# Patient Record
Sex: FEMALE | Race: WHITE | NOT HISPANIC OR LATINO | URBAN - METROPOLITAN AREA
[De-identification: names, ages, dates, MRNs, and addresses within clinical notes are randomized per-mention and may not be internally consistent; named-entity substitution may affect disease eponyms.]

---

## 2017-11-02 ENCOUNTER — INPATIENT (INPATIENT)
Facility: HOSPITAL | Age: 72
LOS: 10 days | Discharge: HOME | DRG: 233 | End: 2017-11-13
Attending: THORACIC SURGERY (CARDIOTHORACIC VASCULAR SURGERY) | Admitting: INTERNAL MEDICINE
Payer: MEDICARE

## 2017-11-02 ENCOUNTER — EMERGENCY (EMERGENCY)
Facility: HOSPITAL | Age: 72
LOS: 1 days | Discharge: ANOTHER HOSPITAL | End: 2017-11-02
Attending: EMERGENCY MEDICINE | Admitting: EMERGENCY MEDICINE
Payer: MEDICARE

## 2017-11-02 VITALS
SYSTOLIC BLOOD PRESSURE: 137 MMHG | DIASTOLIC BLOOD PRESSURE: 54 MMHG | TEMPERATURE: 98 F | OXYGEN SATURATION: 96 % | RESPIRATION RATE: 19 BRPM | HEART RATE: 86 BPM

## 2017-11-02 VITALS
WEIGHT: 190.92 LBS | HEART RATE: 87 BPM | OXYGEN SATURATION: 100 % | RESPIRATION RATE: 16 BRPM | SYSTOLIC BLOOD PRESSURE: 175 MMHG | DIASTOLIC BLOOD PRESSURE: 96 MMHG | HEIGHT: 67 IN

## 2017-11-02 VITALS
WEIGHT: 190.92 LBS | RESPIRATION RATE: 18 BRPM | DIASTOLIC BLOOD PRESSURE: 89 MMHG | HEIGHT: 67 IN | TEMPERATURE: 99 F | HEART RATE: 93 BPM | OXYGEN SATURATION: 96 % | SYSTOLIC BLOOD PRESSURE: 161 MMHG

## 2017-11-02 DIAGNOSIS — I21.4 NON-ST ELEVATION (NSTEMI) MYOCARDIAL INFARCTION: ICD-10-CM

## 2017-11-02 DIAGNOSIS — Z98.890 OTHER SPECIFIED POSTPROCEDURAL STATES: Chronic | ICD-10-CM

## 2017-11-02 DIAGNOSIS — Z90.710 ACQUIRED ABSENCE OF BOTH CERVIX AND UTERUS: Chronic | ICD-10-CM

## 2017-11-02 DIAGNOSIS — Z90.89 ACQUIRED ABSENCE OF OTHER ORGANS: Chronic | ICD-10-CM

## 2017-11-02 LAB
ALBUMIN SERPL ELPH-MCNC: 3.5 G/DL — SIGNIFICANT CHANGE UP (ref 3.3–5)
ALP SERPL-CCNC: 74 U/L — SIGNIFICANT CHANGE UP (ref 40–120)
ALT FLD-CCNC: 25 U/L — SIGNIFICANT CHANGE UP (ref 12–78)
ANION GAP SERPL CALC-SCNC: 13 MMOL/L — SIGNIFICANT CHANGE UP (ref 5–17)
ANION GAP SERPL CALC-SCNC: 18 MMOL/L — HIGH (ref 5–17)
APTT BLD: 30.9 SEC — SIGNIFICANT CHANGE UP (ref 27.5–37.4)
APTT BLD: 32.6 SEC — SIGNIFICANT CHANGE UP (ref 27.5–37.4)
AST SERPL-CCNC: 24 U/L — SIGNIFICANT CHANGE UP (ref 15–37)
BASOPHILS # BLD AUTO: 0.1 K/UL — SIGNIFICANT CHANGE UP (ref 0–0.2)
BASOPHILS NFR BLD AUTO: 1 % — SIGNIFICANT CHANGE UP (ref 0–2)
BILIRUB SERPL-MCNC: 0.7 MG/DL — SIGNIFICANT CHANGE UP (ref 0.2–1.2)
BUN SERPL-MCNC: 15 MG/DL — SIGNIFICANT CHANGE UP (ref 7–23)
BUN SERPL-MCNC: 16 MG/DL — SIGNIFICANT CHANGE UP (ref 7–23)
CALCIUM SERPL-MCNC: 8.3 MG/DL — LOW (ref 8.5–10.1)
CALCIUM SERPL-MCNC: 9.1 MG/DL — SIGNIFICANT CHANGE UP (ref 8.4–10.5)
CHLORIDE SERPL-SCNC: 108 MMOL/L — SIGNIFICANT CHANGE UP (ref 96–108)
CHLORIDE SERPL-SCNC: 98 MMOL/L — SIGNIFICANT CHANGE UP (ref 96–108)
CK MB BLD-MCNC: 2.8 % — SIGNIFICANT CHANGE UP (ref 0–3.5)
CK MB BLD-MCNC: 6.3 % — HIGH (ref 0–3.5)
CK MB CFR SERPL CALC: 1.3 NG/ML — SIGNIFICANT CHANGE UP (ref 0–3.6)
CK MB CFR SERPL CALC: 1.7 NG/ML — SIGNIFICANT CHANGE UP (ref 0–3.8)
CK SERPL-CCNC: 27 U/L — SIGNIFICANT CHANGE UP (ref 25–170)
CK SERPL-CCNC: 47 U/L — SIGNIFICANT CHANGE UP (ref 26–192)
CO2 SERPL-SCNC: 22 MMOL/L — SIGNIFICANT CHANGE UP (ref 22–31)
CO2 SERPL-SCNC: 25 MMOL/L — SIGNIFICANT CHANGE UP (ref 22–31)
CREAT SERPL-MCNC: 0.81 MG/DL — SIGNIFICANT CHANGE UP (ref 0.5–1.3)
CREAT SERPL-MCNC: 1.11 MG/DL — SIGNIFICANT CHANGE UP (ref 0.5–1.3)
D DIMER BLD IA.RAPID-MCNC: 659 NG/ML DDU — HIGH
EOSINOPHIL # BLD AUTO: 0.1 K/UL — SIGNIFICANT CHANGE UP (ref 0–0.5)
EOSINOPHIL NFR BLD AUTO: 1.3 % — SIGNIFICANT CHANGE UP (ref 0–6)
GLUCOSE SERPL-MCNC: 158 MG/DL — HIGH (ref 70–99)
GLUCOSE SERPL-MCNC: 85 MG/DL — SIGNIFICANT CHANGE UP (ref 70–99)
HCT VFR BLD CALC: 32.7 % — LOW (ref 34.5–45)
HCT VFR BLD CALC: 34.4 % — LOW (ref 34.5–45)
HGB BLD-MCNC: 10.9 G/DL — LOW (ref 11.5–15.5)
HGB BLD-MCNC: 11.2 G/DL — LOW (ref 11.5–15.5)
INR BLD: 1.15 RATIO — SIGNIFICANT CHANGE UP (ref 0.88–1.16)
INR BLD: 1.19 RATIO — HIGH (ref 0.88–1.16)
LYMPHOCYTES # BLD AUTO: 2 K/UL — SIGNIFICANT CHANGE UP (ref 1–3.3)
LYMPHOCYTES # BLD AUTO: 24 % — SIGNIFICANT CHANGE UP (ref 13–44)
MAGNESIUM SERPL-MCNC: 1.8 MG/DL — SIGNIFICANT CHANGE UP (ref 1.6–2.6)
MCHC RBC-ENTMCNC: 28.1 PG — SIGNIFICANT CHANGE UP (ref 27–34)
MCHC RBC-ENTMCNC: 29.6 PG — SIGNIFICANT CHANGE UP (ref 27–34)
MCHC RBC-ENTMCNC: 31.6 GM/DL — LOW (ref 32–36)
MCHC RBC-ENTMCNC: 34.1 GM/DL — SIGNIFICANT CHANGE UP (ref 32–36)
MCV RBC AUTO: 87 FL — SIGNIFICANT CHANGE UP (ref 80–100)
MCV RBC AUTO: 88.9 FL — SIGNIFICANT CHANGE UP (ref 80–100)
MONOCYTES # BLD AUTO: 0.6 K/UL — SIGNIFICANT CHANGE UP (ref 0–0.9)
MONOCYTES NFR BLD AUTO: 6.7 % — SIGNIFICANT CHANGE UP (ref 1–9)
NEUTROPHILS # BLD AUTO: 5.6 K/UL — SIGNIFICANT CHANGE UP (ref 1.8–7.4)
NEUTROPHILS NFR BLD AUTO: 67 % — SIGNIFICANT CHANGE UP (ref 43–77)
PHOSPHATE SERPL-MCNC: 4.6 MG/DL — HIGH (ref 2.5–4.5)
PLATELET # BLD AUTO: 346 K/UL — SIGNIFICANT CHANGE UP (ref 150–400)
PLATELET # BLD AUTO: 354 K/UL — SIGNIFICANT CHANGE UP (ref 150–400)
POTASSIUM SERPL-MCNC: 3.8 MMOL/L — SIGNIFICANT CHANGE UP (ref 3.5–5.3)
POTASSIUM SERPL-MCNC: 4.5 MMOL/L — SIGNIFICANT CHANGE UP (ref 3.5–5.3)
POTASSIUM SERPL-SCNC: 3.8 MMOL/L — SIGNIFICANT CHANGE UP (ref 3.5–5.3)
POTASSIUM SERPL-SCNC: 4.5 MMOL/L — SIGNIFICANT CHANGE UP (ref 3.5–5.3)
PROT SERPL-MCNC: 8.2 G/DL — SIGNIFICANT CHANGE UP (ref 6–8.3)
PROTHROM AB SERPL-ACNC: 12.6 SEC — SIGNIFICANT CHANGE UP (ref 9.8–12.7)
PROTHROM AB SERPL-ACNC: 13 SEC — HIGH (ref 9.8–12.7)
RBC # BLD: 3.76 M/UL — LOW (ref 3.8–5.2)
RBC # BLD: 3.87 M/UL — SIGNIFICANT CHANGE UP (ref 3.8–5.2)
RBC # FLD: 14.6 % — HIGH (ref 10.3–14.5)
RBC # FLD: 15.2 % — HIGH (ref 10.3–14.5)
SODIUM SERPL-SCNC: 141 MMOL/L — SIGNIFICANT CHANGE UP (ref 135–145)
SODIUM SERPL-SCNC: 143 MMOL/L — SIGNIFICANT CHANGE UP (ref 135–145)
TROPONIN I SERPL-MCNC: 0.09 NG/ML — HIGH (ref 0.01–0.04)
TROPONIN T SERPL-MCNC: <0.01 NG/ML — SIGNIFICANT CHANGE UP (ref 0–0.06)
WBC # BLD: 8.3 K/UL — SIGNIFICANT CHANGE UP (ref 3.8–10.5)
WBC # BLD: 8.3 K/UL — SIGNIFICANT CHANGE UP (ref 3.8–10.5)
WBC # FLD AUTO: 8.3 K/UL — SIGNIFICANT CHANGE UP (ref 3.8–10.5)
WBC # FLD AUTO: 8.3 K/UL — SIGNIFICANT CHANGE UP (ref 3.8–10.5)

## 2017-11-02 PROCEDURE — 93010 ELECTROCARDIOGRAM REPORT: CPT

## 2017-11-02 PROCEDURE — 82550 ASSAY OF CK (CPK): CPT

## 2017-11-02 PROCEDURE — 85027 COMPLETE CBC AUTOMATED: CPT

## 2017-11-02 PROCEDURE — 85610 PROTHROMBIN TIME: CPT

## 2017-11-02 PROCEDURE — 93458 L HRT ARTERY/VENTRICLE ANGIO: CPT | Mod: 26,GC

## 2017-11-02 PROCEDURE — 85730 THROMBOPLASTIN TIME PARTIAL: CPT

## 2017-11-02 PROCEDURE — 71275 CT ANGIOGRAPHY CHEST: CPT

## 2017-11-02 PROCEDURE — 94640 AIRWAY INHALATION TREATMENT: CPT

## 2017-11-02 PROCEDURE — 71045 X-RAY EXAM CHEST 1 VIEW: CPT

## 2017-11-02 PROCEDURE — 93005 ELECTROCARDIOGRAM TRACING: CPT

## 2017-11-02 PROCEDURE — 71275 CT ANGIOGRAPHY CHEST: CPT | Mod: 26

## 2017-11-02 PROCEDURE — 99223 1ST HOSP IP/OBS HIGH 75: CPT | Mod: GC

## 2017-11-02 PROCEDURE — 99285 EMERGENCY DEPT VISIT HI MDM: CPT

## 2017-11-02 PROCEDURE — 84484 ASSAY OF TROPONIN QUANT: CPT

## 2017-11-02 PROCEDURE — 99152 MOD SED SAME PHYS/QHP 5/>YRS: CPT | Mod: GC

## 2017-11-02 PROCEDURE — 99285 EMERGENCY DEPT VISIT HI MDM: CPT | Mod: 25

## 2017-11-02 PROCEDURE — 80053 COMPREHEN METABOLIC PANEL: CPT

## 2017-11-02 PROCEDURE — 96374 THER/PROPH/DIAG INJ IV PUSH: CPT | Mod: XU

## 2017-11-02 PROCEDURE — 82553 CREATINE MB FRACTION: CPT

## 2017-11-02 PROCEDURE — 71010: CPT | Mod: 26

## 2017-11-02 PROCEDURE — 85379 FIBRIN DEGRADATION QUANT: CPT

## 2017-11-02 PROCEDURE — 71010: CPT | Mod: 26,77

## 2017-11-02 RX ORDER — FUROSEMIDE 40 MG
40 TABLET ORAL ONCE
Qty: 0 | Refills: 0 | Status: COMPLETED | OUTPATIENT
Start: 2017-11-02 | End: 2017-11-02

## 2017-11-02 RX ORDER — ONDANSETRON 8 MG/1
4 TABLET, FILM COATED ORAL ONCE
Qty: 0 | Refills: 0 | Status: COMPLETED | OUTPATIENT
Start: 2017-11-02 | End: 2017-11-02

## 2017-11-02 RX ORDER — DEXTROSE 50 % IN WATER 50 %
12.5 SYRINGE (ML) INTRAVENOUS ONCE
Qty: 0 | Refills: 0 | Status: DISCONTINUED | OUTPATIENT
Start: 2017-11-02 | End: 2017-11-04

## 2017-11-02 RX ORDER — DEXTROSE 50 % IN WATER 50 %
25 SYRINGE (ML) INTRAVENOUS ONCE
Qty: 0 | Refills: 0 | Status: DISCONTINUED | OUTPATIENT
Start: 2017-11-02 | End: 2017-11-04

## 2017-11-02 RX ORDER — ASPIRIN/CALCIUM CARB/MAGNESIUM 324 MG
325 TABLET ORAL ONCE
Qty: 0 | Refills: 0 | Status: COMPLETED | OUTPATIENT
Start: 2017-11-02 | End: 2017-11-02

## 2017-11-02 RX ORDER — DEXTROSE 50 % IN WATER 50 %
1 SYRINGE (ML) INTRAVENOUS ONCE
Qty: 0 | Refills: 0 | Status: DISCONTINUED | OUTPATIENT
Start: 2017-11-02 | End: 2017-11-04

## 2017-11-02 RX ORDER — INSULIN LISPRO 100/ML
VIAL (ML) SUBCUTANEOUS AT BEDTIME
Qty: 0 | Refills: 0 | Status: DISCONTINUED | OUTPATIENT
Start: 2017-11-02 | End: 2017-11-04

## 2017-11-02 RX ORDER — IPRATROPIUM/ALBUTEROL SULFATE 18-103MCG
3 AEROSOL WITH ADAPTER (GRAM) INHALATION ONCE
Qty: 0 | Refills: 0 | Status: COMPLETED | OUTPATIENT
Start: 2017-11-02 | End: 2017-11-02

## 2017-11-02 RX ORDER — INFLUENZA VIRUS VACCINE 15; 15; 15; 15 UG/.5ML; UG/.5ML; UG/.5ML; UG/.5ML
0.5 SUSPENSION INTRAMUSCULAR ONCE
Qty: 0 | Refills: 0 | Status: DISCONTINUED | OUTPATIENT
Start: 2017-11-02 | End: 2017-11-09

## 2017-11-02 RX ORDER — INSULIN LISPRO 100/ML
VIAL (ML) SUBCUTANEOUS
Qty: 0 | Refills: 0 | Status: DISCONTINUED | OUTPATIENT
Start: 2017-11-02 | End: 2017-11-04

## 2017-11-02 RX ORDER — LANOLIN ALCOHOL/MO/W.PET/CERES
3 CREAM (GRAM) TOPICAL AT BEDTIME
Qty: 0 | Refills: 0 | Status: DISCONTINUED | OUTPATIENT
Start: 2017-11-02 | End: 2017-11-04

## 2017-11-02 RX ORDER — SODIUM CHLORIDE 9 MG/ML
3 INJECTION INTRAMUSCULAR; INTRAVENOUS; SUBCUTANEOUS ONCE
Qty: 0 | Refills: 0 | Status: COMPLETED | OUTPATIENT
Start: 2017-11-02 | End: 2017-11-02

## 2017-11-02 RX ORDER — SODIUM CHLORIDE 9 MG/ML
1000 INJECTION, SOLUTION INTRAVENOUS
Qty: 0 | Refills: 0 | Status: DISCONTINUED | OUTPATIENT
Start: 2017-11-02 | End: 2017-11-04

## 2017-11-02 RX ORDER — ATORVASTATIN CALCIUM 80 MG/1
80 TABLET, FILM COATED ORAL AT BEDTIME
Qty: 0 | Refills: 0 | Status: DISCONTINUED | OUTPATIENT
Start: 2017-11-02 | End: 2017-11-08

## 2017-11-02 RX ORDER — POTASSIUM CHLORIDE 20 MEQ
20 PACKET (EA) ORAL ONCE
Qty: 0 | Refills: 0 | Status: COMPLETED | OUTPATIENT
Start: 2017-11-02 | End: 2017-11-03

## 2017-11-02 RX ORDER — MAGNESIUM SULFATE 500 MG/ML
1 VIAL (ML) INJECTION ONCE
Qty: 0 | Refills: 0 | Status: COMPLETED | OUTPATIENT
Start: 2017-11-02 | End: 2017-11-03

## 2017-11-02 RX ORDER — ASPIRIN/CALCIUM CARB/MAGNESIUM 324 MG
81 TABLET ORAL DAILY
Qty: 0 | Refills: 0 | Status: DISCONTINUED | OUTPATIENT
Start: 2017-11-02 | End: 2017-11-04

## 2017-11-02 RX ORDER — GLUCAGON INJECTION, SOLUTION 0.5 MG/.1ML
1 INJECTION, SOLUTION SUBCUTANEOUS ONCE
Qty: 0 | Refills: 0 | Status: DISCONTINUED | OUTPATIENT
Start: 2017-11-02 | End: 2017-11-04

## 2017-11-02 RX ORDER — CLOPIDOGREL BISULFATE 75 MG/1
300 TABLET, FILM COATED ORAL ONCE
Qty: 0 | Refills: 0 | Status: COMPLETED | OUTPATIENT
Start: 2017-11-02 | End: 2017-11-02

## 2017-11-02 RX ADMIN — ONDANSETRON 4 MILLIGRAM(S): 8 TABLET, FILM COATED ORAL at 20:55

## 2017-11-02 RX ADMIN — Medication 3 MILLILITER(S): at 10:46

## 2017-11-02 RX ADMIN — CLOPIDOGREL BISULFATE 300 MILLIGRAM(S): 75 TABLET, FILM COATED ORAL at 16:31

## 2017-11-02 RX ADMIN — ATORVASTATIN CALCIUM 80 MILLIGRAM(S): 80 TABLET, FILM COATED ORAL at 23:47

## 2017-11-02 RX ADMIN — SODIUM CHLORIDE 3 MILLILITER(S): 9 INJECTION INTRAMUSCULAR; INTRAVENOUS; SUBCUTANEOUS at 10:46

## 2017-11-02 RX ADMIN — Medication 40 MILLIGRAM(S): at 15:34

## 2017-11-02 RX ADMIN — Medication 325 MILLIGRAM(S): at 10:46

## 2017-11-02 RX ADMIN — Medication 3 MILLIGRAM(S): at 23:47

## 2017-11-02 NOTE — CONSULT NOTE ADULT - ASSESSMENT
72 year old woman with a history of tobacco use, DM, HTN, poor historian presents for dyspnea.  - She is complaining of significant dyspnea and is volume overloaded I would give Lasix 40mg IV x 1  - She has anginal symptoms, with mildly elevated trops and ischemic EKG changes. She will need a cardiac cath.   - ASA 325mg x1  - Plavix 300mg x1  - transfer to [place] for cardiac cath. Pt understands and agrees with procedure.

## 2017-11-02 NOTE — ED PROVIDER NOTE - OBJECTIVE STATEMENT
pt c/o sob x 2 weeks with epigastric pain this am, now resolved. no fevers, chills, ha, d/n/v, cp, cough, edema, calf pain, travel.  pmd - katof  cards - none  pulm - none

## 2017-11-02 NOTE — CHART NOTE - NSCHARTNOTEFT_GEN_A_CORE
====================  HPI:  ====================  72 year old woman with a history of tobacco use, T2 DM unknown HgA1C, ?HTN on no medications  poor historian presented to [facility] with c/o  for dyspnea. She has been complaining of progressively worsening dyspnea on exertion for the last few months. Her exercise tolerance is limited to about half a block. LAst night the patient  felt dyspnea worsened  when laying down with chest pressure. + orthopnea, and went to [facility]. She was found to have an elevated D-  Dimer 659 but CT angio was negative for PE ( results below) Her Troponin I 0.093 was elevated  ws also elevated. Patient was given  mg, Plavix 300mg and Lasix 40 mg and transferred to [facility] for further evaluation. The patient currently denies any CP, palpitations, syncope or near syncope     PCI: 99% mLAD, 99% pCx. CTS consulted (Jackie)    ====================  VITALS:  ====================    ICU Vital Signs Last 24 Hrs  T(C): 36.6 (02 Nov 2017 17:29), Max: 37 (02 Nov 2017 09:34)  T(F): 97.8 (02 Nov 2017 17:29), Max: 98.6 (02 Nov 2017 09:34)  HR: 87 (02 Nov 2017 18:49) (82 - 93)  BP: 175/96 (02 Nov 2017 18:49) (132/57 - 175/96)  BP(mean): 122 (02 Nov 2017 18:49) (122 - 122)  ABP: --  ABP(mean): --  RR: 16 (02 Nov 2017 18:49) (16 - 19)  SpO2: 100% (02 Nov 2017 18:49) (96% - 100%)      I&O's Summary    ====================  LABS:  ====================                          10.9   8.3   )-----------( 354      ( 02 Nov 2017 10:44 )             34.4     11-02    143  |  108  |  15  ----------------------------<  85  4.5   |  22  |  0.81    Ca    8.3<L>      02 Nov 2017 10:44    TPro  8.2  /  Alb  3.5  /  TBili  0.7  /  DBili  x   /  AST  24  /  ALT  25  /  AlkPhos  74  11-02    PT/INR - ( 02 Nov 2017 10:44 )   PT: 12.6 sec;   INR: 1.15 ratio         PTT - ( 02 Nov 2017 10:44 )  PTT:32.6 sec  Creatine Kinase, Serum: 47 U/L (11-02-17 @ 10:44)        ====================  PLAN:  ====================  - c/w ASA. Holding plavix 2/2 CTS consult  - Remove Radial Band @ 1030PM  - Trend Little, EKGs  - eventual PCI vs. CTS

## 2017-11-02 NOTE — CHART NOTE - NSCHARTNOTEFT_GEN_A_CORE
====================  CCU MIDNIGHT ROUNDS  ====================    LUIGI MELARA  [id number]    ====================  SUMMARY:  ====================    72 year old woman with a history of tobacco use, T2 DM unknown HgA1C, ?HTN on no medications  poor historian presented to [facility] with c/o  for dyspnea. She has been complaining of progressively worsening dyspnea on exertion for the last few months. Her exercise tolerance is limited to about half a block. LAst night the patient  felt dyspnea worsened  when laying down with chest pressure. + orthopnea, and went to [facility]. She was found to have an elevated D-  Dimer 659 but CT angio was negative for PE ( results below) Her Troponin I 0.093 was elevated  ws also elevated. Patient was given  mg, Plavix 300mg and Lasix 40 mg and transferred to [facility] for further evaluation. The patient currently denies any CP, palpitations, syncope or near syncope     PCI: 99% mLAD, 99% pCx. CTS consulted (Jackie)    ====================  NEW EVENTS:  ====================    no events    ====================  VITALS (Last 12 hrs):  ====================    T(C): 36.6 (11-02-17 @ 20:30), Max: 36.6 (11-02-17 @ 17:29)  HR: 76 (11-02-17 @ 22:45) (76 - 87)  BP: 148/70 (11-02-17 @ 22:45) (132/57 - 175/96)  BP(mean): 96 (11-02-17 @ 22:45) (96 - 138)  ABP: --  ABP(mean): --  RR: 22 (11-02-17 @ 22:45) (16 - 34)  SpO2: 94% (11-02-17 @ 22:45) (91% - 100%)  Wt(kg): --  CVP(mm Hg): --  CO: --  CI: --  PA: --  PA(mean): --  PA(direct): --  PCWP: --  SVR: --    TELEMETRY:        *BLOOD GAS/ARTERIAL/MIXED/VENOUS  *LACTATE    I&O's Summary      ====================  PLAN:  # Neuro: AAOx3, no issues  # CAD s/p cath showing multivessel disease. CT Surgery consulted. Will continue medical treatement with aspirin and atorvastatin. After radial sheath is removed, heparin drip will be restarted in 6 hours. Plavix on hold for CT surgery evaluation. Will trend cardiac enzymes   # Endocrine: Ha1c pending, ISS. Holding home oral hypoglycemics     ====================    HEALTH ISSUES - PROBLEM Dx:        HEALTH ISSUES - R/O PROBLEM Dx:      Bernadette Balderas PGY2  Pager [phone]

## 2017-11-02 NOTE — ED ADULT NURSE NOTE - ED STAT RN HANDOFF DETAILS 2
received hand off report from Marichuy GALLARDO. pt awaiting transfer to [facility]. on cardiac monitor, NSR noted. denies pain at this time.

## 2017-11-02 NOTE — ED ADULT NURSE NOTE - OBJECTIVE STATEMENT
Patient is a and o x3. No acute distress noted. Placed on Cardiac monitor MSpencer Patient is a and o x3. No acute distress noted. Placed on Cardiac monitor Rufina  [id number] Report  given to True    Regarding transfer to [facility]. Patient advised of NPO status and accepting MD Haas. Kikoencer

## 2017-11-02 NOTE — H&P CARDIOLOGY - HISTORY OF PRESENT ILLNESS
72 year old woman with a history of tobacco use, T2 DM unknown HgA1C, ?HTN on no medications  poor historian presented to [facility] with c/o  for dyspnea. She has been complaining of progressively worsening dyspnea on exertion for the last few months. Her exercise tolerance is limited to about half a block. LAst night the patient  felt dyspnea worsened  when laying down with chest pressure. + orthopnea, and went to [facility]. She was found to have an elevated D-  Dimer 659 but CT angio was negative for PE ( results below) Her Troponin ws also elevated. Patient was given  mg, Plavix 300mg and LAsix 40 mg and transferred to [facility] for further evaluation. The patient currently denies any CP, palpitations, syncope or near syncope     < from: CT Angio Chest w/ IV Cont (11.02.17 @ 14:02) >  mpression:    No CT evidence for central pulmonary embolism as discussed.    CT findings compatible with interstitial pulmonary edema including small   bilateral pleural effusions, larger on the right.      < end of copied text > 72 year old woman with a history of tobacco use, T2 DM unknown HgA1C, ?HTN on no medications  poor historian presented to [facility] with c/o  for dyspnea. She has been complaining of progressively worsening dyspnea on exertion for the last few months. Her exercise tolerance is limited to about half a block. LAst night the patient  felt dyspnea worsened  when laying down with chest pressure. + orthopnea, and went to [facility]. She was found to have an elevated D-  Dimer 659 but CT angio was negative for PE ( results below) Her Troponin I was elevated  ws also elevated. Patient was given  mg, Plavix 300mg and Lasix 40 mg and transferred to [facility] for further evaluation. The patient currently denies any CP, palpitations, syncope or near syncope   Troponin I, Serum (11.02.17 @ 10:44)    Troponin I, Serum: .093: The new reference range for Troponin-I performed on the Siemens Vista  system is 0.015-0.045 ng/mL, which includes the 99th percentile of a  healthy reference population. Studies have shown that elevated troponin  levels above the 99th percentile cutoff are associated with an increased  risk for adverse cardiac events, with the risk increasing as troponin  levels increase. As per a joint committee of the American College of  Cardiology and European Society of Cardiology, diagnosis of classic MI is  based upon the detection of a rise or fall of cardiac troponin values,  with at least one value above the 99th percentile upper reference limit,  in the appropriate clinical context.  Troponin-I (ng/mL) Interpretation  0.00-0.045 Normal range (includes the 99th percentile of a healthy  reference population)  >0.045 Elevated troponin level indicating increased risk  Note: Troponin-I and Troponin-T cannot be used interchangeably in serial  measurements. Minimally elevated Troponin results should be interpreted  in the context of clinical findings and risk factors. ng/mL        < from: CT Angio Chest w/ IV Cont (11.02.17 @ 14:02) >  mpression:    No CT evidence for central pulmonary embolism as discussed.    CT findings compatible with interstitial pulmonary edema including small   bilateral pleural effusions, larger on the right.      < end of copied text > 72 year old woman with a history of tobacco use, T2 DM unknown HgA1C, ?HTN on no medications  poor historian presented to [facility] with c/o  for dyspnea. She has been complaining of progressively worsening dyspnea on exertion for the last few months. Her exercise tolerance is limited to about half a block. LAst night the patient  felt dyspnea worsened  when laying down with chest pressure. + orthopnea, and went to [facility]. She was found to have an elevated D-  Dimer 659 but CT angio was negative for PE ( results below) Her Troponin I 0.093 was elevated  ws also elevated. Patient was given  mg, Plavix 300mg and Lasix 40 mg and transferred to [facility] for further evaluation. The patient currently denies any CP, palpitations, syncope or near syncope             < from: CT Angio Chest w/ IV Cont (11.02.17 @ 14:02) >  mpression:    No CT evidence for central pulmonary embolism as discussed.    CT findings compatible with interstitial pulmonary edema including small   bilateral pleural effusions, larger on the right.      < end of copied text >    < from: Xray Chest 1 View AP/PA (11.02.17 @ 11:29) >  Mild vascular congestion present. Loss of definition left hemidiaphragm   and left costophrenic angle, effusion. Heart size appears enlarged   although there is magnification secondary to portable technique. Aortic   knob contains calcifications. No acute osseous abnormalities.    IMPRESSION:    < end of copied text >

## 2017-11-02 NOTE — CONSULT NOTE ADULT - SUBJECTIVE AND OBJECTIVE BOX
CHIEF COMPLAINT: Patient is a 72y old  Female who presents with a chief complaint of dyspnea    HPI:  72 year old woman with a history of tobacco use, DM, HTN, poor historian presents for dyspnea. She has been complaining of progressively worsening dyspnea on exertion for the last few weeks. Her exercise tolerance is limited to about half a block. She today felt dyspnea when laying down with chest pressure. + orthopnea, Denies any  PND, orthopnea, near syncope, syncope, lower extremity edema, stroke like symptoms.     EKG: SR with ST/T wave changes c/w lateral ischemia    REVIEW OF SYSTEMS:   All other review of systems are negative unless indicated above    PAST MEDICAL & SURGICAL HISTORY:  see listed    SOCIAL HISTORY:  + tobacco, denies ethanol, or drug abuse.    FAMILY HISTORY:    No family history of acute MI or sudden cardiac death.           Allergies    No Known Drug Allergies  Peroxide (Blisters)    Intolerances        Home meds:  Home Medications:        VITAL SIGNS:   Vital Signs Last 24 Hrs  T(C): 37 (02 Nov 2017 09:34), Max: 37 (02 Nov 2017 09:34)  T(F): 98.6 (02 Nov 2017 09:34), Max: 98.6 (02 Nov 2017 09:34)  HR: 82 (02 Nov 2017 12:02) (82 - 93)  BP: 132/57 (02 Nov 2017 12:02) (132/57 - 161/89)  BP(mean): --  RR: 18 (02 Nov 2017 12:02) (18 - 18)  SpO2: 96% (02 Nov 2017 12:02) (96% - 96%)    I&O's Summary      On Exam:    Constitutional: NAD, awake and alert, well-developed  HEENT: Moist Mucous Membranes, Anicteric  Pulmonary: Decreased breath sounds b/l. bibisilar crackles.   Cardiovascular: Regular, S1 and S2, No murmurs, rubs, gallops or clicks  Gastrointestinal: Bowel Sounds present, soft, nontender.   Lymph: trace peripheral edema. No lymphadenopathy.  Skin: No visible rashes or ulcers.  Psych:  Mood & affect appropriate    LABS: All Labs Reviewed:                        10.9   8.3   )-----------( 354      ( 02 Nov 2017 10:44 )             34.4     02 Nov 2017 10:44    143    |  108    |  15     ----------------------------<  85     4.5     |  22     |  0.81     Ca    8.3        02 Nov 2017 10:44    TPro  8.2    /  Alb  3.5    /  TBili  0.7    /  DBili  x      /  AST  24     /  ALT  25     /  AlkPhos  74     02 Nov 2017 10:44    PT/INR - ( 02 Nov 2017 10:44 )   PT: 12.6 sec;   INR: 1.15 ratio         PTT - ( 02 Nov 2017 10:44 )  PTT:32.6 sec  CARDIAC MARKERS ( 02 Nov 2017 10:44 )  .093 ng/mL / x     / 47 U/L / x     / 1.3 ng/mL      Blood Culture:         RADIOLOGY:  < from: CT Angio Chest w/ IV Cont (11.02.17 @ 14:02) >    EXAM:  CT ANGIO CHEST (W)AW IC                            PROCEDURE DATE:  11/02/2017          INTERPRETATION:  CLINICAL INFORMATION:  Respiratory difficulty.    PROCEDURE:  Using multislice helical technique,  CT angiography, 1.5 mm   sections were obtained  following the intravenous administration of 78   ccs of Omnipaque 350.  Multiplanar MIP reconstructed images were obtained.    FINDINGS:      There is no evidence of intraluminal filling defects to suggest central   or interlobar pulmonaryemboli.  Evaluation of the lower lobe segmental pulmonary artery branches is   limited secondary to artifact.    The heart is enlarged. There is arteriosclerotic calcification of the   thoracic aorta with coronary artery calcification. There is a small   anterior pericardial effusion.    There is a pretracheal mediastinal lymph nodes, measuring up to 10 mm in   short axis.  No enlarged retroperitoneal lymphadenopathy is noted.    There is a small to moderate right-sided pleural effusion and a small   left-sided pleural fusion.  There is bilateral interlobular septal thickening. Findings are   compatible with pulmonary interstitial edema.  No lobar lung consolidation is noted.    There is narrowing of the tracheal bronchial airways, which remain patent.    Limited images that include the upper abdomen suggests an enlarged liver   and spleen.  Partially visualized is calcification at the upper pole of the right   kidney.    There is an age indeterminant compression fracture deformity of theT11   vertebral body. Degenerative disc disease with intervertebral vacuum disc   phenomenon is present at T11-12.    There is a small nonspecific sclerotic focus right posterior seventh rib.    Impression:    No CT evidence for central pulmonary embolism as discussed.    CT findings compatible with interstitial pulmonary edema including small   bilateral pleural effusions, larger on the right.    Other findings as discussed above.                          ALVINO SIEGMANN M.D., ATTENDING RADIOLOGIST  This document has been electronically signed. Nov 2 2017  2:59PM                < end of copied text >

## 2017-11-03 DIAGNOSIS — I25.10 ATHEROSCLEROTIC HEART DISEASE OF NATIVE CORONARY ARTERY WITHOUT ANGINA PECTORIS: ICD-10-CM

## 2017-11-03 DIAGNOSIS — I50.9 HEART FAILURE, UNSPECIFIED: ICD-10-CM

## 2017-11-03 LAB
ANION GAP SERPL CALC-SCNC: 19 MMOL/L — HIGH (ref 5–17)
APPEARANCE UR: ABNORMAL
APTT BLD: 32.2 SEC — SIGNIFICANT CHANGE UP (ref 27.5–37.4)
APTT BLD: 35.9 SEC — SIGNIFICANT CHANGE UP (ref 27.5–37.4)
APTT BLD: 37.6 SEC — HIGH (ref 27.5–37.4)
BILIRUB UR-MCNC: NEGATIVE — SIGNIFICANT CHANGE UP
BLD GP AB SCN SERPL QL: NEGATIVE — SIGNIFICANT CHANGE UP
BUN SERPL-MCNC: 15 MG/DL — SIGNIFICANT CHANGE UP (ref 7–23)
CALCIUM SERPL-MCNC: 9.2 MG/DL — SIGNIFICANT CHANGE UP (ref 8.4–10.5)
CHLORIDE SERPL-SCNC: 99 MMOL/L — SIGNIFICANT CHANGE UP (ref 96–108)
CHOLEST SERPL-MCNC: 212 MG/DL — HIGH (ref 10–199)
CK MB CFR SERPL CALC: 1.7 NG/ML — SIGNIFICANT CHANGE UP (ref 0–3.8)
CK SERPL-CCNC: 26 U/L — SIGNIFICANT CHANGE UP (ref 25–170)
CO2 SERPL-SCNC: 25 MMOL/L — SIGNIFICANT CHANGE UP (ref 22–31)
COLOR SPEC: YELLOW — SIGNIFICANT CHANGE UP
CREAT SERPL-MCNC: 1.13 MG/DL — SIGNIFICANT CHANGE UP (ref 0.5–1.3)
DIFF PNL FLD: ABNORMAL
GLUCOSE BLDC GLUCOMTR-MCNC: 114 MG/DL — HIGH (ref 70–99)
GLUCOSE BLDC GLUCOMTR-MCNC: 117 MG/DL — HIGH (ref 70–99)
GLUCOSE BLDC GLUCOMTR-MCNC: 144 MG/DL — HIGH (ref 70–99)
GLUCOSE BLDC GLUCOMTR-MCNC: 187 MG/DL — HIGH (ref 70–99)
GLUCOSE SERPL-MCNC: 111 MG/DL — HIGH (ref 70–99)
GLUCOSE UR QL: NEGATIVE — SIGNIFICANT CHANGE UP
HBA1C BLD-MCNC: 5.2 % — SIGNIFICANT CHANGE UP (ref 4–5.6)
HBA1C BLD-MCNC: 5.2 % — SIGNIFICANT CHANGE UP (ref 4–5.6)
HCT VFR BLD CALC: 32.6 % — LOW (ref 34.5–45)
HCT VFR BLD CALC: 33.4 % — LOW (ref 34.5–45)
HDLC SERPL-MCNC: 37 MG/DL — LOW (ref 40–125)
HGB BLD-MCNC: 10.7 G/DL — LOW (ref 11.5–15.5)
HGB BLD-MCNC: 11 G/DL — LOW (ref 11.5–15.5)
INR BLD: 1.19 RATIO — HIGH (ref 0.88–1.16)
KETONES UR-MCNC: NEGATIVE — SIGNIFICANT CHANGE UP
LEUKOCYTE ESTERASE UR-ACNC: ABNORMAL
LIPID PNL WITH DIRECT LDL SERPL: 142 MG/DL — HIGH
MAGNESIUM SERPL-MCNC: 2.2 MG/DL — SIGNIFICANT CHANGE UP (ref 1.6–2.6)
MCHC RBC-ENTMCNC: 28.4 PG — SIGNIFICANT CHANGE UP (ref 27–34)
MCHC RBC-ENTMCNC: 28.6 PG — SIGNIFICANT CHANGE UP (ref 27–34)
MCHC RBC-ENTMCNC: 32.8 GM/DL — SIGNIFICANT CHANGE UP (ref 32–36)
MCHC RBC-ENTMCNC: 32.9 GM/DL — SIGNIFICANT CHANGE UP (ref 32–36)
MCV RBC AUTO: 86.4 FL — SIGNIFICANT CHANGE UP (ref 80–100)
MCV RBC AUTO: 86.9 FL — SIGNIFICANT CHANGE UP (ref 80–100)
NITRITE UR-MCNC: NEGATIVE — SIGNIFICANT CHANGE UP
NT-PROBNP SERPL-SCNC: 6079 PG/ML — HIGH (ref 0–300)
PH UR: 6 — SIGNIFICANT CHANGE UP (ref 5–8)
PHOSPHATE SERPL-MCNC: 5.3 MG/DL — HIGH (ref 2.5–4.5)
PLATELET # BLD AUTO: 310 K/UL — SIGNIFICANT CHANGE UP (ref 150–400)
PLATELET # BLD AUTO: 360 K/UL — SIGNIFICANT CHANGE UP (ref 150–400)
POTASSIUM SERPL-MCNC: 4.1 MMOL/L — SIGNIFICANT CHANGE UP (ref 3.5–5.3)
POTASSIUM SERPL-SCNC: 4.1 MMOL/L — SIGNIFICANT CHANGE UP (ref 3.5–5.3)
PROT UR-MCNC: SIGNIFICANT CHANGE UP
PROTHROM AB SERPL-ACNC: 13 SEC — HIGH (ref 9.8–12.7)
RBC # BLD: 3.77 M/UL — LOW (ref 3.8–5.2)
RBC # BLD: 3.85 M/UL — SIGNIFICANT CHANGE UP (ref 3.8–5.2)
RBC # FLD: 14.4 % — SIGNIFICANT CHANGE UP (ref 10.3–14.5)
RBC # FLD: 14.5 % — SIGNIFICANT CHANGE UP (ref 10.3–14.5)
RH IG SCN BLD-IMP: POSITIVE — SIGNIFICANT CHANGE UP
SODIUM SERPL-SCNC: 143 MMOL/L — SIGNIFICANT CHANGE UP (ref 135–145)
SP GR SPEC: 1.03 — HIGH (ref 1.01–1.02)
TOTAL CHOLESTEROL/HDL RATIO MEASUREMENT: 5.7 RATIO — SIGNIFICANT CHANGE UP (ref 3.3–7.1)
TRIGL SERPL-MCNC: 165 MG/DL — HIGH (ref 10–149)
TROPONIN T SERPL-MCNC: <0.01 NG/ML — SIGNIFICANT CHANGE UP (ref 0–0.06)
TSH SERPL-MCNC: 2.41 UIU/ML — SIGNIFICANT CHANGE UP (ref 0.27–4.2)
UROBILINOGEN FLD QL: NEGATIVE — SIGNIFICANT CHANGE UP
WBC # BLD: 10.7 K/UL — HIGH (ref 3.8–10.5)
WBC # BLD: 7.9 K/UL — SIGNIFICANT CHANGE UP (ref 3.8–10.5)
WBC # FLD AUTO: 10.7 K/UL — HIGH (ref 3.8–10.5)
WBC # FLD AUTO: 7.9 K/UL — SIGNIFICANT CHANGE UP (ref 3.8–10.5)

## 2017-11-03 PROCEDURE — 99233 SBSQ HOSP IP/OBS HIGH 50: CPT | Mod: GC

## 2017-11-03 PROCEDURE — 99291 CRITICAL CARE FIRST HOUR: CPT

## 2017-11-03 PROCEDURE — 93010 ELECTROCARDIOGRAM REPORT: CPT

## 2017-11-03 RX ORDER — FUROSEMIDE 40 MG
20 TABLET ORAL ONCE
Qty: 0 | Refills: 0 | Status: COMPLETED | OUTPATIENT
Start: 2017-11-03 | End: 2017-11-03

## 2017-11-03 RX ORDER — HEPARIN SODIUM 5000 [USP'U]/ML
INJECTION INTRAVENOUS; SUBCUTANEOUS
Qty: 25000 | Refills: 0 | Status: DISCONTINUED | OUTPATIENT
Start: 2017-11-03 | End: 2017-11-05

## 2017-11-03 RX ORDER — LISINOPRIL 2.5 MG/1
2.5 TABLET ORAL DAILY
Qty: 0 | Refills: 0 | Status: DISCONTINUED | OUTPATIENT
Start: 2017-11-03 | End: 2017-11-04

## 2017-11-03 RX ORDER — HEPARIN SODIUM 5000 [USP'U]/ML
6000 INJECTION INTRAVENOUS; SUBCUTANEOUS EVERY 6 HOURS
Qty: 0 | Refills: 0 | Status: DISCONTINUED | OUTPATIENT
Start: 2017-11-03 | End: 2017-11-05

## 2017-11-03 RX ADMIN — HEPARIN SODIUM 6000 UNIT(S): 5000 INJECTION INTRAVENOUS; SUBCUTANEOUS at 23:38

## 2017-11-03 RX ADMIN — ATORVASTATIN CALCIUM 80 MILLIGRAM(S): 80 TABLET, FILM COATED ORAL at 21:17

## 2017-11-03 RX ADMIN — HEPARIN SODIUM 1000 UNIT(S)/HR: 5000 INJECTION INTRAVENOUS; SUBCUTANEOUS at 07:55

## 2017-11-03 RX ADMIN — LISINOPRIL 2.5 MILLIGRAM(S): 2.5 TABLET ORAL at 21:28

## 2017-11-03 RX ADMIN — Medication 3 MILLIGRAM(S): at 21:16

## 2017-11-03 RX ADMIN — Medication 20 MILLIGRAM(S): at 14:22

## 2017-11-03 RX ADMIN — HEPARIN SODIUM 1300 UNIT(S)/HR: 5000 INJECTION INTRAVENOUS; SUBCUTANEOUS at 15:28

## 2017-11-03 RX ADMIN — Medication 100 GRAM(S): at 00:24

## 2017-11-03 RX ADMIN — Medication 81 MILLIGRAM(S): at 11:30

## 2017-11-03 RX ADMIN — Medication 20 MILLIEQUIVALENT(S): at 00:25

## 2017-11-03 RX ADMIN — HEPARIN SODIUM 1600 UNIT(S)/HR: 5000 INJECTION INTRAVENOUS; SUBCUTANEOUS at 23:28

## 2017-11-03 NOTE — CONSULT NOTE ADULT - PROBLEM SELECTOR RECOMMENDATION 9
- Continue with current medication regimen.   - Continue with Heparin gtt for ACS   - Diuresis   - TTE in AM   - Check P2 Y12 on AM and continue to hold Plavix   - Pre op Cardiac surgery work up in progress   - PFT's   - Carotid Duplex study  - Discussed with patient option and recommendation for cardiac surgery for revascularization.  Patient verbalized uncertainty to undergo procedure.  Risk vs Benefits explained at length to patient. OR tentative scheduled for Wednesday with Dr. Mejia; pending on patient to make a final decision.   Plan of care discussed with attending. - Continue with current medication regimen.   - Continue with Heparin gtt for ACS   - Continue with ASA   - Continue with high dose Statin   - Start low dose BB if patient able to tolerate  - Diuresis   - TTE in AM   - Check P2 Y12 on AM and continue to hold Plavix   - Pre op Cardiac surgery work up in progress   - PFT's   - Carotid Duplex study  - Discussed with patient option and recommendation for cardiac surgery for revascularization.  Patient verbalized uncertainty to undergo procedure.  Risk vs Benefits explained at length to patient. OR tentative scheduled for Wednesday with Dr. Mejia; pending on patient to make a final decision.   Plan of care discussed with attending. - Continue with current medication regimen.   - Continue with Heparin gtt for ACS   - Continue with ASA   - Continue with high dose Statin   - Start low dose BB if patient able to tolerate  - Diuresis   - TTE in AM   - Check P2 Y12 on AM and continue to hold Plavix   - Pre op Cardiac surgery work up in progress   - PFT's   - Carotid Duplex study  - Discussed with patient option and recommendation for cardiac surgery for revascularization.  Patient verbalized uncertainty to undergo procedure.  Risk vs Benefits explained at length to patient. OR tentatively scheduled for Wednesday with Dr. Mejia; pending on patient to make a final decision.   Plan of care discussed with attending.

## 2017-11-03 NOTE — CONSULT NOTE ADULT - SUBJECTIVE AND OBJECTIVE BOX
[facility] - Madison Herrera, Landon, Yusra, Jonn Smith  Office Number: [phone]    Initial Consult Note    CHIEF COMPLAINT: Patient is a 72y old  Female who presents with a chief complaint of dyspnea    HPI:  72 year old woman with a history of tobacco use, T2 DM unknown HgA1C, ?HTN on no medications  poor historian presented to [facility] with c/o  for dyspnea. She has been complaining of progressively worsening dyspnea on exertion for the last few months. Her exercise tolerance is limited to about half a block. LAst night the patient  felt dyspnea worsened  when laying down with chest pressure. + orthopnea, and went to [facility]. She was found to have an elevated D-  Dimer 659 but CT angio was negative for PE ( results below) Her Troponin I 0.093 was elevated  ws also elevated. Patient was given  mg, Plavix 300mg and Lasix 40 mg and transferred to [facility] for further evaluation. The patient currently denies any CP, palpitations, syncope or near syncope  She was seen by our practice yesterday at , and transferred to [place] for further management.  She is s/p cath with LCX and LAD lesions. She does not want to think about surgery at current time.  She denies chest pain, difficulty breathing and is feeling better overall. She is very nervous during my exam this morning.      < from: CT Angio Chest w/ IV Cont (11.02.17 @ 14:02) >  mpression:    No CT evidence for central pulmonary embolism as discussed.    CT findings compatible with interstitial pulmonary edema including small   bilateral pleural effusions, larger on the right.      < end of copied text >    < from: Xray Chest 1 View AP/PA (11.02.17 @ 11:29) >  Mild vascular congestion present. Loss of definition left hemidiaphragm   and left costophrenic angle, effusion. Heart size appears enlarged   although there is magnification secondary to portable technique. Aortic   knob contains calcifications. No acute osseous abnormalities.    IMPRESSION:    < end of copied text > (02 Nov 2017 18:49)      PAST MEDICAL & SURGICAL HISTORY:  Diabetes  S/P breast lumpectomy: left  History of tonsillectomy  H/O abdominal hysterectomy: 1991      SOCIAL HISTORY:  + tobacco, no drug use    FAMILY HISTORY:    No family history of acute MI or sudden cardiac death.    MEDICATIONS  (STANDING):  aspirin  chewable 81 milliGRAM(s) Oral daily  atorvastatin 80 milliGRAM(s) Oral at bedtime  dextrose 5%. 1000 milliLiter(s) (50 mL/Hr) IV Continuous <Continuous>  dextrose 50% Injectable 12.5 Gram(s) IV Push once  dextrose 50% Injectable 25 Gram(s) IV Push once  dextrose 50% Injectable 25 Gram(s) IV Push once  furosemide    Tablet 20 milliGRAM(s) Oral once  heparin  Infusion.  Unit(s)/Hr (10 mL/Hr) IV Continuous <Continuous>  influenza   Vaccine 0.5 milliLiter(s) IntraMuscular once  insulin lispro (HumaLOG) corrective regimen sliding scale   SubCutaneous three times a day before meals  insulin lispro (HumaLOG) corrective regimen sliding scale   SubCutaneous at bedtime  melatonin 3 milliGRAM(s) Oral at bedtime    MEDICATIONS  (PRN):  dextrose Gel 1 Dose(s) Oral once PRN Blood Glucose LESS THAN 70 milliGRAM(s)/deciliter  glucagon  Injectable 1 milliGRAM(s) IntraMuscular once PRN Glucose LESS THAN 70 milligrams/deciliter  heparin  Injectable 6000 Unit(s) IV Push every 6 hours PRN For aPTT less than 40      Allergies    No Known Drug Allergies  Peroxide (Blisters)    Intolerances        REVIEW OF SYSTEMS:    CONSTITUTIONAL: + weakness, fevers or chills  EYES/ENT: No visual changes;  No vertigo or throat pain   NECK: No pain or stiffness  RESPIRATORY: No cough, wheezing, hemoptysis; +shortness of breath  CARDIOVASCULAR: +chest pain or palpitations  GASTROINTESTINAL: No abdominal pain. No nausea, vomiting, or hematemesis; No diarrhea or constipation. No melena or hematochezia.  GENITOURINARY: No dysuria, frequency or hematuria  NEUROLOGICAL: No numbness or weakness, + anxiety  SKIN: No itching or rash  All other review of systems is negative unless indicated above    VITAL SIGNS:   Vital Signs Last 24 Hrs  T(C): 36.6 (03 Nov 2017 08:30), Max: 37.1 (03 Nov 2017 03:00)  T(F): 97.9 (03 Nov 2017 08:30), Max: 98.8 (03 Nov 2017 03:00)  HR: 78 (03 Nov 2017 12:00) (66 - 87)  BP: 130/63 (03 Nov 2017 12:00) (104/58 - 175/96)  BP(mean): 89 (03 Nov 2017 12:00) (68 - 138)  RR: 28 (03 Nov 2017 12:00) (15 - 34)  SpO2: 99% (03 Nov 2017 12:00) (91% - 100%)    I&O's Summary    02 Nov 2017 07:01  -  03 Nov 2017 07:00  --------------------------------------------------------  IN: 550 mL / OUT: 1100 mL / NET: -550 mL    03 Nov 2017 07:01  -  03 Nov 2017 13:26  --------------------------------------------------------  IN: 400 mL / OUT: 0 mL / NET: 400 mL        On Exam:    Constitutional: NAD, alert and oriented x 3  Lungs:  Non-labored, breath sounds are clear bilaterally, No wheezing, rales or rhonchi  Cardiovascular: RRR.  S1 and S2 positive.  No murmurs, rubs, gallops or clicks  Gastrointestinal: Bowel Sounds present, soft, nontender.   Lymph: No peripheral edema. No cervical lymphadenopathy.  Neurological: Alert, no focal deficits  Skin: No rashes or ulcers   Psych:  Mood & affect appropriate.    LABS: All Labs Reviewed:                        10.7   7.9   )-----------( 360      ( 03 Nov 2017 06:24 )             32.6                         11.2   8.3   )-----------( 346      ( 02 Nov 2017 22:59 )             32.7                         10.9   8.3   )-----------( 354      ( 02 Nov 2017 10:44 )             34.4     03 Nov 2017 06:26    143    |  99     |  15     ----------------------------<  111    4.1     |  25     |  1.13   02 Nov 2017 22:59    141    |  98     |  16     ----------------------------<  158    3.8     |  25     |  1.11   02 Nov 2017 10:44    143    |  108    |  15     ----------------------------<  85     4.5     |  22     |  0.81     Ca    9.2        03 Nov 2017 06:26  Ca    9.1        02 Nov 2017 22:59  Ca    8.3        02 Nov 2017 10:44  Phos  5.3       03 Nov 2017 06:26  Phos  4.6       02 Nov 2017 22:59  Mg     2.2       03 Nov 2017 06:26  Mg     1.8       02 Nov 2017 22:59    TPro  8.2    /  Alb  3.5    /  TBili  0.7    /  DBili  x      /  AST  24     /  ALT  25     /  AlkPhos  74     02 Nov 2017 10:44    PT/INR - ( 03 Nov 2017 06:25 )   PT: 13.0 sec;   INR: 1.19 ratio         PTT - ( 03 Nov 2017 06:25 )  PTT:32.2 sec  CARDIAC MARKERS ( 03 Nov 2017 06:26 )  x     / <0.01 ng/mL / 26 U/L / x     / 1.7 ng/mL  CARDIAC MARKERS ( 02 Nov 2017 22:59 )  x     / <0.01 ng/mL / 27 U/L / x     / 1.7 ng/mL  CARDIAC MARKERS ( 02 Nov 2017 10:44 )  .093 ng/mL / x     / 47 U/L / x     / 1.3 ng/mL      Blood Culture:     11-03 @ 05:50  TSH: 2.41      RADIOLOGY:    EKG: SR, LAE, LAD Qtc 480  TELE: no events, SR

## 2017-11-03 NOTE — CHART NOTE - NSCHARTNOTEFT_GEN_A_CORE
====================  CCU MIDNIGHT ROUNDS  ====================    LUIGI MELARA  [id number]    ====================  SUMMARY:  ====================    71 yo F w/PMH of T2DM, HTN, tobacco use who presented to [place] w/complain of dyspnea ruled out for PE, found to have TWI on EKG and positive troponin, transferred to [facility] for cardiac cath which demonstrated multi-vessel disease, pending CT surgery consult for possible CABG    ====================  NEW EVENTS:  ====================    no events     ====================  VITALS (Last 12 hrs):  ====================    T(C): 36.9 (11-03-17 @ 19:00), Max: 36.9 (11-03-17 @ 19:00)  HR: 96 (11-03-17 @ 20:00) (76 - 96)  BP: 147/71 (11-03-17 @ 20:00) (104/58 - 153/70)  BP(mean): 105 (11-03-17 @ 20:00) (68 - 105)  ABP: --  ABP(mean): --  RR: 25 (11-03-17 @ 20:00) (21 - 32)  SpO2: 97% (11-03-17 @ 20:00) (91% - 100%)  Wt(kg): --  CVP(mm Hg): --  CO: --  CI: --  PA: --  PA(mean): --  PA(direct): --  PCWP: --  SVR: --    TELEMETRY:        *BLOOD GAS/ARTERIAL/MIXED/VENOUS  *LACTATE    I&O's Summary    02 Nov 2017 07:01  -  03 Nov 2017 07:00  --------------------------------------------------------  IN: 550 mL / OUT: 1100 mL / NET: -550 mL    03 Nov 2017 07:01  -  03 Nov 2017 21:42  --------------------------------------------------------  IN: 985 mL / OUT: 100 mL / NET: 885 mL        ====================  PLAN:  #Neuro - AAOx3, no issues  #CV: NSTEMI s/p cardiac cath demonstrating 99% occlusion mLAD and 99% to pCx. Plan for CABG Wednesday. Continue ASA, statin, heparin gtt, lisinopril   #GI: DASH/TLC diet  #Endo: SSI and FSBG checks for DM  #DVT ppx: heparin gtt    ====================    HEALTH ISSUES - PROBLEM Dx:  CHF (congestive heart failure): CHF (congestive heart failure)  CAD (coronary artery disease): CAD (coronary artery disease)        HEALTH ISSUES - R/O PROBLEM Dx:      Bernadette Balderas PGY2  Pager [phone]/ [phone]

## 2017-11-03 NOTE — CONSULT NOTE ADULT - SUBJECTIVE AND OBJECTIVE BOX
History of Present Illness:  72y Female    Past Medical History  Diabetes      Past Surgical History  S/P breast lumpectomy: left  History of tonsillectomy  H/O abdominal hysterectomy: 1991      MEDICATIONS  (STANDING):  aspirin  chewable 81 milliGRAM(s) Oral daily  atorvastatin 80 milliGRAM(s) Oral at bedtime  heparin  Infusion.  Unit(s)/Hr (10 mL/Hr) IV Continuous <Continuous>  influenza  Vaccine 0.5 milliLiter(s) IntraMuscular once  insulin lispro (HumaLOG) corrective regimen sliding scale   SubCutaneous three times a day before meals  insulin lispro (HumaLOG) corrective regimen sliding scale   SubCutaneous at bedtime  melatonin 3 milliGRAM(s) Oral at bedtime    MEDICATIONS  (PRN):  dextrose Gel 1 Dose(s) Oral once PRN Blood Glucose LESS THAN 70 milliGRAM(s)/deciliter  glucagon  Injectable 1 milliGRAM(s) IntraMuscular once PRN Glucose LESS THAN 70 milligrams/deciliter  heparin  Injectable 6000 Unit(s) IV Push every 6 hours PRN For aPTT less than 40    Antiplatelet therapy:                           Last dose/amt:    Allergies: No Known Drug Allergies  Peroxide (Blisters)      SOCIAL HISTORY:  Smoker: [X ] Yes  [ ] No        PACK YEARS:                         WHEN QUIT?  ETOH use: [ ] Yes  [X ] No              FREQUENCY / QUANTITY:  Ilicit Drug use:  [ ] Yes  [X ] No  Occupation: Retired    Live with: Self   Assist device use: Denies     Relevant Family History  FAMILY HISTORY:      Review of Systems  GENERAL:  Fevers[] chills[] sweats[] fatigue[] weight loss[] weight gain []                                        NEURO:  parathesias[] seizures []  syncope []  confusion []                                                                                  EYES: glasses[]  blurry vision[]  discharge[] pain[] glaucoma []                                                                            ENMT:  difficulty hearing []  vertigo[]  dysphagia[] epistaxis[] recent dental work []                                      CV:  chest pain[] palpitations[] RANGEL [X] diaphoresis [] edema[]                                                                                             RESPIRATORY:  wheezing[] SOB[X] cough [] sputum[] hemoptysis[]                                                                    GI:  nausea[]  vomiting []  diarrhea[] constipation [] melena []                                                                        : hematuria[ ]  dysuria[ ] urgency[] incontinence[]                                                                                              MUSCULOSKELETAL  arthritis[ ]  joint swelling [ ] muscle weakness [ ]                                                                  SKIN/BREAST:  rash[ ] itching [ ]  hair loss[ ] masses[ ]                                                                                                PSYCH:  dementia [ ] depression [ ] anxiety[X ]                                                                                                                  HEME/LYMPH:  bruises easily[ ] enlarged lymph nodes[ ] tender lymph nodes[ ]                                                 ENDOCRINE:  cold intolerance[ ] heat intolerance[ ] polydipsia[ ]                                                                              PHYSICAL EXAM  Vital Signs Last 24 Hrs  T(C): 36.8 (03 Nov 2017 14:00), Max: 37.1 (03 Nov 2017 03:00)  T(F): 98.2 (03 Nov 2017 14:00), Max: 98.8 (03 Nov 2017 03:00)  HR: 80 (03 Nov 2017 15:00) (66 - 87)  BP: 132/79 (03 Nov 2017 15:00) (104/58 - 175/96)  BP(mean): 87 (03 Nov 2017 15:00) (68 - 138)  RR: 22 (03 Nov 2017 15:00) (15 - 34)  SpO2: 98% (03 Nov 2017 15:00) (91% - 100%)    General: Well nourished, well developed, no acute distress.                                                         Neuro: Normal exam oriented to person/place & time with no focal motor or sensory  deficits.                    Eyes: Normal exam of conjunctiva & lids, pupils equally reactive.   ENT: Normal exam of nasal/oral mucosa with absence of cyanosis.   Neck: Normal exam of jugular veins, trachea & thyroid.   Chest: Normal lung exam with good air movement absence of wheezes, rales, or rhonchi:                                                                          CV:  Auscultation: normal [X] S3[ ] S4[ ] Irregular [ ] Rub[ ] Clicks[ ]  Murmurs none:[X]systolic [ ]  diastolic [ ] holosystolic [ ]  Carotids: No Bruits[X ] Other____________ Abdominal Aorta: normal [X ] nonpalpable[X]                                                                         GI: Normal exam of abdomen, liver & spleen with no noted masses or tenderness.  (+) BS X 4 Quadrants, Nontender / Non Distended.                                                                                             Extremities: Normal no evidence of cyanosis or deformity Edema: none[ ]trace[X ]1+[ ]2+[ ]3+[ ]4+[ ]  Lower Extremity Pulses: Right[+2 ] Left[+2 ]Varicosities[0 ]  SKIN : Normal exam to inspection & palpation.                                                           LABS:                        11.0   10.7  )-----------( 310      ( 03 Nov 2017 14:49 )             33.4     11-03    143  |  99  |  15  ----------------------------<  111<H>  4.1   |  25  |  1.13    Ca    9.2      03 Nov 2017 06:26  Phos  5.3     11-03  Mg     2.2     11-03    TPro  8.2  /  Alb  3.5  /  TBili  0.7  /  DBili  x   /  AST  24  /  ALT  25  /  AlkPhos  74  11-02    PT/INR - ( 03 Nov 2017 06:25 )   PT: 13.0 sec;   INR: 1.19 ratio         PTT - ( 03 Nov 2017 14:49 )  PTT:35.9 sec    CARDIAC MARKERS ( 03 Nov 2017 06:26 )  x     / <0.01 ng/mL / 26 U/L / x     / 1.7 ng/mL  CARDIAC MARKERS ( 02 Nov 2017 22:59 )  x     / <0.01 ng/mL / 27 U/L / x     / 1.7 ng/mL  CARDIAC MARKERS ( 02 Nov 2017 10:44 )  .093 ng/mL / x     / 47 U/L / x     / 1.3 ng/mL          Cardiac Cath: VENTRICLES: EF estimated was 20 %. CORONARY VESSELS: The coronary circulation is right dominant.  LM: Angiography showed minor luminal irregularities with no flow  limiting lesions. Mid LAD: There was a diffuse 90 % stenosis. Mid circumflex: There was a 100 % stenosis.  Distal circumflex: There was a 100 % stenosis. OM1: There was a 90 % stenosis.  RCA: Angiography showed minor luminal irregularities with no flow limiting lesions.      TTE / LUC: History of Present Illness:  72y Female presents with PMHx of current smoker, and DM2 who presented to [facility] with c/o worsening dyspnea. She was found to have an elevated D- Dimer 659 but CT angio was negative for PE.  Also was found to have (+) Troponin. Patient was given  mg, Plavix 300mg and Lasix 40 mg and on 11/2 transferred to [facility] for further evaluation for NSTEMI in the setting of dyspnea, S/P cardiac cath finding revealed multivessel CAD; EF 20%;  99% stenosis mLAD, 99% pCx. CT surgery consult called to evaluate patient for cardiac surgery.     Past Medical History  Diabetes      Past Surgical History  S/P breast lumpectomy: left  History of tonsillectomy  H/O abdominal hysterectomy: 1991      MEDICATIONS  (STANDING):  aspirin  chewable 81 milliGRAM(s) Oral daily  atorvastatin 80 milliGRAM(s) Oral at bedtime  heparin  Infusion.  Unit(s)/Hr (10 mL/Hr) IV Continuous <Continuous>  influenza  Vaccine 0.5 milliLiter(s) IntraMuscular once  insulin lispro (HumaLOG) corrective regimen sliding scale   SubCutaneous three times a day before meals  insulin lispro (HumaLOG) corrective regimen sliding scale   SubCutaneous at bedtime  melatonin 3 milliGRAM(s) Oral at bedtime    MEDICATIONS  (PRN):  dextrose Gel 1 Dose(s) Oral once PRN Blood Glucose LESS THAN 70 milliGRAM(s)/deciliter  glucagon  Injectable 1 milliGRAM(s) IntraMuscular once PRN Glucose LESS THAN 70 milligrams/deciliter  heparin  Injectable 6000 Unit(s) IV Push every 6 hours PRN For aPTT less than 40    Antiplatelet therapy: Plavix 300 mg                    Last dose/amt: 11/2/17    Allergies: No Known Drug Allergies  Peroxide (Blisters)      SOCIAL HISTORY:  Smoker: [X ] Yes  [ ] No        PACK YEARS:  28 pk/yrs                       WHEN QUIT?  ETOH use: [ ] Yes  [X ] No              FREQUENCY / QUANTITY:  Ilicit Drug use:  [ ] Yes  [X ] No  Occupation: Retired    Live with: Self   Assist device use: Denies     Relevant Family History  FAMILY HISTORY:      Review of Systems  GENERAL:  Fevers[] chills[] sweats[] fatigue[] weight loss[] weight gain []                                        NEURO:  parathesias[] seizures []  syncope []  confusion []                                                                                  EYES: glasses[]  blurry vision[]  discharge[] pain[] glaucoma []                                                                            ENMT:  difficulty hearing []  vertigo[]  dysphagia[] epistaxis[] recent dental work []                                      CV:  chest pain[] palpitations[] RANGEL [X] diaphoresis [] edema[]                                                                                             RESPIRATORY:  wheezing[] SOB[X] cough [] sputum[] hemoptysis[]                                                                    GI:  nausea[]  vomiting []  diarrhea[] constipation [] melena []                                                                        : hematuria[ ]  dysuria[ ] urgency[] incontinence[]                                                                                              MUSCULOSKELETAL  arthritis[ ]  joint swelling [ ] muscle weakness [ ]                                                                  SKIN/BREAST:  rash[ ] itching [ ]  hair loss[ ] masses[ ]                                                                                                PSYCH:  dementia [ ] depression [ ] anxiety[X ]                                                                                                                  HEME/LYMPH:  bruises easily[ ] enlarged lymph nodes[ ] tender lymph nodes[ ]                                                 ENDOCRINE:  cold intolerance[ ] heat intolerance[ ] polydipsia[ ]                                                                              PHYSICAL EXAM  Vital Signs Last 24 Hrs  T(C): 36.8 (03 Nov 2017 14:00), Max: 37.1 (03 Nov 2017 03:00)  T(F): 98.2 (03 Nov 2017 14:00), Max: 98.8 (03 Nov 2017 03:00)  HR: 80 (03 Nov 2017 15:00) (66 - 87)  BP: 132/79 (03 Nov 2017 15:00) (104/58 - 175/96)  BP(mean): 87 (03 Nov 2017 15:00) (68 - 138)  RR: 22 (03 Nov 2017 15:00) (15 - 34)  SpO2: 98% (03 Nov 2017 15:00) (91% - 100%)    General: Well nourished, well developed, no acute distress.                                                         Neuro: Normal exam oriented to person/place & time with no focal motor or sensory  deficits.                    Eyes: Normal exam of conjunctiva & lids, pupils equally reactive.   ENT: Normal exam of nasal/oral mucosa with absence of cyanosis.   Neck: Normal exam of jugular veins, trachea & thyroid.   Chest: Normal lung exam with good air movement absence of wheezes, rales, or rhonchi:                                                                          CV:  Auscultation: normal [X] S3[ ] S4[ ] Irregular [ ] Rub[ ] Clicks[ ]  Murmurs none:[X]systolic [ ]  diastolic [ ] holosystolic [ ]  Carotids: No Bruits[X ] Other____________ Abdominal Aorta: normal [X ] nonpalpable[X]                                                                         GI: Normal exam of abdomen, liver & spleen with no noted masses or tenderness.  (+) BS X 4 Quadrants, Nontender / Non Distended.                                                                                             Extremities: Normal no evidence of cyanosis or deformity Edema: none[ ]trace[X ]1+[ ]2+[ ]3+[ ]4+[ ]  Lower Extremity Pulses: Right[+2 ] Left[+2 ]Varicosities[0 ]  SKIN : Normal exam to inspection & palpation.                                                           LABS:                        11.0   10.7  )-----------( 310      ( 03 Nov 2017 14:49 )             33.4     11-03    143  |  99  |  15  ----------------------------<  111<H>  4.1   |  25  |  1.13    Ca    9.2      03 Nov 2017 06:26  Phos  5.3     11-03  Mg     2.2     11-03    TPro  8.2  /  Alb  3.5  /  TBili  0.7  /  DBili  x   /  AST  24  /  ALT  25  /  AlkPhos  74  11-02    PT/INR - ( 03 Nov 2017 06:25 )   PT: 13.0 sec;   INR: 1.19 ratio         PTT - ( 03 Nov 2017 14:49 )  PTT:35.9 sec    CARDIAC MARKERS ( 03 Nov 2017 06:26 )  x     / <0.01 ng/mL / 26 U/L / x     / 1.7 ng/mL  CARDIAC MARKERS ( 02 Nov 2017 22:59 )  x     / <0.01 ng/mL / 27 U/L / x     / 1.7 ng/mL  CARDIAC MARKERS ( 02 Nov 2017 10:44 )  .093 ng/mL / x     / 47 U/L / x     / 1.3 ng/mL      Cardiac Cath: VENTRICLES: EF estimated was 20 %. CORONARY VESSELS: The coronary circulation is right dominant.  LM: Angiography showed minor luminal irregularities with no flow  limiting lesions. Mid LAD: There was a diffuse 90 % stenosis. Mid circumflex: There was a 100 % stenosis.  Distal circumflex: There was a 100 % stenosis. OM1: There was a 90 % stenosis.  RCA: Angiography showed minor luminal irregularities with no flow limiting lesions.

## 2017-11-03 NOTE — PROGRESS NOTE ADULT - ASSESSMENT
71 yo F w/PMH of T2DM, HTN, tobacco use who presented to [place] w/complain of dyspnea ruled out for PE, found to have TWI on EKG and positive troponin, transferred to [facility] for cardiac cath which demonstrated multi-vessel disease, pending CT surgery consult for possible CABG      #Neuro:   -     #CV:   - patient presented with NSTEMI s/p cardiac cath demonstrating 99% occlusion mLAD and 99% to pCx  - CT surgery consulted regarding possible CABG, recommendations pending  - c/w ASA, statin, heparin gtt    #Resp:   -     #GI:   -     #ID:  - no issues    #Metabolic/Renal  - patient with normal renal function, no issues    #Heme  - patient with mild baseline anemia  - H/H stable    #Endo:   - c/w SSI and FSBG checks for DM  - f/u A1c, TSH    #DVT ppx: heparin gtt 71 yo F w/PMH of T2DM, HTN, tobacco use who presented to [place] w/complain of dyspnea ruled out for PE, found to have TWI on EKG and positive troponin, transferred to [facility] for cardiac cath which demonstrated multi-vessel disease, pending CT surgery consult for possible CABG      #Neuro:   - AAOx3, no issues    #CV:   - patient presented with NSTEMI s/p cardiac cath demonstrating 99% occlusion mLAD and 99% to pCx  - CT surgery consulted regarding possible CABG, recommendations pending  - c/w ASA, statin, heparin gtt    #Resp:   - patient denies dysnpea at rest this morning, but reports feeling short of breath with movement  - O2 sat stable on O2 by NC  - CTA at OSH negative for PE    #GI:   - c/w DASH/TLC diet    #ID:  - no issues    #Metabolic/Renal  - patient with normal renal function, no issues    #Heme  - patient with mild baseline anemia  - H/H stable    #Endo:   - c/w SSI and FSBG checks for DM  - f/u A1c, TSH    #DVT ppx: heparin gtt

## 2017-11-03 NOTE — CONSULT NOTE ADULT - ASSESSMENT
72 year old woman with a history of tobacco use, DM, HTN, poor historian presents for dyspnea and NSTEMI. Initially she was found to be in heart failure, and is s/p IV Lasix.   She is more euvolemic on exam today, though I would give her a single dose of lasix 20 IV and watch output  She is s/p Cath with significant two vessel disease.   CTS to evaluate  TTE to evaluate LV function and MV disease  Continue with ASA and Heparin gtt. It is ok to hold Plavix in preparation for possible surgery.  Continue with Statin  Will further discuss options with patient, after surgical evaluation.

## 2017-11-03 NOTE — PROGRESS NOTE ADULT - SUBJECTIVE AND OBJECTIVE BOX
CONTACT INFO:  KAREN Gross  Pager:  [phone]      HPI / INTERVAL HISTORY:    Patient transferred from [facility] yesterday for NSTEMI in the setting of dyspnea. She underwent cardiac cath which showed 99% stenosis mLAD, 99% pCx. CT surgery was consulted. Awaiting recommendations, Plavix on hold pending decision.    OBJECTIVE:  VITAL SIGNS:  ICU Vital Signs Last 24 Hrs  T(C): 37.1 (03 Nov 2017 03:00), Max: 37.1 (03 Nov 2017 03:00)  T(F): 98.8 (03 Nov 2017 03:00), Max: 98.8 (03 Nov 2017 03:00)  HR: 76 (03 Nov 2017 07:00) (66 - 93)  BP: 144/86 (03 Nov 2017 07:00) (119/62 - 175/96)  BP(mean): 95 (03 Nov 2017 07:00) (82 - 138)  ABP: --  ABP(mean): --  RR: 15 (03 Nov 2017 05:00) (15 - 34)  SpO2: 96% (03 Nov 2017 07:00) (91% - 100%)    11-02 @ 07:01  -  11-03 @ 07:00  --------------------------------------------------------  IN: 540 mL / OUT: 1100 mL / NET: -560 mL    PHYSICAL EXAM:  Gen:   HEENT: NC/AT; PERRL, anicteric sclera  Neck: supple, no JVD  Resp: clear to ausculation B/L; no wheezes, rales or rhonchi  Cardiovasc: S1S2 normal; RRR; no murmurs, rubs or gallops  GI: soft, nondistended, nontender; +BS  Extr: warm, well-perfused, PT/DP pulses 2+ B/L; no LE edema  Skin: normal color and turgor  Neuro:     LABS:                        10.7   7.9   )-----------( 360      ( 03 Nov 2017 06:24 )             32.6     11-03    143  |  99  |  15  ----------------------------<  111<H>  4.1   |  25  |  1.13    Ca    9.2      03 Nov 2017 06:26  Phos  5.3     11-03  Mg     2.2     11-03    TPro  8.2  /  Alb  3.5  /  TBili  0.7  /  DBili  x   /  AST  24  /  ALT  25  /  AlkPhos  74  11-02    LIVER FUNCTIONS - ( 02 Nov 2017 10:44 )  Alb: 3.5 g/dL / Pro: 8.2 g/dL / ALK PHOS: 74 U/L / ALT: 25 U/L / AST: 24 U/L / GGT: x           PT/INR - ( 03 Nov 2017 06:25 )   PT: 13.0 sec;   INR: 1.19 ratio    PTT - ( 03 Nov 2017 06:25 )  PTT:32.2 sec    CARDIAC MARKERS ( 03 Nov 2017 06:26 )  x     / <0.01 ng/mL / 26 U/L / x     / 1.7 ng/mL  CARDIAC MARKERS ( 02 Nov 2017 22:59 )  x     / <0.01 ng/mL / 27 U/L / x     / 1.7 ng/mL  CARDIAC MARKERS ( 02 Nov 2017 10:44 )  .093 ng/mL / x     / 47 U/L / x     / 1.3 ng/mL    RADIOLOGY & ADDITIONAL TESTS:     MEDICATIONS:  aspirin  chewable 81 milliGRAM(s) Oral daily  atorvastatin 80 milliGRAM(s) Oral at bedtime  dextrose 5%. 1000 milliLiter(s) IV Continuous <Continuous>  dextrose 50% Injectable 12.5 Gram(s) IV Push once  dextrose 50% Injectable 25 Gram(s) IV Push once  dextrose 50% Injectable 25 Gram(s) IV Push once  dextrose Gel 1 Dose(s) Oral once PRN  glucagon  Injectable 1 milliGRAM(s) IntraMuscular once PRN  heparin  Infusion.  Unit(s)/Hr IV Continuous <Continuous>  heparin  Injectable 6000 Unit(s) IV Push every 6 hours PRN  influenza   Vaccine 0.5 milliLiter(s) IntraMuscular once  insulin lispro (HumaLOG) corrective regimen sliding scale   SubCutaneous three times a day before meals  insulin lispro (HumaLOG) corrective regimen sliding scale   SubCutaneous at bedtime  melatonin 3 milliGRAM(s) Oral at bedtime      ALLERGIES:  No Known Drug Allergies  Peroxide (Blisters) CONTACT INFO:  KAREN Gross  Pager:  [phone]      HPI / INTERVAL HISTORY:    Patient transferred from [facility] yesterday for NSTEMI in the setting of dyspnea. She underwent cardiac cath which showed 99% stenosis mLAD, 99% pCx. CT surgery was consulted. Awaiting recommendations, Plavix on hold pending decision. Patient states this morning she hates doctors and hospitals and says she is a difficult patient. States she does not want to undergo surgical intervention.    OBJECTIVE:  VITAL SIGNS:  ICU Vital Signs Last 24 Hrs  T(C): 37.1 (03 Nov 2017 03:00), Max: 37.1 (03 Nov 2017 03:00)  T(F): 98.8 (03 Nov 2017 03:00), Max: 98.8 (03 Nov 2017 03:00)  HR: 76 (03 Nov 2017 07:00) (66 - 93)  BP: 144/86 (03 Nov 2017 07:00) (119/62 - 175/96)  BP(mean): 95 (03 Nov 2017 07:00) (82 - 138)  ABP: --  ABP(mean): --  RR: 15 (03 Nov 2017 05:00) (15 - 34)  SpO2: 96% (03 Nov 2017 07:00) (91% - 100%)    11-02 @ 07:01  -  11-03 @ 07:00  --------------------------------------------------------  IN: 540 mL / OUT: 1100 mL / NET: -560 mL    PHYSICAL EXAM:  Gen: NAD  HEENT: NC/AT; PERRL, anicteric sclera  Neck: supple, no JVD  Resp: clear to ausculation B/L; no wheezes, rales or rhonchi  Cardiovasc: S1S2 normal; RRR; no murmurs, rubs or gallops  GI: soft, nondistended, nontender; +BS  Extr: warm, well-perfused, PT/DP pulses 2+ B/L; no LE edema  Skin: normal color and turgor  Neuro: AAOx3    LABS:                        10.7   7.9   )-----------( 360      ( 03 Nov 2017 06:24 )             32.6     11-03    143  |  99  |  15  ----------------------------<  111<H>  4.1   |  25  |  1.13    Ca    9.2      03 Nov 2017 06:26  Phos  5.3     11-03  Mg     2.2     11-03    TPro  8.2  /  Alb  3.5  /  TBili  0.7  /  DBili  x   /  AST  24  /  ALT  25  /  AlkPhos  74  11-02    LIVER FUNCTIONS - ( 02 Nov 2017 10:44 )  Alb: 3.5 g/dL / Pro: 8.2 g/dL / ALK PHOS: 74 U/L / ALT: 25 U/L / AST: 24 U/L / GGT: x           PT/INR - ( 03 Nov 2017 06:25 )   PT: 13.0 sec;   INR: 1.19 ratio    PTT - ( 03 Nov 2017 06:25 )  PTT:32.2 sec    CARDIAC MARKERS ( 03 Nov 2017 06:26 )  x     / <0.01 ng/mL / 26 U/L / x     / 1.7 ng/mL  CARDIAC MARKERS ( 02 Nov 2017 22:59 )  x     / <0.01 ng/mL / 27 U/L / x     / 1.7 ng/mL  CARDIAC MARKERS ( 02 Nov 2017 10:44 )  .093 ng/mL / x     / 47 U/L / x     / 1.3 ng/mL    RADIOLOGY & ADDITIONAL TESTS:     MEDICATIONS:  aspirin  chewable 81 milliGRAM(s) Oral daily  atorvastatin 80 milliGRAM(s) Oral at bedtime  dextrose 5%. 1000 milliLiter(s) IV Continuous <Continuous>  dextrose 50% Injectable 12.5 Gram(s) IV Push once  dextrose 50% Injectable 25 Gram(s) IV Push once  dextrose 50% Injectable 25 Gram(s) IV Push once  dextrose Gel 1 Dose(s) Oral once PRN  glucagon  Injectable 1 milliGRAM(s) IntraMuscular once PRN  heparin  Infusion.  Unit(s)/Hr IV Continuous <Continuous>  heparin  Injectable 6000 Unit(s) IV Push every 6 hours PRN  influenza   Vaccine 0.5 milliLiter(s) IntraMuscular once  insulin lispro (HumaLOG) corrective regimen sliding scale   SubCutaneous three times a day before meals  insulin lispro (HumaLOG) corrective regimen sliding scale   SubCutaneous at bedtime  melatonin 3 milliGRAM(s) Oral at bedtime      ALLERGIES:  No Known Drug Allergies  Peroxide (Blisters)

## 2017-11-04 LAB
ANION GAP SERPL CALC-SCNC: 18 MMOL/L — HIGH (ref 5–17)
APTT BLD: 26.5 SEC — LOW (ref 27.5–37.4)
APTT BLD: 58 SEC — HIGH (ref 27.5–37.4)
BUN SERPL-MCNC: 17 MG/DL — SIGNIFICANT CHANGE UP (ref 7–23)
CALCIUM SERPL-MCNC: 8.8 MG/DL — SIGNIFICANT CHANGE UP (ref 8.4–10.5)
CHLORIDE SERPL-SCNC: 97 MMOL/L — SIGNIFICANT CHANGE UP (ref 96–108)
CK MB BLD-MCNC: 3.5 % — SIGNIFICANT CHANGE UP (ref 0–3.5)
CK MB CFR SERPL CALC: 1.2 NG/ML — SIGNIFICANT CHANGE UP (ref 0–3.8)
CK SERPL-CCNC: 34 U/L — SIGNIFICANT CHANGE UP (ref 25–170)
CO2 SERPL-SCNC: 24 MMOL/L — SIGNIFICANT CHANGE UP (ref 22–31)
CREAT SERPL-MCNC: 1.21 MG/DL — SIGNIFICANT CHANGE UP (ref 0.5–1.3)
GAS PNL BLDA: SIGNIFICANT CHANGE UP
GLUCOSE BLDC GLUCOMTR-MCNC: 133 MG/DL — HIGH (ref 70–99)
GLUCOSE BLDC GLUCOMTR-MCNC: 161 MG/DL — HIGH (ref 70–99)
GLUCOSE BLDC GLUCOMTR-MCNC: 212 MG/DL — HIGH (ref 70–99)
GLUCOSE SERPL-MCNC: 174 MG/DL — HIGH (ref 70–99)
HCT VFR BLD CALC: 31.7 % — LOW (ref 34.5–45)
HGB BLD-MCNC: 10.8 G/DL — LOW (ref 11.5–15.5)
MAGNESIUM SERPL-MCNC: 2 MG/DL — SIGNIFICANT CHANGE UP (ref 1.6–2.6)
MCHC RBC-ENTMCNC: 29.2 PG — SIGNIFICANT CHANGE UP (ref 27–34)
MCHC RBC-ENTMCNC: 33.9 GM/DL — SIGNIFICANT CHANGE UP (ref 32–36)
MCV RBC AUTO: 86.2 FL — SIGNIFICANT CHANGE UP (ref 80–100)
MRSA PCR RESULT.: SIGNIFICANT CHANGE UP
PA ADP PRP-ACNC: 278 PRU — SIGNIFICANT CHANGE UP (ref 194–417)
PHOSPHATE SERPL-MCNC: 4.1 MG/DL — SIGNIFICANT CHANGE UP (ref 2.5–4.5)
PLATELET # BLD AUTO: 323 K/UL — SIGNIFICANT CHANGE UP (ref 150–400)
POTASSIUM SERPL-MCNC: 3.5 MMOL/L — SIGNIFICANT CHANGE UP (ref 3.5–5.3)
POTASSIUM SERPL-SCNC: 3.5 MMOL/L — SIGNIFICANT CHANGE UP (ref 3.5–5.3)
RBC # BLD: 3.68 M/UL — LOW (ref 3.8–5.2)
RBC # FLD: 14.3 % — SIGNIFICANT CHANGE UP (ref 10.3–14.5)
S AUREUS DNA NOSE QL NAA+PROBE: SIGNIFICANT CHANGE UP
SODIUM SERPL-SCNC: 139 MMOL/L — SIGNIFICANT CHANGE UP (ref 135–145)
TROPONIN T SERPL-MCNC: <0.01 NG/ML — SIGNIFICANT CHANGE UP (ref 0–0.06)
WBC # BLD: 13 K/UL — HIGH (ref 3.8–10.5)
WBC # FLD AUTO: 13 K/UL — HIGH (ref 3.8–10.5)

## 2017-11-04 PROCEDURE — 93880 EXTRACRANIAL BILAT STUDY: CPT | Mod: 26

## 2017-11-04 PROCEDURE — 93010 ELECTROCARDIOGRAM REPORT: CPT

## 2017-11-04 PROCEDURE — 71010: CPT | Mod: 26

## 2017-11-04 PROCEDURE — 99233 SBSQ HOSP IP/OBS HIGH 50: CPT

## 2017-11-04 PROCEDURE — 99291 CRITICAL CARE FIRST HOUR: CPT

## 2017-11-04 PROCEDURE — 93306 TTE W/DOPPLER COMPLETE: CPT | Mod: 26

## 2017-11-04 RX ORDER — IPRATROPIUM/ALBUTEROL SULFATE 18-103MCG
3 AEROSOL WITH ADAPTER (GRAM) INHALATION EVERY 6 HOURS
Qty: 0 | Refills: 0 | Status: DISCONTINUED | OUTPATIENT
Start: 2017-11-04 | End: 2017-11-08

## 2017-11-04 RX ORDER — ZOLPIDEM TARTRATE 10 MG/1
5 TABLET ORAL ONCE
Qty: 0 | Refills: 0 | Status: DISCONTINUED | OUTPATIENT
Start: 2017-11-04 | End: 2017-11-04

## 2017-11-04 RX ORDER — SIMETHICONE 80 MG/1
80 TABLET, CHEWABLE ORAL ONCE
Qty: 0 | Refills: 0 | Status: COMPLETED | OUTPATIENT
Start: 2017-11-04 | End: 2017-11-04

## 2017-11-04 RX ORDER — ASPIRIN/CALCIUM CARB/MAGNESIUM 324 MG
81 TABLET ORAL DAILY
Qty: 0 | Refills: 0 | Status: DISCONTINUED | OUTPATIENT
Start: 2017-11-04 | End: 2017-11-08

## 2017-11-04 RX ORDER — POTASSIUM CHLORIDE 20 MEQ
20 PACKET (EA) ORAL
Qty: 0 | Refills: 0 | Status: DISCONTINUED | OUTPATIENT
Start: 2017-11-04 | End: 2017-11-04

## 2017-11-04 RX ORDER — FUROSEMIDE 40 MG
20 TABLET ORAL ONCE
Qty: 0 | Refills: 0 | Status: COMPLETED | OUTPATIENT
Start: 2017-11-04 | End: 2017-11-04

## 2017-11-04 RX ORDER — POTASSIUM CHLORIDE 20 MEQ
40 PACKET (EA) ORAL ONCE
Qty: 0 | Refills: 0 | Status: COMPLETED | OUTPATIENT
Start: 2017-11-04 | End: 2017-11-04

## 2017-11-04 RX ORDER — ONDANSETRON 8 MG/1
4 TABLET, FILM COATED ORAL ONCE
Qty: 0 | Refills: 0 | Status: COMPLETED | OUTPATIENT
Start: 2017-11-04 | End: 2017-11-04

## 2017-11-04 RX ADMIN — Medication 1: at 08:52

## 2017-11-04 RX ADMIN — HEPARIN SODIUM 1900 UNIT(S)/HR: 5000 INJECTION INTRAVENOUS; SUBCUTANEOUS at 15:52

## 2017-11-04 RX ADMIN — Medication 3 MILLILITER(S): at 16:51

## 2017-11-04 RX ADMIN — Medication 40 MILLIEQUIVALENT(S): at 07:15

## 2017-11-04 RX ADMIN — Medication 3 MILLILITER(S): at 12:01

## 2017-11-04 RX ADMIN — SIMETHICONE 80 MILLIGRAM(S): 80 TABLET, CHEWABLE ORAL at 21:36

## 2017-11-04 RX ADMIN — ATORVASTATIN CALCIUM 80 MILLIGRAM(S): 80 TABLET, FILM COATED ORAL at 21:35

## 2017-11-04 RX ADMIN — ONDANSETRON 4 MILLIGRAM(S): 8 TABLET, FILM COATED ORAL at 05:37

## 2017-11-04 RX ADMIN — Medication 3 MILLILITER(S): at 05:02

## 2017-11-04 RX ADMIN — Medication 20 MILLIGRAM(S): at 11:50

## 2017-11-04 RX ADMIN — ZOLPIDEM TARTRATE 5 MILLIGRAM(S): 10 TABLET ORAL at 21:35

## 2017-11-04 RX ADMIN — Medication 81 MILLIGRAM(S): at 11:54

## 2017-11-04 NOTE — PROGRESS NOTE ADULT - ASSESSMENT
73 yo F w/PMH of T2DM, HTN, tobacco use who presented to [place] w/complain of dyspnea ruled out for PE, found to have TWI on EKG and positive troponin, transferred to [facility] for cardiac cath which demonstrated multi-vessel disease, pending CT surgery consult for possible CABG      #Neuro:   - AAOx3, no issues    #CV:   - patient presented with NSTEMI s/p cardiac cath demonstrating 99% occlusion mLAD and 99% to pCx  - CT surgery consulted regarding possible CABG, recommendations pending  - c/w ASA, statin, heparin gtt    #Resp:   - patient denies dysnpea at rest this morning, but reports feeling short of breath with movement  - O2 sat stable on O2 by NC  - CTA at OSH negative for PE    #GI:   - c/w DASH/TLC diet    #ID:  - no issues    #Metabolic/Renal  - patient with normal renal function, no issues    #Heme  - patient with mild baseline anemia  - H/H stable    #Endo:   - c/w SSI and FSBG checks for DM  - f/u A1c, TSH    #DVT ppx: heparin gtt 71 yo F w/PMH of T2DM, HTN, tobacco use who presented to [place] w/complain of dyspnea ruled out for PE, found to have TWI on EKG and positive troponin, transferred to [facility] for cardiac cath which demonstrated multi-vessel disease, pending CT surgery consult for possible CABG      #Neuro:   - AAOx3, no issues    #CV:   - patient presented with NSTEMI s/p cardiac cath demonstrating 99% occlusion mLAD and 99% to pCx  - CT surgery following, potential for 11/15 CABG    #Resp:   - O2 sat stable on O2 by NC 4L  - CTA at OSH negative for PE    #GI:   - c/w DASH/TLC diet    #ID:  - no issues    #Metabolic/Renal  - patient with normal renal function, no issues    #Heme  - patient with mild baseline anemia  - H/H stable    #Endo:   - c/w SSI and FSBG checks for DM  - f/u A1c, TSH    #DVT ppx: heparin gtt 73 yo F w/PMH of T2DM, HTN, tobacco use who presented to [place] w/complain of dyspnea ruled out for PE, found to have TWI on EKG and positive troponin, transferred to [facility] for cardiac cath which demonstrated multi-vessel disease, pending CT surgery consult for possible CABG      #Neuro:   - AAOx3, no issues    #CV:   - patient presented with NSTEMI s/p cardiac cath demonstrating 90% occlusion mLAD and 100% to pCx, OM1 100%  - CT surgery following, potential for 11/15 CABG  - c/w heparin gtt, ASA 81, lipitor 80  - f/u TTE, consider low dose Beta blocker if BP tolerates    #Resp:   - O2 sat stable on O2 by NC 4L  - CTA at OSH negative for PE    #GI:   - c/w DASH/TLC diet    #ID:  - no issues    #Metabolic/Renal  - patient with normal renal function, no issues    #Heme  - patient with mild baseline anemia  - H/H stable    #Endo:   - c/w SSI and FSBG checks for DM  -  A1c 5.2, TSH 5.7     #DVT ppx: heparin gtt 73 yo F w/PMH of T2DM, HTN, tobacco use who presented to [place] w/complain of dyspnea ruled out for PE, found to have TWI on EKG and positive troponin, transferred to [facility] for cardiac cath which demonstrated multi-vessel disease. Currently, being medically optimized for for possible CABG      #Neuro:   - AAOx3, no issues    #CV:   - patient presented with NSTEMI s/p cardiac cath demonstrating 90% occlusion mLAD and 100% to pCx, OM1 100%  - CT surgery following, potential for CABG on 11/15   - c/w heparin gtt, ASA 81, lipitor 80  - f/u TTE, consider low dose Beta blocker if BP tolerates  - goal for net negative, diuresis with 20mg IV lasix    #Resp:   - O2 sat stable on O2 by NC 4L  - CTA at OSH negative for PE    #GI:   - c/w DASH/TLC diet    #ID:  - no issues    #Metabolic/Renal  - patient with normal renal function, no issues    #Heme  - patient with mild baseline anemia  - H/H stable    #Endo:   -  A1c 5.2, TSH 5.7   - d/c SSI    #DVT ppx: heparin gtt 71 yo F w/PMH of T2DM, HTN, tobacco use who presented to [place] w/complain of dyspnea ruled out for PE, found to have TWI on EKG and positive troponin, transferred to [facility] for cardiac cath which demonstrated multi-vessel disease. Currently, being medically optimized for for possible CABG      #Neuro:   - AAOx3, no issues    #CV:   - patient presented with NSTEMI s/p cardiac cath demonstrating 90% occlusion mLAD and 100% to pCx, OM1 100%  - CT surgery following, potential for CABG on 11/15   - c/w heparin gtt, ASA 81, lipitor 80  - f/u TTE, consider low dose Beta blocker if BP tolerates      #Resp:   - O2 sat stable on O2 by NC 4L  - CTA at OSH negative for PE  -CXR with worsening edema, will give 20mg IV lasix     #GI:   - c/w DASH/TLC diet    #ID:  - no issues    #Metabolic/Renal  - patient with normal renal function, no issues    #Heme  - patient with mild baseline anemia  - H/H stable    #Endo:   -  A1c 5.2, TSH 5.7   - d/c SSI    #DVT ppx: heparin gtt 71 yo F w/PMH of T2DM, HTN, tobacco use who presented to [place] w/complain of dyspnea ruled out for PE, found to have TWI on EKG and positive troponin, transferred to [facility] for cardiac cath which demonstrated multi-vessel disease. Currently, being medically optimized for for possible CABG      #Neuro:   - AAOx3, no issues    #CV:   - patient presented with NSTEMI s/p cardiac cath demonstrating 90% occlusion mLAD and 100% to pCx, OM1 100%  - CT surgery following, potential for CABG on 11/8 wednesday  - c/w heparin gtt, ASA 81, lipitor 80  - f/u TTE, consider low dose Beta blocker if BP tolerates      #Resp:   - O2 sat stable on O2 by NC 4L  - CTA at OSH negative for PE  -CXR with worsening edema, will give 20mg IV lasix     #GI:   - c/w DASH/TLC diet    #ID:  - no issues    #Metabolic/Renal  - patient with normal renal function, no issues    #Heme  - patient with mild baseline anemia  - H/H stable    #Endo:   -  A1c 5.2, TSH 5.7   - d/c SSI    #DVT ppx: heparin gtt

## 2017-11-04 NOTE — PROGRESS NOTE ADULT - SUBJECTIVE AND OBJECTIVE BOX
[facility]    Madison Herrera, Landon, Yusra, Luis, Jonn, Liseth      [phone]    CHIEF COMPLAINT: Patient is a 72y old  Female who presents with a chief complaint of     Follow Up: cad, severe mr, considering cabg    Interim history: The patient reports no new symptoms.  Denies chest discomfort.  Improved shortness of breath.  No abdominal pain.  No new neurologic symptoms. Reports that she simply wants to sleep.        MEDICATIONS  (STANDING):  ALBUTerol/ipratropium for Nebulization 3 milliLiter(s) Nebulizer every 6 hours  aspirin  chewable 81 milliGRAM(s) Oral daily  atorvastatin 80 milliGRAM(s) Oral at bedtime  dextrose 5%. 1000 milliLiter(s) (50 mL/Hr) IV Continuous <Continuous>  dextrose 50% Injectable 12.5 Gram(s) IV Push once  dextrose 50% Injectable 25 Gram(s) IV Push once  dextrose 50% Injectable 25 Gram(s) IV Push once  heparin  Infusion.  Unit(s)/Hr (10 mL/Hr) IV Continuous <Continuous>  influenza   Vaccine 0.5 milliLiter(s) IntraMuscular once  insulin lispro (HumaLOG) corrective regimen sliding scale   SubCutaneous three times a day before meals  insulin lispro (HumaLOG) corrective regimen sliding scale   SubCutaneous at bedtime  lisinopril 2.5 milliGRAM(s) Oral daily  melatonin 3 milliGRAM(s) Oral at bedtime    MEDICATIONS  (PRN):  dextrose Gel 1 Dose(s) Oral once PRN Blood Glucose LESS THAN 70 milliGRAM(s)/deciliter  glucagon  Injectable 1 milliGRAM(s) IntraMuscular once PRN Glucose LESS THAN 70 milligrams/deciliter  heparin  Injectable 6000 Unit(s) IV Push every 6 hours PRN For aPTT less than 40      REVIEW OF SYSTEMS:  eye, ent, GI, , allergic, dermatologic, musculoskeletal and neurologic are negative except as described above    Vital Signs Last 24 Hrs  T(C): 36.6 (04 Nov 2017 08:00), Max: 36.9 (03 Nov 2017 19:00)  T(F): 97.9 (04 Nov 2017 08:00), Max: 98.5 (03 Nov 2017 19:00)  HR: 76 (04 Nov 2017 08:00) (76 - 108)  BP: 100/62 (04 Nov 2017 08:00) (94/52 - 161/76)  BP(mean): 76 (04 Nov 2017 08:00) (68 - 106)  RR: 18 (04 Nov 2017 08:00) (18 - 33)  SpO2: 99% (04 Nov 2017 08:00) (90% - 100%)    I&O's Summary    03 Nov 2017 07:01  -  04 Nov 2017 07:00  --------------------------------------------------------  IN: 1275 mL / OUT: 200 mL / NET: 1075 mL        Telemetry past 24h: sr    PHYSICAL EXAM:    Constitutional: well-nourished, well-developed, NAD   HEENT:  MMM, sclerae anicteric, conjunctivae clear, no oral cyanosis.  Pulmonary: Non-labored, crackles fine at bases  Cardiovascular: Regular, S1 and S2.  soft apical murmur.  No rubs, gallops or clicks  Gastrointestinal: Bowel Sounds present, soft, nontender.   Lymph: No peripheral edema.   Neurological: Alert, no focal deficits  Skin: No rashes.  Psych:  Mood & affect appropriate    LABS: All Labs Reviewed:                        10.8   13.0  )-----------( 323      ( 04 Nov 2017 05:40 )             31.7                         11.0   10.7  )-----------( 310      ( 03 Nov 2017 14:49 )             33.4                         10.7   7.9   )-----------( 360      ( 03 Nov 2017 06:24 )             32.6     04 Nov 2017 05:40    139    |  97     |  17     ----------------------------<  174    3.5     |  24     |  1.21   03 Nov 2017 06:26    143    |  99     |  15     ----------------------------<  111    4.1     |  25     |  1.13   02 Nov 2017 22:59    141    |  98     |  16     ----------------------------<  158    3.8     |  25     |  1.11     Ca    8.8        04 Nov 2017 05:40  Ca    9.2        03 Nov 2017 06:26  Ca    9.1        02 Nov 2017 22:59  Phos  4.1       04 Nov 2017 05:40  Phos  5.3       03 Nov 2017 06:26  Phos  4.6       02 Nov 2017 22:59  Mg     2.0       04 Nov 2017 05:40  Mg     2.2       03 Nov 2017 06:26  Mg     1.8       02 Nov 2017 22:59    TPro  8.2    /  Alb  3.5    /  TBili  0.7    /  DBili  x      /  AST  24     /  ALT  25     /  AlkPhos  74     02 Nov 2017 10:44    PT/INR - ( 03 Nov 2017 06:25 )   PT: 13.0 sec;   INR: 1.19 ratio         PTT - ( 04 Nov 2017 05:40 )  PTT:58.0 sec  CARDIAC MARKERS ( 04 Nov 2017 05:40 )  x     / <0.01 ng/mL / 34 U/L / x     / 1.2 ng/mL  CARDIAC MARKERS ( 03 Nov 2017 06:26 )  x     / <0.01 ng/mL / 26 U/L / x     / 1.7 ng/mL  CARDIAC MARKERS ( 02 Nov 2017 22:59 )  x     / <0.01 ng/mL / 27 U/L / x     / 1.7 ng/mL  CARDIAC MARKERS ( 02 Nov 2017 10:44 )  .093 ng/mL / x     / 47 U/L / x     / 1.3 ng/mL      Blood Culture:   11-03 @ 22:05  Pro Bnp [id number]    11-03 @ 05:50  TSH: 2.41      RADIOLOGY:    EKG:    Echo:

## 2017-11-04 NOTE — PROGRESS NOTE ADULT - SUBJECTIVE AND OBJECTIVE BOX
CONTACT INFO:  Marques Padilla MD  Internal Medicine PGY1  Pager:  [phone]       HPI / INTERVAL HISTORY:  Pt nauseous o/n, responded to zofran. Denies dyspnea, chest pain, palpitations, abd pain, ext pain.       OBJECTIVE:  VITAL SIGNS:  ICU Vital Signs Last 24 Hrs  T(C): 36.6 (2017 08:00), Max: 36.9 (2017 19:00)  T(F): 97.9 (2017 08:00), Max: 98.5 (2017 19:00)  HR: 76 (2017 08:00) (76 - 108)  BP: 100/62 (2017 08:00) (94/52 - 161/76)  BP(mean): 76 (2017 08:00) (68 - 106)  ABP: --  ABP(mean): --  RR: 18 (2017 08:00) (18 - 33)  SpO2: 99% (2017 08:00) (90% - 100%)         @ 07:01  -   @ 07:00  --------------------------------------------------------  IN: 1275 mL / OUT: 200 mL / NET: 1075 mL      CAPILLARY BLOOD GLUCOSE  212 (2017 06:00)      POCT Blood Glucose.: 161 mg/dL (2017 07:51)      PHYSICAL EXAM:  Gen: NAD, obese  HEENT: NC/AT; PERRL, anicteric sclera  Neck: supple, no JVD  Resp: clear to ausculation B/L; wheezing b/l  Cardiovasc: S1, S2 normal; RRR; no murmurs, rubs or gallops  GI: soft, nondistended, nontender; +BS  Extr: warm, well-perfused, PT/DP pulses 2+ B/L; no LE edema  Skin: normal color and turgor  Neuro: AOx3    LABS:                        10.8   13.0  )-----------( 323      ( 2017 05:40 )             31.7     11-    139  |  97  |  17  ----------------------------<  174<H>  3.5   |  24  |  1.21    Ca    8.8      2017 05:40  Phos  4.1     11-04  Mg     2.0     11-04    TPro  8.2  /  Alb  3.5  /  TBili  0.7  /  DBili  x   /  AST  24  /  ALT  25  /  AlkPhos  74  11-02    LIVER FUNCTIONS - ( 2017 10:44 )  Alb: 3.5 g/dL / Pro: 8.2 g/dL / ALK PHOS: 74 U/L / ALT: 25 U/L / AST: 24 U/L / GGT: x           PT/INR - ( 2017 06:25 )   PT: 13.0 sec;   INR: 1.19 ratio         PTT - ( 2017 05:40 )  PTT:58.0 sec  Urinalysis Basic - ( 2017 18:54 )    Color: Yellow / Appearance: SL Turbid / S.030 / pH: x  Gluc: x / Ketone: Negative  / Bili: Negative / Urobili: Negative   Blood: x / Protein: Trace / Nitrite: Negative   Leuk Esterase: Small / RBC: 5-10 /HPF / WBC 10-25 /HPF   Sq Epi: x / Non Sq Epi: Few /HPF / Bacteria: x      CARDIAC MARKERS ( 2017 05:40 )  x     / <0.01 ng/mL / 34 U/L / x     / 1.2 ng/mL  CARDIAC MARKERS ( 2017 06:26 )  x     / <0.01 ng/mL / 26 U/L / x     / 1.7 ng/mL  CARDIAC MARKERS ( 2017 22:59 )  x     / <0.01 ng/mL / 27 U/L / x     / 1.7 ng/mL  CARDIAC MARKERS ( 2017 10:44 )  .093 ng/mL / x     / 47 U/L / x     / 1.3 ng/mL          RADIOLOGY & ADDITIONAL TESTS:       MEDICATIONS:  ALBUTerol/ipratropium for Nebulization 3 milliLiter(s) Nebulizer every 6 hours  aspirin  chewable 81 milliGRAM(s) Oral daily  atorvastatin 80 milliGRAM(s) Oral at bedtime  dextrose 5%. 1000 milliLiter(s) IV Continuous <Continuous>  dextrose 50% Injectable 12.5 Gram(s) IV Push once  dextrose 50% Injectable 25 Gram(s) IV Push once  dextrose 50% Injectable 25 Gram(s) IV Push once  dextrose Gel 1 Dose(s) Oral once PRN  glucagon  Injectable 1 milliGRAM(s) IntraMuscular once PRN  heparin  Infusion.  Unit(s)/Hr IV Continuous <Continuous>  heparin  Injectable 6000 Unit(s) IV Push every 6 hours PRN  influenza   Vaccine 0.5 milliLiter(s) IntraMuscular once  insulin lispro (HumaLOG) corrective regimen sliding scale   SubCutaneous three times a day before meals  insulin lispro (HumaLOG) corrective regimen sliding scale   SubCutaneous at bedtime  lisinopril 2.5 milliGRAM(s) Oral daily  melatonin 3 milliGRAM(s) Oral at bedtime      ALLERGIES:  No Known Drug Allergies  Peroxide (Blisters)

## 2017-11-04 NOTE — PROVIDER CONTACT NOTE (OTHER) - SITUATION
Pt explained risks and benefits of heparin gtt. Pt verbalizes understanding, however adamantly refusing blood draw for ptt at this time.

## 2017-11-04 NOTE — CHART NOTE - NSCHARTNOTEFT_GEN_A_CORE
====================  CCU MIDNIGHT ROUNDS  ====================    LUIGI MELARA  [id number]    ====================  SUMMARY:  ====================    72 year old woman with a history of tobacco use, DM, HTN, poor historian presents for dyspnea and NSTEMI. Initially she was found to be in heart failure, and is s/p IV Lasix. Found to have significant multi-vessel disease.  Being evaluated for CABG    ====================  NEW EVENTS:  ====================    no events     ====================  VITALS (Last 12 hrs):  ====================    T(C): 36.7 (11-04-17 @ 19:55), Max: 36.7 (11-04-17 @ 19:55)  HR: 88 (11-04-17 @ 21:55) (74 - 88)  BP: 121/82 (11-04-17 @ 21:55) (91/49 - 121/82)  BP(mean): 103 (11-04-17 @ 21:55) (55 - 103)  ABP: --  ABP(mean): --  RR: 25 (11-04-17 @ 21:55) (16 - 25)  SpO2: 95% (11-04-17 @ 21:55) (95% - 100%)  Wt(kg): --  CVP(mm Hg): --  CO: --  CI: --  PA: --  PA(mean): --  PA(direct): --  PCWP: --  SVR: --    TELEMETRY:        *BLOOD GAS/ARTERIAL/MIXED/VENOUS  *LACTATE    I&O's Summary    03 Nov 2017 07:01  -  04 Nov 2017 07:00  --------------------------------------------------------  IN: 1275 mL / OUT: 200 mL / NET: 1075 mL    04 Nov 2017 08:01  -  04 Nov 2017 22:51  --------------------------------------------------------  IN: 646 mL / OUT: 100 mL / NET: 546 mL        ====================  PLAN:  #Neuro - AAOx3, no issues  #CV: NSTEMI s/p cardiac cath demonstrating 99% occlusion mLAD and 99% to pCx. Plan for CABG Wednesday. Continue ASA, statin, heparin gtt.   #GI: DASH/TLC diet  #Endo: SSI and FSBG checks for DM  #DVT ppx: heparin gtt  ====================    HEALTH ISSUES - PROBLEM Dx:  CHF (congestive heart failure): CHF (congestive heart failure)  CAD (coronary artery disease): CAD (coronary artery disease)        HEALTH ISSUES - R/O PROBLEM Dx:      Bernadette Balderas PGY2  Pager [phone]

## 2017-11-05 LAB
ANION GAP SERPL CALC-SCNC: 13 MMOL/L — SIGNIFICANT CHANGE UP (ref 5–17)
APTT BLD: 29.9 SEC — SIGNIFICANT CHANGE UP (ref 27.5–37.4)
BUN SERPL-MCNC: 27 MG/DL — HIGH (ref 7–23)
CALCIUM SERPL-MCNC: 8.8 MG/DL — SIGNIFICANT CHANGE UP (ref 8.4–10.5)
CHLORIDE SERPL-SCNC: 98 MMOL/L — SIGNIFICANT CHANGE UP (ref 96–108)
CO2 SERPL-SCNC: 28 MMOL/L — SIGNIFICANT CHANGE UP (ref 22–31)
CREAT SERPL-MCNC: 1.34 MG/DL — HIGH (ref 0.5–1.3)
GLUCOSE SERPL-MCNC: 128 MG/DL — HIGH (ref 70–99)
HCT VFR BLD CALC: 29.4 % — LOW (ref 34.5–45)
HGB BLD-MCNC: 9.6 G/DL — LOW (ref 11.5–15.5)
MCHC RBC-ENTMCNC: 28.7 PG — SIGNIFICANT CHANGE UP (ref 27–34)
MCHC RBC-ENTMCNC: 32.8 GM/DL — SIGNIFICANT CHANGE UP (ref 32–36)
MCV RBC AUTO: 87.6 FL — SIGNIFICANT CHANGE UP (ref 80–100)
PLATELET # BLD AUTO: 295 K/UL — SIGNIFICANT CHANGE UP (ref 150–400)
POTASSIUM SERPL-MCNC: 3.7 MMOL/L — SIGNIFICANT CHANGE UP (ref 3.5–5.3)
POTASSIUM SERPL-SCNC: 3.7 MMOL/L — SIGNIFICANT CHANGE UP (ref 3.5–5.3)
RBC # BLD: 3.35 M/UL — LOW (ref 3.8–5.2)
RBC # FLD: 14 % — SIGNIFICANT CHANGE UP (ref 10.3–14.5)
SODIUM SERPL-SCNC: 139 MMOL/L — SIGNIFICANT CHANGE UP (ref 135–145)
WBC # BLD: 6.6 K/UL — SIGNIFICANT CHANGE UP (ref 3.8–10.5)
WBC # FLD AUTO: 6.6 K/UL — SIGNIFICANT CHANGE UP (ref 3.8–10.5)

## 2017-11-05 PROCEDURE — 99291 CRITICAL CARE FIRST HOUR: CPT

## 2017-11-05 PROCEDURE — 93010 ELECTROCARDIOGRAM REPORT: CPT

## 2017-11-05 RX ORDER — ZOLPIDEM TARTRATE 10 MG/1
5 TABLET ORAL AT BEDTIME
Qty: 0 | Refills: 0 | Status: DISCONTINUED | OUTPATIENT
Start: 2017-11-05 | End: 2017-11-07

## 2017-11-05 RX ORDER — METOPROLOL TARTRATE 50 MG
12.5 TABLET ORAL EVERY 12 HOURS
Qty: 0 | Refills: 0 | Status: DISCONTINUED | OUTPATIENT
Start: 2017-11-05 | End: 2017-11-07

## 2017-11-05 RX ORDER — HEPARIN SODIUM 5000 [USP'U]/ML
1600 INJECTION INTRAVENOUS; SUBCUTANEOUS
Qty: 25000 | Refills: 0 | Status: DISCONTINUED | OUTPATIENT
Start: 2017-11-05 | End: 2017-11-05

## 2017-11-05 RX ORDER — POTASSIUM CHLORIDE 20 MEQ
40 PACKET (EA) ORAL ONCE
Qty: 0 | Refills: 0 | Status: COMPLETED | OUTPATIENT
Start: 2017-11-05 | End: 2017-11-05

## 2017-11-05 RX ORDER — ACETAMINOPHEN 500 MG
650 TABLET ORAL EVERY 6 HOURS
Qty: 0 | Refills: 0 | Status: DISCONTINUED | OUTPATIENT
Start: 2017-11-05 | End: 2017-11-08

## 2017-11-05 RX ORDER — HEPARIN SODIUM 5000 [USP'U]/ML
4000 INJECTION INTRAVENOUS; SUBCUTANEOUS EVERY 6 HOURS
Qty: 0 | Refills: 0 | Status: DISCONTINUED | OUTPATIENT
Start: 2017-11-05 | End: 2017-11-05

## 2017-11-05 RX ADMIN — Medication 81 MILLIGRAM(S): at 13:39

## 2017-11-05 RX ADMIN — Medication 650 MILLIGRAM(S): at 12:38

## 2017-11-05 RX ADMIN — Medication 12.5 MILLIGRAM(S): at 21:22

## 2017-11-05 RX ADMIN — Medication 3 MILLILITER(S): at 11:59

## 2017-11-05 RX ADMIN — Medication 3 MILLILITER(S): at 17:19

## 2017-11-05 RX ADMIN — Medication 3 MILLILITER(S): at 05:13

## 2017-11-05 RX ADMIN — Medication 3 MILLILITER(S): at 00:12

## 2017-11-05 RX ADMIN — Medication 12.5 MILLIGRAM(S): at 12:21

## 2017-11-05 RX ADMIN — ATORVASTATIN CALCIUM 80 MILLIGRAM(S): 80 TABLET, FILM COATED ORAL at 21:22

## 2017-11-05 RX ADMIN — HEPARIN SODIUM 1600 UNIT(S)/HR: 5000 INJECTION INTRAVENOUS; SUBCUTANEOUS at 04:50

## 2017-11-05 RX ADMIN — ZOLPIDEM TARTRATE 5 MILLIGRAM(S): 10 TABLET ORAL at 21:22

## 2017-11-05 RX ADMIN — Medication 40 MILLIEQUIVALENT(S): at 21:22

## 2017-11-05 RX ADMIN — Medication 650 MILLIGRAM(S): at 12:20

## 2017-11-05 NOTE — PROGRESS NOTE ADULT - ASSESSMENT
71 yo F w/PMH of T2DM, HTN, tobacco use who presented to [place] w/complain of dyspnea ruled out for PE, found to have TWI on EKG and positive troponin, transferred to [facility] for cardiac cath which demonstrated multi-vessel disease, currently being medically optimized for possible CABG      #Neuro:   - AAOx3, no issues    #CV:   - patient presented with NSTEMI s/p cardiac cath demonstrating 90% occlusion mLAD and 100% to pCx, OM1 100%  - c/w heparin gtt, ASA 81, Lipitor 80  - TTE (11/3): EF 30-35%, mild-mod MR, mildly dilated LA, mod-severe LVSD, eccentric LVH, mild diastolic dysfunction (stage I), minimal RI, no LV thrombus  - per CT surgery, is tentatively scheduled for procedure Wednesday (11/8) with Dr. Mejia, however patient still unsure if she would want the procedure      #Resp:   - O2 sat stable on O2 by NC 4L  - CTA at OSH negative for PE  - patient with continued bibasilar crackles on exam, received Lasix yesterday morning, currently net positive approximately 900cc  - consider additional Lasix as needed    #GI:   - c/w DASH/TLC diet    #ID:  - no issues    #Metabolic/Renal  - patient with normal renal function, no issues    #Heme  - patient with mild baseline anemia  - H/H stable    #Endo:   - A1c 5.2, TSH 5.7    #DVT ppx: heparin gtt

## 2017-11-05 NOTE — CHART NOTE - NSCHARTNOTEFT_GEN_A_CORE
====================  NEW EVENTS:  ====================  No events o/n    ====================  SUMMARY:  ====================  71 yo F w/PMH of T2DM, HTN, tobacco use who presented to [place] w/complain of dyspnea ruled out for PE, found to have TWI on EKG and positive troponin, transferred to [facility] for cardiac cath which demonstrated multi-vessel disease, currently being medically optimized for possible CABG    ====================  VITALS:  ====================    ICU Vital Signs Last 24 Hrs  T(C): 36.6 (05 Nov 2017 19:30), Max: 36.6 (05 Nov 2017 10:00)  T(F): 97.8 (05 Nov 2017 19:30), Max: 97.9 (05 Nov 2017 10:00)  HR: 74 (05 Nov 2017 20:30) (64 - 100)  BP: 136/72 (05 Nov 2017 20:30) (93/49 - 153/92)  BP(mean): 96 (05 Nov 2017 20:30) (66 - 128)  ABP: --  ABP(mean): --  RR: 23 (05 Nov 2017 20:30) (16 - 32)  SpO2: 100% (05 Nov 2017 20:30) (84% - 100%)      I&O's Summary    04 Nov 2017 08:01  -  05 Nov 2017 07:00  --------------------------------------------------------  IN: 1067 mL / OUT: 200 mL / NET: 867 mL    05 Nov 2017 07:01  -  05 Nov 2017 21:29  --------------------------------------------------------  IN: 688 mL / OUT: 450 mL / NET: 238 mL    ====================  LABS:  ====================                          9.6    6.6   )-----------( 295      ( 05 Nov 2017 15:28 )             29.4     11-05    139  |  98  |  27<H>  ----------------------------<  128<H>  3.7   |  28  |  1.34<H>    Ca    8.8      05 Nov 2017 15:28  Phos  4.1     11-04  Mg     2.0     11-04      PTT - ( 05 Nov 2017 15:28 )  PTT:29.9 sec    ABG - ( 04 Nov 2017 06:15 )  pH: 7.48  /  pCO2: 34    /  pO2: 97    / HCO3: 25    / Base Excess: 2.3   /  SaO2: 99        ====================  PLAN:  ====================  - Pt refusing more than 1 blood draw a day.   - Pt on ASA, Lipitor, Metoprolol. Holding Plavix 2/2 possible CTS  - Pt tentatively planned for CTS on Wednesday 11/8  - Pt very anxious. Will attempt to get Behavorial Cardiology to see pt tomorrow

## 2017-11-05 NOTE — PROGRESS NOTE ADULT - SUBJECTIVE AND OBJECTIVE BOX
[facility]    Madison Herrera, Landon, Yusra, Luis, Jonn, Lindsaygillian      [phone]    CHIEF COMPLAINT: Patient is a 72y old  Female who presents with a chief complaint of sob    Follow Up: chf, mv disease, cad    Interim history: The patient reports no new symptoms.  Denies chest discomfort and shortness of breath.  No abdominal pain.  No new neurologic symptoms. She is refusing blood draws.  Echo results noted      MEDICATIONS  (STANDING):  ALBUTerol/ipratropium for Nebulization 3 milliLiter(s) Nebulizer every 6 hours  aspirin enteric coated 81 milliGRAM(s) Oral daily  atorvastatin 80 milliGRAM(s) Oral at bedtime  heparin  Infusion. 1600 Unit(s)/Hr (16 mL/Hr) IV Continuous <Continuous>  influenza   Vaccine 0.5 milliLiter(s) IntraMuscular once    MEDICATIONS  (PRN):  heparin  Injectable 4000 Unit(s) IV Push every 6 hours PRN For aPTT less than 40      REVIEW OF SYSTEMS:  eye, ent, GI, , allergic, dermatologic, musculoskeletal and neurologic are negative except as described above    Vital Signs Last 24 Hrs  T(C): 36.5 (05 Nov 2017 06:20), Max: 36.7 (04 Nov 2017 19:55)  T(F): 97.7 (05 Nov 2017 06:20), Max: 98 (04 Nov 2017 19:55)  HR: 76 (05 Nov 2017 06:20) (74 - 100)  BP: 119/62 (05 Nov 2017 06:20) (86/48 - 153/92)  BP(mean): 86 (05 Nov 2017 06:20) (55 - 128)  RR: 32 (05 Nov 2017 06:20) (16 - 32)  SpO2: 97% (05 Nov 2017 06:20) (85% - 100%)    I&O's Summary    03 Nov 2017 07:01  -  04 Nov 2017 07:00  --------------------------------------------------------  IN: 1275 mL / OUT: 200 mL / NET: 1075 mL    04 Nov 2017 08:01  -  05 Nov 2017 06:58  --------------------------------------------------------  IN: 1067 mL / OUT: 200 mL / NET: 867 mL        Telemetry past 24h: sr    PHYSICAL EXAM:    Constitutional: well-nourished, well-developed, NAD   HEENT:  MMM, sclerae anicteric, conjunctivae clear, no oral cyanosis.  Pulmonary: Non-labored, breath sounds are clear bilaterally, No wheezing, rales or rhonchi  Cardiovascular: Regular, S1 and S2.  No murmur.  No rubs, gallops or clicks  Gastrointestinal: Bowel Sounds present, soft, nontender.   Lymph: No peripheral edema.   Neurological: Alert, no focal deficits  Skin: No rashes.  Psych:  Mood & affect appropriate    LABS: All Labs Reviewed:                        10.8   13.0  )-----------( 323      ( 04 Nov 2017 05:40 )             31.7                         11.0   10.7  )-----------( 310      ( 03 Nov 2017 14:49 )             33.4                         10.7   7.9   )-----------( 360      ( 03 Nov 2017 06:24 )             32.6     04 Nov 2017 05:40    139    |  97     |  17     ----------------------------<  174    3.5     |  24     |  1.21   03 Nov 2017 06:26    143    |  99     |  15     ----------------------------<  111    4.1     |  25     |  1.13   02 Nov 2017 22:59    141    |  98     |  16     ----------------------------<  158    3.8     |  25     |  1.11     Ca    8.8        04 Nov 2017 05:40  Ca    9.2        03 Nov 2017 06:26  Ca    9.1        02 Nov 2017 22:59  Phos  4.1       04 Nov 2017 05:40  Phos  5.3       03 Nov 2017 06:26  Phos  4.6       02 Nov 2017 22:59  Mg     2.0       04 Nov 2017 05:40  Mg     2.2       03 Nov 2017 06:26  Mg     1.8       02 Nov 2017 22:59    TPro  8.2    /  Alb  3.5    /  TBili  0.7    /  DBili  x      /  AST  24     /  ALT  25     /  AlkPhos  74     02 Nov 2017 10:44    PTT - ( 04 Nov 2017 15:04 )  PTT:26.5 sec  CARDIAC MARKERS ( 04 Nov 2017 05:40 )  x     / <0.01 ng/mL / 34 U/L / x     / 1.2 ng/mL      Blood Culture:   11-03 @ 22:05  Pro Bnp [id number]    11-03 @ 05:50  TSH: 2.41      RADIOLOGY:    EKG:    Echo:    < from: TTE with Doppler (w/Cont) (11.04.17 @ 10:30) >    Patient name: LUIGI MELARA  YOB: 1945   Age: 72 (F)   MR#: [id number]  Study Date: 11/4/2017  Location: [place] [id number]: Roula Rebolledo [facility]  Study quality: Technically difficult  Referring Physician: Pineda Haas MD  BloodPressure: 100/62 mmHg  Height: 170 cm  Weight: 86 kg  BSA: 2 m2  ------------------------------------------------------------------------  PROCEDURE: Transthoracic echocardiogram with 2-D, M-Mode  and complete spectral and color flow Doppler. Verbal  consent was obtained for injection of echo contrast  following a discussion of risks and benefits. Following  intravenous injection of contrast, harmonic imaging was  performed.  INDICATION: Atherosclerotic heart disease of native  coronary artery without angina pectoris (I25.10)  ------------------------------------------------------------------------  Dimensions:    Normal Values:  LA:     3.2    2.0 - 4.0 cm  Ao:     3.0    2.0 - 3.8 cm  SEPTUM: 1.4    0.6 - 1.2 cm  PWT:    1.3    0.6 - 1.1 cm  LVIDd:  7.0    3.0 - 5.6 cm  LVIDs:  6.0    1.8 - 4.0 cm  Derived variables:  LVMI: 240 g/m2  RWT: 0.37  Fractional short: 14 %  EF (Visual Estimate): 30-35 %  ------------------------------------------------------------------------  Observations:  Mitral Valve: Tethered mitral valve leaflets with mitral  annular calcification. Mild-moderate mitral regurgitation.  Aortic Valve/Aorta: Calcified trileaflet aortic valve with  normal opening.  Normal aortic root size. (Ao: 3.0 cm at the sinuses of  Valsalva).  Left Atrium: Mildly dilated left atrium.  LA volume index =  35 cc/m2.  Left Ventricle: Moderate to severe left ventricular  systolic dysfunction. Left ventricular dysfunction is  global with regional variation. No left ventricular  thrombus seen. Eccentric left ventricular hypertrophy  (dilated left ventricle with normal relative wall  thickness). Mild diastolic dysfunction (Stage I).  Right Heart: Normal right atrium. The right ventricle is  not well visualized; grossly normal right ventricular  systolic function. Normal tricuspid valve. Pulmonic valve  not well visualized, probably normal. Minimal pulmonic  regurgitation.  Pericardium/Pleura: Trivial pericardial effusion.  Right pleural effusion.  Hemodynamic: Estimated right atrial pressure is 8 mm Hg.  ------------------------------------------------------------------------  Conclusions:  1. Moderate to severe left ventricular systolic  dysfunction. Left ventricular dysfunction is global with  regional variation. No left ventricular thrombus seen.  2. Mild diastolic dysfunction (Stage I).  3. The right ventricle is not well visualized; grossly  normal right ventricular systolic function.  4. Trivial pericardial effusion.  *** No previous Echo exam.  ------------------------------------------------------------------------  Confirmed on  11/4/2017 - 14:18:18 by Memo Leo M.D.  ------------------------------------------------------------------------    < end of copied text >

## 2017-11-05 NOTE — PROGRESS NOTE ADULT - SUBJECTIVE AND OBJECTIVE BOX
CONTACT INFO:  KAREN Gross  Pager:  [phone]      HPI / INTERVAL HISTORY:  Overnight, the patient refused blood draws for aPTT for heparin gtt. Gtt initially increased to 19 secondary to low aPTT, decreased back to 16 as patient refusing repeat blood draw and risk of bleeding. Patient aware of risk of bleeding, MI and death. She is still refusing blood draws. Continues to be unsure of whether she would want to pursue CABG.      OBJECTIVE:  VITAL SIGNS:  ICU Vital Signs Last 24 Hrs  T(C): 36.5 (2017 06:20), Max: 36.7 (2017 19:55)  T(F): 97.7 (2017 06:20), Max: 98 (2017 19:55)  HR: 76 (2017 06:20) (74 - 100)  BP: 119/62 (2017 06:20) (86/48 - 153/92)  BP(mean): 86 (2017 06:20) (55 - 128)  ABP: --  ABP(mean): --  RR: 32 (2017 06:20) (16 - 32)  SpO2: 97% (2017 06:20) (85% - 100%)         @ 08:01  -   @ 07:00  --------------------------------------------------------  IN: 1067 mL / OUT: 200 mL / NET: 867 mL      CAPILLARY BLOOD GLUCOSE  212 (2017 06:00)      PHYSICAL EXAM:  Gen: NAD  HEENT: NC/AT; PERRL, anicteric sclera  Neck: supple, no JVD  Resp: bilateral crackles at bases  Cardiovasc: S1S2 normal; RRR; no murmurs, rubs or gallops  GI: soft, nondistended, nontender; +BS  Extr: warm, well-perfused, PT/DP pulses 2+ B/L; no LE edema  Skin: normal color and turgor  Neuro: AAOx3    LABS:                        10.8   13.0  )-----------( 323      ( 2017 05:40 )             31.7     11-04    139  |  97  |  17  ----------------------------<  174<H>  3.5   |  24  |  1.21    Ca    8.8      2017 05:40  Phos  4.1     11-04  Mg     2.0     11-04      PTT - ( 2017 15:04 )  PTT:26.5 sec  Urinalysis Basic - ( 2017 18:54 )    Color: Yellow / Appearance: SL Turbid / S.030 / pH: x  Gluc: x / Ketone: Negative  / Bili: Negative / Urobili: Negative   Blood: x / Protein: Trace / Nitrite: Negative   Leuk Esterase: Small / RBC: 5-10 /HPF / WBC 10-25 /HPF   Sq Epi: x / Non Sq Epi: Few /HPF / Bacteria: x      CARDIAC MARKERS ( 2017 05:40 )  x     / <0.01 ng/mL / 34 U/L / x     / 1.2 ng/mL      RADIOLOGY & ADDITIONAL TESTS:       MEDICATIONS:  ALBUTerol/ipratropium for Nebulization 3 milliLiter(s) Nebulizer every 6 hours  aspirin enteric coated 81 milliGRAM(s) Oral daily  atorvastatin 80 milliGRAM(s) Oral at bedtime  heparin  Infusion. 1600 Unit(s)/Hr IV Continuous <Continuous>  heparin  Injectable 4000 Unit(s) IV Push every 6 hours PRN  influenza   Vaccine 0.5 milliLiter(s) IntraMuscular once      ALLERGIES:  No Known Drug Allergies  Peroxide (Blisters)

## 2017-11-05 NOTE — PROGRESS NOTE ADULT - ASSESSMENT
72 year old woman with a history of tobacco use, DM, HTN, poor historian presents for dyspnea and NSTEMI. Initially she was found to be in heart failure, and is s/p IV Lasix.     -she appears euvolemic on exam  -would keep net even today  -She is s/p Cath with significant two vessel disease.   -cabg has been recommended, but she is hesitant  -TTE reveals only mild-mod MR which certainly reduces the complexity and risk of the planned CTS intervention  - she seems to have poor insight into her illness and has still not agreed to pursue cabg  -Continue with ASA and Heparin gtt.   -Continue with Statin  -did not tolerate ACEi as she developed hypotension  -will follow    The patient is at risk of abrupt decompensation.  35 minutes of critical care time was spent with this patient.

## 2017-11-05 NOTE — CHART NOTE - NSCHARTNOTEFT_GEN_A_CORE
Was called to bedside because patient is refusing blood draws. It was explained to the patient that blood work is necessary to ensure therapeutic range of heparin drip. Last blood draw was at 3pm and because the PTT <50, rate was increased from 16ml/hr to 19ml/hr. Patient has been refusing blood work since. Will reduce the rate back to 16ml/hr. Patient understands risk of heart attack and death.       Fellow Addendum    Discussed risk / benefits of not checking labs on Heparin gtt, pt refusing blood draws, also refusing Lovenox. Will place Heparin gtt back to level where we knew the pt was not supratherapeutic. Pt understands risks.    Alexis

## 2017-11-06 LAB
ANION GAP SERPL CALC-SCNC: 13 MMOL/L — SIGNIFICANT CHANGE UP (ref 5–17)
APTT BLD: 32.7 SEC — SIGNIFICANT CHANGE UP (ref 27.5–37.4)
BUN SERPL-MCNC: 24 MG/DL — HIGH (ref 7–23)
CALCIUM SERPL-MCNC: 9.1 MG/DL — SIGNIFICANT CHANGE UP (ref 8.4–10.5)
CHLORIDE SERPL-SCNC: 101 MMOL/L — SIGNIFICANT CHANGE UP (ref 96–108)
CO2 SERPL-SCNC: 27 MMOL/L — SIGNIFICANT CHANGE UP (ref 22–31)
CREAT SERPL-MCNC: 1.16 MG/DL — SIGNIFICANT CHANGE UP (ref 0.5–1.3)
GLUCOSE SERPL-MCNC: 141 MG/DL — HIGH (ref 70–99)
HCT VFR BLD CALC: 31.3 % — LOW (ref 34.5–45)
HGB BLD-MCNC: 10.2 G/DL — LOW (ref 11.5–15.5)
INR BLD: 1.18 RATIO — HIGH (ref 0.88–1.16)
MAGNESIUM SERPL-MCNC: 2.3 MG/DL — SIGNIFICANT CHANGE UP (ref 1.6–2.6)
MCHC RBC-ENTMCNC: 28.8 PG — SIGNIFICANT CHANGE UP (ref 27–34)
MCHC RBC-ENTMCNC: 32.7 GM/DL — SIGNIFICANT CHANGE UP (ref 32–36)
MCV RBC AUTO: 88.1 FL — SIGNIFICANT CHANGE UP (ref 80–100)
PHOSPHATE SERPL-MCNC: 3.3 MG/DL — SIGNIFICANT CHANGE UP (ref 2.5–4.5)
PLATELET # BLD AUTO: 336 K/UL — SIGNIFICANT CHANGE UP (ref 150–400)
POTASSIUM SERPL-MCNC: 4.4 MMOL/L — SIGNIFICANT CHANGE UP (ref 3.5–5.3)
POTASSIUM SERPL-SCNC: 4.4 MMOL/L — SIGNIFICANT CHANGE UP (ref 3.5–5.3)
PROTHROM AB SERPL-ACNC: 12.9 SEC — HIGH (ref 9.8–12.7)
RBC # BLD: 3.56 M/UL — LOW (ref 3.8–5.2)
RBC # FLD: 14.1 % — SIGNIFICANT CHANGE UP (ref 10.3–14.5)
SODIUM SERPL-SCNC: 141 MMOL/L — SIGNIFICANT CHANGE UP (ref 135–145)
WBC # BLD: 6.4 K/UL — SIGNIFICANT CHANGE UP (ref 3.8–10.5)
WBC # FLD AUTO: 6.4 K/UL — SIGNIFICANT CHANGE UP (ref 3.8–10.5)

## 2017-11-06 PROCEDURE — 90791 PSYCH DIAGNOSTIC EVALUATION: CPT

## 2017-11-06 PROCEDURE — 99291 CRITICAL CARE FIRST HOUR: CPT

## 2017-11-06 PROCEDURE — 99233 SBSQ HOSP IP/OBS HIGH 50: CPT

## 2017-11-06 PROCEDURE — 94010 BREATHING CAPACITY TEST: CPT | Mod: 26

## 2017-11-06 PROCEDURE — 93010 ELECTROCARDIOGRAM REPORT: CPT

## 2017-11-06 RX ADMIN — Medication 12.5 MILLIGRAM(S): at 22:07

## 2017-11-06 RX ADMIN — Medication 81 MILLIGRAM(S): at 11:30

## 2017-11-06 RX ADMIN — Medication 0.5 MILLIGRAM(S): at 22:07

## 2017-11-06 RX ADMIN — Medication 3 MILLILITER(S): at 18:25

## 2017-11-06 RX ADMIN — Medication 3 MILLILITER(S): at 13:04

## 2017-11-06 RX ADMIN — Medication 12.5 MILLIGRAM(S): at 10:35

## 2017-11-06 RX ADMIN — Medication 3 MILLILITER(S): at 06:05

## 2017-11-06 RX ADMIN — Medication 3 MILLILITER(S): at 00:12

## 2017-11-06 RX ADMIN — ATORVASTATIN CALCIUM 80 MILLIGRAM(S): 80 TABLET, FILM COATED ORAL at 22:07

## 2017-11-06 NOTE — DIETITIAN INITIAL EVALUATION ADULT. - ENERGY NEEDS
Ht: 5'7" Wt: 187.1 BMI: 29.3 kg/m2 IBW: 135.5 (+/-10%) 138 %IBW  Pertinent information: Pt admitted with complain of dyspnea. PMH: T2DM, tobacco use, HTN, anxiety, stenosis mLAD, CHF. Per chart: NSTEMI, some ARYA.   Noted no edema. However, noted +2 bilateral edema in ankles (11/3) and +1 bilateral edema in ankles (11/4). Skin intact. Ht: 5'7" Wt: 187.1 BMI: 29.3 kg/m2 IBW: 135.5 (+/-10%) 138 %IBW  Pertinent information: Pt admitted with dyspnea. PMH: T2DM, tobacco use, HTN, anxiety. Per chart: NSTEMI s/p cardiac cath shows stenosis mLAD, some ARYA, 2 vessel disease possible CABG but Pt undecided, noted per RN Pt refusing blood draws and medications. Pending behavioral cardiology evaluation.   No noted edema today. However, noted +2 bilateral edema in ankles (11/3) and +1 bilateral edema in ankles (11/4). Skin intact.

## 2017-11-06 NOTE — DIETITIAN INITIAL EVALUATION ADULT. - NS FNS REASON FOR WEIGHT CHANG
other (specify)/Noted weight 217.5 (11/2) and 187.1 (11/6) pounds, weight loss probable due to edema. Pt reported  for the last 2 years. other (specify)/Noted weight 217.5 (11/2) and 187.1 (11/6) pounds, weight loss probable due to fluid shift as per chart s/p IV Lasix. Pt reported  for the last 2 years.

## 2017-11-06 NOTE — CONSULT NOTE ADULT - SUBJECTIVE AND OBJECTIVE BOX
Behavioral Cardiology     Psychological assessment:    Patient information:  Mrs. Malone is a 72 year-old woman, , one daughter, grandchildren and great-grandchildren.  Lives alone in [facility].   Retired .        HPI:  PMH of T2DM, HTN, tobacco use who presented to [facility] w/complain of dyspnea ruled out for PE, found to have TWI on EKG and positive troponin, transferred to [facility] for cardiac catherization which demonstrated multi-vessel disease, currently being medically optimized for CABG.       Understanding of illness and treatment recommendations:   Good understanding of current health issues and reason for hospitalization and CABG.  Reports poor adherence with MD follow up.  Expressing increased anxiety related to surgery and other aspects of hospitalization (blood draws, testing).          Emotional health/coping strategies:   Describes herself as always having fears surrounding medical issues (fearful of going to doctors, having blood drawn, surgery).   Since being hospitalized has refused some blood draws and was unsure of having surgery due to her anxiety.   After speaking with family members has decided to go through with surgery "I want to be around for my daughter and grandchildren."   Since hospitalization endorsing ongoing anxiety and periods of low mood.   Denies anhedonia.   Denies s/i.   Poor sleep in hospital.   Appetite fair (doesn’t like hospital food).   Current stressors: fearful of surgery and all aspects of hospitalization.  Coping strategies include: strong family support.  Denies PPH.  Many years ago was in psychotherapy for 2 years during divorce.   Several years ago was prescribed medication to "calm me down" was unsure of name; reports it made her foggy.       Substance use history:  Current smoker; ½ PPD x50 years.  Expressed motivation to quit "for my health."  Has never tried to quit in past.   Rare alcohol use 5-6x/year.   Denies nonprescription drug use.        MSE:  Pt seen resting in bed.   A&Ox3.   Anxious and guarded.  Fairly related with intermittent eye contact.  Fidgety and irritable at times.   Speech normal rate and volume.  Thought process goal directed.  No evidence of psychosis, nitish, delusions.  Denies s/i.   Mood "I’m scared." Affect constricted.            Impression:  73 y/o female with NSTEMI, found to have 3 vessel disease scheduled for CABG.  Experiencing increased anxiety related to surgery and all aspects of hospitalization most notably blood draws, fears about the surgery (sutures, dressings).  Good understanding of current health issues, poor adherence with MD follow up.  Endorsing ongoing anxiety with brief periods of low mood since hospitalization.  Describes herself as always being anxious but since hospitalization has become more intense.  Denies PPH.  Has strong family support (daughter, sister).  Not receptive to stress reduction/relaxation strategies or additional coping strategies for managing anxiety.      Dx:  Adjustment disorder with anxiety and depressed mood.        Recommendations:   (1) May benefit from medication evaluation for anxiety.   (2) Behavioral Cardiology will follow as needed.

## 2017-11-06 NOTE — DIETITIAN INITIAL EVALUATION ADULT. - NS AS NUTRI INTERV MEALS SNACK
Carbohydrate - modified diet/Other (specify)/Consistent carbohydrates, DASH Other (specify)/Carbohydrate - modified diet/Consistent carbohydrates, DASH. Reviewed alternate menu options and ordering procedures. Provided education on current diet restrictions and encouraged Pt to increase PO intake of meals.

## 2017-11-06 NOTE — DIETITIAN INITIAL EVALUATION ADULT. - NS AS NUTRI INTERV MEDICAL AND FOOD SUPPLEMENTS
Commercial beverage/Pt willing to try Glucerna to increase PO intake and reach nutritional needs. Commercial beverage/Will provide a trial of Glucerna for dinner

## 2017-11-06 NOTE — DIETITIAN INITIAL EVALUATION ADULT. - NS AS NUTRI INTERV ED CONTENT
Other (specify)/RD attempt to provide education of DM and HF nutrition therapy. Pt denied education at this time. Pt not interested on modifying lifestyle/eating behaviors. RD remains available. RD attempt to provide education for DM and heart healthy nutrition therapy. Pt declines education at this time. Pt not interested on modifying lifestyle/eating behaviors. RD remains available./Other (specify)

## 2017-11-06 NOTE — DIETITIAN INITIAL EVALUATION ADULT. - OTHER INFO
Pt seen for length of stay initial assessment. Pt reports poor appetite since admission because dislikes food. However, noted PO intake %. Pt states not eating vegetables at home and constantly going out for lunch and dinner. Pt reports no difficulty chewing or swallowing. No symptoms of nausea or vomit reported, however daughter reports Pt feeling heartburn after lunch probable to nebulizer. Pt reports history of constipation. Noted last BM on 11/6. Pt reports allergy to clamps. No other food allergies reported. Pt reports taking fish oil at home. Pt states taking metformin PTA and not monitoring blood glucose. Pt willing to try Glucerna to increase PO intake. Pt seen for length of stay initial assessment. Pt reports poor appetite since admission because does not likes institutional food. However, noted PO intake %. Pt states not eating vegetables at home and constantly going out for lunch and dinner. Pt reports no difficulty chewing or swallowing. No symptoms of nausea or vomit reported, however daughter reports Pt feeling heartburn after lunch probable to nebulizer. Pt reports history of constipation. Noted last BM on 11/6. Pt reports allergy to clams. No other food allergies reported. Pt reports taking fish oil at home. Pt states taking metformin PTA and not monitoring blood glucose. Offered diet education, however Pt not receptive stating she rather take medications than eat vegetables. Pt stated she will not be changing her diet at home despite encouragements.

## 2017-11-06 NOTE — STUDENT SIGN OFF DOCUMENT - DOCUMENTS STUDENTS ARE SIGNED OFF ON
Input and Output/Plan of Care/Assessment and Intervention Patient Profile/Provider Contact Note/Dietitian Initial Evaluation

## 2017-11-06 NOTE — DIETITIAN INITIAL EVALUATION ADULT. - SIGNS/SYMPTOMS
Pt diet recall and no blood glucose monitoring PTA, and Pt denied diet education at this time. Pt non compliant with therapeutic diet at home and Pt declined diet education at this time.

## 2017-11-06 NOTE — DIETITIAN INITIAL EVALUATION ADULT. - PERTINENT LABORATORY DATA
(11/5) BUN 27, Creatinine 1.34, , GFR 39, , . HbA1c 5.2% (11/3). (11/5) BUN 27, Creatinine 1.34, , GFR 39, Cholesterol 212, . HbA1c 5.2% (11/3). FS (11/2-4) 114 - 212.

## 2017-11-06 NOTE — CHART NOTE - NSCHARTNOTEFT_GEN_A_CORE
====================  CCU MIDNIGHT ROUNDS  ====================    LUIGI MELARA  [id number]    ====================  SUMMARY:  ====================    73 yo F tobacco use, DM2, HTN, transferred from [facility] w/ NSTEMI s/p LHC 11/2 w/ mLAD 90%, mCx 100%, dCx 100%, OM1 90%, s/p diuresis, being optimized for CABG tenatively Weds    ====================  NEW EVENTS:  ====================    No new complaints, PRN PO ativan started for anxiety. Pt refusing BP measurement and pulse ox     ====================  VITALS (Last 12 hrs):  ====================    T(C): 36.9 (11-06-17 @ 19:30), Max: 36.9 (11-06-17 @ 19:30)  HR: 76 (11-06-17 @ 23:00) (66 - 82)  BP: 155/72 (11-06-17 @ 22:00) (115/66 - 160/80)  BP(mean): 104 (11-06-17 @ 22:00) (76 - 118)  RR: 21 (11-06-17 @ 23:00) (20 - 27)  SpO2: 96% (11-06-17 @ 19:30) (96% - 100%)    TELEMETRY: sinus     I&O's Summary    05 Nov 2017 07:01  -  06 Nov 2017 07:00  --------------------------------------------------------  IN: 928 mL / OUT: 500 mL / NET: 428 mL    06 Nov 2017 07:01  -  06 Nov 2017 23:49  --------------------------------------------------------  IN: 470 mL / OUT: 100 mL / NET: 370 mL    ====================  PLAN:  ====================  c/w ASA, lipitor, lopressor  strict I&Os, daily weights, monitor Cr and taylor dupont as needed   PRN ativan for anxiety    Annemarie Traore [facility]/CCU  #[id number]

## 2017-11-06 NOTE — DIETITIAN INITIAL EVALUATION ADULT. - ORAL INTAKE PTA
good/Pt states having good appetite PTA, however appetite decreased in hospital because Pt dislikes food. good/Pt states having good appetite PTA, however appetite decreased in hospital because Pt dislikes institutional food.

## 2017-11-06 NOTE — PROGRESS NOTE ADULT - SUBJECTIVE AND OBJECTIVE BOX
CONTACT INFO:  KAREN Gross  Pager:  [phone]      HPI / INTERVAL HISTORY:  no acute events overnight. Patient refused blood draws again this morning. States the metoprolol makes her feel "loopy." When asked to clarify, she moved her head from side to side. Cannot clarify further. When asked if she would like someone to speak to her about her anxiety, she stated "it won't help." When asked if she has made a decision on the surgery, she states that she is having it.    OBJECTIVE:  VITAL SIGNS:  ICU Vital Signs Last 24 Hrs  T(C): 36.6 (06 Nov 2017 04:30), Max: 36.6 (05 Nov 2017 10:00)  T(F): 97.8 (06 Nov 2017 04:30), Max: 97.9 (05 Nov 2017 10:00)  HR: 64 (06 Nov 2017 06:45) (62 - 91)  BP: 111/52 (06 Nov 2017 06:45) (93/49 - 136/72)  BP(mean): 77 (06 Nov 2017 06:45) (66 - 122)  ABP: --  ABP(mean): --  RR: 20 (06 Nov 2017 06:45) (13 - 29)  SpO2: 96% (06 Nov 2017 06:45) (90% - 100%)        11-05 @ 07:01  -  11-06 @ 07:00  --------------------------------------------------------  IN: 928 mL / OUT: 500 mL / NET: 428 mL      CAPILLARY BLOOD GLUCOSE      POCT Blood Glucose.: 133 mg/dL (04 Nov 2017 11:27)      PHYSICAL EXAM:  Gen: NAD  HEENT: NC/AT; PERRL, anicteric sclera  Neck: supple, no JVD  Resp: bibasilar crackles  Cardiovasc: S1S2 normal; RRR; no murmurs, rubs or gallops  GI: soft, nondistended, nontender; +BS  Extr: warm, well-perfused, PT/DP pulses 2+ B/L; no LE edema  Skin: normal color and turgor  Neuro: AAOx3    LABS:                        9.6    6.6   )-----------( 295      ( 05 Nov 2017 15:28 )             29.4     11-05    139  |  98  |  27<H>  ----------------------------<  128<H>  3.7   |  28  |  1.34<H>    Ca    8.8      05 Nov 2017 15:28    PTT - ( 05 Nov 2017 15:28 )  PTT:29.9 sec      RADIOLOGY & ADDITIONAL TESTS:       MEDICATIONS:  acetaminophen   Tablet. 650 milliGRAM(s) Oral every 6 hours PRN  ALBUTerol/ipratropium for Nebulization 3 milliLiter(s) Nebulizer every 6 hours  aspirin enteric coated 81 milliGRAM(s) Oral daily  atorvastatin 80 milliGRAM(s) Oral at bedtime  influenza   Vaccine 0.5 milliLiter(s) IntraMuscular once  metoprolol     tartrate 12.5 milliGRAM(s) Oral every 12 hours  zolpidem 5 milliGRAM(s) Oral at bedtime PRN      ALLERGIES:  No Known Drug Allergies  Peroxide (Blisters)

## 2017-11-06 NOTE — PROGRESS NOTE ADULT - SUBJECTIVE AND OBJECTIVE BOX
[facility] - Madison Herrera, Landon, Yusra, Luis, Liseth Lunsford  Office Number:  [phone]    Patient resting comfortably in bed in NAD.  Laying flat with no respiratory distress.  No complaints of chest pain, dyspnea, palpitations, PND, or orthopnea.    ROS: negative unless otherwise mentioned.    Telemetry:  SR, with NSVT and PVCs    MEDICATIONS  (STANDING):  ALBUTerol/ipratropium for Nebulization 3 milliLiter(s) Nebulizer every 6 hours  aspirin enteric coated 81 milliGRAM(s) Oral daily  atorvastatin 80 milliGRAM(s) Oral at bedtime  influenza   Vaccine 0.5 milliLiter(s) IntraMuscular once  metoprolol     tartrate 12.5 milliGRAM(s) Oral every 12 hours    MEDICATIONS  (PRN):  acetaminophen   Tablet. 650 milliGRAM(s) Oral every 6 hours PRN Moderate Pain (4 - 6)  zolpidem 5 milliGRAM(s) Oral at bedtime PRN Insomnia      Allergies    No Known Drug Allergies  Peroxide (Blisters)    Intolerances        Vital Signs Last 24 Hrs  T(C): 36.4 (06 Nov 2017 08:00), Max: 36.6 (05 Nov 2017 13:28)  T(F): 97.6 (06 Nov 2017 08:00), Max: 97.9 (05 Nov 2017 13:28)  HR: 78 (06 Nov 2017 10:00) (62 - 91)  BP: 119/56 (06 Nov 2017 10:00) (93/49 - 136/72)  BP(mean): 72 (06 Nov 2017 10:00) (65 - 122)  RR: 18 (06 Nov 2017 10:00) (13 - 29)  SpO2: 98% (06 Nov 2017 09:00) (90% - 100%)    I&O's Summary    05 Nov 2017 07:01  -  06 Nov 2017 07:00  --------------------------------------------------------  IN: 928 mL / OUT: 500 mL / NET: 428 mL    06 Nov 2017 07:01  -  06 Nov 2017 11:54  --------------------------------------------------------  IN: 0 mL / OUT: 100 mL / NET: -100 mL        ON EXAM:    Constitutional: well-nourished, well-developed, NAD   HEENT:  MMM, sclerae anicteric, conjunctivae clear, no oral cyanosis.  Pulmonary: Non-labored, breath sounds are clear bilaterally, No wheezing, rales or rhonchi  Cardiovascular: Regular, S1 and S2.  No murmur.  No rubs, gallops or clicks  Gastrointestinal: Bowel Sounds present, soft, nontender.   Lymph: No peripheral edema.   Neurological: Alert, no focal deficits  Skin: No rashes.  Psych:  Mood & affect appropriate    LABS: All Labs Reviewed:                        9.6    6.6   )-----------( 295      ( 05 Nov 2017 15:28 )             29.4                         10.8   13.0  )-----------( 323      ( 04 Nov 2017 05:40 )             31.7                         11.0   10.7  )-----------( 310      ( 03 Nov 2017 14:49 )             33.4     05 Nov 2017 15:28    139    |  98     |  27     ----------------------------<  128    3.7     |  28     |  1.34   04 Nov 2017 05:40    139    |  97     |  17     ----------------------------<  174    3.5     |  24     |  1.21     Ca    8.8        05 Nov 2017 15:28  Ca    8.8        04 Nov 2017 05:40  Phos  4.1       04 Nov 2017 05:40  Mg     2.0       04 Nov 2017 05:40      PTT - ( 05 Nov 2017 15:28 )  PTT:29.9 sec      Blood Culture:   11-03 @ 22:05  Pro Bnp [id number]

## 2017-11-06 NOTE — PROGRESS NOTE ADULT - ASSESSMENT
72 year old woman with a history of tobacco use, DM, HTN, poor historian presents for dyspnea and NSTEMI. Initially she was found to be in heart failure, and is s/p IV Lasix.     - she appears euvolemic on exam; would keep net even today  - She is s/p Cath with significant two vessel disease.   - CABG has been recommended, and she is agreeable  - TTE reveals only mild-mod MR which certainly reduces the complexity and risk of the planned CTS intervention  - Continue with ASA  - Continue with lopressor 12.5 mg po bid, monitor for NSVT  - Continue with Statin  - did not tolerate ACE-inhibitor, as she developed hypotension  - temporary plan for CABG for Wednesday  - will follow    The patient is at risk of abrupt decompensation.  35 minutes of critical care time was spent with this patient.

## 2017-11-06 NOTE — PROVIDER CONTACT NOTE (OTHER) - ACTION/TREATMENT ORDERED:
Pt educated on importance of vital sign monitoring for care in the ICU.  Cuffs changed to provide alternatives to standard cuff size.  Provider aware. Continue to monitor.

## 2017-11-06 NOTE — PROGRESS NOTE ADULT - ASSESSMENT
73 yo F w/PMH of T2DM, HTN, tobacco use who presented to [place] w/complain of dyspnea ruled out for PE, found to have TWI on EKG and positive troponin, transferred to [facility] for cardiac cath which demonstrated multi-vessel disease, currently being medically optimized for possible CABG    #Neuro:   - AAOx3  - patient reports anxiety which she has had since she was a little girl but she does not take anything for it at home  - consider behavioral cardiology consult as patient at times seems to lack insight into her disease and refuses treatments - unclear if she would be a good candidate for CABG    #CV:   - patient presented with NSTEMI s/p cardiac cath demonstrating 90% occlusion mLAD and 100% to pCx, OM1 100%  - c/w ASA 81, Lipitor 80, Plavix on hold due to upcoming CABG  - TTE (11/3): EF 30-35%, mild-mod MR, mildly dilated LA, mod-severe LVSD, eccentric LVH, mild diastolic dysfunction (stage I), minimal WA, no LV thrombus  - per CT surgery, is tentatively scheduled for procedure Wednesday (11/8) with Dr. Mejia    #Resp:   - O2 sat stable on O2 by NC 4L  - CTA at OSH negative for PE  - patient with continued bibasilar crackles on exam, received Lasix yesterday morning, currently net positive approximately 900cc  - consider additional Lasix as needed    #GI:   - c/w DASH/TLC diet    #ID:  - no issues    #Metabolic/Renal  - patient with some ARYA yesterday, may be related to initial overdiuresis  - patient refused labs this morning, will attempt later to get additional SCr to trend    #Heme  - patient with mild baseline anemia  - H/H stable as per labs yesterday    #Endo:   - A1c 5.2, TSH 5.7    #DVT ppx: SCDs

## 2017-11-06 NOTE — PROVIDER CONTACT NOTE (OTHER) - ASSESSMENT
Patient A+O x4; afebrile, HR stable; denies chest pain/ SOB. Call bell within reach, will continue to monitor.

## 2017-11-06 NOTE — DIETITIAN INITIAL EVALUATION ADULT. - ADHERENCE
poor/Noted Pt HbA1c 5.2, however Pt states not adhering to any diet or restriction at home. Pt dislikes vegetables. Pt states eating English muffin with cream cheese and sugar free jello for breakfast; and cheeseburgers, french fries for lunch and dinner. Pt usually eats in restaurants and dinners Noted Pt HbA1c 5.2, however Pt states not adhering to any diet or restriction at home. Pt dislikes vegetables. Pt states eating English muffin with cream cheese and sugar free jelly for breakfast; and cheeseburgers, french fries for lunch and dinner. Pt usually eats in British and Chinese restaurants and dinners. Pt does not cook at home. Pt snacks on cakes and cookies at home./poor

## 2017-11-07 LAB
ANION GAP SERPL CALC-SCNC: 12 MMOL/L — SIGNIFICANT CHANGE UP (ref 5–17)
BLD GP AB SCN SERPL QL: NEGATIVE — SIGNIFICANT CHANGE UP
BUN SERPL-MCNC: 23 MG/DL — SIGNIFICANT CHANGE UP (ref 7–23)
CALCIUM SERPL-MCNC: 9.2 MG/DL — SIGNIFICANT CHANGE UP (ref 8.4–10.5)
CHLORIDE SERPL-SCNC: 101 MMOL/L — SIGNIFICANT CHANGE UP (ref 96–108)
CO2 SERPL-SCNC: 27 MMOL/L — SIGNIFICANT CHANGE UP (ref 22–31)
CREAT SERPL-MCNC: 1.13 MG/DL — SIGNIFICANT CHANGE UP (ref 0.5–1.3)
GLUCOSE SERPL-MCNC: 136 MG/DL — HIGH (ref 70–99)
HCT VFR BLD CALC: 30.5 % — LOW (ref 34.5–45)
HGB BLD-MCNC: 10.2 G/DL — LOW (ref 11.5–15.5)
MAGNESIUM SERPL-MCNC: 2.3 MG/DL — SIGNIFICANT CHANGE UP (ref 1.6–2.6)
MCHC RBC-ENTMCNC: 29.4 PG — SIGNIFICANT CHANGE UP (ref 27–34)
MCHC RBC-ENTMCNC: 33.4 GM/DL — SIGNIFICANT CHANGE UP (ref 32–36)
MCV RBC AUTO: 87.9 FL — SIGNIFICANT CHANGE UP (ref 80–100)
PHOSPHATE SERPL-MCNC: 3.5 MG/DL — SIGNIFICANT CHANGE UP (ref 2.5–4.5)
PLATELET # BLD AUTO: 337 K/UL — SIGNIFICANT CHANGE UP (ref 150–400)
POTASSIUM SERPL-MCNC: 4.8 MMOL/L — SIGNIFICANT CHANGE UP (ref 3.5–5.3)
POTASSIUM SERPL-SCNC: 4.8 MMOL/L — SIGNIFICANT CHANGE UP (ref 3.5–5.3)
RBC # BLD: 3.47 M/UL — LOW (ref 3.8–5.2)
RBC # FLD: 13.9 % — SIGNIFICANT CHANGE UP (ref 10.3–14.5)
RH IG SCN BLD-IMP: POSITIVE — SIGNIFICANT CHANGE UP
SODIUM SERPL-SCNC: 140 MMOL/L — SIGNIFICANT CHANGE UP (ref 135–145)
WBC # BLD: 7 K/UL — SIGNIFICANT CHANGE UP (ref 3.8–10.5)
WBC # FLD AUTO: 7 K/UL — SIGNIFICANT CHANGE UP (ref 3.8–10.5)

## 2017-11-07 PROCEDURE — 99291 CRITICAL CARE FIRST HOUR: CPT

## 2017-11-07 PROCEDURE — 93010 ELECTROCARDIOGRAM REPORT: CPT

## 2017-11-07 PROCEDURE — 90832 PSYTX W PT 30 MINUTES: CPT

## 2017-11-07 RX ORDER — PANTOPRAZOLE SODIUM 20 MG/1
40 TABLET, DELAYED RELEASE ORAL
Qty: 0 | Refills: 0 | Status: DISCONTINUED | OUTPATIENT
Start: 2017-11-07 | End: 2017-11-08

## 2017-11-07 RX ORDER — METOPROLOL TARTRATE 50 MG
25 TABLET ORAL
Qty: 0 | Refills: 0 | Status: DISCONTINUED | OUTPATIENT
Start: 2017-11-07 | End: 2017-11-08

## 2017-11-07 RX ORDER — LANOLIN ALCOHOL/MO/W.PET/CERES
3 CREAM (GRAM) TOPICAL AT BEDTIME
Qty: 0 | Refills: 0 | Status: DISCONTINUED | OUTPATIENT
Start: 2017-11-07 | End: 2017-11-08

## 2017-11-07 RX ORDER — CEFUROXIME AXETIL 250 MG
1500 TABLET ORAL ONCE
Qty: 0 | Refills: 0 | Status: CANCELLED | OUTPATIENT
Start: 2017-11-08 | End: 2017-11-08

## 2017-11-07 RX ORDER — LIDOCAINE 4 G/100G
1 CREAM TOPICAL
Qty: 0 | Refills: 0 | Status: DISCONTINUED | OUTPATIENT
Start: 2017-11-07 | End: 2017-11-08

## 2017-11-07 RX ORDER — ALPRAZOLAM 0.25 MG
0.25 TABLET ORAL EVERY 8 HOURS
Qty: 0 | Refills: 0 | Status: DISCONTINUED | OUTPATIENT
Start: 2017-11-07 | End: 2017-11-08

## 2017-11-07 RX ADMIN — Medication 3 MILLILITER(S): at 05:28

## 2017-11-07 RX ADMIN — Medication 0.25 MILLIGRAM(S): at 23:48

## 2017-11-07 RX ADMIN — Medication 3 MILLILITER(S): at 11:18

## 2017-11-07 RX ADMIN — PANTOPRAZOLE SODIUM 40 MILLIGRAM(S): 20 TABLET, DELAYED RELEASE ORAL at 18:07

## 2017-11-07 RX ADMIN — Medication 3 MILLIGRAM(S): at 01:28

## 2017-11-07 RX ADMIN — Medication 25 MILLIGRAM(S): at 06:37

## 2017-11-07 RX ADMIN — ATORVASTATIN CALCIUM 80 MILLIGRAM(S): 80 TABLET, FILM COATED ORAL at 20:57

## 2017-11-07 RX ADMIN — Medication 0.5 MILLIGRAM(S): at 07:54

## 2017-11-07 RX ADMIN — Medication 25 MILLIGRAM(S): at 18:07

## 2017-11-07 RX ADMIN — Medication 3 MILLILITER(S): at 18:07

## 2017-11-07 RX ADMIN — Medication 0.25 MILLIGRAM(S): at 15:48

## 2017-11-07 RX ADMIN — Medication 3 MILLILITER(S): at 23:48

## 2017-11-07 RX ADMIN — Medication 81 MILLIGRAM(S): at 11:18

## 2017-11-07 NOTE — PROGRESS NOTE ADULT - ASSESSMENT
72 year old woman with a history of tobacco use, DM, HTN, poor historian presents for dyspnea and NSTEMI. Initially she was found to be in heart failure, and is s/p IV Lasix.     - she appears euvolemic on exam; would keep net even today  - She is s/p Cath with significant two vessel disease.   - CABG has been recommended, and she is now agreeable  - TTE reveals only mild-mod MR which certainly reduces the complexity and risk of the planned CTS intervention  - Continue with ASA  - Continue with lopressor 12.5 mg po bid, monitor for NSVT  - Continue with Statin  - did not tolerate ACE-inhibitor, as she developed hypotension  - presently planned for CABG for tomorrow  - will follow    The patient is at risk of abrupt decompensation.  35 minutes of critical care time was spent with this patient.

## 2017-11-07 NOTE — PROGRESS NOTE ADULT - ASSESSMENT
Reports she is feeling much less anxious today after taking Xanax.   Scheduled for CABG tomorrow and although still concerned about surgery and recovery period is coping much better.   Receptive to support and validation but not interested in stress reduction strategies "I don’t believe in that stuff."   Family available to provide support when patient goes home.   Taking melatonin for sleep.        Dx:  Adjustment disorder with anxiety and depressed mood.        Recommendations:     Provide support and validation for anxiety   Behavioral Cardiology will follow as needed.

## 2017-11-07 NOTE — PROGRESS NOTE ADULT - ATTENDING COMMENTS
Edith Craft, PhD  [phone]
Patient is seen and examined with fellow, NP and the CCU house-staff. I agree with the history, physical and the assessment and plan.  awaiting CABG    on ASA and metoprolol    will refer to be seen by Dr Parada for behavioral cardiac needs
Patient is seen and examined with fellow, NP and the CCU house-staff. I agree with the history, physical and the assessment and plan.  plan for CABG tomorrow
Patient is seen and examined with fellow, NP and the CCU house-staff. I agree with the history, physical and the assessment and plan.  unstble angina s/p cath - found to have severe CAD with EF 20% - awaiting surgical evaluation  on ASA, plavix on hold  heparin gtt for 48 hrs  TTE pending  f/u with CTS
Seen/examined at bedside. Agree with the above.  72 year old woman with a history of tobacco use, DM, HTN, poor historian presents for dyspnea and NSTEMI. Initially she was found to be in heart failure, and is s/p IV Lasix. Found to have significant multi-vessel disease.  Being evaluated for CABG  Will give Lasix 20 IV x 1 for mild volume overload and SOB  continue with ASA and statin. Will d/c lisinopril at current time.  TTE to evaluate MV    High risk of decompensation. >35 min spent taking care of patient and discussing treatment options.

## 2017-11-07 NOTE — CHART NOTE - NSCHARTNOTEFT_GEN_A_CORE
Patient is a 73 yo F w/PMH of tobacco use, T2DM, HTN on no medications who presented to [facility] on 11/2 with c/o dyspnea. She was found to have an elevated D-Dimer to 659, CTPA negative. She was also noted to have elevated Troponin I to 0.093 w/associated TWI on EKG, transferred to [facility] for NSTEMI s/p cath (11/2). Cath showed  multivessel disease and CT surgery consulted for possible CABG. She was ASA/Plavix loaded and started on a heparin gtt. Cardiac enzymes remained negative while in CCU. She was evaluated by CT surgery and deemed a good candidate for CABG, however hesitant. Heparin gtt dc'd on 11/5. She was medically optimized on ASA, BB, Lipitor. She was noted to have anxiety, evaluated by the behavioral cardiologist and started on PRN Ativan. She is tentatively scheduled for Wednesday 11/8 with Dr. Mejia. She remained HD stable and is now stable for transfer to the floor.    Juliana Scruggs, R2

## 2017-11-07 NOTE — PROGRESS NOTE ADULT - SUBJECTIVE AND OBJECTIVE BOX
Cardiac Surgery Pre-op Note:    CC: Patient is a 72y old  Female who presents with a chief complaint of chest pain found to have 3 VCAD                                                                                                           Surgeon:Jackie    Procedure: CABG    Allergies    No Known Drug Allergies  Peroxide (Blisters)    Intolerances        HPI:  72 year old woman with a history of tobacco use, T2 DM unknown HgA1C, ?HTN on no medications  poor historian presented to [facility] with c/o  for dyspnea. She has been complaining of progressively worsening dyspnea on exertion for the last few months. Her exercise tolerance is limited to about half a block. LAst night the patient  felt dyspnea worsened  when laying down with chest pressure. + orthopnea, and went to [facility]. She was found to have an elevated D-  Dimer 659 but CT angio was negative for PE ( results below) Her Troponin I 0.093 was elevated  ws also elevated. Patient was given  mg, Plavix 300mg and Lasix 40 mg and transferred to [facility] for further evaluation. The patient currently denies any CP, palpitations, syncope or near syncope             IMPRESSION:          PAST MEDICAL & SURGICAL HISTORY:  Diabetes  S/P breast lumpectomy: left  History of tonsillectomy  H/O abdominal hysterectomy: 1991      MEDICATIONS  (STANDING):  ALBUTerol/ipratropium for Nebulization 3 milliLiter(s) Nebulizer every 6 hours  aspirin enteric coated 81 milliGRAM(s) Oral daily  atorvastatin 80 milliGRAM(s) Oral at bedtime  influenza   Vaccine 0.5 milliLiter(s) IntraMuscular once  metoprolol     tartrate 25 milliGRAM(s) Oral two times a day  pantoprazole    Tablet 40 milliGRAM(s) Oral before breakfast    MEDICATIONS  (PRN):  acetaminophen   Tablet. 650 milliGRAM(s) Oral every 6 hours PRN Moderate Pain (4 - 6)  LORazepam     Tablet 0.5 milliGRAM(s) Oral two times a day PRN Anxiety  melatonin 3 milliGRAM(s) Oral at bedtime PRN Sleep        Labs:                        10.2   7.0   )-----------( 337      ( 07 Nov 2017 14:18 )             30.5     11-07    140  |  101  |  23  ----------------------------<  136<H>  4.8   |  27  |  1.13    Ca    9.2      07 Nov 2017 14:18  Phos  3.5     11-07  Mg     2.3     11-07      PT/INR - ( 06 Nov 2017 15:28 )   PT: 12.9 sec;   INR: 1.18 ratio         PTT - ( 06 Nov 2017 15:28 )  PTT:32.7 sec    Blood Type: ABO Interpretation: B (11-02 @ 22:57)    HGB A1C: Hemoglobin A1C, Whole Blood: 5.2 % (11-03 @ 08:57)    Pro-BNP: Serum Pro-Brain Natriuretic Peptide: 6079 pg/mL (11-03 @ 22:05)    Thyroid Panel: 11-03 @ 05:50/2.41  --/--/--    MRSA: MRSA PCR Result.: NotDetec (11-03 @ 22:56)   / MSSA: MSSA PCR Result.: NotDetec (11-03 @ 22:56)        CXR:   he heart size cannot be accurately assessed on this projection.  Worsening pulmonary edema.   There are no pleural effusions or pneumothorax.  EKG:  NORMAL SINUS RHYTHM 66 BPM    Carotid Duplex:    No hemodynamically significant carotid artery stenoses  PFT's:    Echocardiogram:  1. Moderate to severe left ventricular systolic  dysfunction. Left ventricular dysfunction is global with  regional variation. No left ventricular thrombus seen.  2. Mild diastolic dysfunction (Stage I).  3. The right ventricle is not well visualized; grossly  normal right ventricular systolic function.  4. Trivial pericardial effusion.  .  Cardiac catheterization:  VENTRICLES: EF estimated was 20 %.  CORONARY VESSELS: The coronary circulation is right dominant.  LM:   --  LM: Angiography showed minor luminal irregularities with no flow  limiting lesions.  LAD:   --  Mid LAD: There was a diffuse 90 % stenosis.  CX:   --  Mid circumflex: There was a 100 % stenosis.  --  Distal circumflex: There was a 100 % stenosis.  --  OM1: There was a 90 % stenosis.  RCA:   --  RCA: Angiography showed minor luminal irregularities with no  flow limiting lesion    Gen: WN/WD NAD  Neuro: AAOx3, nonfocal  Pulm: CTA B/L  CV: RRR, S1S2  Abd: Soft, NT, ND +BS  Ext: No edema, + peripheral pulses      Pt has AICD/PPM [ ] Yes  [x ] No             Brand Name:  Pre-op Beta Blocker ordered within 24 hrs of surgery?  [x ] Yes  [ ] No  If not, Why?  Type & Cross  [x ] Yes  [ ] No  NPO after Midnight [x ] Yes  [ ] No  Pre-op ABX ordered, to be taped on chart:  [x ] Yes  [ ] No     Hibiclens/Peridex ordered [x ] Yes  [ ] No  Intraop on Hold: PRBCs, CXR, LUC [x ]   Consent obtained  [ ] Yes  [ ] No

## 2017-11-07 NOTE — PROGRESS NOTE ADULT - ASSESSMENT
73 yo F w/PMH of T2DM, HTN, tobacco use who presented to [place] w/complain of dyspnea ruled out for PE, found to have TWI on EKG and positive troponin, transferred to [facility] for cardiac cath which demonstrated multi-vessel disease, currently being medically optimized for CABG    #Neuro:   - AAOx3  - patient reports anxiety which she has had since she was a little girl but she does not take anything for it at home  - seen by behavioral cardiology - started Ativan PRN for anxiety    #CV:   - patient presented with NSTEMI s/p cardiac cath demonstrating 90% occlusion mLAD and 100% to pCx, OM1 100%  - c/w ASA 81, Lipitor 80, Metoprolol 25 BID, Plavix on hold due to upcoming CABG  - TTE (11/3): EF 30-35%, mild-mod MR, mildly dilated LA, mod-severe LVSD, eccentric LVH, mild diastolic dysfunction (stage I), minimal OH, no LV thrombus  - per CT surgery, is tentatively scheduled for procedure Wednesday (11/8) with Dr. Mejia    #Resp:   - O2 sat stable on O2 by NC 4L  - CTA at OSH negative for PE  - consider additional Lasix as needed    #GI:   - c/w DASH/TLC diet    #ID:  - no issues    #Metabolic/Renal  - patient with some ARYA yesterday, may be related to initial overdiuresis  - patient refused labs this morning, will attempt later to get additional SCr to trend    #Heme  - patient with mild baseline anemia  - H/H stable as per labs yesterday    #Endo:   - A1c 5.2, TSH 5.7    #DVT ppx: SCDs 71 yo F w/PMH of T2DM, HTN, tobacco use who presented to [place] w/complain of dyspnea ruled out for PE, found to have TWI on EKG and positive troponin, transferred to [facility] for cardiac cath which demonstrated multi-vessel disease, currently being medically optimized for CABG    #Neuro:   - AAOx3  - patient reports anxiety which she has had since she was a little girl but she does not take anything for it at home  - seen by behavioral cardiology - started Ativan PRN for anxiety    #CV:   - patient presented with NSTEMI s/p cardiac cath demonstrating 90% occlusion mLAD and 100% to pCx, OM1 100%  - c/w ASA 81, Lipitor 80, Metoprolol 25 BID, Plavix on hold due to upcoming CABG  - TTE (11/3): EF 30-35%, mild-mod MR, mildly dilated LA, mod-severe LVSD, eccentric LVH, mild diastolic dysfunction (stage I), minimal ID, no LV thrombus  - per CT surgery, is tentatively scheduled for procedure Wednesday (11/8) with Dr. Mejia    #Resp:   - O2 sat stable  - CTA at OSH negative for PE  - consider additional Lasix as needed    #GI:   - c/w DASH/TLC diet    #ID:  - no issues    #Metabolic/Renal  - patient with some ARYA, now improved    #Heme  - patient with mild baseline anemia  - H/H stable    #Endo:   - A1c 5.2, TSH 5.7    #DVT ppx: SCDs

## 2017-11-07 NOTE — PROGRESS NOTE ADULT - SUBJECTIVE AND OBJECTIVE BOX
CONTACT INFO:  KAREN Gross  Pager:  [phone]      HPI / INTERVAL HISTORY:    No acute events overnight.     OBJECTIVE:  VITAL SIGNS:  ICU Vital Signs Last 24 Hrs  T(C): 36.6 (07 Nov 2017 06:00), Max: 36.9 (06 Nov 2017 19:30)  T(F): 97.8 (07 Nov 2017 06:00), Max: 98.4 (06 Nov 2017 19:30)  HR: 72 (07 Nov 2017 06:00) (64 - 96)  BP: 153/84 (07 Nov 2017 06:00) (106/47 - 160/80)  BP(mean): 100 (07 Nov 2017 06:00) (65 - 118)  ABP: --  ABP(mean): --  RR: 23 (07 Nov 2017 06:00) (18 - 27)  SpO2: 97% (07 Nov 2017 06:00) (93% - 100%)        11-06 @ 07:01  -  11-07 @ 07:00  --------------------------------------------------------  IN: 550 mL / OUT: 350 mL / NET: 200 mL      PHYSICAL EXAM:  Gen: NAD  HEENT: NC/AT; PERRL, anicteric sclera  Neck: supple, no JVD  Resp: clear to ausculation B/L; no wheezes, rales or rhonchi  Cardiovasc: S1S2 normal; RRR; no murmurs, rubs or gallops  GI: soft, nondistended, nontender; +BS  Extr: warm, well-perfused, PT/DP pulses 2+ B/L; no LE edema  Skin: normal color and turgor  Neuro: AAOx3    LABS:                        10.2   6.4   )-----------( 336      ( 06 Nov 2017 15:28 )             31.3     11-06    141  |  101  |  24<H>  ----------------------------<  141<H>  4.4   |  27  |  1.16    Ca    9.1      06 Nov 2017 15:28  Phos  3.3     11-06  Mg     2.3     11-06      PT/INR - ( 06 Nov 2017 15:28 )   PT: 12.9 sec;   INR: 1.18 ratio    PTT - ( 06 Nov 2017 15:28 )  PTT:32.7 sec      RADIOLOGY & ADDITIONAL TESTS:     MEDICATIONS:  acetaminophen   Tablet. 650 milliGRAM(s) Oral every 6 hours PRN  ALBUTerol/ipratropium for Nebulization 3 milliLiter(s) Nebulizer every 6 hours  aspirin enteric coated 81 milliGRAM(s) Oral daily  atorvastatin 80 milliGRAM(s) Oral at bedtime  influenza   Vaccine 0.5 milliLiter(s) IntraMuscular once  LORazepam     Tablet 0.5 milliGRAM(s) Oral two times a day PRN  melatonin 3 milliGRAM(s) Oral at bedtime PRN  metoprolol     tartrate 25 milliGRAM(s) Oral two times a day      ALLERGIES:  No Known Drug Allergies  Peroxide (Blisters) CONTACT INFO:  KAREN Gross  Pager:  [phone]      HPI / INTERVAL HISTORY:    No acute events overnight. Patient reports feeling very anxious this morning, offered Ativan which she accepted. States she got the dose late last night and so has been having anxiety. Got melatonin which helped her sleep.    OBJECTIVE:  VITAL SIGNS:  ICU Vital Signs Last 24 Hrs  T(C): 36.6 (07 Nov 2017 06:00), Max: 36.9 (06 Nov 2017 19:30)  T(F): 97.8 (07 Nov 2017 06:00), Max: 98.4 (06 Nov 2017 19:30)  HR: 72 (07 Nov 2017 06:00) (64 - 96)  BP: 153/84 (07 Nov 2017 06:00) (106/47 - 160/80)  BP(mean): 100 (07 Nov 2017 06:00) (65 - 118)  ABP: --  ABP(mean): --  RR: 23 (07 Nov 2017 06:00) (18 - 27)  SpO2: 97% (07 Nov 2017 06:00) (93% - 100%)        11-06 @ 07:01  -  11-07 @ 07:00  --------------------------------------------------------  IN: 550 mL / OUT: 350 mL / NET: 200 mL      PHYSICAL EXAM:  Gen: NAD  HEENT: NC/AT; PERRL, anicteric sclera  Neck: supple, no JVD  Resp: clear to ausculation B/L; no wheezes, rales or rhonchi  Cardiovasc: S1S2 normal; RRR; no murmurs, rubs or gallops  GI: soft, nondistended, nontender; +BS  Extr: warm, well-perfused, PT/DP pulses 2+ B/L; no LE edema  Skin: normal color and turgor  Neuro: AAOx3    LABS:                        10.2   6.4   )-----------( 336      ( 06 Nov 2017 15:28 )             31.3     11-06    141  |  101  |  24<H>  ----------------------------<  141<H>  4.4   |  27  |  1.16    Ca    9.1      06 Nov 2017 15:28  Phos  3.3     11-06  Mg     2.3     11-06      PT/INR - ( 06 Nov 2017 15:28 )   PT: 12.9 sec;   INR: 1.18 ratio    PTT - ( 06 Nov 2017 15:28 )  PTT:32.7 sec      RADIOLOGY & ADDITIONAL TESTS:     MEDICATIONS:  acetaminophen   Tablet. 650 milliGRAM(s) Oral every 6 hours PRN  ALBUTerol/ipratropium for Nebulization 3 milliLiter(s) Nebulizer every 6 hours  aspirin enteric coated 81 milliGRAM(s) Oral daily  atorvastatin 80 milliGRAM(s) Oral at bedtime  influenza   Vaccine 0.5 milliLiter(s) IntraMuscular once  LORazepam     Tablet 0.5 milliGRAM(s) Oral two times a day PRN  melatonin 3 milliGRAM(s) Oral at bedtime PRN  metoprolol     tartrate 25 milliGRAM(s) Oral two times a day      ALLERGIES:  No Known Drug Allergies  Peroxide (Blisters)

## 2017-11-07 NOTE — PROGRESS NOTE ADULT - SUBJECTIVE AND OBJECTIVE BOX
Behavioral Cardiology Progress Note     Patient information:  Mrs. Malone is a 72 year-old woman, , one daughter, grandchildren and great-grandchildren.  Lives alone in [place].   Retired .        HPI:  PMH of T2DM, HTN, tobacco use who presented to [place] w/complain of dyspnea ruled out for PE, found to have TWI on EKG and positive troponin, transferred to [facility] for cardiac catherization which demonstrated multi-vessel disease, currently being medically optimized for CABG.       Behavioral Health Assessment:     Mood: "I’m not as anxious today."             Current stressors:   Upcoming surgery (CABG)  Hospitalization     Support system/family support:  Strong support from family (daughter, sisters).       Coping strategies: Talking with family       Understanding of medical illness and treatment plan:  Good understanding of current health issues and reason for hospitalization and CABG.  Reports poor adherence with MD follow up.      MSE:  Pt seen resting in bed.  A&Ox3.  Well related with good eye contact.  Thought process goal directed.  No abnormal thought content; denies s/i.  Mood less anxious. Affect full range.  Insight and judgment adequate.

## 2017-11-07 NOTE — PROGRESS NOTE ADULT - SUBJECTIVE AND OBJECTIVE BOX
[facility]    Madison Herrera, Landon, Yusra, Luis, Jonn, Liseth      [phone]    CHIEF COMPLAINT: Patient is a 72y old  Female who presents with a chief complaint of     Follow Up:  chf, severe cad, for cabg    Interim history: The patient reports no new symptoms.  Denies chest discomfort and shortness of breath.  No abdominal pain.  No new neurologic symptoms.      MEDICATIONS  (STANDING):  ALBUTerol/ipratropium for Nebulization 3 milliLiter(s) Nebulizer every 6 hours  aspirin enteric coated 81 milliGRAM(s) Oral daily  atorvastatin 80 milliGRAM(s) Oral at bedtime  influenza   Vaccine 0.5 milliLiter(s) IntraMuscular once  metoprolol     tartrate 25 milliGRAM(s) Oral two times a day    MEDICATIONS  (PRN):  acetaminophen   Tablet. 650 milliGRAM(s) Oral every 6 hours PRN Moderate Pain (4 - 6)  LORazepam     Tablet 0.5 milliGRAM(s) Oral two times a day PRN Anxiety  melatonin 3 milliGRAM(s) Oral at bedtime PRN Sleep      REVIEW OF SYSTEMS:  eye, ent, GI, , allergic, dermatologic, musculoskeletal and neurologic are negative except as described above    Vital Signs Last 24 Hrs  T(C): 36.6 (07 Nov 2017 06:00), Max: 36.9 (06 Nov 2017 19:30)  T(F): 97.8 (07 Nov 2017 06:00), Max: 98.4 (06 Nov 2017 19:30)  HR: 70 (07 Nov 2017 09:00) (66 - 96)  BP: 119/61 (07 Nov 2017 09:00) (115/66 - 160/80)  BP(mean): 82 (07 Nov 2017 09:00) (72 - 118)  RR: 24 (07 Nov 2017 09:00) (18 - 27)  SpO2: 97% (07 Nov 2017 06:00) (96% - 100%)    I&O's Summary    06 Nov 2017 07:01  -  07 Nov 2017 07:00  --------------------------------------------------------  IN: 550 mL / OUT: 350 mL / NET: 200 mL    07 Nov 2017 07:01  -  07 Nov 2017 09:16  --------------------------------------------------------  IN: 120 mL / OUT: 0 mL / NET: 120 mL        Telemetry past 24h: sr    PHYSICAL EXAM:    Constitutional: well-nourished, well-developed, NAD   HEENT:  MMM, sclerae anicteric, conjunctivae clear, no oral cyanosis.  Pulmonary: Non-labored, mild bilat wheeze  Cardiovascular: Regular, S1 and S2.  No murmur.  No rubs, gallops or clicks  Gastrointestinal: Bowel Sounds present, soft, nontender.   Lymph: No peripheral edema.   Neurological: Alert, no focal deficits  Skin: No rashes.  Psych:  Mood & affect appropriate    LABS: All Labs Reviewed:                        10.2   6.4   )-----------( 336      ( 06 Nov 2017 15:28 )             31.3                         9.6    6.6   )-----------( 295      ( 05 Nov 2017 15:28 )             29.4     06 Nov 2017 15:28    141    |  101    |  24     ----------------------------<  141    4.4     |  27     |  1.16   05 Nov 2017 15:28    139    |  98     |  27     ----------------------------<  128    3.7     |  28     |  1.34     Ca    9.1        06 Nov 2017 15:28  Ca    8.8        05 Nov 2017 15:28  Phos  3.3       06 Nov 2017 15:28  Mg     2.3       06 Nov 2017 15:28      PT/INR - ( 06 Nov 2017 15:28 )   PT: 12.9 sec;   INR: 1.18 ratio         PTT - ( 06 Nov 2017 15:28 )  PTT:32.7 sec      Blood Culture:         RADIOLOGY:    EKG:    Echo:  < from: TTE with Doppler (w/Cont) (11.04.17 @ 10:30) >    Patient name: LUIGI MELARA  YOB: 1945   Age: 72 (F)   MR#: [id number]  Study Date: 11/4/2017  Location: [place] [id number]: [facility]  Study quality: Technically difficult  Referring Physician: Pineda Haas MD  BloodPressure: 100/62 mmHg  Height: 170 cm  Weight: 86 kg  BSA: 2 m2  ------------------------------------------------------------------------  PROCEDURE: Transthoracic echocardiogram with 2-D, M-Mode  and complete spectral and color flow Doppler. Verbal  consent was obtained for injection of echo contrast  following a discussion of risks and benefits. Following  intravenous injection of contrast, harmonic imaging was  performed.  INDICATION: Atherosclerotic heart disease of native  coronary artery without angina pectoris (I25.10)  ------------------------------------------------------------------------  Dimensions:    Normal Values:  LA:     3.2    2.0 - 4.0 cm  Ao:     3.0    2.0 - 3.8 cm  SEPTUM: 1.4    0.6 - 1.2 cm  PWT:    1.3    0.6 - 1.1 cm  LVIDd:  7.0    3.0 - 5.6 cm  LVIDs:  6.0    1.8 - 4.0 cm  Derived variables:  LVMI: 240 g/m2  RWT: 0.37  Fractional short: 14 %  EF (Visual Estimate): 30-35 %  ------------------------------------------------------------------------  Observations:  Mitral Valve: Tethered mitral valve leaflets with mitral  annular calcification. Mild-moderate mitral regurgitation.  Aortic Valve/Aorta: Calcified trileaflet aortic valve with  normal opening.  Normal aortic root size. (Ao: 3.0 cm at the sinuses of  Valsalva).  Left Atrium: Mildly dilated left atrium.  LA volume index =  35 cc/m2.  Left Ventricle: Moderate to severe left ventricular  systolic dysfunction. Left ventricular dysfunction is  global with regional variation. No left ventricular  thrombus seen. Eccentric left ventricular hypertrophy  (dilated left ventricle with normal relative wall  thickness). Mild diastolic dysfunction (Stage I).  Right Heart: Normal right atrium. The right ventricle is  not well visualized; grossly normal right ventricular  systolic function. Normal tricuspid valve. Pulmonic valve  not well visualized, probably normal. Minimal pulmonic  regurgitation.  Pericardium/Pleura: Trivial pericardial effusion.  Right pleural effusion.  Hemodynamic: Estimated right atrial pressure is 8 mm Hg.  ------------------------------------------------------------------------  Conclusions:  1. Moderate to severe left ventricular systolic  dysfunction. Left ventricular dysfunction is global with  regional variation. No left ventricular thrombus seen.  2. Mild diastolic dysfunction (Stage I).  3. The right ventricle is not well visualized; grossly  normal right ventricular systolic function.  4. Trivial pericardial effusion.  *** No previous Echo exam.  ------------------------------------------------------------------------  Confirmed on  11/4/2017 - 14:18:18 by Memo Leo M.D.  ------------------------------------------------------------------------    < end of copied text >

## 2017-11-08 ENCOUNTER — APPOINTMENT (OUTPATIENT)
Dept: CARDIOTHORACIC SURGERY | Facility: HOSPITAL | Age: 72
End: 2017-11-08
Payer: MEDICARE

## 2017-11-08 DIAGNOSIS — I10 ESSENTIAL (PRIMARY) HYPERTENSION: ICD-10-CM

## 2017-11-08 DIAGNOSIS — R06.02 SHORTNESS OF BREATH: ICD-10-CM

## 2017-11-08 DIAGNOSIS — R10.13 EPIGASTRIC PAIN: ICD-10-CM

## 2017-11-08 DIAGNOSIS — I21.4 NON-ST ELEVATION (NSTEMI) MYOCARDIAL INFARCTION: ICD-10-CM

## 2017-11-08 DIAGNOSIS — E11.9 TYPE 2 DIABETES MELLITUS WITHOUT COMPLICATIONS: ICD-10-CM

## 2017-11-08 DIAGNOSIS — R06.00 DYSPNEA, UNSPECIFIED: ICD-10-CM

## 2017-11-08 DIAGNOSIS — Z72.0 TOBACCO USE: ICD-10-CM

## 2017-11-08 LAB
ALBUMIN SERPL ELPH-MCNC: 3.5 G/DL — SIGNIFICANT CHANGE UP (ref 3.3–5)
ALP SERPL-CCNC: 83 U/L — SIGNIFICANT CHANGE UP (ref 40–120)
ALT FLD-CCNC: 45 U/L RC — SIGNIFICANT CHANGE UP (ref 10–45)
ANION GAP SERPL CALC-SCNC: 15 MMOL/L — SIGNIFICANT CHANGE UP (ref 5–17)
APTT BLD: 31.7 SEC — SIGNIFICANT CHANGE UP (ref 27.5–37.4)
AST SERPL-CCNC: 38 U/L — SIGNIFICANT CHANGE UP (ref 10–40)
BASOPHILS # BLD AUTO: 0.1 K/UL — SIGNIFICANT CHANGE UP (ref 0–0.2)
BASOPHILS NFR BLD AUTO: 0.4 % — SIGNIFICANT CHANGE UP (ref 0–2)
BILIRUB SERPL-MCNC: 1.2 MG/DL — SIGNIFICANT CHANGE UP (ref 0.2–1.2)
BUN SERPL-MCNC: 21 MG/DL — SIGNIFICANT CHANGE UP (ref 7–23)
CALCIUM SERPL-MCNC: 8.2 MG/DL — LOW (ref 8.4–10.5)
CHLORIDE SERPL-SCNC: 104 MMOL/L — SIGNIFICANT CHANGE UP (ref 96–108)
CK MB BLD-MCNC: 11.4 % — HIGH (ref 0–3.5)
CK MB CFR SERPL CALC: 17.4 NG/ML — HIGH (ref 0–3.8)
CK SERPL-CCNC: 153 U/L — SIGNIFICANT CHANGE UP (ref 25–170)
CO2 SERPL-SCNC: 22 MMOL/L — SIGNIFICANT CHANGE UP (ref 22–31)
CREAT SERPL-MCNC: 0.96 MG/DL — SIGNIFICANT CHANGE UP (ref 0.5–1.3)
EOSINOPHIL # BLD AUTO: 0.1 K/UL — SIGNIFICANT CHANGE UP (ref 0–0.5)
EOSINOPHIL NFR BLD AUTO: 1 % — SIGNIFICANT CHANGE UP (ref 0–6)
FIBRINOGEN PPP-MCNC: 403 MG/DL — SIGNIFICANT CHANGE UP (ref 310–510)
GAS PNL BLDA: SIGNIFICANT CHANGE UP
GLUCOSE BLDC GLUCOMTR-MCNC: 230 MG/DL — HIGH (ref 70–99)
GLUCOSE BLDC GLUCOMTR-MCNC: 235 MG/DL — HIGH (ref 70–99)
GLUCOSE SERPL-MCNC: 192 MG/DL — HIGH (ref 70–99)
HCT VFR BLD CALC: 29.8 % — LOW (ref 34.5–45)
HGB BLD-MCNC: 9.9 G/DL — LOW (ref 11.5–15.5)
INR BLD: 1.32 RATIO — HIGH (ref 0.88–1.16)
LYMPHOCYTES # BLD AUTO: 0.8 K/UL — LOW (ref 1–3.3)
LYMPHOCYTES # BLD AUTO: 6.3 % — LOW (ref 13–44)
MCHC RBC-ENTMCNC: 28.7 PG — SIGNIFICANT CHANGE UP (ref 27–34)
MCHC RBC-ENTMCNC: 33.2 GM/DL — SIGNIFICANT CHANGE UP (ref 32–36)
MCV RBC AUTO: 86.6 FL — SIGNIFICANT CHANGE UP (ref 80–100)
MONOCYTES # BLD AUTO: 0.7 K/UL — SIGNIFICANT CHANGE UP (ref 0–0.9)
MONOCYTES NFR BLD AUTO: 5.7 % — SIGNIFICANT CHANGE UP (ref 2–14)
NEUTROPHILS # BLD AUTO: 10.5 K/UL — HIGH (ref 1.8–7.4)
NEUTROPHILS NFR BLD AUTO: 86.5 % — HIGH (ref 43–77)
PLATELET # BLD AUTO: 260 K/UL — SIGNIFICANT CHANGE UP (ref 150–400)
POTASSIUM SERPL-MCNC: 4.3 MMOL/L — SIGNIFICANT CHANGE UP (ref 3.5–5.3)
POTASSIUM SERPL-SCNC: 4.3 MMOL/L — SIGNIFICANT CHANGE UP (ref 3.5–5.3)
PROT SERPL-MCNC: 6.1 G/DL — SIGNIFICANT CHANGE UP (ref 6–8.3)
PROTHROM AB SERPL-ACNC: 14.5 SEC — HIGH (ref 9.8–12.7)
RBC # BLD: 3.45 M/UL — LOW (ref 3.8–5.2)
RBC # FLD: 14.1 % — SIGNIFICANT CHANGE UP (ref 10.3–14.5)
SODIUM SERPL-SCNC: 141 MMOL/L — SIGNIFICANT CHANGE UP (ref 135–145)
TROPONIN T SERPL-MCNC: 0.4 NG/ML — HIGH (ref 0–0.06)
WBC # BLD: 12.1 K/UL — HIGH (ref 3.8–10.5)
WBC # FLD AUTO: 12.1 K/UL — HIGH (ref 3.8–10.5)

## 2017-11-08 PROCEDURE — 33508 ENDOSCOPIC VEIN HARVEST: CPT

## 2017-11-08 PROCEDURE — 71010: CPT | Mod: 26

## 2017-11-08 PROCEDURE — 33533 CABG ARTERIAL SINGLE: CPT | Mod: AS

## 2017-11-08 PROCEDURE — 99233 SBSQ HOSP IP/OBS HIGH 50: CPT

## 2017-11-08 PROCEDURE — 93010 ELECTROCARDIOGRAM REPORT: CPT

## 2017-11-08 PROCEDURE — 33533 CABG ARTERIAL SINGLE: CPT

## 2017-11-08 PROCEDURE — 33518 CABG ARTERY-VEIN TWO: CPT | Mod: AS

## 2017-11-08 PROCEDURE — 33518 CABG ARTERY-VEIN TWO: CPT

## 2017-11-08 RX ORDER — POTASSIUM CHLORIDE 20 MEQ
10 PACKET (EA) ORAL
Qty: 0 | Refills: 0 | Status: COMPLETED | OUTPATIENT
Start: 2017-11-08 | End: 2017-11-08

## 2017-11-08 RX ORDER — ASPIRIN/CALCIUM CARB/MAGNESIUM 324 MG
325 TABLET ORAL DAILY
Qty: 0 | Refills: 0 | Status: DISCONTINUED | OUTPATIENT
Start: 2017-11-08 | End: 2017-11-13

## 2017-11-08 RX ORDER — POTASSIUM CHLORIDE 20 MEQ
10 PACKET (EA) ORAL
Qty: 0 | Refills: 0 | Status: DISCONTINUED | OUTPATIENT
Start: 2017-11-08 | End: 2017-11-09

## 2017-11-08 RX ORDER — DEXTROSE 50 % IN WATER 50 %
25 SYRINGE (ML) INTRAVENOUS
Qty: 0 | Refills: 0 | Status: DISCONTINUED | OUTPATIENT
Start: 2017-11-08 | End: 2017-11-12

## 2017-11-08 RX ORDER — NOREPINEPHRINE BITARTRATE/D5W 8 MG/250ML
0.01 PLASTIC BAG, INJECTION (ML) INTRAVENOUS
Qty: 8 | Refills: 0 | Status: DISCONTINUED | OUTPATIENT
Start: 2017-11-08 | End: 2017-11-09

## 2017-11-08 RX ORDER — DOCUSATE SODIUM 100 MG
100 CAPSULE ORAL THREE TIMES A DAY
Qty: 0 | Refills: 0 | Status: DISCONTINUED | OUTPATIENT
Start: 2017-11-08 | End: 2017-11-13

## 2017-11-08 RX ORDER — FENTANYL CITRATE 50 UG/ML
25 INJECTION INTRAVENOUS ONCE
Qty: 0 | Refills: 0 | Status: DISCONTINUED | OUTPATIENT
Start: 2017-11-08 | End: 2017-11-08

## 2017-11-08 RX ORDER — ALBUMIN HUMAN 25 %
250 VIAL (ML) INTRAVENOUS
Qty: 0 | Refills: 0 | Status: COMPLETED | OUTPATIENT
Start: 2017-11-08 | End: 2017-11-08

## 2017-11-08 RX ORDER — SODIUM CHLORIDE 9 MG/ML
1000 INJECTION INTRAMUSCULAR; INTRAVENOUS; SUBCUTANEOUS
Qty: 0 | Refills: 0 | Status: DISCONTINUED | OUTPATIENT
Start: 2017-11-08 | End: 2017-11-11

## 2017-11-08 RX ORDER — FAMOTIDINE 10 MG/ML
20 INJECTION INTRAVENOUS EVERY 12 HOURS
Qty: 0 | Refills: 0 | Status: DISCONTINUED | OUTPATIENT
Start: 2017-11-08 | End: 2017-11-09

## 2017-11-08 RX ORDER — INSULIN HUMAN 100 [IU]/ML
2 INJECTION, SOLUTION SUBCUTANEOUS
Qty: 100 | Refills: 0 | Status: DISCONTINUED | OUTPATIENT
Start: 2017-11-08 | End: 2017-11-08

## 2017-11-08 RX ORDER — INSULIN HUMAN 100 [IU]/ML
2 INJECTION, SOLUTION SUBCUTANEOUS
Qty: 100 | Refills: 0 | Status: DISCONTINUED | OUTPATIENT
Start: 2017-11-08 | End: 2017-11-10

## 2017-11-08 RX ORDER — SODIUM CHLORIDE 9 MG/ML
1000 INJECTION, SOLUTION INTRAVENOUS
Qty: 0 | Refills: 0 | Status: DISCONTINUED | OUTPATIENT
Start: 2017-11-08 | End: 2017-11-11

## 2017-11-08 RX ORDER — CEFUROXIME AXETIL 250 MG
1500 TABLET ORAL EVERY 8 HOURS
Qty: 0 | Refills: 0 | Status: COMPLETED | OUTPATIENT
Start: 2017-11-08 | End: 2017-11-10

## 2017-11-08 RX ORDER — MEPERIDINE HYDROCHLORIDE 50 MG/ML
25 INJECTION INTRAMUSCULAR; INTRAVENOUS; SUBCUTANEOUS ONCE
Qty: 0 | Refills: 0 | Status: DISCONTINUED | OUTPATIENT
Start: 2017-11-08 | End: 2017-11-10

## 2017-11-08 RX ORDER — OXYCODONE AND ACETAMINOPHEN 5; 325 MG/1; MG/1
2 TABLET ORAL EVERY 6 HOURS
Qty: 0 | Refills: 0 | Status: DISCONTINUED | OUTPATIENT
Start: 2017-11-08 | End: 2017-11-09

## 2017-11-08 RX ORDER — DEXMEDETOMIDINE HYDROCHLORIDE IN 0.9% SODIUM CHLORIDE 4 UG/ML
0.4 INJECTION INTRAVENOUS
Qty: 200 | Refills: 0 | Status: DISCONTINUED | OUTPATIENT
Start: 2017-11-08 | End: 2017-11-10

## 2017-11-08 RX ORDER — DEXTROSE 50 % IN WATER 50 %
50 SYRINGE (ML) INTRAVENOUS
Qty: 0 | Refills: 0 | Status: DISCONTINUED | OUTPATIENT
Start: 2017-11-08 | End: 2017-11-12

## 2017-11-08 RX ORDER — MILRINONE LACTATE 1 MG/ML
0.25 INJECTION, SOLUTION INTRAVENOUS
Qty: 20 | Refills: 0 | Status: DISCONTINUED | OUTPATIENT
Start: 2017-11-08 | End: 2017-11-10

## 2017-11-08 RX ORDER — OXYCODONE AND ACETAMINOPHEN 5; 325 MG/1; MG/1
1 TABLET ORAL EVERY 6 HOURS
Qty: 0 | Refills: 0 | Status: DISCONTINUED | OUTPATIENT
Start: 2017-11-08 | End: 2017-11-09

## 2017-11-08 RX ORDER — POTASSIUM CHLORIDE 20 MEQ
10 PACKET (EA) ORAL ONCE
Qty: 0 | Refills: 0 | Status: DISCONTINUED | OUTPATIENT
Start: 2017-11-08 | End: 2017-11-09

## 2017-11-08 RX ORDER — VASOPRESSIN 20 [USP'U]/ML
0.03 INJECTION INTRAVENOUS
Qty: 100 | Refills: 0 | Status: DISCONTINUED | OUTPATIENT
Start: 2017-11-08 | End: 2017-11-09

## 2017-11-08 RX ADMIN — Medication 100 MILLIEQUIVALENT(S): at 20:00

## 2017-11-08 RX ADMIN — Medication 500 MILLILITER(S): at 19:00

## 2017-11-08 RX ADMIN — FENTANYL CITRATE 25 MICROGRAM(S): 50 INJECTION INTRAVENOUS at 21:45

## 2017-11-08 RX ADMIN — Medication 0.25 MILLIGRAM(S): at 10:49

## 2017-11-08 RX ADMIN — FENTANYL CITRATE 25 MICROGRAM(S): 50 INJECTION INTRAVENOUS at 21:30

## 2017-11-08 RX ADMIN — Medication 500 MILLILITER(S): at 19:20

## 2017-11-08 RX ADMIN — FENTANYL CITRATE 25 MICROGRAM(S): 50 INJECTION INTRAVENOUS at 20:15

## 2017-11-08 RX ADMIN — Medication 100 MILLIGRAM(S): at 21:00

## 2017-11-08 RX ADMIN — FENTANYL CITRATE 25 MICROGRAM(S): 50 INJECTION INTRAVENOUS at 20:30

## 2017-11-08 RX ADMIN — Medication 3 MILLILITER(S): at 05:47

## 2017-11-08 RX ADMIN — Medication 25 MILLIGRAM(S): at 05:49

## 2017-11-08 RX ADMIN — FAMOTIDINE 20 MILLIGRAM(S): 10 INJECTION INTRAVENOUS at 20:28

## 2017-11-08 RX ADMIN — PANTOPRAZOLE SODIUM 40 MILLIGRAM(S): 20 TABLET, DELAYED RELEASE ORAL at 05:47

## 2017-11-08 RX ADMIN — Medication 100 MILLIEQUIVALENT(S): at 19:40

## 2017-11-08 NOTE — PROGRESS NOTE ADULT - SUBJECTIVE AND OBJECTIVE BOX
[facility] - Madison Herrera, Landon, Yusra, Luis, Liseth [place]  Office Number:  [phone]    Patient resting comfortably in bed in NAD.  Laying flat with no respiratory distress.  No complaints of chest pain, dyspnea, palpitations, PND, or orthopnea.  Patient very anxious.  For OR today for CABG.      Telemetry:  Normal sinus rhythm 60-70    MEDICATIONS  (STANDING):  ALBUTerol/ipratropium for Nebulization 3 milliLiter(s) Nebulizer every 6 hours  aspirin enteric coated 81 milliGRAM(s) Oral daily  atorvastatin 80 milliGRAM(s) Oral at bedtime  influenza   Vaccine 0.5 milliLiter(s) IntraMuscular once  lidocaine 2% Gel 1 Application(s) Topical two times a day  metoprolol     tartrate 25 milliGRAM(s) Oral two times a day  pantoprazole    Tablet 40 milliGRAM(s) Oral before breakfast    MEDICATIONS  (PRN):  acetaminophen   Tablet. 650 milliGRAM(s) Oral every 6 hours PRN Moderate Pain (4 - 6)  ALPRAZolam 0.25 milliGRAM(s) Oral every 8 hours PRN anxiety  melatonin 3 milliGRAM(s) Oral at bedtime PRN Sleep      Allergies    No Known Drug Allergies  Peroxide (Blisters)        Vital Signs Last 24 Hrs  T(C): 36.7 (08 Nov 2017 05:00), Max: 36.7 (08 Nov 2017 02:37)  T(F): 98 (08 Nov 2017 05:00), Max: 98.1 (08 Nov 2017 02:37)  HR: 71 (08 Nov 2017 05:00) (71 - 80)  BP: 146/80 (08 Nov 2017 05:00) (124/57 - 146/80)  BP(mean): 88 (07 Nov 2017 14:00) (86 - 88)  RR: 18 (08 Nov 2017 05:00) (18 - 22)  SpO2: 98% (08 Nov 2017 05:00) (94% - 98%)    I&O's Summary    07 Nov 2017 07:01  -  08 Nov 2017 07:00  --------------------------------------------------------  IN: 600 mL / OUT: 450 mL / NET: 150 mL    08 Nov 2017 07:01  -  08 Nov 2017 09:09  --------------------------------------------------------  IN: 0 mL / OUT: 0 mL / NET: 0 mL        ON EXAM:    General: NAD, awake and alert, oriented x 3  HEENT: Mucous membranes are moist, anicteric  Lungs: Non-labored, breath sounds are clear bilaterally, No wheezing, rales or rhonchi  Cardiovascular: Regular, S1 and S2, no murmurs, rubs, or gallops  Gastrointestinal: Bowel Sounds present, soft, nontender.   Lymph: No peripheral edema. No lymphadenopathy.  Skin: No rashes or ulcers  Psych:  Mood & affect appropriate    LABS: All Labs Reviewed:                        10.2   7.0   )-----------( 337      ( 07 Nov 2017 14:18 )             30.5                         10.2   6.4   )-----------( 336      ( 06 Nov 2017 15:28 )             31.3                         9.6    6.6   )-----------( 295      ( 05 Nov 2017 15:28 )             29.4     07 Nov 2017 14:18    140    |  101    |  23     ----------------------------<  136    4.8     |  27     |  1.13   06 Nov 2017 15:28    141    |  101    |  24     ----------------------------<  141    4.4     |  27     |  1.16   05 Nov 2017 15:28    139    |  98     |  27     ----------------------------<  128    3.7     |  28     |  1.34     Ca    9.2        07 Nov 2017 14:18  Ca    9.1        06 Nov 2017 15:28  Ca    8.8        05 Nov 2017 15:28  Phos  3.5       07 Nov 2017 14:18  Phos  3.3       06 Nov 2017 15:28  Mg     2.3       07 Nov 2017 14:18  Mg     2.3       06 Nov 2017 15:28      PT/INR - ( 06 Nov 2017 15:28 )   PT: 12.9 sec;   INR: 1.18 ratio         PTT - ( 06 Nov 2017 15:28 )  PTT:32.7 sec      Blood Culture:

## 2017-11-08 NOTE — BRIEF OPERATIVE NOTE - PROCEDURE
<<-----Click on this checkbox to enter Procedure CABG (coronary artery bypass graft)  11/08/2017  C3L  LIMA to LAD  SVG sequential to OM!, OM2  Active  SLEWINSO

## 2017-11-09 LAB
ALBUMIN SERPL ELPH-MCNC: 1.9 G/DL — LOW (ref 3.3–5)
ALBUMIN SERPL ELPH-MCNC: 4.2 G/DL — SIGNIFICANT CHANGE UP (ref 3.3–5)
ALP SERPL-CCNC: 33 U/L — LOW (ref 40–120)
ALP SERPL-CCNC: 68 U/L — SIGNIFICANT CHANGE UP (ref 40–120)
ALT FLD-CCNC: 21 U/L RC — SIGNIFICANT CHANGE UP (ref 10–45)
ALT FLD-CCNC: 37 U/L RC — SIGNIFICANT CHANGE UP (ref 10–45)
ANION GAP SERPL CALC-SCNC: 11 MMOL/L — SIGNIFICANT CHANGE UP (ref 5–17)
ANION GAP SERPL CALC-SCNC: 12 MMOL/L — SIGNIFICANT CHANGE UP (ref 5–17)
APTT BLD: 27.4 SEC — LOW (ref 27.5–37.4)
AST SERPL-CCNC: 20 U/L — SIGNIFICANT CHANGE UP (ref 10–40)
AST SERPL-CCNC: 32 U/L — SIGNIFICANT CHANGE UP (ref 10–40)
BASE EXCESS BLDV CALC-SCNC: -0.7 MMOL/L — SIGNIFICANT CHANGE UP (ref -2–2)
BASOPHILS # BLD AUTO: 0 K/UL — SIGNIFICANT CHANGE UP (ref 0–0.2)
BASOPHILS NFR BLD AUTO: 0.1 % — SIGNIFICANT CHANGE UP (ref 0–2)
BILIRUB SERPL-MCNC: 0.4 MG/DL — SIGNIFICANT CHANGE UP (ref 0.2–1.2)
BILIRUB SERPL-MCNC: 0.8 MG/DL — SIGNIFICANT CHANGE UP (ref 0.2–1.2)
BUN SERPL-MCNC: 12 MG/DL — SIGNIFICANT CHANGE UP (ref 7–23)
BUN SERPL-MCNC: 21 MG/DL — SIGNIFICANT CHANGE UP (ref 7–23)
CALCIUM SERPL-MCNC: 3.8 MG/DL — CRITICAL LOW (ref 8.4–10.5)
CALCIUM SERPL-MCNC: 8.8 MG/DL — SIGNIFICANT CHANGE UP (ref 8.4–10.5)
CHLORIDE SERPL-SCNC: 104 MMOL/L — SIGNIFICANT CHANGE UP (ref 96–108)
CHLORIDE SERPL-SCNC: 125 MMOL/L — HIGH (ref 96–108)
CO2 BLDV-SCNC: 26 MMOL/L — SIGNIFICANT CHANGE UP (ref 22–30)
CO2 SERPL-SCNC: 12 MMOL/L — LOW (ref 22–31)
CO2 SERPL-SCNC: 24 MMOL/L — SIGNIFICANT CHANGE UP (ref 22–31)
CREAT SERPL-MCNC: 0.4 MG/DL — LOW (ref 0.5–1.3)
CREAT SERPL-MCNC: 1.12 MG/DL — SIGNIFICANT CHANGE UP (ref 0.5–1.3)
EOSINOPHIL # BLD AUTO: 0 K/UL — SIGNIFICANT CHANGE UP (ref 0–0.5)
EOSINOPHIL NFR BLD AUTO: 0.3 % — SIGNIFICANT CHANGE UP (ref 0–6)
GAS PNL BLDA: SIGNIFICANT CHANGE UP
GAS PNL BLDV: SIGNIFICANT CHANGE UP
GAS PNL BLDV: SIGNIFICANT CHANGE UP
GLUCOSE BLDC GLUCOMTR-MCNC: 123 MG/DL — HIGH (ref 70–99)
GLUCOSE BLDC GLUCOMTR-MCNC: 124 MG/DL — HIGH (ref 70–99)
GLUCOSE BLDC GLUCOMTR-MCNC: 128 MG/DL — HIGH (ref 70–99)
GLUCOSE BLDC GLUCOMTR-MCNC: 158 MG/DL — HIGH (ref 70–99)
GLUCOSE BLDC GLUCOMTR-MCNC: 218 MG/DL — HIGH (ref 70–99)
GLUCOSE BLDC GLUCOMTR-MCNC: 88 MG/DL — SIGNIFICANT CHANGE UP (ref 70–99)
GLUCOSE SERPL-MCNC: 108 MG/DL — HIGH (ref 70–99)
GLUCOSE SERPL-MCNC: 212 MG/DL — HIGH (ref 70–99)
HCO3 BLDV-SCNC: 24 MMOL/L — SIGNIFICANT CHANGE UP (ref 21–29)
HCT VFR BLD CALC: 16.5 % — CRITICAL LOW (ref 34.5–45)
HCT VFR BLD CALC: 27 % — LOW (ref 34.5–45)
HGB BLD-MCNC: 5.6 G/DL — CRITICAL LOW (ref 11.5–15.5)
HGB BLD-MCNC: 9 G/DL — LOW (ref 11.5–15.5)
HOROWITZ INDEX BLDV+IHG-RTO: 60 — SIGNIFICANT CHANGE UP
INR BLD: 1.54 RATIO — HIGH (ref 0.88–1.16)
LYMPHOCYTES # BLD AUTO: 0.8 K/UL — LOW (ref 1–3.3)
LYMPHOCYTES # BLD AUTO: 7.2 % — LOW (ref 13–44)
MCHC RBC-ENTMCNC: 28.6 PG — SIGNIFICANT CHANGE UP (ref 27–34)
MCHC RBC-ENTMCNC: 29.6 PG — SIGNIFICANT CHANGE UP (ref 27–34)
MCHC RBC-ENTMCNC: 33.4 GM/DL — SIGNIFICANT CHANGE UP (ref 32–36)
MCHC RBC-ENTMCNC: 34 GM/DL — SIGNIFICANT CHANGE UP (ref 32–36)
MCV RBC AUTO: 85.6 FL — SIGNIFICANT CHANGE UP (ref 80–100)
MCV RBC AUTO: 87.1 FL — SIGNIFICANT CHANGE UP (ref 80–100)
MONOCYTES # BLD AUTO: 0.5 K/UL — SIGNIFICANT CHANGE UP (ref 0–0.9)
MONOCYTES NFR BLD AUTO: 4.3 % — SIGNIFICANT CHANGE UP (ref 2–14)
NEUTROPHILS # BLD AUTO: 9.9 K/UL — HIGH (ref 1.8–7.4)
NEUTROPHILS NFR BLD AUTO: 88.2 % — HIGH (ref 43–77)
PCO2 BLDV: 46 MMHG — SIGNIFICANT CHANGE UP (ref 35–50)
PH BLDV: 7.35 — SIGNIFICANT CHANGE UP (ref 7.35–7.45)
PLATELET # BLD AUTO: 156 K/UL — SIGNIFICANT CHANGE UP (ref 150–400)
PLATELET # BLD AUTO: 268 K/UL — SIGNIFICANT CHANGE UP (ref 150–400)
PO2 BLDV: 29 MMHG — SIGNIFICANT CHANGE UP (ref 25–45)
POTASSIUM SERPL-MCNC: 2.6 MMOL/L — CRITICAL LOW (ref 3.5–5.3)
POTASSIUM SERPL-MCNC: 5 MMOL/L — SIGNIFICANT CHANGE UP (ref 3.5–5.3)
POTASSIUM SERPL-SCNC: 2.6 MMOL/L — CRITICAL LOW (ref 3.5–5.3)
POTASSIUM SERPL-SCNC: 5 MMOL/L — SIGNIFICANT CHANGE UP (ref 3.5–5.3)
PROT SERPL-MCNC: 3 G/DL — LOW (ref 6–8.3)
PROT SERPL-MCNC: 6.3 G/DL — SIGNIFICANT CHANGE UP (ref 6–8.3)
PROTHROM AB SERPL-ACNC: 16.9 SEC — HIGH (ref 9.8–12.7)
RBC # BLD: 1.89 M/UL — LOW (ref 3.8–5.2)
RBC # BLD: 3.15 M/UL — LOW (ref 3.8–5.2)
RBC # FLD: 13.7 % — SIGNIFICANT CHANGE UP (ref 10.3–14.5)
RBC # FLD: 13.7 % — SIGNIFICANT CHANGE UP (ref 10.3–14.5)
SAO2 % BLDV: 46 % — LOW (ref 67–88)
SODIUM SERPL-SCNC: 140 MMOL/L — SIGNIFICANT CHANGE UP (ref 135–145)
SODIUM SERPL-SCNC: 148 MMOL/L — HIGH (ref 135–145)
WBC # BLD: 11.2 K/UL — HIGH (ref 3.8–10.5)
WBC # BLD: 7 K/UL — SIGNIFICANT CHANGE UP (ref 3.8–10.5)
WBC # FLD AUTO: 11.2 K/UL — HIGH (ref 3.8–10.5)
WBC # FLD AUTO: 7 K/UL — SIGNIFICANT CHANGE UP (ref 3.8–10.5)

## 2017-11-09 PROCEDURE — 71010: CPT | Mod: 26

## 2017-11-09 PROCEDURE — 93010 ELECTROCARDIOGRAM REPORT: CPT

## 2017-11-09 PROCEDURE — 99291 CRITICAL CARE FIRST HOUR: CPT

## 2017-11-09 RX ORDER — CALCIUM GLUCONATE 100 MG/ML
1 VIAL (ML) INTRAVENOUS ONCE
Qty: 0 | Refills: 0 | Status: COMPLETED | OUTPATIENT
Start: 2017-11-09 | End: 2017-11-09

## 2017-11-09 RX ORDER — FENTANYL CITRATE 50 UG/ML
25 INJECTION INTRAVENOUS ONCE
Qty: 0 | Refills: 0 | Status: DISCONTINUED | OUTPATIENT
Start: 2017-11-09 | End: 2017-11-09

## 2017-11-09 RX ORDER — OXYCODONE HYDROCHLORIDE 5 MG/1
10 TABLET ORAL EVERY 6 HOURS
Qty: 0 | Refills: 0 | Status: DISCONTINUED | OUTPATIENT
Start: 2017-11-09 | End: 2017-11-13

## 2017-11-09 RX ORDER — POLYETHYLENE GLYCOL 3350 17 G/17G
17 POWDER, FOR SOLUTION ORAL DAILY
Qty: 0 | Refills: 0 | Status: DISCONTINUED | OUTPATIENT
Start: 2017-11-09 | End: 2017-11-13

## 2017-11-09 RX ORDER — ACETAMINOPHEN 500 MG
1000 TABLET ORAL ONCE
Qty: 0 | Refills: 0 | Status: COMPLETED | OUTPATIENT
Start: 2017-11-09 | End: 2017-11-09

## 2017-11-09 RX ORDER — OXYCODONE HYDROCHLORIDE 5 MG/1
5 TABLET ORAL EVERY 6 HOURS
Qty: 0 | Refills: 0 | Status: DISCONTINUED | OUTPATIENT
Start: 2017-11-09 | End: 2017-11-13

## 2017-11-09 RX ORDER — ALBUMIN HUMAN 25 %
250 VIAL (ML) INTRAVENOUS ONCE
Qty: 0 | Refills: 0 | Status: COMPLETED | OUTPATIENT
Start: 2017-11-09 | End: 2017-11-09

## 2017-11-09 RX ORDER — ASPIRIN/CALCIUM CARB/MAGNESIUM 324 MG
300 TABLET ORAL ONCE
Qty: 0 | Refills: 0 | Status: COMPLETED | OUTPATIENT
Start: 2017-11-09 | End: 2017-11-09

## 2017-11-09 RX ORDER — PANTOPRAZOLE SODIUM 20 MG/1
40 TABLET, DELAYED RELEASE ORAL
Qty: 0 | Refills: 0 | Status: DISCONTINUED | OUTPATIENT
Start: 2017-11-09 | End: 2017-11-13

## 2017-11-09 RX ORDER — ACETAMINOPHEN 500 MG
650 TABLET ORAL EVERY 6 HOURS
Qty: 0 | Refills: 0 | Status: DISCONTINUED | OUTPATIENT
Start: 2017-11-09 | End: 2017-11-13

## 2017-11-09 RX ORDER — ATORVASTATIN CALCIUM 80 MG/1
40 TABLET, FILM COATED ORAL AT BEDTIME
Qty: 0 | Refills: 0 | Status: DISCONTINUED | OUTPATIENT
Start: 2017-11-09 | End: 2017-11-13

## 2017-11-09 RX ORDER — ENOXAPARIN SODIUM 100 MG/ML
40 INJECTION SUBCUTANEOUS DAILY
Qty: 0 | Refills: 0 | Status: DISCONTINUED | OUTPATIENT
Start: 2017-11-09 | End: 2017-11-13

## 2017-11-09 RX ORDER — FENTANYL CITRATE 50 UG/ML
50 INJECTION INTRAVENOUS ONCE
Qty: 0 | Refills: 0 | Status: DISCONTINUED | OUTPATIENT
Start: 2017-11-09 | End: 2017-11-09

## 2017-11-09 RX ORDER — INSULIN LISPRO 100/ML
VIAL (ML) SUBCUTANEOUS AT BEDTIME
Qty: 0 | Refills: 0 | Status: DISCONTINUED | OUTPATIENT
Start: 2017-11-09 | End: 2017-11-12

## 2017-11-09 RX ORDER — HYDROMORPHONE HYDROCHLORIDE 2 MG/ML
0.5 INJECTION INTRAMUSCULAR; INTRAVENOUS; SUBCUTANEOUS ONCE
Qty: 0 | Refills: 0 | Status: DISCONTINUED | OUTPATIENT
Start: 2017-11-09 | End: 2017-11-09

## 2017-11-09 RX ORDER — HYDROMORPHONE HYDROCHLORIDE 2 MG/ML
0.25 INJECTION INTRAMUSCULAR; INTRAVENOUS; SUBCUTANEOUS ONCE
Qty: 0 | Refills: 0 | Status: DISCONTINUED | OUTPATIENT
Start: 2017-11-09 | End: 2017-11-09

## 2017-11-09 RX ORDER — INSULIN LISPRO 100/ML
VIAL (ML) SUBCUTANEOUS
Qty: 0 | Refills: 0 | Status: DISCONTINUED | OUTPATIENT
Start: 2017-11-09 | End: 2017-11-12

## 2017-11-09 RX ADMIN — Medication 100 MILLIGRAM(S): at 15:01

## 2017-11-09 RX ADMIN — FENTANYL CITRATE 50 MICROGRAM(S): 50 INJECTION INTRAVENOUS at 08:00

## 2017-11-09 RX ADMIN — VASOPRESSIN 2 UNIT(S)/MIN: 20 INJECTION INTRAVENOUS at 01:04

## 2017-11-09 RX ADMIN — Medication 1000 MILLIGRAM(S): at 05:05

## 2017-11-09 RX ADMIN — FENTANYL CITRATE 50 MICROGRAM(S): 50 INJECTION INTRAVENOUS at 10:00

## 2017-11-09 RX ADMIN — ENOXAPARIN SODIUM 40 MILLIGRAM(S): 100 INJECTION SUBCUTANEOUS at 12:15

## 2017-11-09 RX ADMIN — Medication 125 MILLILITER(S): at 10:30

## 2017-11-09 RX ADMIN — ATORVASTATIN CALCIUM 40 MILLIGRAM(S): 80 TABLET, FILM COATED ORAL at 21:31

## 2017-11-09 RX ADMIN — MILRINONE LACTATE 7.4 MICROGRAM(S)/KG/MIN: 1 INJECTION, SOLUTION INTRAVENOUS at 12:16

## 2017-11-09 RX ADMIN — HYDROMORPHONE HYDROCHLORIDE 0.25 MILLIGRAM(S): 2 INJECTION INTRAMUSCULAR; INTRAVENOUS; SUBCUTANEOUS at 01:15

## 2017-11-09 RX ADMIN — FENTANYL CITRATE 50 MICROGRAM(S): 50 INJECTION INTRAVENOUS at 08:15

## 2017-11-09 RX ADMIN — Medication 325 MILLIGRAM(S): at 12:12

## 2017-11-09 RX ADMIN — OXYCODONE HYDROCHLORIDE 10 MILLIGRAM(S): 5 TABLET ORAL at 12:45

## 2017-11-09 RX ADMIN — Medication 400 MILLIGRAM(S): at 21:30

## 2017-11-09 RX ADMIN — Medication 100 MILLIGRAM(S): at 21:31

## 2017-11-09 RX ADMIN — Medication 300 MILLIGRAM(S): at 00:15

## 2017-11-09 RX ADMIN — DEXMEDETOMIDINE HYDROCHLORIDE IN 0.9% SODIUM CHLORIDE 9.87 MICROGRAM(S)/KG/HR: 4 INJECTION INTRAVENOUS at 01:04

## 2017-11-09 RX ADMIN — Medication 200 GRAM(S): at 02:30

## 2017-11-09 RX ADMIN — HYDROMORPHONE HYDROCHLORIDE 0.5 MILLIGRAM(S): 2 INJECTION INTRAMUSCULAR; INTRAVENOUS; SUBCUTANEOUS at 17:15

## 2017-11-09 RX ADMIN — Medication 500 MILLILITER(S): at 02:30

## 2017-11-09 RX ADMIN — PANTOPRAZOLE SODIUM 40 MILLIGRAM(S): 20 TABLET, DELAYED RELEASE ORAL at 12:13

## 2017-11-09 RX ADMIN — Medication 100 MILLIGRAM(S): at 05:00

## 2017-11-09 RX ADMIN — Medication 100 MILLIGRAM(S): at 21:30

## 2017-11-09 RX ADMIN — Medication 2 MICROGRAM(S)/KG/MIN: at 01:05

## 2017-11-09 RX ADMIN — OXYCODONE HYDROCHLORIDE 10 MILLIGRAM(S): 5 TABLET ORAL at 12:15

## 2017-11-09 RX ADMIN — MILRINONE LACTATE 14.8 MICROGRAM(S)/KG/MIN: 1 INJECTION, SOLUTION INTRAVENOUS at 01:04

## 2017-11-09 RX ADMIN — Medication 1000 MILLIGRAM(S): at 21:50

## 2017-11-09 RX ADMIN — Medication 400 MILLIGRAM(S): at 04:50

## 2017-11-09 RX ADMIN — FAMOTIDINE 20 MILLIGRAM(S): 10 INJECTION INTRAVENOUS at 05:00

## 2017-11-09 RX ADMIN — HYDROMORPHONE HYDROCHLORIDE 0.5 MILLIGRAM(S): 2 INJECTION INTRAMUSCULAR; INTRAVENOUS; SUBCUTANEOUS at 17:00

## 2017-11-09 RX ADMIN — FENTANYL CITRATE 25 MICROGRAM(S): 50 INJECTION INTRAVENOUS at 05:05

## 2017-11-09 RX ADMIN — INSULIN HUMAN 2 UNIT(S)/HR: 100 INJECTION, SOLUTION SUBCUTANEOUS at 01:03

## 2017-11-09 RX ADMIN — POLYETHYLENE GLYCOL 3350 17 GRAM(S): 17 POWDER, FOR SOLUTION ORAL at 12:13

## 2017-11-09 RX ADMIN — HYDROMORPHONE HYDROCHLORIDE 0.25 MILLIGRAM(S): 2 INJECTION INTRAMUSCULAR; INTRAVENOUS; SUBCUTANEOUS at 01:00

## 2017-11-09 RX ADMIN — FENTANYL CITRATE 50 MICROGRAM(S): 50 INJECTION INTRAVENOUS at 10:15

## 2017-11-09 RX ADMIN — FENTANYL CITRATE 25 MICROGRAM(S): 50 INJECTION INTRAVENOUS at 04:50

## 2017-11-09 NOTE — AIRWAY REMOVAL NOTE  ADULT & PEDS - ARTIFICAL AIRWAY REMOVAL COMMENTS
Written order for extubation verified. The patient was identified by full name and birth date compared to the identification band. Present during the procedure was Monique GALLARDO

## 2017-11-09 NOTE — PHYSICAL THERAPY INITIAL EVALUATION ADULT - GAIT DEVIATIONS NOTED, PT EVAL
increased time in double stance/decreased velocity of limb motion/decreased carolyn/decreased step length/decreased stride length/decreased weight-shifting ability

## 2017-11-09 NOTE — PHYSICAL THERAPY INITIAL EVALUATION ADULT - PERTINENT HX OF CURRENT PROBLEM, REHAB EVAL
Pt is 72F admitted 11/2/17 PMHx of tobacco use, DM2, ?HTN, poor historian presented to [facility] with c/o dyspnea; elevated troponin. Transferred to [facility] for further evaluation.

## 2017-11-09 NOTE — PHYSICAL THERAPY INITIAL EVALUATION ADULT - ADDITIONAL COMMENTS
Pt resides alone in private home, 1 step to enter, one level home. PTA independent in mobility and ADL's, owns cane (usually does not use), (+)driving.

## 2017-11-09 NOTE — PROGRESS NOTE ADULT - SUBJECTIVE AND OBJECTIVE BOX
[facility] - Madison Herrera, Landon, Yusra, Jonn Smith Savella  Office Number:  [phone]    She is uncomfortable in chair. S/p CABG  Feeling ok, but with some pain this morning. Wants pain medication    ROS: negative unless otherwise mentioned.    Telemetry:  SR, no ectopy    MEDICATIONS  (STANDING):  albumin human  5% IVPB 250 milliLiter(s) IV Intermittent once  aspirin enteric coated 325 milliGRAM(s) Oral daily  atorvastatin 40 milliGRAM(s) Oral at bedtime  cefuroxime  IVPB 1500 milliGRAM(s) IV Intermittent every 8 hours  dexmedetomidine Infusion 0.4 MICROgram(s)/kG/Hr (9.87 mL/Hr) IV Continuous <Continuous>  dextrose 5%. 1000 milliLiter(s) (15 mL/Hr) IV Continuous <Continuous>  dextrose 50% Injectable 50 milliLiter(s) IV Push every 15 minutes  dextrose 50% Injectable 25 milliLiter(s) IV Push every 15 minutes  docusate sodium 100 milliGRAM(s) Oral three times a day  enoxaparin Injectable 40 milliGRAM(s) SubCutaneous daily  insulin Infusion 2 Unit(s)/Hr (2 mL/Hr) IV Continuous <Continuous>  insulin lispro (HumaLOG) corrective regimen sliding scale   SubCutaneous three times a day before meals  insulin lispro (HumaLOG) corrective regimen sliding scale   SubCutaneous at bedtime  milrinone Infusion 0.25 MICROgram(s)/kG/Min (7.402 mL/Hr) IV Continuous <Continuous>  norepinephrine Infusion 0.011 MICROgram(s)/kG/Min (2 mL/Hr) IV Continuous <Continuous>  pantoprazole    Tablet 40 milliGRAM(s) Oral before breakfast  polyethylene glycol 3350 17 Gram(s) Oral daily  potassium chloride  10 mEq/50 mL IVPB 10 milliEquivalent(s) IV Intermittent every 1 hour  potassium chloride  10 mEq/50 mL IVPB 10 milliEquivalent(s) IV Intermittent every 1 hour  potassium chloride  10 mEq/50 mL IVPB 10 milliEquivalent(s) IV Intermittent once  sodium chloride 0.9%. 1000 milliLiter(s) (10 mL/Hr) IV Continuous <Continuous>  vasopressin Infusion 0.033 Unit(s)/Min (2 mL/Hr) IV Continuous <Continuous>    MEDICATIONS  (PRN):  acetaminophen   Tablet. 650 milliGRAM(s) Oral every 6 hours PRN Mild Pain (1 - 3)  meperidine     Injectable 25 milliGRAM(s) IV Push once PRN For Shivering  oxyCODONE    IR 5 milliGRAM(s) Oral every 6 hours PRN Moderate Pain (4 - 6)  oxyCODONE    IR 10 milliGRAM(s) Oral every 6 hours PRN Severe Pain (7 - 10)      Allergies    No Known Drug Allergies  Peroxide (Blisters)    Intolerances        Vital Signs Last 24 Hrs  T(C): 37 (09 Nov 2017 11:00), Max: 37.4 (09 Nov 2017 03:00)  T(F): 98.6 (09 Nov 2017 11:00), Max: 99.3 (09 Nov 2017 03:00)  HR: 99 (09 Nov 2017 11:10) (72 - 103)  BP: 131/63 (09 Nov 2017 11:00) (102/64 - 139/64)  BP(mean): 90 (09 Nov 2017 11:00) (77 - 92)  RR: 22 (09 Nov 2017 11:10) (12 - 27)  SpO2: 99% (09 Nov 2017 11:10) (82% - 100%)    I&O's Summary    08 Nov 2017 07:01  -  09 Nov 2017 07:00  --------------------------------------------------------  IN: 1680.5 mL / OUT: 1730 mL / NET: -49.5 mL    09 Nov 2017 07:01  -  09 Nov 2017 11:28  --------------------------------------------------------  IN: 464.1 mL / OUT: 230 mL / NET: 234.1 mL        ON EXAM:    General: now extubated, in chair  Cardiovascular: S1, S2, RRR. V wire attached to EPM  Lungs: CTA b/l  Abdomen: Soft, Non-distended, non-tender,  positive BS  Extremities: Warm, well perfused, no edema    Incision: Sternal dressing clean, dry, intact. Leg incision with ace wrap clean, dry, intact.   Lines: R IJ intro, L radial Strum which is to be replaced,  Drains: Meraz catheter. Mediastinal and pleural chest tubes with sero-sang drainage, to low wall suction, no air leak    LABS: All Labs Reviewed:                        9.0    11.2  )-----------( 268      ( 09 Nov 2017 02:42 )             27.0                         5.6    7.0   )-----------( 156      ( 09 Nov 2017 01:58 )             16.5                         9.9    12.1  )-----------( 260      ( 08 Nov 2017 19:13 )             29.8     09 Nov 2017 02:57    140    |  104    |  21     ----------------------------<  212    5.0     |  24     |  1.12   09 Nov 2017 01:58    148    |  125    |  12     ----------------------------<  108    2.6     |  12     |  0.40   08 Nov 2017 19:13    141    |  104    |  21     ----------------------------<  192    4.3     |  22     |  0.96     Ca    8.8        09 Nov 2017 02:57  Ca    3.8        09 Nov 2017 01:58  Ca    8.2        08 Nov 2017 19:13  Phos  3.5       07 Nov 2017 14:18  Phos  3.3       06 Nov 2017 15:28  Mg     2.3       07 Nov 2017 14:18  Mg     2.3       06 Nov 2017 15:28    TPro  6.3    /  Alb  4.2    /  TBili  0.8    /  DBili  x      /  AST  32     /  ALT  37     /  AlkPhos  68     09 Nov 2017 02:57  TPro  3.0    /  Alb  1.9    /  TBili  0.4    /  DBili  x      /  AST  20     /  ALT  21     /  AlkPhos  33     09 Nov 2017 01:58  TPro  6.1    /  Alb  3.5    /  TBili  1.2    /  DBili  x      /  AST  38     /  ALT  45     /  AlkPhos  83     08 Nov 2017 19:13    PT/INR - ( 09 Nov 2017 01:58 )   PT: 16.9 sec;   INR: 1.54 ratio         PTT - ( 09 Nov 2017 01:58 )  PTT:27.4 sec  CARDIAC MARKERS ( 08 Nov 2017 19:13 )  x     / 0.40 ng/mL / 153 U/L / x     / 17.4 ng/mL      Blood Culture:

## 2017-11-09 NOTE — PROGRESS NOTE ADULT - ASSESSMENT
72 year old woman with a history of tobacco use, DM, HTN, poor historian presents for dyspnea and NSTEMI. 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  - Currently off vaso and levo. Remains on milrinone for some inotropic support  - Global LV hypokinesis on intra-op LUC. Mild MR  - Watch volume status closely  - Continue with ASA  - Once off pressors, restart lopressor 25 mg po bid, and monitor for NSVT  - Continue with Statin  - did not tolerate ACE-inhibitor, as she developed hypotension  - Care per CTU. Will follow with you    Critically ill. >35 min discussing care with patient and family.

## 2017-11-09 NOTE — PROGRESS NOTE ADULT - SUBJECTIVE AND OBJECTIVE BOX
Operation: POD #1 C3L, EF 20-25%   Narrative: 72 year old F, resting comfortably in bed, no acute distress.  Intubated, sedated.     Vital Signs Last 24 Hrs  T(C): 37 (08 Nov 2017 23:00), Max: 37 (08 Nov 2017 23:00)  T(F): 98.6 (08 Nov 2017 23:00), Max: 98.6 (08 Nov 2017 23:00)  HR: 80 (09 Nov 2017 01:45) (71 - 96)  BP: 146/80 (08 Nov 2017 05:00) (146/80 - 146/80)  BP(mean): --  RR: 17 (09 Nov 2017 01:45) (12 - 27)  SpO2: 95% (09 Nov 2017 01:45) (93% - 100%)    11-07 @ 07:01  -  11-08 @ 07:00  --------------------------------------------------------  IN:    Oral Fluid: 600 mL  Total IN: 600 mL    OUT:    Voided: 450 mL  Total OUT: 450 mL    Total NET: 150 mL      11-08 @ 07:01  -  11-09 @ 01:52  --------------------------------------------------------  IN:    Albumin 5%  - 250 mL: 500 mL    dexmedetomidine Infusion: 62 mL    insulin Infusion: 24 mL    IV PiggyBack: 200 mL    milrinone  Infusion: 104.3 mL    norepinephrine Infusion: 2 mL    sodium chloride 0.9%.: 70 mL    vasopressin Infusion: 22 mL  Total IN: 984.3 mL    OUT:    Chest Tube: 20 mL    Chest Tube: 140 mL    Indwelling Catheter - Urethral: 605 mL    Voided: 600 mL  Total OUT: 1365 mL    Total NET: -380.7 mL    LABS:    MEDICATIONS  (STANDING):  aspirin enteric coated 325 milliGRAM(s) Oral daily  cefuroxime  IVPB 1500 milliGRAM(s) IV Intermittent every 8 hours  dexmedetomidine Infusion 0.4 MICROgram(s)/kG/Hr (9.87 mL/Hr) IV Continuous <Continuous>  dextrose 5%. 1000 milliLiter(s) (15 mL/Hr) IV Continuous <Continuous>  dextrose 50% Injectable 50 milliLiter(s) IV Push every 15 minutes  dextrose 50% Injectable 25 milliLiter(s) IV Push every 15 minutes  docusate sodium 100 milliGRAM(s) Oral three times a day  famotidine Injectable 20 milliGRAM(s) IV Push every 12 hours  insulin Infusion 2 Unit(s)/Hr (2 mL/Hr) IV Continuous <Continuous>  milrinone Infusion 0.5 MICROgram(s)/kG/Min (14.805 mL/Hr) IV Continuous <Continuous>  norepinephrine Infusion 0.011 MICROgram(s)/kG/Min (2 mL/Hr) IV Continuous <Continuous>  potassium chloride  10 mEq/50 mL IVPB 10 milliEquivalent(s) IV Intermittent every 1 hour  potassium chloride  10 mEq/50 mL IVPB 10 milliEquivalent(s) IV Intermittent every 1 hour  potassium chloride  10 mEq/50 mL IVPB 10 milliEquivalent(s) IV Intermittent once  sodium chloride 0.9%. 1000 milliLiter(s) (10 mL/Hr) IV Continuous <Continuous>  vasopressin Infusion 0.033 Unit(s)/Min (2 mL/Hr) IV Continuous <Continuous>    MEDICATIONS  (PRN):  meperidine     Injectable 25 milliGRAM(s) IV Push once PRN For Shivering  oxyCODONE    5 mG/acetaminophen 325 mG 1 Tablet(s) Oral every 6 hours PRN Mild Pain  oxyCODONE    5 mG/acetaminophen 325 mG 2 Tablet(s) Oral every 6 hours PRN Moderate Pain      PHYSICAL EXAM:  General: Intubated, sedated   Cardiovascular: S1, S2, RRR. V wire attached to EPM  Lungs: CTA b/l  Abdomen: Soft, Non-distended, non-tender,  positive BS  Extremities: Warm, well perfused, palpable/dopplerable distal pulses, no edema    Incision: Sternal dressing clean, dry, intact. Leg incision with ace wrap clean, dry, intact.   Lines: R IJ intro, L radial Nneka, PIV  Drains: Meraz catheter. Mediastinal and pleural chest tubes with sero-sang drainage, to low wall suction, no air leak    RADIOLOGY & ADDITIONAL TESTS:    ADDITIONAL DATA:   Does the patient have a history of CHF? Yes  -If yes, systolic or diastolic  -Pre-operative EF: mod-severe LV dysfunction    Extubation within 24 hours? N/A     Does the patient have a history of kidney disease? No  -Give CKD stage if applicable:   -Patient's baseline Creatinine: 0.8 - 1.1     Did the patient receive transfusion of blood and/or products? PRBCx1   -If yes, indication: Intraop acute blood loss    Eleni-operative antibiotics discontinued within 48 hours of CTU admission? Yes   -Name/date/time of discontinue: Omero to be d/c 10/10 06:00

## 2017-11-09 NOTE — PROGRESS NOTE ADULT - ASSESSMENT
Patient care discussed on morning interdisciplinary rounds with CTS team. D/w intensivist Dr. Hendrix   72 y.o. F s/p C3L,  remains on vasodilatory/inotropic support with levophed, vasopressin, s/p Primacor load in OR and continues to be on Primacor gtt. Currently intubated and sedated.

## 2017-11-09 NOTE — PHYSICAL THERAPY INITIAL EVALUATION ADULT - GENERAL OBSERVATIONS, REHAB EVAL
Pt received OOB sitting in chair, A&Ox4, selectively following commands, +chest tube, +external pacer, +limon, +high flow (non-rebreather donned for amb), c/o sternal pain t/o (pre medicated), p/w anxiousness with yelling & agitation t/o; required extensive assist from family, medical team, nursing staff & PT to participate in session.

## 2017-11-09 NOTE — PROGRESS NOTE ADULT - SUBJECTIVE AND OBJECTIVE BOX
LUIGI MELARA   MRN#: [id number]     The patient is a 72y Female who was seen, evaluated, & examined with the CTICU staff on rounds and later in the day with a multidisciplinary care plan formulated & implemented.  All available clinical, laboratory, radiographic, pharmacologic, and electrocardiographic data were reviewed & analyzed.      The patient was in the CTICU in critical condition secondary to acute hypoxic respiratory failure-persistent cardiopulmonary dysfunction, cardiogenic shock-RV dysfunction, hemodynamically significant anemia/hypovolemia-shock, and acute postoperative blood loss anemia S/P C3L.      Respiratory status required high flow nasal oxygen, the following of ABG’s with A-line monitoring, and continuous pulse oximetry monitoring for support & to evaluate for & prevent further decompensation secondary to persistent cardiopulmonary dysfunction.  Invasive hemodynamic monitoring with the A-line was discontinued secondary to malfunction.       Invasive hemodynamic monitoring with an A-line was required for the following of continuous MAP/BP monitoring to ensure adequate cardiovascular support and to evaluate for & help prevent decompensation while receiving intermittent volume expansion, an IV Primacor drip and an IV Vasopressin drip secondary to hemodynamically significant anemia/hypovolemia-shock, cardiogenic shock, and RV dysfunction.       Patient required more than the usual postoperative critical care management and I provided 80 minutes of non-continuous care to the patient.  Discussed at length with the CTICU staff and helped coordinate care.

## 2017-11-10 DIAGNOSIS — E11.49 TYPE 2 DIABETES MELLITUS WITH OTHER DIABETIC NEUROLOGICAL COMPLICATION: ICD-10-CM

## 2017-11-10 DIAGNOSIS — E11.9 TYPE 2 DIABETES MELLITUS WITHOUT COMPLICATIONS: ICD-10-CM

## 2017-11-10 DIAGNOSIS — E87.70 FLUID OVERLOAD, UNSPECIFIED: ICD-10-CM

## 2017-11-10 DIAGNOSIS — Z29.9 ENCOUNTER FOR PROPHYLACTIC MEASURES, UNSPECIFIED: ICD-10-CM

## 2017-11-10 DIAGNOSIS — Z95.1 PRESENCE OF AORTOCORONARY BYPASS GRAFT: ICD-10-CM

## 2017-11-10 LAB
ALBUMIN SERPL ELPH-MCNC: 4.2 G/DL — SIGNIFICANT CHANGE UP (ref 3.3–5)
ALP SERPL-CCNC: 67 U/L — SIGNIFICANT CHANGE UP (ref 40–120)
ALT FLD-CCNC: 29 U/L RC — SIGNIFICANT CHANGE UP (ref 10–45)
ANION GAP SERPL CALC-SCNC: 12 MMOL/L — SIGNIFICANT CHANGE UP (ref 5–17)
APTT BLD: 28.7 SEC — SIGNIFICANT CHANGE UP (ref 27.5–37.4)
AST SERPL-CCNC: 21 U/L — SIGNIFICANT CHANGE UP (ref 10–40)
BILIRUB SERPL-MCNC: 0.5 MG/DL — SIGNIFICANT CHANGE UP (ref 0.2–1.2)
BUN SERPL-MCNC: 25 MG/DL — HIGH (ref 7–23)
CALCIUM SERPL-MCNC: 8.9 MG/DL — SIGNIFICANT CHANGE UP (ref 8.4–10.5)
CHLORIDE SERPL-SCNC: 101 MMOL/L — SIGNIFICANT CHANGE UP (ref 96–108)
CO2 SERPL-SCNC: 23 MMOL/L — SIGNIFICANT CHANGE UP (ref 22–31)
CREAT SERPL-MCNC: 1.24 MG/DL — SIGNIFICANT CHANGE UP (ref 0.5–1.3)
GLUCOSE BLDC GLUCOMTR-MCNC: 132 MG/DL — HIGH (ref 70–99)
GLUCOSE BLDC GLUCOMTR-MCNC: 165 MG/DL — HIGH (ref 70–99)
GLUCOSE BLDC GLUCOMTR-MCNC: 169 MG/DL — HIGH (ref 70–99)
GLUCOSE BLDC GLUCOMTR-MCNC: 188 MG/DL — HIGH (ref 70–99)
GLUCOSE SERPL-MCNC: 176 MG/DL — HIGH (ref 70–99)
HCT VFR BLD CALC: 27.2 % — LOW (ref 34.5–45)
HGB BLD-MCNC: 9.2 G/DL — LOW (ref 11.5–15.5)
INR BLD: 1.18 RATIO — HIGH (ref 0.88–1.16)
MCHC RBC-ENTMCNC: 29.2 PG — SIGNIFICANT CHANGE UP (ref 27–34)
MCHC RBC-ENTMCNC: 33.9 GM/DL — SIGNIFICANT CHANGE UP (ref 32–36)
MCV RBC AUTO: 86.1 FL — SIGNIFICANT CHANGE UP (ref 80–100)
PHOSPHATE SERPL-MCNC: 3.8 MG/DL — SIGNIFICANT CHANGE UP (ref 2.5–4.5)
PLATELET # BLD AUTO: 350 K/UL — SIGNIFICANT CHANGE UP (ref 150–400)
POTASSIUM SERPL-MCNC: 5 MMOL/L — SIGNIFICANT CHANGE UP (ref 3.5–5.3)
POTASSIUM SERPL-SCNC: 5 MMOL/L — SIGNIFICANT CHANGE UP (ref 3.5–5.3)
PROT SERPL-MCNC: 6.7 G/DL — SIGNIFICANT CHANGE UP (ref 6–8.3)
PROTHROM AB SERPL-ACNC: 12.8 SEC — HIGH (ref 9.8–12.7)
RBC # BLD: 3.15 M/UL — LOW (ref 3.8–5.2)
RBC # FLD: 14.4 % — SIGNIFICANT CHANGE UP (ref 10.3–14.5)
SODIUM SERPL-SCNC: 136 MMOL/L — SIGNIFICANT CHANGE UP (ref 135–145)
WBC # BLD: 16.8 K/UL — HIGH (ref 3.8–10.5)
WBC # FLD AUTO: 16.8 K/UL — HIGH (ref 3.8–10.5)

## 2017-11-10 PROCEDURE — 99233 SBSQ HOSP IP/OBS HIGH 50: CPT

## 2017-11-10 PROCEDURE — 93010 ELECTROCARDIOGRAM REPORT: CPT

## 2017-11-10 PROCEDURE — 71010: CPT | Mod: 26

## 2017-11-10 RX ORDER — HYDROMORPHONE HYDROCHLORIDE 2 MG/ML
0.5 INJECTION INTRAMUSCULAR; INTRAVENOUS; SUBCUTANEOUS ONCE
Qty: 0 | Refills: 0 | Status: DISCONTINUED | OUTPATIENT
Start: 2017-11-10 | End: 2017-11-10

## 2017-11-10 RX ORDER — SPIRONOLACTONE 25 MG/1
25 TABLET, FILM COATED ORAL DAILY
Qty: 0 | Refills: 0 | Status: DISCONTINUED | OUTPATIENT
Start: 2017-11-10 | End: 2017-11-13

## 2017-11-10 RX ORDER — METOPROLOL TARTRATE 50 MG
12.5 TABLET ORAL THREE TIMES A DAY
Qty: 0 | Refills: 0 | Status: DISCONTINUED | OUTPATIENT
Start: 2017-11-10 | End: 2017-11-11

## 2017-11-10 RX ORDER — FUROSEMIDE 40 MG
20 TABLET ORAL DAILY
Qty: 0 | Refills: 0 | Status: DISCONTINUED | OUTPATIENT
Start: 2017-11-10 | End: 2017-11-11

## 2017-11-10 RX ORDER — ONDANSETRON 8 MG/1
4 TABLET, FILM COATED ORAL EVERY 4 HOURS
Qty: 0 | Refills: 0 | Status: DISCONTINUED | OUTPATIENT
Start: 2017-11-10 | End: 2017-11-13

## 2017-11-10 RX ORDER — FUROSEMIDE 40 MG
20 TABLET ORAL ONCE
Qty: 0 | Refills: 0 | Status: COMPLETED | OUTPATIENT
Start: 2017-11-10 | End: 2017-11-10

## 2017-11-10 RX ADMIN — Medication: at 12:36

## 2017-11-10 RX ADMIN — Medication 12.5 MILLIGRAM(S): at 17:18

## 2017-11-10 RX ADMIN — HYDROMORPHONE HYDROCHLORIDE 0.5 MILLIGRAM(S): 2 INJECTION INTRAMUSCULAR; INTRAVENOUS; SUBCUTANEOUS at 05:57

## 2017-11-10 RX ADMIN — ONDANSETRON 4 MILLIGRAM(S): 8 TABLET, FILM COATED ORAL at 11:00

## 2017-11-10 RX ADMIN — Medication 20 MILLIGRAM(S): at 17:18

## 2017-11-10 RX ADMIN — OXYCODONE HYDROCHLORIDE 10 MILLIGRAM(S): 5 TABLET ORAL at 09:50

## 2017-11-10 RX ADMIN — Medication 100 MILLIGRAM(S): at 21:25

## 2017-11-10 RX ADMIN — HYDROMORPHONE HYDROCHLORIDE 0.5 MILLIGRAM(S): 2 INJECTION INTRAMUSCULAR; INTRAVENOUS; SUBCUTANEOUS at 06:20

## 2017-11-10 RX ADMIN — Medication 100 MILLIGRAM(S): at 05:05

## 2017-11-10 RX ADMIN — Medication 1: at 08:15

## 2017-11-10 RX ADMIN — Medication 20 MILLIGRAM(S): at 02:29

## 2017-11-10 RX ADMIN — OXYCODONE HYDROCHLORIDE 10 MILLIGRAM(S): 5 TABLET ORAL at 10:50

## 2017-11-10 RX ADMIN — SPIRONOLACTONE 25 MILLIGRAM(S): 25 TABLET, FILM COATED ORAL at 17:18

## 2017-11-10 RX ADMIN — PANTOPRAZOLE SODIUM 40 MILLIGRAM(S): 20 TABLET, DELAYED RELEASE ORAL at 07:29

## 2017-11-10 RX ADMIN — Medication 100 MILLIGRAM(S): at 14:00

## 2017-11-10 RX ADMIN — Medication 325 MILLIGRAM(S): at 11:53

## 2017-11-10 RX ADMIN — ATORVASTATIN CALCIUM 40 MILLIGRAM(S): 80 TABLET, FILM COATED ORAL at 21:25

## 2017-11-10 RX ADMIN — ENOXAPARIN SODIUM 40 MILLIGRAM(S): 100 INJECTION SUBCUTANEOUS at 11:53

## 2017-11-10 RX ADMIN — Medication 12.5 MILLIGRAM(S): at 21:25

## 2017-11-10 NOTE — PROGRESS NOTE ADULT - ASSESSMENT
72 year old woman with a history of tobacco use, DM, HTN, poor historian presents for dyspnea and NSTEMI. Found to have triple vessel disease  now 11/8/17 s/p C3L   ef- 25%   POD #2  postop required pressors and volume expansion and inotropic support.  No PRBC given   hi flow o2- weaned off- now on NC  11/10 milrinone d/c- tx sdu   hypervolemia - diuresis  initiate low dose b-blocker this evening  discharge planning- await PT eval

## 2017-11-10 NOTE — PROGRESS NOTE ADULT - SUBJECTIVE AND OBJECTIVE BOX
Operation s/p C3L  POD 1    Patient is doing well, resting comfortably; complains of mild incisional pain that is relieved by pain medication.    Vital Signs Last 24 Hrs  T(C): 37.3 (10 Nov 2017 00:00), Max: 37.3 (10 Nov 2017 00:00)  T(F): 99.1 (10 Nov 2017 00:00), Max: 99.1 (10 Nov 2017 00:00)  HR: 94 (10 Nov 2017 02:00) (76 - 105)  BP: 146/75 (10 Nov 2017 02:00) (102/64 - 146/75)  BP(mean): 100 (10 Nov 2017 02:00) (77 - 100)  RR: 20 (10 Nov 2017 02:00) (15 - 38)  SpO2: 96% (10 Nov 2017 02:00) (82% - 100%)  I&O's Detail    08 Nov 2017 07:01  -  09 Nov 2017 07:00  --------------------------------------------------------  IN:    Albumin 5%  - 250 mL: 750 mL    dexmedetomidine Infusion: 86.8 mL    insulin Infusion: 59 mL    IV PiggyBack: 400 mL    milrinone  Infusion: 193.7 mL    norepinephrine Infusion: 8 mL    sodium chloride 0.9%.: 130 mL    vasopressin Infusion: 53 mL  Total IN: 1680.5 mL    OUT:    Chest Tube: 50 mL    Chest Tube: 290 mL    Indwelling Catheter - Urethral: 790 mL    Voided: 600 mL  Total OUT: 1730 mL    Total NET: -49.5 mL      09 Nov 2017 07:01  -  10 Nov 2017 03:00  --------------------------------------------------------  IN:    Albumin 5%  - 250 mL: 250 mL    IV PiggyBack: 200 mL    milrinone  Infusion: 44.7 mL    milrinone  Infusion: 125.8 mL    Oral Fluid: 240 mL    sodium chloride 0.9%.: 200 mL    vasopressin Infusion: 2 mL  Total IN: 1062.5 mL    OUT:    Chest Tube: 330 mL    Chest Tube: 100 mL    Indwelling Catheter - Urethral: 810 mL  Total OUT: 1240 mL    Total NET: -177.5 mL          CHEST TUBES:  on Suction  EPICARDIAL WIRES: Ventricular wires   ANTHONY: Yes    MEDICATIONS  (STANDING):  aspirin enteric coated 325 milliGRAM(s) Oral daily  atorvastatin 40 milliGRAM(s) Oral at bedtime  cefuroxime  IVPB 1500 milliGRAM(s) IV Intermittent every 8 hours  dexmedetomidine Infusion 0.4 MICROgram(s)/kG/Hr (9.87 mL/Hr) IV Continuous <Continuous>  dextrose 5%. 1000 milliLiter(s) (15 mL/Hr) IV Continuous <Continuous>  dextrose 50% Injectable 50 milliLiter(s) IV Push every 15 minutes  dextrose 50% Injectable 25 milliLiter(s) IV Push every 15 minutes  docusate sodium 100 milliGRAM(s) Oral three times a day  enoxaparin Injectable 40 milliGRAM(s) SubCutaneous daily  insulin Infusion 2 Unit(s)/Hr (2 mL/Hr) IV Continuous <Continuous>  insulin lispro (HumaLOG) corrective regimen sliding scale   SubCutaneous three times a day before meals  insulin lispro (HumaLOG) corrective regimen sliding scale   SubCutaneous at bedtime  milrinone Infusion 0.25 MICROgram(s)/kG/Min (7.402 mL/Hr) IV Continuous <Continuous>  pantoprazole    Tablet 40 milliGRAM(s) Oral before breakfast  polyethylene glycol 3350 17 Gram(s) Oral daily  sodium chloride 0.9%. 1000 milliLiter(s) (10 mL/Hr) IV Continuous <Continuous>    MEDICATIONS  (PRN):  acetaminophen   Tablet. 650 milliGRAM(s) Oral every 6 hours PRN Mild Pain (1 - 3)  oxyCODONE    IR 5 milliGRAM(s) Oral every 6 hours PRN Moderate Pain (4 - 6)  oxyCODONE    IR 10 milliGRAM(s) Oral every 6 hours PRN Severe Pain (7 - 10)        Does the patient have a history of CHF?  None    Extubation within 24 hours? yes    CXR reviewed with attending.     Does the patient have a history of kidney disease?   baseline creatinine is 1.20    Beta Blockers contraindicated for the first 24 hours due to vasopressor/inotropic medication      Did the patient receive transfusion of blood and/or products?  -none    DVT PPX with vendodynes as well as chemical prophylaxis.    Eleni-operative antibiotics to be discontinued within 48 hours of CTU admission    Patient care discussed on morning interdisciplinary rounds with CTS team.

## 2017-11-10 NOTE — PROGRESS NOTE ADULT - ASSESSMENT
PHYSICAL EXAM    Constitutitional: A&O x 3, No acute distress    Respiratory: CTA bilaterally, mildly diminished breath sounds at bilateral bases.  No rhonchi, rales    Cardiovascular:  +S1, +S2 Regular rate and rhythm    Gastrointestinal:  Abdomen is soft, nontender, non distended.  Normal bowel sounds throughout.     Extremities: WALKER, pulses 2+ and symmetrical throughout.  No edema, no swelling, no tenderness noted on exam

## 2017-11-10 NOTE — PROGRESS NOTE ADULT - ASSESSMENT
72 year old woman with a history of tobacco use, DM, HTN, poor historian presents for dyspnea and NSTEMI. 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 Mild MR  - Watch volume status closely. May need Lasix  - resume Lopressor today.   - Continue with ASA  - Continue with Statin  - Monitor and replete electrolytes. Keep K>4.0 and Mg>2.0.   - Further cardiac workup will depend on clinical course.   - All other workup per primary team. Will followup.     Patient at high risk for decompensation.  >35 minutes of critical care time was spent with this patient.

## 2017-11-10 NOTE — PROGRESS NOTE ADULT - SUBJECTIVE AND OBJECTIVE BOX
VITAL SIGNS    Telemetry:  rsr 90's  Vital Signs Last 24 Hrs  T(C): 36.3 (11-10-17 @ 15:31), Max: 38 (11-10-17 @ 04:00)  T(F): 97.3 (11-10-17 @ 15:31), Max: 100.4 (11-10-17 @ 04:00)  HR: 93 (11-10-17 @ 15:31) (92 - 100)  BP: 134/66 (11-10-17 @ 15:31) (119/59 - 153/65)  RR: 18 (11-10-17 @ 15:31) (16 - 38)  SpO2: 92% (11-10-17 @ 15:31) (92% - 100%)             07:01  -  11-10 @ 07:00  --------------------------------------------------------  IN: 1174.7 mL / OUT: 2225 mL / NET: -1050.3 mL    11-10 @ 07:01  -  11-10 @ 17:01  --------------------------------------------------------  IN: 290 mL / OUT: 380 mL / NET: -90 mL       Daily     Daily Weight in k.2 (10 Nov 2017 00:00)  Admit Wt: Drug Dosing Weight  Height (cm): 170.18 (2017 20:30)  Weight (kg): 98.7 (2017 20:30)  BMI (kg/m2): 34.1 (2017 20:30)  BSA (m2): 2.1 (2017 20:30)    Bilirubin Total, Serum: 0.5 mg/dL (11-10 @ 01:13)  Bilirubin Total, Serum: 0.5 mg/dL ( 17:39)    CAPILLARY BLOOD GLUCOSE  158 (2017 22:00)      POCT Blood Glucose.: 132 mg/dL (10 Nov 2017 16:14)  POCT Blood Glucose.: 169 mg/dL (10 Nov 2017 12:32)  POCT Blood Glucose.: 165 mg/dL (10 Nov 2017 07:35)  POCT Blood Glucose.: 158 mg/dL (2017 21:38)          acetaminophen   Tablet. 650 milliGRAM(s) Oral every 6 hours PRN  aspirin enteric coated 325 milliGRAM(s) Oral daily  atorvastatin 40 milliGRAM(s) Oral at bedtime  dextrose 5%. 1000 milliLiter(s) IV Continuous <Continuous>  dextrose 50% Injectable 50 milliLiter(s) IV Push every 15 minutes  dextrose 50% Injectable 25 milliLiter(s) IV Push every 15 minutes  docusate sodium 100 milliGRAM(s) Oral three times a day  enoxaparin Injectable 40 milliGRAM(s) SubCutaneous daily  furosemide    Tablet 20 milliGRAM(s) Oral daily  insulin lispro (HumaLOG) corrective regimen sliding scale   SubCutaneous three times a day before meals  insulin lispro (HumaLOG) corrective regimen sliding scale   SubCutaneous at bedtime  metoprolol     tartrate 12.5 milliGRAM(s) Oral three times a day  ondansetron Injectable 4 milliGRAM(s) IV Push every 4 hours PRN  oxyCODONE    IR 5 milliGRAM(s) Oral every 6 hours PRN  oxyCODONE    IR 10 milliGRAM(s) Oral every 6 hours PRN  pantoprazole    Tablet 40 milliGRAM(s) Oral before breakfast  polyethylene glycol 3350 17 Gram(s) Oral daily  sodium chloride 0.9%. 1000 milliLiter(s) IV Continuous <Continuous>  spironolactone 25 milliGRAM(s) Oral daily      PHYSICAL EXAM    Subjective: "Hi.   Neurology: alert and oriented x 3, nonfocal, no gross deficits  CV : tele:  RSR 90's   +rij central line cdi with dressing   Sternal Wound :  CDI with dressing , Stable; +pw  Lungs: clear. RR easy, unlabored; + left chest tube and mediastinal chest tube- draining serous-sanguinous drainage   Abdomen: soft, nontender, nondistended, positive bowel sounds, neg bowel movement   Neg N/V/D; obese abdomen   :  +limon- clear yellow urine   Extremities:   WALKER; trace LE edema, neg calf tenderness.   PPP bilaterally; + left leg with ab       PW: + v. wire  Chest tubes: + left chest tube and mediastinal chest tube- draining serous-sanguinous drainage

## 2017-11-10 NOTE — PROGRESS NOTE ADULT - SUBJECTIVE AND OBJECTIVE BOX
[facility] -- Madison Herrera, Yusra Navarrete, Jonn Smith Savella  Office # [phone]      Follow Up:  CAD    Subjective/Observations: Patient seen and examined. Events noted. Resting comfortably in bed. No complaints of dyspnea, or palpitations reported. C/O pleuritic CP      REVIEW OF SYSTEMS: All other review of systems is negative unless indicated above    PAST MEDICAL & SURGICAL HISTORY:  Diabetes  S/P breast lumpectomy: left  History of tonsillectomy  H/O abdominal hysterectomy: 1991      MEDICATIONS  (STANDING):  aspirin enteric coated 325 milliGRAM(s) Oral daily  atorvastatin 40 milliGRAM(s) Oral at bedtime  dextrose 5%. 1000 milliLiter(s) (15 mL/Hr) IV Continuous <Continuous>  dextrose 50% Injectable 50 milliLiter(s) IV Push every 15 minutes  dextrose 50% Injectable 25 milliLiter(s) IV Push every 15 minutes  docusate sodium 100 milliGRAM(s) Oral three times a day  enoxaparin Injectable 40 milliGRAM(s) SubCutaneous daily  furosemide    Tablet 20 milliGRAM(s) Oral daily  insulin lispro (HumaLOG) corrective regimen sliding scale   SubCutaneous three times a day before meals  insulin lispro (HumaLOG) corrective regimen sliding scale   SubCutaneous at bedtime  metoprolol     tartrate 12.5 milliGRAM(s) Oral three times a day  pantoprazole    Tablet 40 milliGRAM(s) Oral before breakfast  polyethylene glycol 3350 17 Gram(s) Oral daily  sodium chloride 0.9%. 1000 milliLiter(s) (10 mL/Hr) IV Continuous <Continuous>  spironolactone 25 milliGRAM(s) Oral daily    MEDICATIONS  (PRN):  acetaminophen   Tablet. 650 milliGRAM(s) Oral every 6 hours PRN Mild Pain (1 - 3)  ondansetron Injectable 4 milliGRAM(s) IV Push every 4 hours PRN Nausea and/or Vomiting  oxyCODONE    IR 5 milliGRAM(s) Oral every 6 hours PRN Moderate Pain (4 - 6)  oxyCODONE    IR 10 milliGRAM(s) Oral every 6 hours PRN Severe Pain (7 - 10)      Allergies    No Known Drug Allergies  Peroxide (Blisters)    Intolerances            Vital Signs Last 24 Hrs  T(C): 36.3 (10 Nov 2017 15:31), Max: 38 (10 Nov 2017 04:00)  T(F): 97.3 (10 Nov 2017 15:31), Max: 100.4 (10 Nov 2017 04:00)  HR: 93 (10 Nov 2017 15:31) (92 - 100)  BP: 134/66 (10 Nov 2017 15:31) (119/59 - 153/65)  BP(mean): 91 (10 Nov 2017 15:31) (83 - 100)  RR: 18 (10 Nov 2017 15:31) (16 - 38)  SpO2: 92% (10 Nov 2017 15:31) (92% - 100%)    I&O's Summary    09 Nov 2017 07:01  -  10 Nov 2017 07:00  --------------------------------------------------------  IN: 1174.7 mL / OUT: 2225 mL / NET: -1050.3 mL    10 Nov 2017 07:01  -  10 Nov 2017 17:41  --------------------------------------------------------  IN: 290 mL / OUT: 380 mL / NET: -90 mL          PHYSICAL EXAM:  [facility] [place]:   Constitutional: NAD, awake and alert, well-developed  HEENT: Moist Mucous Membranes, Anicteric  Pulmonary: Decreased breath sounds b/l.   Cardiovascular: Regular, S1 and S2, No murmurs, rubs, gallops or clicks  Gastrointestinal: Bowel Sounds present, soft, nontender.   Lymph: No peripheral edema. No lymphadenopathy.  Skin: strenotomy dressing c/d/i  Psych:  Mood & affect appropriate    LABS: All Labs Reviewed:                        9.2    16.8  )-----------( 350      ( 10 Nov 2017 01:13 )             27.2                         9.0    15.1  )-----------( 324      ( 09 Nov 2017 17:39 )             26.4                         9.0    11.2  )-----------( 268      ( 09 Nov 2017 02:42 )             27.0     10 Nov 2017 01:13    136    |  101    |  25     ----------------------------<  176    5.0     |  23     |  1.24   09 Nov 2017 17:39    137    |  101    |  25     ----------------------------<  144    4.8     |  23     |  1.20   09 Nov 2017 02:57    140    |  104    |  21     ----------------------------<  212    5.0     |  24     |  1.12     Ca    8.9        10 Nov 2017 01:13  Ca    8.9        09 Nov 2017 17:39  Ca    8.8        09 Nov 2017 02:57  Phos  3.8       10 Nov 2017 01:13    TPro  6.7    /  Alb  4.2    /  TBili  0.5    /  DBili  x      /  AST  21     /  ALT  29     /  AlkPhos  67     10 Nov 2017 01:13  TPro  6.9    /  Alb  4.4    /  TBili  0.5    /  DBili  x      /  AST  24     /  ALT  31     /  AlkPhos  69     09 Nov 2017 17:39  TPro  6.3    /  Alb  4.2    /  TBili  0.8    /  DBili  x      /  AST  32     /  ALT  37     /  AlkPhos  68     09 Nov 2017 02:57    PT/INR - ( 10 Nov 2017 01:13 )   PT: 12.8 sec;   INR: 1.18 ratio         PTT - ( 10 Nov 2017 01:13 )  PTT:28.7 sec  CARDIAC MARKERS ( 08 Nov 2017 19:13 )  x     / 0.40 ng/mL / 153 U/L / x     / 17.4 ng/mL

## 2017-11-11 LAB
ANION GAP SERPL CALC-SCNC: 13 MMOL/L — SIGNIFICANT CHANGE UP (ref 5–17)
BUN SERPL-MCNC: 25 MG/DL — HIGH (ref 7–23)
CALCIUM SERPL-MCNC: 8.9 MG/DL — SIGNIFICANT CHANGE UP (ref 8.4–10.5)
CHLORIDE SERPL-SCNC: 103 MMOL/L — SIGNIFICANT CHANGE UP (ref 96–108)
CO2 SERPL-SCNC: 25 MMOL/L — SIGNIFICANT CHANGE UP (ref 22–31)
CREAT SERPL-MCNC: 1.06 MG/DL — SIGNIFICANT CHANGE UP (ref 0.5–1.3)
GLUCOSE BLDC GLUCOMTR-MCNC: 136 MG/DL — HIGH (ref 70–99)
GLUCOSE BLDC GLUCOMTR-MCNC: 154 MG/DL — HIGH (ref 70–99)
GLUCOSE SERPL-MCNC: 159 MG/DL — HIGH (ref 70–99)
HCT VFR BLD CALC: 27.8 % — LOW (ref 34.5–45)
HGB BLD-MCNC: 9.4 G/DL — LOW (ref 11.5–15.5)
MCHC RBC-ENTMCNC: 29.2 PG — SIGNIFICANT CHANGE UP (ref 27–34)
MCHC RBC-ENTMCNC: 33.8 GM/DL — SIGNIFICANT CHANGE UP (ref 32–36)
MCV RBC AUTO: 86.5 FL — SIGNIFICANT CHANGE UP (ref 80–100)
PLATELET # BLD AUTO: 355 K/UL — SIGNIFICANT CHANGE UP (ref 150–400)
POTASSIUM SERPL-MCNC: 4.4 MMOL/L — SIGNIFICANT CHANGE UP (ref 3.5–5.3)
POTASSIUM SERPL-SCNC: 4.4 MMOL/L — SIGNIFICANT CHANGE UP (ref 3.5–5.3)
RBC # BLD: 3.21 M/UL — LOW (ref 3.8–5.2)
RBC # FLD: 14.4 % — SIGNIFICANT CHANGE UP (ref 10.3–14.5)
SODIUM SERPL-SCNC: 141 MMOL/L — SIGNIFICANT CHANGE UP (ref 135–145)
WBC # BLD: 13.5 K/UL — HIGH (ref 3.8–10.5)
WBC # FLD AUTO: 13.5 K/UL — HIGH (ref 3.8–10.5)

## 2017-11-11 PROCEDURE — 71010: CPT | Mod: 26,76

## 2017-11-11 PROCEDURE — 99233 SBSQ HOSP IP/OBS HIGH 50: CPT

## 2017-11-11 RX ORDER — FUROSEMIDE 40 MG
40 TABLET ORAL ONCE
Qty: 0 | Refills: 0 | Status: COMPLETED | OUTPATIENT
Start: 2017-11-11 | End: 2017-11-11

## 2017-11-11 RX ORDER — METOPROLOL TARTRATE 50 MG
25 TABLET ORAL
Qty: 0 | Refills: 0 | Status: DISCONTINUED | OUTPATIENT
Start: 2017-11-11 | End: 2017-11-13

## 2017-11-11 RX ORDER — ALPRAZOLAM 0.25 MG
0.25 TABLET ORAL EVERY 8 HOURS
Qty: 0 | Refills: 0 | Status: DISCONTINUED | OUTPATIENT
Start: 2017-11-11 | End: 2017-11-13

## 2017-11-11 RX ORDER — FUROSEMIDE 40 MG
40 TABLET ORAL DAILY
Qty: 0 | Refills: 0 | Status: DISCONTINUED | OUTPATIENT
Start: 2017-11-12 | End: 2017-11-13

## 2017-11-11 RX ADMIN — OXYCODONE HYDROCHLORIDE 10 MILLIGRAM(S): 5 TABLET ORAL at 07:39

## 2017-11-11 RX ADMIN — Medication 100 MILLIGRAM(S): at 13:20

## 2017-11-11 RX ADMIN — OXYCODONE HYDROCHLORIDE 5 MILLIGRAM(S): 5 TABLET ORAL at 23:19

## 2017-11-11 RX ADMIN — Medication 325 MILLIGRAM(S): at 13:20

## 2017-11-11 RX ADMIN — Medication 1: at 07:30

## 2017-11-11 RX ADMIN — Medication 100 MILLIGRAM(S): at 05:22

## 2017-11-11 RX ADMIN — Medication 12.5 MILLIGRAM(S): at 05:22

## 2017-11-11 RX ADMIN — OXYCODONE HYDROCHLORIDE 10 MILLIGRAM(S): 5 TABLET ORAL at 08:30

## 2017-11-11 RX ADMIN — Medication 20 MILLIGRAM(S): at 05:22

## 2017-11-11 RX ADMIN — Medication 25 MILLIGRAM(S): at 17:33

## 2017-11-11 RX ADMIN — SPIRONOLACTONE 25 MILLIGRAM(S): 25 TABLET, FILM COATED ORAL at 05:22

## 2017-11-11 RX ADMIN — PANTOPRAZOLE SODIUM 40 MILLIGRAM(S): 20 TABLET, DELAYED RELEASE ORAL at 05:23

## 2017-11-11 RX ADMIN — Medication 40 MILLIGRAM(S): at 11:00

## 2017-11-11 RX ADMIN — Medication 100 MILLIGRAM(S): at 21:12

## 2017-11-11 RX ADMIN — OXYCODONE HYDROCHLORIDE 5 MILLIGRAM(S): 5 TABLET ORAL at 22:49

## 2017-11-11 RX ADMIN — ATORVASTATIN CALCIUM 40 MILLIGRAM(S): 80 TABLET, FILM COATED ORAL at 21:12

## 2017-11-11 RX ADMIN — ENOXAPARIN SODIUM 40 MILLIGRAM(S): 100 INJECTION SUBCUTANEOUS at 17:33

## 2017-11-11 RX ADMIN — POLYETHYLENE GLYCOL 3350 17 GRAM(S): 17 POWDER, FOR SOLUTION ORAL at 13:20

## 2017-11-11 NOTE — PROGRESS NOTE ADULT - SUBJECTIVE AND OBJECTIVE BOX
VITAL SIGNS    Telemetry:  rsr 1st 90's   Vital Signs Last 24 Hrs  T(C): 36.7 (11-11-17 @ 11:47), Max: 36.9 (11-11-17 @ 05:00)  T(F): 98.1 (11-11-17 @ 11:47), Max: 98.5 (11-11-17 @ 05:00)  HR: 83 (11-11-17 @ 11:47) (77 - 93)  BP: 126/68 (11-11-17 @ 11:47) (118/57 - 134/66)  RR: 18 (11-11-17 @ 11:47) (18 - 19)  SpO2: 92% (11-11-17 @ 11:47) (92% - 100%)            11-10 @ 07:01  -  11-11 @ 07:00  --------------------------------------------------------  IN: 1020 mL / OUT: 1400 mL / NET: -380 mL    11-11 @ 07:01  -  11-11 @ 13:04  --------------------------------------------------------  IN: 240 mL / OUT: 0 mL / NET: 240 mL       Daily     Daily   Admit Wt: Drug Dosing Weight  Height (cm): 170.18 (02 Nov 2017 20:30)  Weight (kg): 98.7 (02 Nov 2017 20:30)  BMI (kg/m2): 34.1 (02 Nov 2017 20:30)  BSA (m2): 2.1 (02 Nov 2017 20:30)      CAPILLARY BLOOD GLUCOSE      POCT Blood Glucose.: 136 mg/dL (11 Nov 2017 12:00)  POCT Blood Glucose.: 188 mg/dL (10 Nov 2017 21:29)  POCT Blood Glucose.: 132 mg/dL (10 Nov 2017 16:14)          acetaminophen   Tablet. 650 milliGRAM(s) Oral every 6 hours PRN  ALPRAZolam 0.25 milliGRAM(s) Oral every 8 hours PRN  aspirin enteric coated 325 milliGRAM(s) Oral daily  atorvastatin 40 milliGRAM(s) Oral at bedtime  dextrose 5%. 1000 milliLiter(s) IV Continuous <Continuous>  dextrose 50% Injectable 50 milliLiter(s) IV Push every 15 minutes  dextrose 50% Injectable 25 milliLiter(s) IV Push every 15 minutes  docusate sodium 100 milliGRAM(s) Oral three times a day  enoxaparin Injectable 40 milliGRAM(s) SubCutaneous daily  furosemide   Injectable 40 milliGRAM(s) IV Push once  insulin lispro (HumaLOG) corrective regimen sliding scale   SubCutaneous three times a day before meals  insulin lispro (HumaLOG) corrective regimen sliding scale   SubCutaneous at bedtime  metoprolol     tartrate 25 milliGRAM(s) Oral two times a day  ondansetron Injectable 4 milliGRAM(s) IV Push every 4 hours PRN  oxyCODONE    IR 5 milliGRAM(s) Oral every 6 hours PRN  oxyCODONE    IR 10 milliGRAM(s) Oral every 6 hours PRN  pantoprazole    Tablet 40 milliGRAM(s) Oral before breakfast  polyethylene glycol 3350 17 Gram(s) Oral daily  sodium chloride 0.9%. 1000 milliLiter(s) IV Continuous <Continuous>  spironolactone 25 milliGRAM(s) Oral daily      PHYSICAL EXAM    Subjective: "I have alot of pain."   Neurology: alert and oriented x 3, nonfocal, no gross deficits  CV : tele:   rsr 1st 90's   Sternal Wound :  CDI with dressing , Stable  Lungs: bibasilar crackles. RR easy, unlabored;  + mediastinal and left chest tube draining minimal serous- sanguinous drainge   Abdomen: soft, nontender, nondistended, positive bowel sounds, neg bowel movement   Neg N/V/D ; obese abdomen; + flatus  :  limon d/c this am- pt voided clear, yellow urine s/p limon removal  Extremities:   WALKER; trace LE edema, neg calf tenderness.   PPP bilaterally; SVG site cdi with dressing/ ace bandage      PW: +- vvi 40   Chest tubes: + mediastinal and left chest tube draining minimal serous- sanguinous drainge

## 2017-11-11 NOTE — PROGRESS NOTE ADULT - ASSESSMENT
72 year old woman with a history of tobacco use, DM, HTN, poor historian presents for dyspnea and NSTEMI. 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  - appears volume overloaded and would consider lasix today  - cont bb  - Continue with ASA  - Continue with Statin  - Monitor and replete electrolytes. Keep K>4.0 and Mg>2.0.   - Further cardiac workup will depend on clinical course.   - All other workup per primary team. Will followup.

## 2017-11-11 NOTE — PROGRESS NOTE ADULT - SUBJECTIVE AND OBJECTIVE BOX
[facility]    Madison Herrera, Landon, Yusra, Luis, Jonn, Liseth      [phone]    CHIEF COMPLAINT: Patient is a 72y old  Female who presents with a chief complaint of     Follow Up: s/p cabg    Interim history: The patient reports no new symptoms.  Denies shortness of breath.  No abdominal pain.  No new neurologic symptoms. Reports not feeling great but cannot elaborate.      MEDICATIONS  (STANDING):  aspirin enteric coated 325 milliGRAM(s) Oral daily  atorvastatin 40 milliGRAM(s) Oral at bedtime  dextrose 5%. 1000 milliLiter(s) (15 mL/Hr) IV Continuous <Continuous>  dextrose 50% Injectable 50 milliLiter(s) IV Push every 15 minutes  dextrose 50% Injectable 25 milliLiter(s) IV Push every 15 minutes  docusate sodium 100 milliGRAM(s) Oral three times a day  enoxaparin Injectable 40 milliGRAM(s) SubCutaneous daily  furosemide    Tablet 20 milliGRAM(s) Oral daily  insulin lispro (HumaLOG) corrective regimen sliding scale   SubCutaneous three times a day before meals  insulin lispro (HumaLOG) corrective regimen sliding scale   SubCutaneous at bedtime  metoprolol     tartrate 12.5 milliGRAM(s) Oral three times a day  pantoprazole    Tablet 40 milliGRAM(s) Oral before breakfast  polyethylene glycol 3350 17 Gram(s) Oral daily  sodium chloride 0.9%. 1000 milliLiter(s) (10 mL/Hr) IV Continuous <Continuous>  spironolactone 25 milliGRAM(s) Oral daily    MEDICATIONS  (PRN):  acetaminophen   Tablet. 650 milliGRAM(s) Oral every 6 hours PRN Mild Pain (1 - 3)  ondansetron Injectable 4 milliGRAM(s) IV Push every 4 hours PRN Nausea and/or Vomiting  oxyCODONE    IR 5 milliGRAM(s) Oral every 6 hours PRN Moderate Pain (4 - 6)  oxyCODONE    IR 10 milliGRAM(s) Oral every 6 hours PRN Severe Pain (7 - 10)      REVIEW OF SYSTEMS:  eye, ent, GI, , allergic, dermatologic, musculoskeletal and neurologic are negative except as described above    Vital Signs Last 24 Hrs  T(C): 36.9 (11 Nov 2017 05:00), Max: 36.9 (11 Nov 2017 05:00)  T(F): 98.5 (11 Nov 2017 05:00), Max: 98.5 (11 Nov 2017 05:00)  HR: 87 (11 Nov 2017 05:00) (77 - 95)  BP: 127/63 (11 Nov 2017 05:00) (119/67 - 134/67)  BP(mean): 91 (10 Nov 2017 19:08) (88 - 93)  RR: 19 (11 Nov 2017 05:00) (18 - 38)  SpO2: 98% (11 Nov 2017 05:00) (92% - 100%)    I&O's Summary    10 Nov 2017 07:01  -  11 Nov 2017 07:00  --------------------------------------------------------  IN: 1020 mL / OUT: 1400 mL / NET: -380 mL        Telemetry past 24h: sr    PHYSICAL EXAM:    Constitutional: well-nourished, well-developed, NAD   HEENT:  MMM, sclerae anicteric, conjunctivae clear, no oral cyanosis.  Pulmonary: Non-labored, occ wheeze shashank  Cardiovascular: Regular, S1 and S2.  No murmur.  No rubs, gallops or clicks  Gastrointestinal: Bowel Sounds present, soft, nontender.   Lymph: left leg wrapped  Neurological: Alert, no focal deficits  Skin: No rashes.  Psych:  Mood & affect appropriate    LABS: All Labs Reviewed:                        9.4    13.5  )-----------( 355      ( 11 Nov 2017 01:39 )             27.8                         9.2    16.8  )-----------( 350      ( 10 Nov 2017 01:13 )             27.2                         9.0    15.1  )-----------( 324      ( 09 Nov 2017 17:39 )             26.4     11 Nov 2017 01:39    141    |  103    |  25     ----------------------------<  159    4.4     |  25     |  1.06   10 Nov 2017 01:13    136    |  101    |  25     ----------------------------<  176    5.0     |  23     |  1.24   09 Nov 2017 17:39    137    |  101    |  25     ----------------------------<  144    4.8     |  23     |  1.20     Ca    8.9        11 Nov 2017 01:39  Ca    8.9        10 Nov 2017 01:13  Ca    8.9        09 Nov 2017 17:39  Phos  3.8       10 Nov 2017 01:13    TPro  6.7    /  Alb  4.2    /  TBili  0.5    /  DBili  x      /  AST  21     /  ALT  29     /  AlkPhos  67     10 Nov 2017 01:13  TPro  6.9    /  Alb  4.4    /  TBili  0.5    /  DBili  x      /  AST  24     /  ALT  31     /  AlkPhos  69     09 Nov 2017 17:39  TPro  6.3    /  Alb  4.2    /  TBili  0.8    /  DBili  x      /  AST  32     /  ALT  37     /  AlkPhos  68     09 Nov 2017 02:57    PT/INR - ( 10 Nov 2017 01:13 )   PT: 12.8 sec;   INR: 1.18 ratio         PTT - ( 10 Nov 2017 01:13 )  PTT:28.7 sec      Blood Culture:         RADIOLOGY:    EKG:    Echo:  < from: Intra-Operative Transesophageal Echo (11.08.17 @ 17:09) >    Patient name: LUIGI MELARA  YOB: 1945   Age: 72 (F)   MR#: [id number]  Study Date: 11/8/2017  Location: [place] [id number]: Amaury Mata M.D  Study quality: Technically good  Referring Physician: [facility] Joellen BAEZA /  [facility]  Height: 170 cm  Weight: 99 kg  BSA: 2.1 m2  ------------------------------------------------------------------------  PROCEDURE: Intra operative transeophageal echocardiogram  with 2D, M mode and complete  Doppler examination. The  patient was approached in the operative suite after  induction of full anesthesia and transesophageal probe had  been positioned in the esophagus posterior to the heart.  INDICATION: Atherosclerotic heart disease of native  coronary artery without angina pectoris (I25.10),  Nonrheumatic mitral (valve) insufficiency (I34.0)  ------------------------------------------------------------------------  Dimensions:    Normal Values:  LA:     4.6    2.0 - 4.0 cm  Ao:            2.0 - 3.8 cm  SEPTUM:        0.6 - 1.2 cm  PWT:           0.6 - 1.1 cm  LVIDd:         3.0 - 5.6 cm  LVIDs:         1.8 - 4.0 cm  EF (Ogden Rule): 34 %  ------------------------------------------------------------------------  Pre-Bypass Observations:  Mitral Valve: Mitral annular calcification and calcified  mitral leaflets with normal diastolic opening.Tethered  posterior leaflet Moderate mitral regurgitation  by vena  contracta   m(0.6 mm). No significant mitral stenosis.  Aortic Valve/Aorta: Calcified trileaflet aortic valve with  normal opening. None Minimal aortic regurgitation.  Aortic Annulus: 2.3 cm.  Sinotubular Junction: 3 cm.  Sinus of Valsalva Height: 3 cm.  LVOT diameter: 2.3 cm.  Left Atrium: Moderate left atrial enlargement.  Left Ventricle: Severe global left ventricular systolic  dysfunction. Severe global hypokinesia. Moderate left  ventricular enlargement.  Right Heart: Normal right atrium. Small right ventricle.  Miild decreaed RV function Normal tricuspid valve. Mild  tricuspid regurgitation. Normal pulmonic valve.  Pericardium/Pleura: Small pericardial effusion.  Hemodynamic: Estimated right atrial pressure is 8 mm Hg.  ------------------------------------------------------------------------  Post-Bypass Observations:    Significantly improved MR.  Mild to Moderate MR.  LV  function still globally hypokinetic.  Post bypass EF  unchanged.  ------------------------------------------------------------------------  Conclusions:  Confirmed on  11/8/2017 - 17:56:04 by Amaury Mata M.D.  ------------------------------------------------------------------------    < end of copied text >

## 2017-11-12 DIAGNOSIS — F41.9 ANXIETY DISORDER, UNSPECIFIED: ICD-10-CM

## 2017-11-12 PROCEDURE — 99233 SBSQ HOSP IP/OBS HIGH 50: CPT

## 2017-11-12 RX ADMIN — ENOXAPARIN SODIUM 40 MILLIGRAM(S): 100 INJECTION SUBCUTANEOUS at 14:01

## 2017-11-12 RX ADMIN — ATORVASTATIN CALCIUM 40 MILLIGRAM(S): 80 TABLET, FILM COATED ORAL at 21:30

## 2017-11-12 RX ADMIN — OXYCODONE HYDROCHLORIDE 10 MILLIGRAM(S): 5 TABLET ORAL at 10:43

## 2017-11-12 RX ADMIN — SPIRONOLACTONE 25 MILLIGRAM(S): 25 TABLET, FILM COATED ORAL at 05:54

## 2017-11-12 RX ADMIN — POLYETHYLENE GLYCOL 3350 17 GRAM(S): 17 POWDER, FOR SOLUTION ORAL at 14:00

## 2017-11-12 RX ADMIN — Medication 100 MILLIGRAM(S): at 14:01

## 2017-11-12 RX ADMIN — OXYCODONE HYDROCHLORIDE 10 MILLIGRAM(S): 5 TABLET ORAL at 11:13

## 2017-11-12 RX ADMIN — Medication 40 MILLIGRAM(S): at 05:53

## 2017-11-12 RX ADMIN — PANTOPRAZOLE SODIUM 40 MILLIGRAM(S): 20 TABLET, DELAYED RELEASE ORAL at 05:54

## 2017-11-12 RX ADMIN — Medication 25 MILLIGRAM(S): at 17:26

## 2017-11-12 RX ADMIN — Medication 100 MILLIGRAM(S): at 21:30

## 2017-11-12 RX ADMIN — Medication 100 MILLIGRAM(S): at 05:53

## 2017-11-12 RX ADMIN — Medication 325 MILLIGRAM(S): at 14:00

## 2017-11-12 RX ADMIN — Medication 25 MILLIGRAM(S): at 05:53

## 2017-11-12 NOTE — PROGRESS NOTE ADULT - PROBLEM SELECTOR PLAN 4
diabetic diet- bs controlled this am   continue insulin ss  accuchecks ac and hs
diabetic check  continue insulin ss  accuchecks ac and hs
dvt prophylaxis  antacid

## 2017-11-12 NOTE — PROGRESS NOTE ADULT - PROBLEM SELECTOR PROBLEM 4
Type 2 diabetes mellitus with other neurologic complication, without long-term current use of insulin
Prophylactic measure
Type 2 diabetes mellitus with other neurologic complication, without long-term current use of insulin

## 2017-11-12 NOTE — PROGRESS NOTE ADULT - SUBJECTIVE AND OBJECTIVE BOX
[facility]    Madison Herrera, Landon, Yusra, Luis, Jonn, Liseth      [phone]    CHIEF COMPLAINT: Patient is a 72y old  Female who presents with a chief complaint of     Follow Up: cad s/p cabg    Interim history: feels a bit better, does not report sob    MEDICATIONS  (STANDING):  aspirin enteric coated 325 milliGRAM(s) Oral daily  atorvastatin 40 milliGRAM(s) Oral at bedtime  dextrose 50% Injectable 50 milliLiter(s) IV Push every 15 minutes  dextrose 50% Injectable 25 milliLiter(s) IV Push every 15 minutes  docusate sodium 100 milliGRAM(s) Oral three times a day  enoxaparin Injectable 40 milliGRAM(s) SubCutaneous daily  furosemide    Tablet 40 milliGRAM(s) Oral daily  insulin lispro (HumaLOG) corrective regimen sliding scale   SubCutaneous three times a day before meals  insulin lispro (HumaLOG) corrective regimen sliding scale   SubCutaneous at bedtime  metoprolol     tartrate 25 milliGRAM(s) Oral two times a day  pantoprazole    Tablet 40 milliGRAM(s) Oral before breakfast  polyethylene glycol 3350 17 Gram(s) Oral daily  spironolactone 25 milliGRAM(s) Oral daily    MEDICATIONS  (PRN):  acetaminophen   Tablet. 650 milliGRAM(s) Oral every 6 hours PRN Mild Pain (1 - 3)  ALPRAZolam 0.25 milliGRAM(s) Oral every 8 hours PRN anxiety  ondansetron Injectable 4 milliGRAM(s) IV Push every 4 hours PRN Nausea and/or Vomiting  oxyCODONE    IR 5 milliGRAM(s) Oral every 6 hours PRN Moderate Pain (4 - 6)  oxyCODONE    IR 10 milliGRAM(s) Oral every 6 hours PRN Severe Pain (7 - 10)      REVIEW OF SYSTEMS:  eye, ent, GI, , allergic, dermatologic, musculoskeletal and neurologic are negative except as described above    Vital Signs Last 24 Hrs  T(C): 36.4 (12 Nov 2017 05:51), Max: 36.9 (12 Nov 2017 00:01)  T(F): 97.5 (12 Nov 2017 05:51), Max: 98.4 (12 Nov 2017 00:01)  HR: 79 (12 Nov 2017 05:51) (76 - 86)  BP: 122/62 (12 Nov 2017 05:51) (105/59 - 126/68)  BP(mean): --  RR: 18 (12 Nov 2017 05:51) (18 - 19)  SpO2: 100% (12 Nov 2017 05:51) (92% - 100%)    I&O's Summary    11 Nov 2017 07:01  -  12 Nov 2017 07:00  --------------------------------------------------------  IN: 600 mL / OUT: 1100 mL / NET: -500 mL        Telemetry past 24h:    PHYSICAL EXAM:    Constitutional: well-nourished, well-developed, NAD   HEENT:  MMM, sclerae anicteric, conjunctivae clear, no oral cyanosis.  Pulmonary: Non-labored, breath sounds are clear bilaterally, No wheezing, rales or rhonchi  Cardiovascular: Regular, S1 and S2.  No murmur.  No rubs, gallops or clicks  Gastrointestinal: Bowel Sounds present, soft, nontender.   Lymph: No peripheral edema.   Neurological: Alert, no focal deficits  Skin: No rashes.  Psych:  Mood & affect appropriate    LABS: All Labs Reviewed:                        9.4    13.5  )-----------( 355      ( 11 Nov 2017 01:39 )             27.8                         9.2    16.8  )-----------( 350      ( 10 Nov 2017 01:13 )             27.2                         9.0    15.1  )-----------( 324      ( 09 Nov 2017 17:39 )             26.4     11 Nov 2017 01:39    141    |  103    |  25     ----------------------------<  159    4.4     |  25     |  1.06   10 Nov 2017 01:13    136    |  101    |  25     ----------------------------<  176    5.0     |  23     |  1.24   09 Nov 2017 17:39    137    |  101    |  25     ----------------------------<  144    4.8     |  23     |  1.20     Ca    8.9        11 Nov 2017 01:39  Ca    8.9        10 Nov 2017 01:13  Ca    8.9        09 Nov 2017 17:39  Phos  3.8       10 Nov 2017 01:13    TPro  6.7    /  Alb  4.2    /  TBili  0.5    /  DBili  x      /  AST  21     /  ALT  29     /  AlkPhos  67     10 Nov 2017 01:13  TPro  6.9    /  Alb  4.4    /  TBili  0.5    /  DBili  x      /  AST  24     /  ALT  31     /  AlkPhos  69     09 Nov 2017 17:39          Blood Culture:         RADIOLOGY:    EKG:    Echo:

## 2017-11-12 NOTE — PROGRESS NOTE ADULT - PROBLEM SELECTOR PROBLEM 5
Type 2 diabetes mellitus with other neurologic complication, without long-term current use of insulin

## 2017-11-12 NOTE — PROGRESS NOTE ADULT - SUBJECTIVE AND OBJECTIVE BOX
VITAL SIGNS-Tele: SR 70-80    Vital Signs Last 24 Hrs  T(C): 36.8 (17 @ 13:53), Max: 36.9 (17 @ 00:01)  BP: 115/73 (17 @ 13:53) (105/59 - 123/67)  SpO2: 97% (17 @ 13:53) (94% - 100%)          @ 07:01  -   @ 07:00  --------------------------------------------------------  IN: 600 mL / OUT: 1100 mL / NET: -500 mL     @ 07:01  -   @ 13:56  --------------------------------------------------------  IN: 460 mL / OUT: 0 mL / NET: 460 mL    Daily     Daily Weight in k.4 (2017 08:00)         PHYSICAL EXAM:  Neurology: alert and oriented x 3, nonfocal, no gross deficits  CV : S1S2  Sternal Wound :  CDI , Stable  Lungs: CTA  Abdomen: soft, nontender, nondistended, positive bowel sounds, last bowel movement         Extremities:   no edema, no calf tenderness      acetaminophen   Tablet. 650 milliGRAM(s) Oral every 6 hours PRN  ALPRAZolam 0.25 milliGRAM(s) Oral every 8 hours PRN  aspirin enteric coated 325 milliGRAM(s) Oral daily  atorvastatin 40 milliGRAM(s) Oral at bedtime  docusate sodium 100 milliGRAM(s) Oral three times a day  enoxaparin Injectable 40 milliGRAM(s) SubCutaneous daily  furosemide    Tablet 40 milliGRAM(s) Oral daily  metoprolol     tartrate 25 milliGRAM(s) Oral two times a day  ondansetron Injectable 4 milliGRAM(s) IV Push every 4 hours PRN  oxyCODONE    IR 5 milliGRAM(s) Oral every 6 hours PRN  oxyCODONE    IR 10 milliGRAM(s) Oral every 6 hours PRN  pantoprazole    Tablet 40 milliGRAM(s) Oral before breakfast  polyethylene glycol 3350 17 Gram(s) Oral daily  spironolactone 25 milliGRAM(s) Oral daily      Physical Therapy Rec:   Home  [  ]   Home w/ PT  [  ]  Rehab  [  ]  Discussed with Cardiothoracic Team at AM rounds.

## 2017-11-12 NOTE — PROGRESS NOTE ADULT - ASSESSMENT
72 year old woman with a history of tobacco use, DM, HTN, poor historian presents for dyspnea and NSTEMI. Found to have triple vessel disease  now 11/8/17 s/p C3L   ef- 25%   POD #2  postop required pressors and volume expansion and inotropic support.  No PRBC given   hi flow o2- weaned off- now on NC  11/10 milrinone d/c- tx sdu   hypervolemia - diuresis  initiate low dose b-blocker this evening  discharge planning- rehab monday-refused blood draws this am  will ck CBC & Chem 7 in am

## 2017-11-12 NOTE — PROGRESS NOTE ADULT - PROBLEM SELECTOR PLAN 2
diuresis   strict I & O's  daily weights
continue postop care as per sdu  pulm toilet/ pain management/ antacid  increase activity as tolerated  d/c ct/ rij/ limon today  d/c pw and chest tubes   increase diuretics today   increase b-blockers as tolerated today  discharge planning- await pt eval
diuresis   strict I & O's  daily weights
insulin gtt with conversion to lantus/humalog if needed

## 2017-11-12 NOTE — PROGRESS NOTE ADULT - PROBLEM SELECTOR PROBLEM 2
Diabetes
Hypervolemia, unspecified hypervolemia type
S/P CABG x 3
Hypervolemia, unspecified hypervolemia type

## 2017-11-12 NOTE — PROGRESS NOTE ADULT - PROBLEM SELECTOR PLAN 1
CAD s/p CABG, continue ASA and statin for graft prophylaxis.  Wean inotropes/pressors as tolerated for MAP goal 65-80; monitor UOP and lactate    Wean to extubate  Initiate/continue beta-blocker for Afib prophylaxis once no longer requiring pressor/inotropic support   Prophylaxis: DVT-venodynes; GI-Protonix; VAP prophylaxis  Pain management  Glucose control  Resume appropriate home medications.
continue postop care as per sdu  pulm toilet/ pain management/ antacid  increase activity as tolerated  d/c ct/ rij/ ashly in am sat  diuresis  b-blockers  discharge planning- await pt eval
s/p CABG   continue titrating beta blockers to optimize cardiac function and blood pressure control  Continue ASA, statins and diuretic as needed  dvt/GI prophylaxis  Pain control  PT evaluation   monitor chest tube drainage and remove when criteria met
xanax prn  emotional support given
continue postop care as per sdu  pulm toilet/ pain management/ antacid  increase activity as tolerated  d/c ct/ rij/ limon today  d/c pw and chest tubes   increase diuretics today   increase b-blockers as tolerated today  discharge planning- await pt eval

## 2017-11-12 NOTE — PROGRESS NOTE ADULT - PROBLEM SELECTOR PROBLEM 1
Anxiety disorder
CAD (coronary artery disease)
CAD (coronary artery disease)
S/P CABG x 3
S/P CABG x 3

## 2017-11-12 NOTE — PROGRESS NOTE ADULT - ASSESSMENT
72 year old woman with a history of tobacco use, DM, HTN, poor historian presents for dyspnea and NSTEMI. Found to have triple vessel disease, s/p CABG    - s/p cabg without cv complication at present  - Global LV hypokinesis on intra-op LUC, but MR meaningfully improved postop  - Watch volume status closely.  - appeared volume overloaded yesterday, and cxr with shashank effusions.  Got iv lasix x 1 and is now on PO lasix  - cont lasix po for now and follow volume  - cont bb  - Continue with ASA  - Continue with Statin  - Monitor and replete electrolytes. Keep K>4.0 and Mg>2.0.   - Further cardiac workup will depend on clinical course.   - All other workup per primary team. Will follow

## 2017-11-13 ENCOUNTER — TRANSCRIPTION ENCOUNTER (OUTPATIENT)
Age: 72
End: 2017-11-13

## 2017-11-13 VITALS — WEIGHT: 185.63 LBS

## 2017-11-13 LAB
ANION GAP SERPL CALC-SCNC: 16 MMOL/L — SIGNIFICANT CHANGE UP (ref 5–17)
BASOPHILS # BLD AUTO: 0.1 K/UL — SIGNIFICANT CHANGE UP (ref 0–0.2)
BASOPHILS NFR BLD AUTO: 0.5 % — SIGNIFICANT CHANGE UP (ref 0–2)
BUN SERPL-MCNC: 29 MG/DL — HIGH (ref 7–23)
CALCIUM SERPL-MCNC: 8.9 MG/DL — SIGNIFICANT CHANGE UP (ref 8.4–10.5)
CHLORIDE SERPL-SCNC: 96 MMOL/L — SIGNIFICANT CHANGE UP (ref 96–108)
CO2 SERPL-SCNC: 26 MMOL/L — SIGNIFICANT CHANGE UP (ref 22–31)
CREAT SERPL-MCNC: 1.09 MG/DL — SIGNIFICANT CHANGE UP (ref 0.5–1.3)
EOSINOPHIL # BLD AUTO: 0.1 K/UL — SIGNIFICANT CHANGE UP (ref 0–0.5)
EOSINOPHIL NFR BLD AUTO: 0.7 % — SIGNIFICANT CHANGE UP (ref 0–6)
GLUCOSE SERPL-MCNC: 197 MG/DL — HIGH (ref 70–99)
HCT VFR BLD CALC: 31.8 % — LOW (ref 34.5–45)
HGB BLD-MCNC: 10.5 G/DL — LOW (ref 11.5–15.5)
LYMPHOCYTES # BLD AUTO: 1 K/UL — SIGNIFICANT CHANGE UP (ref 1–3.3)
LYMPHOCYTES # BLD AUTO: 8.3 % — LOW (ref 13–44)
MCHC RBC-ENTMCNC: 28.4 PG — SIGNIFICANT CHANGE UP (ref 27–34)
MCHC RBC-ENTMCNC: 32.9 GM/DL — SIGNIFICANT CHANGE UP (ref 32–36)
MCV RBC AUTO: 86.2 FL — SIGNIFICANT CHANGE UP (ref 80–100)
MONOCYTES # BLD AUTO: 1 K/UL — HIGH (ref 0–0.9)
MONOCYTES NFR BLD AUTO: 8.5 % — SIGNIFICANT CHANGE UP (ref 2–14)
NEUTROPHILS # BLD AUTO: 9.4 K/UL — HIGH (ref 1.8–7.4)
NEUTROPHILS NFR BLD AUTO: 82 % — HIGH (ref 43–77)
PLATELET # BLD AUTO: 456 K/UL — HIGH (ref 150–400)
POTASSIUM SERPL-MCNC: 3.9 MMOL/L — SIGNIFICANT CHANGE UP (ref 3.5–5.3)
POTASSIUM SERPL-SCNC: 3.9 MMOL/L — SIGNIFICANT CHANGE UP (ref 3.5–5.3)
RBC # BLD: 3.69 M/UL — LOW (ref 3.8–5.2)
RBC # FLD: 14.1 % — SIGNIFICANT CHANGE UP (ref 10.3–14.5)
SODIUM SERPL-SCNC: 138 MMOL/L — SIGNIFICANT CHANGE UP (ref 135–145)
WBC # BLD: 11.5 K/UL — HIGH (ref 3.8–10.5)
WBC # FLD AUTO: 11.5 K/UL — HIGH (ref 3.8–10.5)

## 2017-11-13 PROCEDURE — 99233 SBSQ HOSP IP/OBS HIGH 50: CPT

## 2017-11-13 RX ORDER — METOPROLOL TARTRATE 50 MG
1 TABLET ORAL
Qty: 0 | Refills: 0 | DISCHARGE
Start: 2017-11-13

## 2017-11-13 RX ORDER — OXYCODONE HYDROCHLORIDE 5 MG/1
1 TABLET ORAL
Qty: 0 | Refills: 0 | DISCHARGE
Start: 2017-11-13

## 2017-11-13 RX ORDER — SPIRONOLACTONE 25 MG/1
1 TABLET, FILM COATED ORAL
Qty: 0 | Refills: 0 | DISCHARGE
Start: 2017-11-13

## 2017-11-13 RX ORDER — ATORVASTATIN CALCIUM 80 MG/1
1 TABLET, FILM COATED ORAL
Qty: 0 | Refills: 0 | DISCHARGE
Start: 2017-11-13

## 2017-11-13 RX ORDER — DOCUSATE SODIUM 100 MG
1 CAPSULE ORAL
Qty: 0 | Refills: 0 | DISCHARGE
Start: 2017-11-13

## 2017-11-13 RX ORDER — POLYETHYLENE GLYCOL 3350 17 G/17G
17 POWDER, FOR SOLUTION ORAL
Qty: 0 | Refills: 0 | DISCHARGE
Start: 2017-11-13

## 2017-11-13 RX ORDER — FUROSEMIDE 40 MG
1 TABLET ORAL
Qty: 0 | Refills: 0 | DISCHARGE
Start: 2017-11-13

## 2017-11-13 RX ORDER — ACETAMINOPHEN 500 MG
2 TABLET ORAL
Qty: 0 | Refills: 0 | DISCHARGE
Start: 2017-11-13

## 2017-11-13 RX ORDER — ALPRAZOLAM 0.25 MG
1 TABLET ORAL
Qty: 0 | Refills: 0 | DISCHARGE
Start: 2017-11-13

## 2017-11-13 RX ORDER — PANTOPRAZOLE SODIUM 20 MG/1
1 TABLET, DELAYED RELEASE ORAL
Qty: 0 | Refills: 0 | DISCHARGE
Start: 2017-11-13

## 2017-11-13 RX ORDER — ASPIRIN/CALCIUM CARB/MAGNESIUM 324 MG
1 TABLET ORAL
Qty: 0 | Refills: 0 | DISCHARGE
Start: 2017-11-13

## 2017-11-13 RX ORDER — OMEGA-3 ACID ETHYL ESTERS 1 G
2 CAPSULE ORAL
Qty: 0 | Refills: 0 | COMMUNITY

## 2017-11-13 RX ADMIN — Medication 25 MILLIGRAM(S): at 05:17

## 2017-11-13 RX ADMIN — Medication 100 MILLIGRAM(S): at 05:16

## 2017-11-13 RX ADMIN — SPIRONOLACTONE 25 MILLIGRAM(S): 25 TABLET, FILM COATED ORAL at 05:17

## 2017-11-13 RX ADMIN — Medication 0.25 MILLIGRAM(S): at 08:41

## 2017-11-13 RX ADMIN — ENOXAPARIN SODIUM 40 MILLIGRAM(S): 100 INJECTION SUBCUTANEOUS at 11:43

## 2017-11-13 RX ADMIN — PANTOPRAZOLE SODIUM 40 MILLIGRAM(S): 20 TABLET, DELAYED RELEASE ORAL at 08:41

## 2017-11-13 RX ADMIN — Medication 40 MILLIGRAM(S): at 05:16

## 2017-11-13 RX ADMIN — OXYCODONE HYDROCHLORIDE 5 MILLIGRAM(S): 5 TABLET ORAL at 05:16

## 2017-11-13 RX ADMIN — Medication 325 MILLIGRAM(S): at 11:43

## 2017-11-13 RX ADMIN — OXYCODONE HYDROCHLORIDE 5 MILLIGRAM(S): 5 TABLET ORAL at 12:12

## 2017-11-13 RX ADMIN — OXYCODONE HYDROCHLORIDE 5 MILLIGRAM(S): 5 TABLET ORAL at 11:42

## 2017-11-13 NOTE — DISCHARGE NOTE ADULT - PATIENT PORTAL LINK FT
“You can access the FollowHealth Patient Portal, offered by [facility], by registering with the following website: [email or web address]”

## 2017-11-13 NOTE — PROGRESS NOTE ADULT - PROVIDER SPECIALTY LIST ADULT
CCU
CT Surgery
CTU
Cardiology
Critical Care
Psychiatry
CCU
Cardiology
CT Surgery

## 2017-11-13 NOTE — DISCHARGE NOTE ADULT - PLAN OF CARE
full recovery follow up appt with Dr. Perfecto Mejia the week you arrive home from rehab  follow up appt with your cardiologist and/or PCP the week you arrive home from rehab  physical therapy as per rehab  low cholesterol diet

## 2017-11-13 NOTE — DISCHARGE NOTE ADULT - OTHER SIGNIFICANT FINDINGS
VSS  Cor: S1S2  Lungs CTA  MT inc CDI - stable sternum  abd: soft NT/ND + bs + bm  ext no edema no calf tenderness

## 2017-11-13 NOTE — DISCHARGE NOTE ADULT - CARE PROVIDERS DIRECT ADDRESSES
[email or web address]

## 2017-11-13 NOTE — DISCHARGE NOTE ADULT - MEDICATION SUMMARY - MEDICATIONS TO STOP TAKING
I will STOP taking the medications listed below when I get home from the hospital:    Fish Oil 1000 mg oral capsule  -- 2 cap(s) by mouth 2 times a day    glyburide-metformin 5 mg-500 mg oral tablet  -- 2  by mouth 2 times a day

## 2017-11-13 NOTE — DISCHARGE NOTE ADULT - CARE PLAN
Principal Discharge DX:	S/P CABG x 3  Goal:	full recovery  Instructions for follow-up, activity and diet:	follow up appt with Dr. Perfecto Mejia the week you arrive home from rehab  follow up appt with your cardiologist and/or PCP the week you arrive home from rehab  physical therapy as per rehab  low cholesterol diet

## 2017-11-13 NOTE — DISCHARGE NOTE ADULT - ADDITIONAL INSTRUCTIONS
shower daily - wash your incisions with mild soap and water  daily weight - report weight gain > 2.5 pounds overnight to your MD/PA/NP in rehab  follow up appt with Dr Perfecto Mejia, cardiac surgeon the week you arrive home from rehab  follow up appt with your cardiologist and/or PCP when you arrive home from rehab

## 2017-11-13 NOTE — PROGRESS NOTE ADULT - SUBJECTIVE AND OBJECTIVE BOX
[facility] - Madison Herrera, Landon, Yusra, Luis, Liseth [place]  Office Number:  [phone]    Patient resting comfortably in bed in NAD.  Laying flat with no respiratory distress.  No complaints of chest pain, dyspnea, palpitations, PND, or orthopnea.  She feels weak and is urinating a lot.    Telemetry:  Sinus rhythm with no arrhythmia.    MEDICATIONS  (STANDING):  aspirin enteric coated 325 milliGRAM(s) Oral daily  atorvastatin 40 milliGRAM(s) Oral at bedtime  docusate sodium 100 milliGRAM(s) Oral three times a day  enoxaparin Injectable 40 milliGRAM(s) SubCutaneous daily  furosemide    Tablet 40 milliGRAM(s) Oral daily  metoprolol     tartrate 25 milliGRAM(s) Oral two times a day  pantoprazole    Tablet 40 milliGRAM(s) Oral before breakfast  polyethylene glycol 3350 17 Gram(s) Oral daily  spironolactone 25 milliGRAM(s) Oral daily    MEDICATIONS  (PRN):  acetaminophen   Tablet. 650 milliGRAM(s) Oral every 6 hours PRN Mild Pain (1 - 3)  ALPRAZolam 0.25 milliGRAM(s) Oral every 8 hours PRN anxiety  ondansetron Injectable 4 milliGRAM(s) IV Push every 4 hours PRN Nausea and/or Vomiting  oxyCODONE    IR 5 milliGRAM(s) Oral every 6 hours PRN Moderate Pain (4 - 6)  oxyCODONE    IR 10 milliGRAM(s) Oral every 6 hours PRN Severe Pain (7 - 10)      Allergies    No Known Drug Allergies  Peroxide (Blisters)          Vital Signs Last 24 Hrs  T(C): 36.4 (13 Nov 2017 05:20), Max: 36.8 (12 Nov 2017 13:53)  T(F): 97.6 (13 Nov 2017 05:20), Max: 98.3 (12 Nov 2017 13:53)  HR: 86 (13 Nov 2017 05:20) (86 - 88)  BP: 132/80 (13 Nov 2017 05:20) (115/73 - 132/80)  BP(mean): --  RR: 18 (13 Nov 2017 05:20) (18 - 18)  SpO2: 94% (13 Nov 2017 05:20) (91% - 97%)    I&O's Summary    12 Nov 2017 07:01  -  13 Nov 2017 07:00  --------------------------------------------------------  IN: 820 mL / OUT: 0 mL / NET: 820 mL        ON EXAM:    General: NAD, awake and alert, oriented x 3  HEENT: Mucous membranes are moist, anicteric  Lungs: Non-labored, breath sounds are clear bilaterally, No wheezing, rales or rhonchi  Cardiovascular: Regular, S1 and S2, no murmurs, rubs, or gallops  Gastrointestinal: Bowel Sounds present, soft, nontender.   Lymph: No peripheral edema. No lymphadenopathy.  Skin: No rashes or ulcers  Psych:  Mood & affect appropriate    LABS: All Labs Reviewed:                        9.4    13.5  )-----------( 355      ( 11 Nov 2017 01:39 )             27.8     11 Nov 2017 01:39    141    |  103    |  25     ----------------------------<  159    4.4     |  25     |  1.06     Ca    8.9        11 Nov 2017 01:39

## 2017-11-13 NOTE — DISCHARGE NOTE ADULT - CARE PROVIDER_API CALL
Perfecto Mejia), Surgery; Thoracic and Cardiac Surgery  [address] [place] [address]  Phone: [phone]  Fax: [phone] Perfecto Mejia), Surgery; Thoracic and Cardiac Surgery  [address] [place] [address]  Phone: [phone]  Fax: [phone]    Luther Meng), Cardiovascular Disease  [address] [place] [address]  Phone: [phone]  Fax: [phone]

## 2017-11-13 NOTE — PROGRESS NOTE ADULT - ASSESSMENT
72 year old woman with a history of tobacco use, DM, HTN, poor historian presents for dyspnea and NSTEMI. Found to have triple vessel disease, s/p CABG    - s/p cabg without cv complication at present  - Global LV hypokinesis on intra-op LUC, but MR meaningfully improved postop  - Watch volume status closely.  Tolerating PO Lasix.  Tolerating Aldactone 25 QD  - Continue metoprolol 25 BID  - Continue with  QD  - Continue with atorvastatin 40 QD  - Monitor and replete electrolytes. Keep K>4.0 and Mg>2.0.   - Further cardiac workup will depend on clinical course.   - All other workup per primary team. Will follow  - D/c planning per primary team

## 2017-11-29 NOTE — ED PROVIDER NOTE - ATTESTATION, MLM
none I have reviewed and confirmed nurses' notes for patient's medications, allergies, medical history, and surgical history.

## 2017-12-19 PROCEDURE — 86900 BLOOD TYPING SEROLOGIC ABO: CPT

## 2017-12-19 PROCEDURE — C1769: CPT

## 2017-12-19 PROCEDURE — 36430 TRANSFUSION BLD/BLD COMPNT: CPT

## 2017-12-19 PROCEDURE — C1887: CPT

## 2017-12-19 PROCEDURE — 84295 ASSAY OF SERUM SODIUM: CPT

## 2017-12-19 PROCEDURE — 82803 BLOOD GASES ANY COMBINATION: CPT

## 2017-12-19 PROCEDURE — 83036 HEMOGLOBIN GLYCOSYLATED A1C: CPT

## 2017-12-19 PROCEDURE — 84443 ASSAY THYROID STIM HORMONE: CPT

## 2017-12-19 PROCEDURE — 87640 STAPH A DNA AMP PROBE: CPT

## 2017-12-19 PROCEDURE — 31720 CLEARANCE OF AIRWAYS: CPT

## 2017-12-19 PROCEDURE — 84484 ASSAY OF TROPONIN QUANT: CPT

## 2017-12-19 PROCEDURE — 87641 MR-STAPH DNA AMP PROBE: CPT

## 2017-12-19 PROCEDURE — 80061 LIPID PANEL: CPT

## 2017-12-19 PROCEDURE — 86850 RBC ANTIBODY SCREEN: CPT

## 2017-12-19 PROCEDURE — 82553 CREATINE MB FRACTION: CPT

## 2017-12-19 PROCEDURE — 85730 THROMBOPLASTIN TIME PARTIAL: CPT

## 2017-12-19 PROCEDURE — 97162 PT EVAL MOD COMPLEX 30 MIN: CPT

## 2017-12-19 PROCEDURE — P9016: CPT

## 2017-12-19 PROCEDURE — 82550 ASSAY OF CK (CPK): CPT

## 2017-12-19 PROCEDURE — 83605 ASSAY OF LACTIC ACID: CPT

## 2017-12-19 PROCEDURE — 93458 L HRT ARTERY/VENTRICLE ANGIO: CPT

## 2017-12-19 PROCEDURE — P9045: CPT

## 2017-12-19 PROCEDURE — C1751: CPT

## 2017-12-19 PROCEDURE — P9047: CPT

## 2017-12-19 PROCEDURE — 84100 ASSAY OF PHOSPHORUS: CPT

## 2017-12-19 PROCEDURE — 71045 X-RAY EXAM CHEST 1 VIEW: CPT

## 2017-12-19 PROCEDURE — 94640 AIRWAY INHALATION TREATMENT: CPT

## 2017-12-19 PROCEDURE — 80048 BASIC METABOLIC PNL TOTAL CA: CPT

## 2017-12-19 PROCEDURE — 85610 PROTHROMBIN TIME: CPT

## 2017-12-19 PROCEDURE — 85014 HEMATOCRIT: CPT

## 2017-12-19 PROCEDURE — C1894: CPT

## 2017-12-19 PROCEDURE — 82947 ASSAY GLUCOSE BLOOD QUANT: CPT

## 2017-12-19 PROCEDURE — 86891 AUTOLOGOUS BLOOD OP SALVAGE: CPT

## 2017-12-19 PROCEDURE — 94010 BREATHING CAPACITY TEST: CPT

## 2017-12-19 PROCEDURE — 86923 COMPATIBILITY TEST ELECTRIC: CPT

## 2017-12-19 PROCEDURE — P9041: CPT

## 2017-12-19 PROCEDURE — 80053 COMPREHEN METABOLIC PANEL: CPT

## 2017-12-19 PROCEDURE — 82435 ASSAY OF BLOOD CHLORIDE: CPT

## 2017-12-19 PROCEDURE — 82565 ASSAY OF CREATININE: CPT

## 2017-12-19 PROCEDURE — 82330 ASSAY OF CALCIUM: CPT

## 2017-12-19 PROCEDURE — 86901 BLOOD TYPING SEROLOGIC RH(D): CPT

## 2017-12-19 PROCEDURE — 82962 GLUCOSE BLOOD TEST: CPT

## 2017-12-19 PROCEDURE — 93005 ELECTROCARDIOGRAM TRACING: CPT

## 2017-12-19 PROCEDURE — C1889: CPT

## 2017-12-19 PROCEDURE — 99152 MOD SED SAME PHYS/QHP 5/>YRS: CPT

## 2017-12-19 PROCEDURE — 93880 EXTRACRANIAL BILAT STUDY: CPT

## 2017-12-19 PROCEDURE — 85027 COMPLETE CBC AUTOMATED: CPT

## 2017-12-19 PROCEDURE — 85384 FIBRINOGEN ACTIVITY: CPT

## 2017-12-19 PROCEDURE — 81001 URINALYSIS AUTO W/SCOPE: CPT

## 2017-12-19 PROCEDURE — 83735 ASSAY OF MAGNESIUM: CPT

## 2017-12-19 PROCEDURE — C8929: CPT

## 2017-12-19 PROCEDURE — 84132 ASSAY OF SERUM POTASSIUM: CPT

## 2017-12-19 PROCEDURE — 83880 ASSAY OF NATRIURETIC PEPTIDE: CPT

## 2017-12-19 PROCEDURE — 85576 BLOOD PLATELET AGGREGATION: CPT

## 2017-12-19 PROCEDURE — 94002 VENT MGMT INPAT INIT DAY: CPT

## 2018-01-04 ENCOUNTER — APPOINTMENT (OUTPATIENT)
Dept: CARDIOLOGY | Facility: CLINIC | Age: 73
End: 2018-01-04

## 2018-01-12 ENCOUNTER — APPOINTMENT (OUTPATIENT)
Dept: CARDIOLOGY | Facility: CLINIC | Age: 73
End: 2018-01-12
Payer: MEDICARE

## 2018-01-12 ENCOUNTER — NON-APPOINTMENT (OUTPATIENT)
Age: 73
End: 2018-01-12

## 2018-01-12 ENCOUNTER — CHART COPY (OUTPATIENT)
Age: 73
End: 2018-01-12

## 2018-01-12 VITALS
BODY MASS INDEX: 28.09 KG/M2 | WEIGHT: 179 LBS | DIASTOLIC BLOOD PRESSURE: 73 MMHG | HEIGHT: 67 IN | OXYGEN SATURATION: 98 % | HEART RATE: 83 BPM | SYSTOLIC BLOOD PRESSURE: 119 MMHG

## 2018-01-12 DIAGNOSIS — Z78.9 OTHER SPECIFIED HEALTH STATUS: ICD-10-CM

## 2018-01-12 PROCEDURE — 93000 ELECTROCARDIOGRAM COMPLETE: CPT

## 2018-01-12 PROCEDURE — 99214 OFFICE O/P EST MOD 30 MIN: CPT

## 2018-01-12 RX ORDER — SACUBITRIL AND VALSARTAN 24; 26 MG/1; MG/1
24-26 TABLET, FILM COATED ORAL
Qty: 60 | Refills: 5 | Status: DISCONTINUED | COMMUNITY
Start: 2018-01-12 | End: 2018-01-12

## 2018-02-20 ENCOUNTER — NON-APPOINTMENT (OUTPATIENT)
Age: 73
End: 2018-02-20

## 2018-02-20 ENCOUNTER — APPOINTMENT (OUTPATIENT)
Dept: CARDIOLOGY | Facility: CLINIC | Age: 73
End: 2018-02-20
Payer: MEDICARE

## 2018-02-20 VITALS
DIASTOLIC BLOOD PRESSURE: 64 MMHG | BODY MASS INDEX: 28.09 KG/M2 | HEART RATE: 80 BPM | SYSTOLIC BLOOD PRESSURE: 92 MMHG | OXYGEN SATURATION: 96 % | HEIGHT: 67 IN | WEIGHT: 179 LBS

## 2018-02-20 DIAGNOSIS — I51.9 HEART DISEASE, UNSPECIFIED: ICD-10-CM

## 2018-02-20 PROCEDURE — 93000 ELECTROCARDIOGRAM COMPLETE: CPT

## 2018-02-20 PROCEDURE — 99214 OFFICE O/P EST MOD 30 MIN: CPT

## 2018-03-26 RX ORDER — LISINOPRIL 5 MG/1
5 TABLET ORAL
Qty: 90 | Refills: 3 | Status: DISCONTINUED | COMMUNITY
Start: 2018-01-12 | End: 2018-03-26

## 2018-03-29 ENCOUNTER — APPOINTMENT (OUTPATIENT)
Dept: CARDIOLOGY | Facility: CLINIC | Age: 73
End: 2018-03-29
Payer: MEDICARE

## 2018-03-29 PROCEDURE — 93306 TTE W/DOPPLER COMPLETE: CPT

## 2018-06-07 ENCOUNTER — APPOINTMENT (OUTPATIENT)
Dept: CARDIOLOGY | Facility: CLINIC | Age: 73
End: 2018-06-07
Payer: MEDICARE

## 2018-06-07 ENCOUNTER — NON-APPOINTMENT (OUTPATIENT)
Age: 73
End: 2018-06-07

## 2018-06-07 VITALS
WEIGHT: 176 LBS | HEART RATE: 78 BPM | BODY MASS INDEX: 27.62 KG/M2 | SYSTOLIC BLOOD PRESSURE: 109 MMHG | DIASTOLIC BLOOD PRESSURE: 70 MMHG | OXYGEN SATURATION: 95 % | HEIGHT: 67 IN

## 2018-06-07 PROCEDURE — 99214 OFFICE O/P EST MOD 30 MIN: CPT

## 2018-06-07 PROCEDURE — 93000 ELECTROCARDIOGRAM COMPLETE: CPT

## 2018-06-07 RX ORDER — SPIRONOLACTONE 25 MG/1
25 TABLET ORAL
Qty: 30 | Refills: 0 | Status: DISCONTINUED | COMMUNITY
Start: 2017-11-27 | End: 2018-06-07

## 2018-07-19 NOTE — PATIENT PROFILE ADULT. - SURGICAL SITE INCISION
Alert and oriented to person, place, time/situation. normal mood and affect. no apparent risk to self or others. yes

## 2018-11-22 NOTE — DIETITIAN INITIAL EVALUATION ADULT. - NUTRITION DIAGNOSTIC TERMINOLOGY #1
Reason For Visit  ERAN HEARN is here today for a nurse visit for immunizations flu shot.      Current Meds   1. Pantoprazole Sodium 40 MG Oral Tablet Delayed Release; take one tablet by mouth   every day;   Therapy: [id number] to (Evaluate:[id number])  Requested for: [id number]; Last   Rx:[id number] Ordered    Allergies  No Known Drug Allergies    Assessment   1. Need for influenza vaccination (V04.81) (Z23)    Plan   1. Fluzone Quadrivalent 0.5 ML Intramuscular Suspension    Signatures   Electronically signed by : JENNIFER Arora; Sep 23 2017  9:44AM CST     Knowledge and Beliefs

## 2019-02-12 ENCOUNTER — RECORD ABSTRACTING (OUTPATIENT)
Age: 74
End: 2019-02-12

## 2019-02-12 DIAGNOSIS — Z82.3 FAMILY HISTORY OF STROKE: ICD-10-CM

## 2019-02-12 DIAGNOSIS — Z80.0 FAMILY HISTORY OF MALIGNANT NEOPLASM OF DIGESTIVE ORGANS: ICD-10-CM

## 2019-02-12 DIAGNOSIS — Z83.3 FAMILY HISTORY OF DIABETES MELLITUS: ICD-10-CM

## 2019-02-12 DIAGNOSIS — G90.09 OTHER IDIOPATHIC PERIPHERAL AUTONOMIC NEUROPATHY: ICD-10-CM

## 2019-02-12 DIAGNOSIS — Z87.891 PERSONAL HISTORY OF NICOTINE DEPENDENCE: ICD-10-CM

## 2019-02-12 PROBLEM — E11.9 TYPE 2 DIABETES MELLITUS WITHOUT COMPLICATIONS: Chronic | Status: ACTIVE | Noted: 2017-11-02

## 2019-02-14 ENCOUNTER — APPOINTMENT (OUTPATIENT)
Dept: INTERNAL MEDICINE | Facility: CLINIC | Age: 74
End: 2019-02-14
Payer: MEDICARE

## 2019-02-14 VITALS
HEART RATE: 94 BPM | DIASTOLIC BLOOD PRESSURE: 70 MMHG | WEIGHT: 180.25 LBS | TEMPERATURE: 98.1 F | SYSTOLIC BLOOD PRESSURE: 118 MMHG | RESPIRATION RATE: 16 BRPM | OXYGEN SATURATION: 97 % | BODY MASS INDEX: 28.29 KG/M2 | HEIGHT: 67 IN

## 2019-02-14 DIAGNOSIS — Z72.0 TOBACCO USE: ICD-10-CM

## 2019-02-14 DIAGNOSIS — F17.210 NICOTINE DEPENDENCE, CIGARETTES, UNCOMPLICATED: ICD-10-CM

## 2019-02-14 DIAGNOSIS — Z80.3 FAMILY HISTORY OF MALIGNANT NEOPLASM OF BREAST: ICD-10-CM

## 2019-02-14 DIAGNOSIS — Z87.891 PERSONAL HISTORY OF NICOTINE DEPENDENCE: ICD-10-CM

## 2019-02-14 PROCEDURE — 99214 OFFICE O/P EST MOD 30 MIN: CPT

## 2019-02-14 NOTE — HISTORY OF PRESENT ILLNESS
[Family Member] : family member [FreeTextEntry1] : A 73 year old female who present to the office for a check up and refill on her medication.   [de-identified] : A 73 year old female with a past medical history as noted below presents to the office for a check up and renewal of medication.  \par Patient states after last visit when she was notified to go to the ER for blood glucose over 900 that she never went.  States since that time she has been better with her diet and more compliant with taking her medication.   Patient states that she no longer has blurry vision.  Patient denies monitoring her blood glucose at home.  \par Patient denies having any chest pain, SOB, RANGEL, nausea or vomiting.  Denies side effects to her medication.   Nesha also states that she still continues not to smoke.

## 2019-02-14 NOTE — PLAN
[FreeTextEntry1] : LUIGI was provided education about general treatment options. LUIGI was advised long term complication of diabetes and its comorbidities.  Patient was advised to routine follow up appointment with podiatry and opthalmology. Advised to return to the office for fasting labs and should have repeat hgb a1c every 3 months and microalbumin every 6 months.  LUIGI MELARA was advised to call me if any concerns about their diabetes.  Also advised to call me once she gets home to clarify what medication she is taking and needs refills on.  Advised renal, Coronary, retinal and neuro complication when her blood glucose remain elevated. \par \par Cardio- CAD with uncontrolled Diabetes - Advised risk of progression of Small vessel disease when blood sugars are not controlled.  Advised pt to call her cardiologist to have a follow up appointment.   Also advised to return to the office in the am fasting to check her lipids.  Refilled Atorvastatin today.  Continue with ASA \par Cardiac counseling was provided to LUIGI PERLARADHAPAVELBERENICELILIANA. We discussed the importance of diet and exercise.  We discussed low sodium, low carbs, and  low fat diets.   LUIGI was counseled about the importance of medication compliance. Risk and benefits was discussed to the patient about their diagnosis and treatment. \par \par Advised pt I can not fill her medication until we clarify her current medications and dosages.  Advised to either bring the medication with her tomorrow or to call me to review her dosages.

## 2019-02-14 NOTE — PAST MEDICAL HISTORY
[Postmenopausal] : postmenopausal [Total Preg ___] : G[unfilled] [Live Births ___] : P[unfilled]  [Full Term ___] : Full Term: [unfilled] [Abortions ___] : Abortions:[unfilled]

## 2019-02-14 NOTE — HEALTH RISK ASSESSMENT
[0] : 2) Feeling down, depressed, or hopeless: Not at all (0) [] : No [de-identified] : quit 11/2018 [[id number]] : 0

## 2019-02-14 NOTE — PHYSICAL EXAM
[No Acute Distress] : no acute distress [Well Nourished] : well nourished [Well Developed] : well developed [Well-Appearing] : well-appearing [Normal Sclera/Conjunctiva] : normal sclera/conjunctiva [PERRL] : pupils equal round and reactive to light [EOMI] : extraocular movements intact [Normal Outer Ear/Nose] : the outer ears and nose were normal in appearance [Normal Oropharynx] : the oropharynx was normal [No JVD] : no jugular venous distention [Supple] : supple [No Lymphadenopathy] : no lymphadenopathy [Thyroid Normal, No Nodules] : the thyroid was normal and there were no nodules present [No Respiratory Distress] : no respiratory distress  [Clear to Auscultation] : lungs were clear to auscultation bilaterally [No Accessory Muscle Use] : no accessory muscle use [Normal Rate] : normal rate  [Regular Rhythm] : with a regular rhythm [No Carotid Bruits] : no carotid bruits [No Abdominal Bruit] : a ~M bruit was not heard ~T in the abdomen [No Varicosities] : no varicosities [Pedal Pulses Present] : the pedal pulses are present [No Edema] : there was no peripheral edema [No Extremity Clubbing/Cyanosis] : no extremity clubbing/cyanosis [No Palpable Aorta] : no palpable aorta [Soft] : abdomen soft [Non Tender] : non-tender [Non-distended] : non-distended [No Masses] : no abdominal mass palpated [No HSM] : no HSM [Normal Bowel Sounds] : normal bowel sounds [Normal Posterior Cervical Nodes] : no posterior cervical lymphadenopathy [Normal Anterior Cervical Nodes] : no anterior cervical lymphadenopathy [No CVA Tenderness] : no CVA  tenderness [No Spinal Tenderness] : no spinal tenderness [No Joint Swelling] : no joint swelling [Grossly Normal Strength/Tone] : grossly normal strength/tone [Normal Gait] : normal gait [Coordination Grossly Intact] : coordination grossly intact [No Focal Deficits] : no focal deficits [Deep Tendon Reflexes (DTR)] : deep tendon reflexes were 2+ and symmetric [Normal Affect] : the affect was normal [Normal Insight/Judgement] : insight and judgment were intact [Thin Hair] : thin hair [Seborrhea] : seborrhea [Lesions On Face] : on the face [Chest] : not on the chest [Back] : on the back [Alert and Oriented x3] : oriented to person, place, and time [de-identified] : S1-S2 reg with audible heart murmur 2-3/6

## 2019-02-15 ENCOUNTER — APPOINTMENT (OUTPATIENT)
Dept: INTERNAL MEDICINE | Facility: CLINIC | Age: 74
End: 2019-02-15
Payer: MEDICARE

## 2019-02-15 VITALS
HEIGHT: 67 IN | DIASTOLIC BLOOD PRESSURE: 82 MMHG | TEMPERATURE: 98.8 F | OXYGEN SATURATION: 97 % | SYSTOLIC BLOOD PRESSURE: 126 MMHG | WEIGHT: 180 LBS | BODY MASS INDEX: 28.25 KG/M2 | HEART RATE: 85 BPM | RESPIRATION RATE: 18 BRPM

## 2019-02-15 DIAGNOSIS — K59.00 CONSTIPATION, UNSPECIFIED: ICD-10-CM

## 2019-02-15 DIAGNOSIS — E55.9 VITAMIN D DEFICIENCY, UNSPECIFIED: ICD-10-CM

## 2019-02-15 PROCEDURE — 36415 COLL VENOUS BLD VENIPUNCTURE: CPT

## 2019-02-15 PROCEDURE — 99212 OFFICE O/P EST SF 10 MIN: CPT | Mod: 25

## 2019-02-15 RX ORDER — BLOOD-GLUCOSE METER
EACH MISCELLANEOUS
Qty: 1 | Refills: 0 | Status: ACTIVE | COMMUNITY
Start: 2019-02-15 | End: 1900-01-01

## 2019-02-15 RX ORDER — DOCUSATE SODIUM AND SENNOSIDES 50; 8.6 MG/1; MG/1
8.6-5 TABLET ORAL
Qty: 30 | Refills: 0 | Status: ACTIVE | COMMUNITY
Start: 2019-02-15 | End: 1900-01-01

## 2019-02-15 RX ORDER — BLOOD SUGAR DIAGNOSTIC
STRIP MISCELLANEOUS DAILY
Qty: 90 | Refills: 0 | Status: ACTIVE | COMMUNITY
Start: 2019-02-15 | End: 1900-01-01

## 2019-02-15 RX ORDER — GLIPIZIDE AND METFORMIN HYDROCHLORIDE 5; 500 MG/1; MG/1
5-500 TABLET, FILM COATED ORAL
Qty: 120 | Refills: 0 | Status: DISCONTINUED | COMMUNITY
Start: 2017-10-13 | End: 2019-02-15

## 2019-02-15 RX ORDER — LANCETS
EACH MISCELLANEOUS
Qty: 90 | Refills: 0 | Status: ACTIVE | COMMUNITY
Start: 2019-02-15 | End: 1900-01-01

## 2019-02-15 NOTE — HISTORY OF PRESENT ILLNESS
[FreeTextEntry1] : A 73-year-old female  who presents to the office for fasting blood work. [de-identified] : Patient is a 73-year-old female with a significant past medical history of coronary artery disease, noncompliant diabetic, hypertension, constipation, hyperlipidemia, GERD and lethargic all diastolic dysfunction, who presents to the office for fasting blood work. Patient states since her visit yesterday there is no acute changes in her health. Patient did bring in her medication today for to review. \par Again patient denies any chest pain, shortness of breath, dyspnea on exertion, nausea, vomiting. Patient also continues to state that she does not check her blood sugars at home.

## 2019-02-15 NOTE — PLAN
[FreeTextEntry1] : Endocrinology- diabetes-refilled Farxiga and metformin. Advised patient to monitor her blood sugars daily. Did give a prescription for an Accu-Chek machine. Check hemoglobin A1c, fructosamine and blood glucose today.\par advised to see endo.\par Pt refused to leave a urine so can not check for microalbumin  \par Cardiology- hypertension- refilled metoprolol and Lasix. We'll check comprehensive metabolic.\par Advised to see cardio. \par \par GI- constipation- increased fluid and fiber in diet. Patient taking over-the-counter senna.\par \par Patient would was educated on compliance with taking medication.

## 2019-02-18 ENCOUNTER — RX RENEWAL (OUTPATIENT)
Age: 74
End: 2019-02-18

## 2019-02-18 ENCOUNTER — RESULT CHARGE (OUTPATIENT)
Age: 74
End: 2019-02-18

## 2019-02-18 LAB
25(OH)D3 SERPL-MCNC: 9.1 NG/ML
ALBUMIN SERPL ELPH-MCNC: 3.8 G/DL
ALP BLD-CCNC: 211 U/L
ALT SERPL-CCNC: 71 U/L
ANION GAP SERPL CALC-SCNC: 11 MMOL/L
AST SERPL-CCNC: 56 U/L
BASOPHILS # BLD AUTO: 0.02 K/UL
BASOPHILS NFR BLD AUTO: 0.3 %
BILIRUB SERPL-MCNC: 0.4 MG/DL
BUN SERPL-MCNC: 15 MG/DL
CALCIUM SERPL-MCNC: 8.9 MG/DL
CHLORIDE SERPL-SCNC: 98 MMOL/L
CHOLEST SERPL-MCNC: 178 MG/DL
CHOLEST/HDLC SERPL: 2.9 RATIO
CO2 SERPL-SCNC: 28 MMOL/L
CREAT SERPL-MCNC: 0.98 MG/DL
EOSINOPHIL # BLD AUTO: 0.16 K/UL
EOSINOPHIL NFR BLD AUTO: 2.1 %
FRUCTOSAMINE SERPL-MCNC: 379 UMOL/L
GLUCOSE SERPL-MCNC: 323 MG/DL
HBA1C MFR BLD HPLC: 9.3 %
HCT VFR BLD CALC: 34.8 %
HDLC SERPL-MCNC: 61 MG/DL
HGB BLD-MCNC: 10.2 G/DL
IMM GRANULOCYTES NFR BLD AUTO: 0.1 %
LDLC SERPL CALC-MCNC: 84 MG/DL
LYMPHOCYTES # BLD AUTO: 1.96 K/UL
LYMPHOCYTES NFR BLD AUTO: 25.7 %
MAN DIFF?: NORMAL
MCHC RBC-ENTMCNC: 24.7 PG
MCHC RBC-ENTMCNC: 29.3 GM/DL
MCV RBC AUTO: 84.3 FL
MONOCYTES # BLD AUTO: 0.57 K/UL
MONOCYTES NFR BLD AUTO: 7.5 %
NEUTROPHILS # BLD AUTO: 4.9 K/UL
NEUTROPHILS NFR BLD AUTO: 64.3 %
PLATELET # BLD AUTO: 420 K/UL
POTASSIUM SERPL-SCNC: 5.5 MMOL/L
PROT SERPL-MCNC: 8.8 G/DL
RBC # BLD: 4.13 M/UL
RBC # FLD: 16.5 %
SODIUM SERPL-SCNC: 137 MMOL/L
TRIGL SERPL-MCNC: 165 MG/DL
TSH SERPL-ACNC: 1.71 UIU/ML
WBC # FLD AUTO: 7.62 K/UL

## 2019-03-26 ENCOUNTER — RX RENEWAL (OUTPATIENT)
Age: 74
End: 2019-03-26

## 2019-04-12 ENCOUNTER — RX CHANGE (OUTPATIENT)
Age: 74
End: 2019-04-12

## 2019-05-08 ENCOUNTER — APPOINTMENT (OUTPATIENT)
Dept: INTERNAL MEDICINE | Facility: CLINIC | Age: 74
End: 2019-05-08

## 2019-05-09 ENCOUNTER — MEDICATION RENEWAL (OUTPATIENT)
Age: 74
End: 2019-05-09

## 2019-05-14 ENCOUNTER — APPOINTMENT (OUTPATIENT)
Dept: INTERNAL MEDICINE | Facility: CLINIC | Age: 74
End: 2019-05-14
Payer: MEDICARE

## 2019-05-14 ENCOUNTER — NON-APPOINTMENT (OUTPATIENT)
Age: 74
End: 2019-05-14

## 2019-05-14 VITALS
BODY MASS INDEX: 26.06 KG/M2 | WEIGHT: 166 LBS | HEART RATE: 77 BPM | SYSTOLIC BLOOD PRESSURE: 122 MMHG | TEMPERATURE: 97.6 F | DIASTOLIC BLOOD PRESSURE: 68 MMHG | HEIGHT: 67 IN | OXYGEN SATURATION: 98 % | RESPIRATION RATE: 16 BRPM

## 2019-05-14 DIAGNOSIS — Z86.79 PERSONAL HISTORY OF OTHER DISEASES OF THE CIRCULATORY SYSTEM: ICD-10-CM

## 2019-05-14 DIAGNOSIS — Z86.39 PERSONAL HISTORY OF OTHER ENDOCRINE, NUTRITIONAL AND METABOLIC DISEASE: ICD-10-CM

## 2019-05-14 DIAGNOSIS — K02.9 DENTAL CARIES, UNSPECIFIED: ICD-10-CM

## 2019-05-14 DIAGNOSIS — Z86.69 PERSONAL HISTORY OF OTHER DISEASES OF THE NERVOUS SYSTEM AND SENSE ORGANS: ICD-10-CM

## 2019-05-14 PROCEDURE — 93000 ELECTROCARDIOGRAM COMPLETE: CPT

## 2019-05-14 PROCEDURE — 99214 OFFICE O/P EST MOD 30 MIN: CPT | Mod: 25

## 2019-05-14 PROCEDURE — 36415 COLL VENOUS BLD VENIPUNCTURE: CPT

## 2019-05-15 ENCOUNTER — CLINICAL ADVICE (OUTPATIENT)
Age: 74
End: 2019-05-15

## 2019-05-15 PROBLEM — Z86.69 HISTORY OF CARPAL TUNNEL SYNDROME: Status: RESOLVED | Noted: 2019-05-14 | Resolved: 2019-05-15

## 2019-05-15 PROBLEM — Z86.39 HISTORY OF HYPERLIPIDEMIA: Status: RESOLVED | Noted: 2019-05-14 | Resolved: 2019-05-15

## 2019-05-15 PROBLEM — Z86.79 HISTORY OF CORONARY ARTERY DISEASE: Status: RESOLVED | Noted: 2019-05-14 | Resolved: 2019-05-15

## 2019-05-15 LAB
25(OH)D3 SERPL-MCNC: 12.6 NG/ML
ALBUMIN SERPL ELPH-MCNC: 3.7 G/DL
ALP BLD-CCNC: 327 U/L
ALT SERPL-CCNC: 43 U/L
ANION GAP SERPL CALC-SCNC: 17 MMOL/L
APTT BLD: 33.4 SEC
AST SERPL-CCNC: 40 U/L
BASOPHILS # BLD AUTO: 0.02 K/UL
BASOPHILS NFR BLD AUTO: 0.2 %
BILIRUB SERPL-MCNC: 0.4 MG/DL
BUN SERPL-MCNC: 18 MG/DL
CALCIUM SERPL-MCNC: 9.3 MG/DL
CHLORIDE SERPL-SCNC: 90 MMOL/L
CHOLEST SERPL-MCNC: 157 MG/DL
CHOLEST/HDLC SERPL: 2.6 RATIO
CO2 SERPL-SCNC: 21 MMOL/L
CREAT SERPL-MCNC: 0.85 MG/DL
EOSINOPHIL # BLD AUTO: 0.1 K/UL
EOSINOPHIL NFR BLD AUTO: 1.2 %
ESTIMATED AVERAGE GLUCOSE: >398 MG/DL
GLUCOSE SERPL-MCNC: 620 MG/DL
HBA1C MFR BLD HPLC: >15.5 %
HCT VFR BLD CALC: 34.4 %
HDLC SERPL-MCNC: 60 MG/DL
HGB BLD-MCNC: 10.4 G/DL
IMM GRANULOCYTES NFR BLD AUTO: 0.2 %
INR PPP: 1.01 RATIO
LDLC SERPL CALC-MCNC: 57 MG/DL
LYMPHOCYTES # BLD AUTO: 2.1 K/UL
LYMPHOCYTES NFR BLD AUTO: 24.6 %
MAN DIFF?: NORMAL
MCHC RBC-ENTMCNC: 25 PG
MCHC RBC-ENTMCNC: 30.2 GM/DL
MCV RBC AUTO: 82.7 FL
MONOCYTES # BLD AUTO: 0.57 K/UL
MONOCYTES NFR BLD AUTO: 6.7 %
NEUTROPHILS # BLD AUTO: 5.74 K/UL
NEUTROPHILS NFR BLD AUTO: 67.1 %
PLATELET # BLD AUTO: 384 K/UL
POTASSIUM SERPL-SCNC: 5.7 MMOL/L
PROT SERPL-MCNC: 8.3 G/DL
PT BLD: 11.4 SEC
RBC # BLD: 4.16 M/UL
RBC # FLD: 17 %
SODIUM SERPL-SCNC: 128 MMOL/L
TRIGL SERPL-MCNC: 200 MG/DL
TSH SERPL-ACNC: 2.13 UIU/ML
WBC # FLD AUTO: 8.55 K/UL

## 2019-05-15 NOTE — HISTORY OF PRESENT ILLNESS
[Coronary Artery Disease] : coronary artery disease [No Pertinent Pulmonary History] : no history of asthma, COPD, sleep apnea, or smoking [Diabetes] : diabetes [No Adverse Anesthesia Reaction] : no adverse anesthesia reaction in self or family member [Moderate (4-6 METs)] : Moderate (4-6 METs) [FreeTextEntry1] : multiple tooth extraction [FreeTextEntry3] : Dr. Joseph Markham [FreeTextEntry2] : TBD [FreeTextEntry4] : Patient is a 74 year female with a past medical history as below who presents for preoperative evaluation prior to multiple tooth extraction. The procedure will be performed by Dr. Joseph Markham. The patient has no history of allergy or adverse reaction to anesthesia. The patient can walk many blocks and can walk up one to 2 flights of stairs without dyspnea on exertion. The patient denies chest pain or palpitations. Patient states she has not been taking Farxiga. Patient states she has not been regularly following up with her cardiologist, given history of CAD and MI.\par  [FreeTextEntry7] : History of severe left ventricular systolic dysfunction\par History of CABG

## 2019-05-15 NOTE — ASSESSMENT
[Continue medications as is] : Continue current medications [Patient NOT optimized for Surgery at this time] : Patient not optimized for surgery at this time [FreeTextEntry4] : The patient is 74 year female an intermediate risk surgical candidate with adequate functional capacity going for an intermediate risk surgical procedure.\par \par 5-15-19\par PST reviewed-glucose 620 and other abnormalities-not fit for surgery at this time-referred for hospitalization

## 2019-05-15 NOTE — PHYSICAL EXAM
[Well Nourished] : well nourished [No Acute Distress] : no acute distress [Well Developed] : well developed [Well-Appearing] : well-appearing [Normal Sclera/Conjunctiva] : normal sclera/conjunctiva [PERRL] : pupils equal round and reactive to light [EOMI] : extraocular movements intact [Normal Outer Ear/Nose] : the outer ears and nose were normal in appearance [Normal Oropharynx] : the oropharynx was normal [No JVD] : no jugular venous distention [Supple] : supple [Thyroid Normal, No Nodules] : the thyroid was normal and there were no nodules present [No Lymphadenopathy] : no lymphadenopathy [No Respiratory Distress] : no respiratory distress  [Clear to Auscultation] : lungs were clear to auscultation bilaterally [No Accessory Muscle Use] : no accessory muscle use [Normal Rate] : normal rate  [Normal S1, S2] : normal S1 and S2 [Regular Rhythm] : with a regular rhythm [No Carotid Bruits] : no carotid bruits [No Abdominal Bruit] : a ~M bruit was not heard ~T in the abdomen [No Murmur] : no murmur heard [No Varicosities] : no varicosities [Pedal Pulses Present] : the pedal pulses are present [No Edema] : there was no peripheral edema [No Palpable Aorta] : no palpable aorta [No Extremity Clubbing/Cyanosis] : no extremity clubbing/cyanosis [Non Tender] : non-tender [Non-distended] : non-distended [Soft] : abdomen soft [No HSM] : no HSM [No Masses] : no abdominal mass palpated [Normal Bowel Sounds] : normal bowel sounds [Normal Anterior Cervical Nodes] : no anterior cervical lymphadenopathy [Normal Posterior Cervical Nodes] : no posterior cervical lymphadenopathy [No Spinal Tenderness] : no spinal tenderness [No CVA Tenderness] : no CVA  tenderness [No Joint Swelling] : no joint swelling [No Rash] : no rash [Grossly Normal Strength/Tone] : grossly normal strength/tone [Normal Gait] : normal gait [Coordination Grossly Intact] : coordination grossly intact [No Focal Deficits] : no focal deficits [Deep Tendon Reflexes (DTR)] : deep tendon reflexes were 2+ and symmetric [Normal Affect] : the affect was normal [Normal Insight/Judgement] : insight and judgment were intact [Kyphosis] : kyphosis [Scoliosis] : scoliosis [de-identified] : kyphoscoliosis

## 2019-05-15 NOTE — RESULTS/DATA
[] : results reviewed [de-identified] : EKG shows sinus rhythm at 70 BPM, with an old inferior infarct and poor R-wave progression.

## 2019-05-15 NOTE — ADDENDUM
[FreeTextEntry1] : I, Erasto Ruiz, acted solely as scribe for Dr. Felipe Manuel DO on this date 05/14/2019  1:20PM .\par \par All medical record entries made by the Scribe were at my, Dr. Felipe Manuel DO direction and personally dictated by me on 05/14/2019  1:20PM. I have reviewed the chart and agree that the record accurately reflects my personal performance of the history, physical exam, assessment and plan. I have also personally directed, reviewed and agreed with the chart.\par

## 2019-05-15 NOTE — PLAN
[FreeTextEntry1] : 1. Preoperative EKG performed - see above - sinus rhythm at 70 BPM with old inferior infarct and poor R-wave progression.\par 2. The patient was instructed to stop eating prior to midnight the evening before the procedure.\par 3. The patient was advised to stop medications 1 day prior to the procedure.\par \par \par check EKG, CBC, CMP, PT/INR PTT, hemoglobin A1C, lipid panel, and UA

## 2019-08-07 ENCOUNTER — RX RENEWAL (OUTPATIENT)
Age: 74
End: 2019-08-07

## 2020-03-08 ENCOUNTER — RX RENEWAL (OUTPATIENT)
Age: 75
End: 2020-03-08

## 2020-03-13 ENCOUNTER — APPOINTMENT (OUTPATIENT)
Dept: INTERNAL MEDICINE | Facility: CLINIC | Age: 75
End: 2020-03-13
Payer: MEDICARE

## 2020-03-13 ENCOUNTER — NON-APPOINTMENT (OUTPATIENT)
Age: 75
End: 2020-03-13

## 2020-03-13 VITALS
HEART RATE: 80 BPM | RESPIRATION RATE: 16 BRPM | SYSTOLIC BLOOD PRESSURE: 120 MMHG | DIASTOLIC BLOOD PRESSURE: 68 MMHG | OXYGEN SATURATION: 98 % | HEIGHT: 67 IN | TEMPERATURE: 97.8 F | BODY MASS INDEX: 24.17 KG/M2 | WEIGHT: 154 LBS

## 2020-03-13 DIAGNOSIS — K21.9 GASTRO-ESOPHAGEAL REFLUX DISEASE W/OUT ESOPHAGITIS: ICD-10-CM

## 2020-03-13 DIAGNOSIS — Z86.73 PERSONAL HISTORY OF TRANSIENT ISCHEMIC ATTACK (TIA), AND CEREBRAL INFARCTION W/OUT RESIDUAL DEFICITS: ICD-10-CM

## 2020-03-13 PROCEDURE — 36415 COLL VENOUS BLD VENIPUNCTURE: CPT

## 2020-03-13 PROCEDURE — 99214 OFFICE O/P EST MOD 30 MIN: CPT | Mod: 25

## 2020-03-13 PROCEDURE — 93000 ELECTROCARDIOGRAM COMPLETE: CPT

## 2020-03-14 ENCOUNTER — INPATIENT (INPATIENT)
Facility: HOSPITAL | Age: 75
LOS: 16 days | Discharge: HOME | DRG: 98 | End: 2020-03-31
Attending: STUDENT IN AN ORGANIZED HEALTH CARE EDUCATION/TRAINING PROGRAM | Admitting: HOSPITALIST
Payer: COMMERCIAL

## 2020-03-14 VITALS
WEIGHT: 154.32 LBS | HEART RATE: 75 BPM | TEMPERATURE: 98 F | DIASTOLIC BLOOD PRESSURE: 78 MMHG | HEIGHT: 67 IN | RESPIRATION RATE: 18 BRPM | OXYGEN SATURATION: 98 % | SYSTOLIC BLOOD PRESSURE: 123 MMHG

## 2020-03-14 DIAGNOSIS — E11.65 TYPE 2 DIABETES MELLITUS WITH HYPERGLYCEMIA: ICD-10-CM

## 2020-03-14 DIAGNOSIS — R74.0 NONSPECIFIC ELEVATION OF LEVELS OF TRANSAMINASE AND LACTIC ACID DEHYDROGENASE [LDH]: ICD-10-CM

## 2020-03-14 DIAGNOSIS — E87.1 HYPO-OSMOLALITY AND HYPONATREMIA: ICD-10-CM

## 2020-03-14 DIAGNOSIS — Z95.1 PRESENCE OF AORTOCORONARY BYPASS GRAFT: Chronic | ICD-10-CM

## 2020-03-14 DIAGNOSIS — I63.9 CEREBRAL INFARCTION, UNSPECIFIED: ICD-10-CM

## 2020-03-14 DIAGNOSIS — Z98.890 OTHER SPECIFIED POSTPROCEDURAL STATES: Chronic | ICD-10-CM

## 2020-03-14 DIAGNOSIS — R51 HEADACHE: ICD-10-CM

## 2020-03-14 DIAGNOSIS — R47.01 APHASIA: ICD-10-CM

## 2020-03-14 DIAGNOSIS — Z90.89 ACQUIRED ABSENCE OF OTHER ORGANS: Chronic | ICD-10-CM

## 2020-03-14 DIAGNOSIS — Z29.9 ENCOUNTER FOR PROPHYLACTIC MEASURES, UNSPECIFIED: ICD-10-CM

## 2020-03-14 DIAGNOSIS — Z90.710 ACQUIRED ABSENCE OF BOTH CERVIX AND UTERUS: Chronic | ICD-10-CM

## 2020-03-14 DIAGNOSIS — D64.9 ANEMIA, UNSPECIFIED: ICD-10-CM

## 2020-03-14 DIAGNOSIS — I25.10 ATHEROSCLEROTIC HEART DISEASE OF NATIVE CORONARY ARTERY WITHOUT ANGINA PECTORIS: ICD-10-CM

## 2020-03-14 DIAGNOSIS — I50.22 CHRONIC SYSTOLIC (CONGESTIVE) HEART FAILURE: ICD-10-CM

## 2020-03-14 LAB
ALBUMIN SERPL ELPH-MCNC: 3.5 G/DL — SIGNIFICANT CHANGE UP (ref 3.3–5)
ALP SERPL-CCNC: 372 U/L — HIGH (ref 40–120)
ALT FLD-CCNC: 52 U/L — HIGH (ref 10–45)
ANION GAP SERPL CALC-SCNC: 14 MMOL/L — SIGNIFICANT CHANGE UP (ref 5–17)
APPEARANCE UR: CLEAR — SIGNIFICANT CHANGE UP
APTT BLD: 20.6 SEC — LOW (ref 27.5–36.3)
AST SERPL-CCNC: 91 U/L — HIGH (ref 10–40)
BACTERIA # UR AUTO: ABNORMAL
BASOPHILS # BLD AUTO: 0.03 K/UL — SIGNIFICANT CHANGE UP (ref 0–0.2)
BASOPHILS NFR BLD AUTO: 0.4 % — SIGNIFICANT CHANGE UP (ref 0–2)
BILIRUB SERPL-MCNC: 0.3 MG/DL — SIGNIFICANT CHANGE UP (ref 0.2–1.2)
BILIRUB UR-MCNC: NEGATIVE — SIGNIFICANT CHANGE UP
BUN SERPL-MCNC: 25 MG/DL — HIGH (ref 7–23)
CALCIUM SERPL-MCNC: 9.4 MG/DL — SIGNIFICANT CHANGE UP (ref 8.4–10.5)
CHLORIDE SERPL-SCNC: 93 MMOL/L — LOW (ref 96–108)
CO2 SERPL-SCNC: 21 MMOL/L — LOW (ref 22–31)
COLOR SPEC: YELLOW — SIGNIFICANT CHANGE UP
CREAT SERPL-MCNC: 0.73 MG/DL — SIGNIFICANT CHANGE UP (ref 0.5–1.3)
DIFF PNL FLD: NEGATIVE — SIGNIFICANT CHANGE UP
EOSINOPHIL # BLD AUTO: 0.06 K/UL — SIGNIFICANT CHANGE UP (ref 0–0.5)
EOSINOPHIL NFR BLD AUTO: 0.8 % — SIGNIFICANT CHANGE UP (ref 0–6)
EPI CELLS # UR: 2 — SIGNIFICANT CHANGE UP
GLUCOSE BLDC GLUCOMTR-MCNC: 189 MG/DL — HIGH (ref 70–99)
GLUCOSE SERPL-MCNC: 333 MG/DL — HIGH (ref 70–99)
GLUCOSE UR QL: ABNORMAL
HCT VFR BLD CALC: 31.8 % — LOW (ref 34.5–45)
HGB BLD-MCNC: 10.2 G/DL — LOW (ref 11.5–15.5)
HYALINE CASTS # UR AUTO: 1 /LPF — SIGNIFICANT CHANGE UP (ref 0–7)
IMM GRANULOCYTES NFR BLD AUTO: 0.4 % — SIGNIFICANT CHANGE UP (ref 0–1.5)
INR BLD: 0.98 RATIO — SIGNIFICANT CHANGE UP (ref 0.88–1.16)
KETONES UR-MCNC: NEGATIVE — SIGNIFICANT CHANGE UP
LEUKOCYTE ESTERASE UR-ACNC: ABNORMAL
LYMPHOCYTES # BLD AUTO: 2.36 K/UL — SIGNIFICANT CHANGE UP (ref 1–3.3)
LYMPHOCYTES # BLD AUTO: 31.3 % — SIGNIFICANT CHANGE UP (ref 13–44)
MCHC RBC-ENTMCNC: 26.2 PG — LOW (ref 27–34)
MCHC RBC-ENTMCNC: 32.1 GM/DL — SIGNIFICANT CHANGE UP (ref 32–36)
MCV RBC AUTO: 81.7 FL — SIGNIFICANT CHANGE UP (ref 80–100)
MONOCYTES # BLD AUTO: 0.67 K/UL — SIGNIFICANT CHANGE UP (ref 0–0.9)
MONOCYTES NFR BLD AUTO: 8.9 % — SIGNIFICANT CHANGE UP (ref 2–14)
NEUTROPHILS # BLD AUTO: 4.39 K/UL — SIGNIFICANT CHANGE UP (ref 1.8–7.4)
NEUTROPHILS NFR BLD AUTO: 58.2 % — SIGNIFICANT CHANGE UP (ref 43–77)
NITRITE UR-MCNC: NEGATIVE — SIGNIFICANT CHANGE UP
NRBC # BLD: 0 /100 WBCS — SIGNIFICANT CHANGE UP (ref 0–0)
PH UR: 6.5 — SIGNIFICANT CHANGE UP (ref 5–8)
PLATELET # BLD AUTO: 328 K/UL — SIGNIFICANT CHANGE UP (ref 150–400)
POTASSIUM SERPL-MCNC: 5.3 MMOL/L — SIGNIFICANT CHANGE UP (ref 3.5–5.3)
POTASSIUM SERPL-SCNC: 5.3 MMOL/L — SIGNIFICANT CHANGE UP (ref 3.5–5.3)
PROT SERPL-MCNC: 8.5 G/DL — HIGH (ref 6–8.3)
PROT UR-MCNC: ABNORMAL
PROTHROM AB SERPL-ACNC: 11.3 SEC — SIGNIFICANT CHANGE UP (ref 10–12.9)
RBC # BLD: 3.89 M/UL — SIGNIFICANT CHANGE UP (ref 3.8–5.2)
RBC # FLD: 15.3 % — HIGH (ref 10.3–14.5)
RBC CASTS # UR COMP ASSIST: 2 /HPF — SIGNIFICANT CHANGE UP (ref 0–4)
SODIUM SERPL-SCNC: 128 MMOL/L — LOW (ref 135–145)
SP GR SPEC: 1.02 — SIGNIFICANT CHANGE UP (ref 1.01–1.02)
UROBILINOGEN FLD QL: NEGATIVE — SIGNIFICANT CHANGE UP
WBC # BLD: 7.54 K/UL — SIGNIFICANT CHANGE UP (ref 3.8–10.5)
WBC # FLD AUTO: 7.54 K/UL — SIGNIFICANT CHANGE UP (ref 3.8–10.5)
WBC UR QL: 31 /HPF — HIGH (ref 0–5)

## 2020-03-14 PROCEDURE — 99223 1ST HOSP IP/OBS HIGH 75: CPT | Mod: GC

## 2020-03-14 PROCEDURE — 70450 CT HEAD/BRAIN W/O DYE: CPT | Mod: 26

## 2020-03-14 PROCEDURE — 93010 ELECTROCARDIOGRAM REPORT: CPT

## 2020-03-14 PROCEDURE — 99285 EMERGENCY DEPT VISIT HI MDM: CPT

## 2020-03-14 RX ORDER — DEXTROSE 50 % IN WATER 50 %
12.5 SYRINGE (ML) INTRAVENOUS ONCE
Refills: 0 | Status: DISCONTINUED | OUTPATIENT
Start: 2020-03-14 | End: 2020-03-15

## 2020-03-14 RX ORDER — DEXTROSE 50 % IN WATER 50 %
25 SYRINGE (ML) INTRAVENOUS ONCE
Refills: 0 | Status: DISCONTINUED | OUTPATIENT
Start: 2020-03-14 | End: 2020-03-15

## 2020-03-14 RX ORDER — ASPIRIN/CALCIUM CARB/MAGNESIUM 324 MG
325 TABLET ORAL DAILY
Refills: 0 | Status: DISCONTINUED | OUTPATIENT
Start: 2020-03-14 | End: 2020-03-15

## 2020-03-14 RX ORDER — SODIUM CHLORIDE 9 MG/ML
1000 INJECTION INTRAMUSCULAR; INTRAVENOUS; SUBCUTANEOUS ONCE
Refills: 0 | Status: COMPLETED | OUTPATIENT
Start: 2020-03-14 | End: 2020-03-14

## 2020-03-14 RX ORDER — METOPROLOL TARTRATE 50 MG
25 TABLET ORAL DAILY
Refills: 0 | Status: DISCONTINUED | OUTPATIENT
Start: 2020-03-14 | End: 2020-03-26

## 2020-03-14 RX ORDER — GLUCAGON INJECTION, SOLUTION 0.5 MG/.1ML
1 INJECTION, SOLUTION SUBCUTANEOUS ONCE
Refills: 0 | Status: DISCONTINUED | OUTPATIENT
Start: 2020-03-14 | End: 2020-03-15

## 2020-03-14 RX ORDER — SODIUM CHLORIDE 9 MG/ML
1000 INJECTION, SOLUTION INTRAVENOUS
Refills: 0 | Status: DISCONTINUED | OUTPATIENT
Start: 2020-03-14 | End: 2020-03-15

## 2020-03-14 RX ORDER — PANTOPRAZOLE SODIUM 20 MG/1
40 TABLET, DELAYED RELEASE ORAL
Refills: 0 | Status: DISCONTINUED | OUTPATIENT
Start: 2020-03-14 | End: 2020-03-31

## 2020-03-14 RX ORDER — ENOXAPARIN SODIUM 100 MG/ML
40 INJECTION SUBCUTANEOUS DAILY
Refills: 0 | Status: DISCONTINUED | OUTPATIENT
Start: 2020-03-14 | End: 2020-03-15

## 2020-03-14 RX ORDER — ATORVASTATIN CALCIUM 80 MG/1
40 TABLET, FILM COATED ORAL AT BEDTIME
Refills: 0 | Status: DISCONTINUED | OUTPATIENT
Start: 2020-03-14 | End: 2020-03-16

## 2020-03-14 RX ORDER — FUROSEMIDE 40 MG
40 TABLET ORAL DAILY
Refills: 0 | Status: DISCONTINUED | OUTPATIENT
Start: 2020-03-14 | End: 2020-03-14

## 2020-03-14 RX ORDER — INSULIN LISPRO 100/ML
VIAL (ML) SUBCUTANEOUS EVERY 6 HOURS
Refills: 0 | Status: DISCONTINUED | OUTPATIENT
Start: 2020-03-14 | End: 2020-03-15

## 2020-03-14 RX ORDER — DEXTROSE 50 % IN WATER 50 %
15 SYRINGE (ML) INTRAVENOUS ONCE
Refills: 0 | Status: DISCONTINUED | OUTPATIENT
Start: 2020-03-14 | End: 2020-03-15

## 2020-03-14 RX ADMIN — SODIUM CHLORIDE 500 MILLILITER(S): 9 INJECTION INTRAMUSCULAR; INTRAVENOUS; SUBCUTANEOUS at 21:55

## 2020-03-14 RX ADMIN — ATORVASTATIN CALCIUM 40 MILLIGRAM(S): 80 TABLET, FILM COATED ORAL at 21:55

## 2020-03-14 NOTE — H&P ADULT - PROBLEM SELECTOR PLAN 9
ISTOP: Reference #: [id number]; last alprazolam filled in 2014  DVT ppx: lovenox qdaily  Diet: NPO except meds until further recommendations from speech and swallow team  Dispo: pending PT and speech therapy consult    Miriam Jones PGY-3  Pager # [phone]/ [phone] hold furosemide for one dose, give 1L NS and recheck Na and volume status in AM  can resume as needed  check TTE w/ bubble study

## 2020-03-14 NOTE — H&P ADULT - ASSESSMENT
74F with T2DM, systolic HF, CABG admitted for possible subacute CVA vs less likely focal seizures without impaired awareness. 74F with T2DM, systolic HF, CABG admitted for possible subacute CVA vs less likely focal seizures.

## 2020-03-14 NOTE — DATA REVIEWED
[FreeTextEntry1] : EKG shows sinus rhythm at 72 BPM with occasional PAC and old anteroseptal infarct. \par

## 2020-03-14 NOTE — ED ADULT NURSE NOTE - CAS EDN DISCHARGE ASSESSMENT
Ventricular Rate : 63  Atrial Rate : 63  P-R Interval : 160  QRS Duration : 76  Q-T Interval : 406  QTC Calculation(Bazett) : 415  P Axis : 29  R Axis : 85  T Axis : 62  Diagnosis : Normal sinus rhythm  RSR' or QR pattern in V1 suggests right ventricular conduction delay  Early repolarization  Borderline ECG  When compared with ECG of 21-SEP-2019 00:02,  QT has shortened  Confirmed by SUZANNE PEACOCK MASOOD ([id number]) on 9/21/2019 8:41:34 AM  
Alert and oriented to person, place and time

## 2020-03-14 NOTE — H&P ADULT - HISTORY OF PRESENT ILLNESS
74F with T2DM, systolic HF, CABG presents with HA x 1 day and aphasia for a 1 week. Spoke with daughter and noted that patient was aphasic for a week and only could say "yes" most of the times. Per daughter it appeared that patient's cognitive function was intact however she was having difficulty speak the words. Pt drove to nail salon and grocery store yesterday and then complained of diffuse headache. Per daughter pt has hx of headaches, unclear if migraine. Daughter denied any grand mal seizure activity, syncope, chest pain, urinary sxs, fever, cough, sob. Currently pt denies headache, blurred vision, focal numbness/weakness. Daughter mentioned that patient takes alprazolam 0.5 mg prior to blood draws due to extreme fear and panic. 74F with T2DM, systolic HF, CABG presents with HA x 1 day and aphasia for a 1 week. History obtained from daughter, as pt unable to provide 2/2 aphasia. Pt lives with sister and dtr initially noted during a phone conversation approx one week ago that pt was only answering in yes/no format. She saw her later in the week and noted nonsensical speech pattern. Per daughter it appeared that patient's cognitive function was intact and was still able to follow commands, however she was having difficulty speak the words. Pt has been ambulating well and has otherwise been keeping up her regualr routine - she drove to [place] and grocery [employer] yesterday without issue. Dtr took pt to PMDs office and was referred to outpt neurology. On day of admission pt started pointing to her head and saying "headache," which prompted ED visit. Per daughter pt has hx of headaches, unclear if migraine. Daughter denied any grand mal seizure activity, syncope, chest pain, urinary sxs, fever, cough, sob. Currently pt denies headache, blurred vision, focal numbness/weakness. Daughter mentioned that patient takes alprazolam 0.5 mg prior to blood draws due to extreme fear and panic.  Of note, pt has hx of 3 prior episode of word finding difficulties lasting approx 15-20 minutes.

## 2020-03-14 NOTE — H&P ADULT - PROBLEM SELECTOR PLAN 10
1.  Name of PCP:  2.  PCP Contacted on Admission: [ ] Y    [ ] N    3.  PCP contacted at Discharge: [ ] Y    [ ] N    [ ] N/A  4.  Post-Discharge Appointment Date and Location:  5.  Summary of Handoff given to PCP: 1.  Name of PCP: Felipe Manuel   2.  PCP Contacted on Admission: [ ] Y    [ ] N    3.  PCP contacted at Discharge: [ ] Y    [ ] N    [ ] N/A  4.  Post-Discharge Appointment Date and Location:  5.  Summary of Handoff given to PCP: ISTOP: Reference #: [id number]; last alprazolam filled in 2014  DVT ppx: lovenox qdaily  Diet: NPO except meds until further recommendations from speech and swallow team  Dispo: pending PT and speech therapy consult    Miriam Jones PGY-3  Pager # [phone]/ [phone]    1.  Name of PCP: Felipe Manuel   2.  PCP Contacted on Admission: [ ] Y    [ ] N    3.  PCP contacted at Discharge: [ ] Y    [ ] N    [ ] N/A  4.  Post-Discharge Appointment Date and Location:  5.  Summary of Handoff given to PCP:

## 2020-03-14 NOTE — ADDENDUM
[FreeTextEntry1] : I, Erasto Ruiz, acted solely as scribe for Dr. Felipe Manuel DO on this date 03/13/2020 10:10AM .\par \par All medical record entries made by the Scribe were at my, Dr. Felipe Manuel DO direction and personally dictated by me on 03/13/2020 10:10AM. I have reviewed the chart and agree that the record accurately reflects my personal performance of the history, physical exam, assessment and plan. I have also personally directed, reviewed and agreed with the chart.\par

## 2020-03-14 NOTE — CONSULT NOTE ADULT - ASSESSMENT
INCOMPLETE  Assessment:  74y R-handed F with h/o DM, CAD s/p CABG p/w subacute onset mixed aphasia with motor predominance likely due to left frontal lobe dysfunction secondary to subacute infarct vs. mass vs. less likely but possibly focal seizures w/o impaired awareness.     On exam, she is dysfluent, cannot name or repeat, makes paraphasic phonemic and semantic errors when speaking and writing, can read most sentences with some word substitutions.  She can follow some simple one-step axial commands but does better with pantomime  She has no facial asymmetry and no obvious focal motor or sensory deficit, though limited by aphasia.     Plan  MRI brain w/wo contrast  MRA head w/o contrast  MRA neck w contrast  24 hr vEEG   Please add GGT to LFT to determine if Alk phos elevation due to possible bone involvement to suggest underlying malignancy.     If MRI brain demonstrates acute infarct, would then determine further if to continue ASA vs. addition of Plavix vs. embolic work up.   If MRI brain demonstrates mass, would then need CT C/A/P to look for primary CA.   If head imaging negative, would continue seizure work up.     Assessment and plan discussed with attending Dr. Teodora Meyers, DO  PGY-2 Neurology Service INCOMPLETE  Assessment:  74y R-handed F with h/o DM, CAD s/p CABG p/w subacute onset mixed aphasia with motor predominance likely due to left frontal lobe dysfunction secondary to subacute infarct vs. mass vs. less likely but possibly focal seizures w/o impaired awareness.     On exam, she is dysfluent, cannot name or repeat, makes paraphasic phonemic and semantic errors when speaking and writing, can read most sentences with some word substitutions.  She can follow some simple one-step axial commands but does better with pantomime  She has no facial asymmetry and no obvious focal motor or sensory deficit, though limited by aphasia.     Plan  []MRI brain w/wo contrast  []MRA head w/o contrast  []MRA neck w contrast  []24 hr vEEG   []Please add GGT to LFT to determine if Alk phos elevation due to possible bone involvement to suggest underlying malignancy.   []Dysphagia screen  []Speech and swallow eval    If MRI brain demonstrates acute infarct, would then determine further if to continue ASA vs. addition of Plavix vs. embolic work up.   If MRI brain demonstrates mass, would then need CT C/A/P to look for primary CA.   If head imaging negative, would continue seizure work up.     Assessment and plan discussed with attending Dr. Teodora Meyers, DO  PGY-2 Neurology Service Assessment:  74y R-handed F with h/o DM, CAD s/p stent and CABG p/w subacute onset mixed aphasia with motor predominance likely due to left frontal lobe dysfunction secondary to subacute infarct vs. mass vs. less likely but possibly focal seizures w/o impaired awareness.     On exam, she is dysfluent, cannot name or repeat, makes paraphasic phonemic and semantic errors when speaking and writing, can read most sentences with some word substitutions.  She can follow some simple one-step axial commands but does better with pantomime  She has no facial asymmetry and no obvious focal motor or sensory deficit, though limited by aphasia.     Plan  []MRI brain w/wo contrast  []MRA head w/o contrast  []MRA neck w contrast  []24 hr vEEG   []Please add GGT to LFT to determine if Alk phos elevation due to possible bone involvement to suggest underlying malignancy.   []Dysphagia screen  []Speech and swallow eval    If MRI brain demonstrates acute infarct, would then determine further if to continue ASA vs. addition of Plavix vs. embolic work up.   If MRI brain demonstrates mass, would then need CT C/A/P to look for primary CA.   If head imaging negative, would continue seizure work up.     Assessment and plan discussed with attending Dr. Teodora Meyers, DO  PGY-2 Neurology Service

## 2020-03-14 NOTE — ED PROVIDER NOTE - PHYSICAL EXAMINATION
Gen: NAD  Head: NCAT  HEENT: PERRL, MMM, normal conjunctiva, anicteric, neck supple  Lung: CTAB, no adventitious sounds  CV: RRR, no murmurs  Abd: soft, NTND, no rebound or guarding, no CVAT  MSK: No edema, no visible deformities  Neuro: Not following commands, only responding "yes", cannot participate in neuro exam but appears to move extremities  Skin: Warm and dry, no evidence of rash  Psych: normal mood and affect

## 2020-03-14 NOTE — H&P ADULT - PROBLEM SELECTOR PLAN 5
Hgb at baseline  can perform anemia work up outpatient Hgb at baseline  can perform anemia work up outpatient  monitor CBC

## 2020-03-14 NOTE — H&P ADULT - NSHPPHYSICALEXAM_GEN_ALL_CORE
Vital Signs Last 24 Hrs  T(C): 37 (14 Mar 2020 19:29), Max: 37 (14 Mar 2020 19:29)  T(F): 98.6 (14 Mar 2020 19:29), Max: 98.6 (14 Mar 2020 19:29)  HR: 58 (14 Mar 2020 19:29) (58 - 75)  BP: 132/56 (14 Mar 2020 19:29) (123/78 - 132/56)  BP(mean): --  RR: 17 (14 Mar 2020 19:29) (17 - 22)  SpO2: 98% (14 Mar 2020 19:29) (98% - 98%)    General: NAD  Neurology: non focal, expressive aphasia   Eyes: PERRL  ENT/Neck: Neck supple, trachea midline, No JVD, Gross hearing intact  Respiratory: CTA B/L, No wheezing, rales, rhonchi  CV: RRR, S1S2, no murmurs, rubs or gallops  Abdominal: Soft, NT, ND +BS,   Extremities: No edema, + peripheral pulses  Skin: +tented skin, No Rashes, Hematoma, Ecchymosis Vital Signs Last 24 Hrs  T(C): 37 (14 Mar 2020 19:29), Max: 37 (14 Mar 2020 19:29)  T(F): 98.6 (14 Mar 2020 19:29), Max: 98.6 (14 Mar 2020 19:29)  HR: 58 (14 Mar 2020 19:29) (58 - 75)  BP: 132/56 (14 Mar 2020 19:29) (123/78 - 132/56)  BP(mean): --  RR: 17 (14 Mar 2020 19:29) (17 - 22)  SpO2: 98% (14 Mar 2020 19:29) (98% - 98%)    General: NAD  Neurology: alert and awake, non focal, expressive aphasia, Dysfluent dysprosodic speech with semantic and phonemic paraphasic errors.  Can read few sentences aloud with phonemic paraphasias. Writes illogical sentence.  Cannot repeat.   Eyes: PERRL  ENT/Neck: Neck supple, trachea midline, No JVD, Gross hearing intact  Respiratory: CTA B/L, No wheezing, rales, rhonchi  CV: RRR, S1S2, no murmurs, rubs or gallops  Abdominal: Soft, NT, ND +BS,   Extremities: No edema, + peripheral pulses  Skin: +tented skin, No Rashes, Hematoma, Ecchymosis Vital Signs Last 24 Hrs  T(C): 37 (14 Mar 2020 19:29), Max: 37 (14 Mar 2020 19:29)  T(F): 98.6 (14 Mar 2020 19:29), Max: 98.6 (14 Mar 2020 19:29)  HR: 58 (14 Mar 2020 19:29) (58 - 75)  BP: 132/56 (14 Mar 2020 19:29) (123/78 - 132/56)  BP(mean): --  RR: 17 (14 Mar 2020 19:29) (17 - 22)  SpO2: 98% (14 Mar 2020 19:29) (98% - 98%)    General: NAD  Neurology: alert and awake, expressive aphasia with dysfluent speech, cannot repeat; not following all commands, but pt moving all extremities spontaneously  Eyes: PERRL, clear sclera  ENT/Neck: Neck supple, trachea midline, No JVD  Respiratory: CTA B/L, No wheezing, rales, rhonchi  CV: RRR, S1S2, no murmurs, rubs or gallops  Abdominal: Soft, NT, ND +BS,   Extremities: No edema, + peripheral pulses  Skin: +tented skin, No Rashes, Hematoma, Ecchymosis

## 2020-03-14 NOTE — H&P ADULT - NSICDXPASTSURGICALHX_GEN_ALL_CORE_FT
PAST SURGICAL HISTORY:  H/O abdominal hysterectomy 1991    History of tonsillectomy     S/P breast lumpectomy left    S/P CABG x 1

## 2020-03-14 NOTE — REVIEW OF SYSTEMS
[Nausea] : nausea [Negative] : Heme/Lymph [FreeTextEntry7] : hiccups  [de-identified] : difficulty speaking/communicating

## 2020-03-14 NOTE — H&P ADULT - PROBLEM SELECTOR PLAN 7
c/w asa and atorvastatin pt with large LE and 31 WBCs, per dtr, denies any recent urinary symptoms  will monitor off abx for now  f/u urine culture

## 2020-03-14 NOTE — CONSULT NOTE ADULT - SUBJECTIVE AND OBJECTIVE BOX
NEUROLOGY CONSULT   HPI: 74y R-handed woman with a PMH of HTN, DM, CAD s/p stent & CABG, prior TIAs and anxiety who presented on 03-14-20 with subacute onset speech difficulty.  History provided by daughter due to patient's language deficit.     Pt apparently has had 3 prior episodes of difficulty speaking manifest as word substitutions/mumbling lasting 15-20 minutes.  KATHERIN 03/03/20 in the morning.  Daughter called later on during the week and noted PT was answering in only yes/no format and when she saw her later on in the week was not making sense when speaking.  Would say "How's medicine?" when referring to grand-daughter Lisette.  Would follow instructions but answer back to questions in short sentences.  Daughter denied noticing any weakness, facial droop or dysarthria and notes she was ambulating better this week than before.      Went to PCP and had referral to outpatient neurology and had EKG and blood work done.  Daughter brought patient in now because was concerned of length of time as well as that patient kept pointing to her head and saying "headache".     ROS: A 10-system ROS was performed and is negative except for those items noted above.     PMH:  Diabetes      Home Medications:  acetaminophen 325 mg oral tablet: 2 tab(s) orally every 6 hours, As needed, Mild Pain (1 - 3) (13 Nov 2017 12:25)  ALPRAZolam 0.25 mg oral tablet: 1 tab(s) orally every 8 hours, As needed, anxiety (13 Nov 2017 12:25)  aspirin 325 mg oral delayed release tablet: 1 tab(s) orally once a day (13 Nov 2017 12:25)  atorvastatin 40 mg oral tablet: 1 tab(s) orally once a day (at bedtime) (13 Nov 2017 12:25)  docusate sodium 100 mg oral capsule: 1 cap(s) orally 3 times a day (13 Nov 2017 12:25)  furosemide 40 mg oral tablet: 1 tab(s) orally once a day (13 Nov 2017 12:25)  metFORMIN 500 mg oral tablet: 1 tab(s) orally 2 times a day (13 Nov 2017 12:25)  metoprolol tartrate 25 mg oral tablet: 1 tab(s) orally 2 times a day (13 Nov 2017 12:25)  oxyCODONE 5 mg oral tablet: 1 tab(s) orally every 6 hours, As needed, Moderate Pain (4 - 6) (13 Nov 2017 12:25)  pantoprazole 40 mg oral delayed release tablet: 1 tab(s) orally once a day (before a meal) (13 Nov 2017 12:25)  polyethylene glycol 3350 oral powder for reconstitution: 17 gram(s) orally once a day (13 Nov 2017 12:25)  spironolactone 25 mg oral tablet: 1 tab(s) orally once a day (13 Nov 2017 12:25)    ALLERGIES: clams - itching (Other), Peroxide (Blisters)    PSH:   S/P breast lumpectomy  History of tonsillectomy  H/O abdominal hysterectomy    OBJECTIVE  Vital Signs Last 24 Hrs  T(C): 36.7 (14 Mar 2020 18:21), Max: 36.9 (14 Mar 2020 12:33)  T(F): 98.1 (14 Mar 2020 18:21), Max: 98.4 (14 Mar 2020 12:33)  HR: 63 (14 Mar 2020 18:21) (60 - 75)  BP: 129/66 (14 Mar 2020 18:21) (123/78 - 129/66)  BP(mean): --  RR: 17 (14 Mar 2020 18:21) (17 - 22)  SpO2: 98% (14 Mar 2020 18:21) (98% - 98%)  I&O's Summary    PHYSICAL EXAM:  Neurologic:  Mental Status: Awake, alert, oriented to person, place, situation, time. Normal affect. Follows all commands.    Language: Speech is clear, fluent with preserved naming, repetition and comprehension.    CBC:                        10.2   7.54  )-----------( 328      ( 14 Mar 2020 14:48 )             31.8       CMP:  03-14    128<L>  |  93<L>  |  25<H>  ----------------------------<  333<H>  5.3   |  21<L>  |  0.73    Ca    9.4      14 Mar 2020 14:48    TPro  8.5<H>  /  Alb  3.5  /  TBili  0.3  /  DBili  x   /  AST  91<H>  /  ALT  52<H>  /  AlkPhos  372<H>  03-14    LIVER FUNCTIONS - ( 14 Mar 2020 14:48 )  Alb: 3.5 g/dL / Pro: 8.5 g/dL / ALK PHOS: 372 U/L / ALT: 52 U/L / AST: 91 U/L / GGT: x             COAGS:  PT/INR - ( 14 Mar 2020 14:48 )   PT: 11.3 sec;   INR: 0.98 ratio         PTT - ( 14 Mar 2020 14:48 )  PTT:20.6 sec    UA:      Cardiac:        ABG:      MICRO/CULTURES:        Neuro LABS    CSF Results:       Imaging/Studies:      CT Head No Cont:   EXAM:  CT BRAIN                            PROCEDURE DATE:  03/14/2020            INTERPRETATION:  Clinical indication: Aphasia.    Multiple axial sections were performed from base of skull to vertex for contrast enhancement. Coronal and sagittal reconstructions were performed as well    Parenchymal volume loss and chronic microvascular ischemic changes are identified    There is no evidence acute hemorrhage mass or mass effect in the posterior fossa or supratentorial region.    Evaluation of the structures with the appropriate window appears unremarkable    Thickening of the surrounding bone is seen involving the left maxillary sinuses which is likely compatible chronic inflammatory change.    Both mastoid and middle ear regions appear clear.    Impression: No acute hemorrhage mass or mass effect.                    BEVERLY MONCADA M.D., ATTENDING RADIOLOGIST  This document has been electronically signed. Mar 14 2020  2:52PM             (03-14-20) NEUROLOGY CONSULT   HPI: 74y R-handed woman with a PMH of HTN, DM, CAD s/p stent & CABG, prior TIAs and anxiety who presented on 03-14-20 with subacute onset speech difficulty.  History provided by daughter due to patient's language deficit.     Pt apparently has had 3 prior episodes of difficulty speaking manifest as word substitutions/mumbling lasting 15-20 minutes.  KATHERIN 03/03/20 in the morning.  Daughter called later on during the week and noted PT was answering in only yes/no format and when she saw her later on in the week was not making sense when speaking.  Would say "How's medicine?" when referring to grand-daughter Lisette.  Would follow instructions but answer back to questions in short sentences.  Daughter denied noticing any weakness, facial droop or dysarthria and notes she was ambulating better this week than before.      Went to PCP and had referral to outpatient neurology and had EKG and blood work done.  Daughter brought patient in now because was concerned of length of time as well as that patient kept pointing to her head and saying "headache".     ROS: A 10-system ROS was performed and is negative except for those items noted above.     PMH:  Diabetes      Home Medications:  acetaminophen 325 mg oral tablet: 2 tab(s) orally every 6 hours, As needed, Mild Pain (1 - 3) (13 Nov 2017 12:25)  ALPRAZolam 0.25 mg oral tablet: 1 tab(s) orally every 8 hours, As needed, anxiety (13 Nov 2017 12:25)  aspirin 325 mg oral delayed release tablet: 1 tab(s) orally once a day (13 Nov 2017 12:25)  atorvastatin 40 mg oral tablet: 1 tab(s) orally once a day (at bedtime) (13 Nov 2017 12:25)  docusate sodium 100 mg oral capsule: 1 cap(s) orally 3 times a day (13 Nov 2017 12:25)  furosemide 40 mg oral tablet: 1 tab(s) orally once a day (13 Nov 2017 12:25)  metFORMIN 500 mg oral tablet: 1 tab(s) orally 2 times a day (13 Nov 2017 12:25)  metoprolol tartrate 25 mg oral tablet: 1 tab(s) orally 2 times a day (13 Nov 2017 12:25)  oxyCODONE 5 mg oral tablet: 1 tab(s) orally every 6 hours, As needed, Moderate Pain (4 - 6) (13 Nov 2017 12:25)  pantoprazole 40 mg oral delayed release tablet: 1 tab(s) orally once a day (before a meal) (13 Nov 2017 12:25)  polyethylene glycol 3350 oral powder for reconstitution: 17 gram(s) orally once a day (13 Nov 2017 12:25)  spironolactone 25 mg oral tablet: 1 tab(s) orally once a day (13 Nov 2017 12:25)    ALLERGIES: clams - itching (Other), Peroxide (Blisters)    PSH:   S/P breast lumpectomy  History of tonsillectomy  H/O abdominal hysterectomy    OBJECTIVE  Vital Signs Last 24 Hrs  T(C): 36.7 (14 Mar 2020 18:21), Max: 36.9 (14 Mar 2020 12:33)  T(F): 98.1 (14 Mar 2020 18:21), Max: 98.4 (14 Mar 2020 12:33)  HR: 63 (14 Mar 2020 18:21) (60 - 75)  BP: 129/66 (14 Mar 2020 18:21) (123/78 - 129/66)  BP(mean): --  RR: 17 (14 Mar 2020 18:21) (17 - 22)  SpO2: 98% (14 Mar 2020 18:21) (98% - 98%)  I&O's Summary    PHYSICAL EXAM:  Neurologic  MS/Language: Awake, alert, intermittently follows simple one-step commands such as squeezing hands and raising arms up.  Dysfluent dysprosodic speech with semantic and phonemic paraphasic errors.  Can read few sentences aloud with phonemic paraphasias.  Writes illogical sentence.  Cannot repeat.    CNs: PERRL, spontaneously tracking around room w/o nystagmus.  Less blinks to threat on R visual field, no facial asymmetry.   Motor: Spontaneously moving all 4 extremities at least against gravity.  No drift noted in arms or legs.  PT unable to fully follow commands for full strength testing.   Sensory: Grimaces equally throughout all 4 extremities.   Reflexes: 1+ throughout, toes downgoing    CBC:                        10.2   7.54  )-----------( 328      ( 14 Mar 2020 14:48 )             31.8   CMP:  03-14  128<L>  |  93<L>  |  25<H>  ----------------------------<  333<H>  5.3   |  21<L>  |  0.73  Ca    9.4      14 Mar 2020 14:48    TPro  8.5<H>  /  Alb  3.5  /  TBili  0.3  /  DBili  x   /  AST  91<H>  /  ALT  52<H>  /  AlkPhos  372<H>  03-14  PT/INR - ( 14 Mar 2020 14:48 )   PT: 11.3 sec;   INR: 0.98 ratio      CT Head No Cont: 03/14/2020    Impression: No acute hemorrhage mass or mass effect.

## 2020-03-14 NOTE — ASSESSMENT
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

## 2020-03-14 NOTE — H&P ADULT - PROBLEM SELECTOR PLAN 6
check Hga1c in AM  c/w JOCELYNE  hold home metformin and dapagliflozin check Hga1c in AM  c/w JOCELYNE and monitor FS ac and hs   hold home metformin and dapagliflozin

## 2020-03-14 NOTE — PLAN
[FreeTextEntry1] : Neurology\par recent CVA - check EKG (results as above) - continue Aspirin 81mg p.o.q.d. - referred to neurologist, Dr. Nir Gonzalez for further evaluation/testing\par Cardiovascular\par benign essential hypertension - continue Metoprolol Tartrate 25mg p.o.q.d. and - continue low sodium diet\par continue Aspirin 81mg p.o.q.d. and Atorvastatin Calcium 40mg p.o.q.d. given history of 3-vessel CAD\par continue Furosemide 40mg p.o.q.d.\par Endocrinology\par hyperlipidemia - continue Atorvastatin Calcium 40mg p.o.q.d. - continue low cholesterol/low fat diet - check FLP and LFTs\par diabetes - continue Farxiga 10mg p.o.q.d. and Metformin HCl 500mg QID p.o.q.d. as directed - continue low carbohydrate/low sugar diet - check hemoglobin A1C \par Gastroenterology\par GERD - continue Pantoprazole Sodium 40mg p.o.q.d. - continue antireflux measures\par \par check EKG (results as above) and female panel

## 2020-03-14 NOTE — ED PROVIDER NOTE - OBJECTIVE STATEMENT
73yo F hx TIAs in past, CAD s/p stent and CABG, HTN, DM, anxiety, BIB daughter for concern of HA x 1 day. Pt has been aphasic and not answering appropriately, only saying "yes" x 1 week. Went to pcp on friday who did blood work and ekg. Family concerned because of headache today.

## 2020-03-14 NOTE — H&P ADULT - NSHPREVIEWOFSYSTEMS_GEN_ALL_CORE
REVIEW OF SYSTEMS:    CONSTITUTIONAL: No weakness, fevers or chills  EYES/ENT: +difficulty speaking words. No visual changes;  No vertigo or throat pain   NECK: No pain or stiffness  RESPIRATORY: No cough, wheezing, hemoptysis; No shortness of breath  CARDIOVASCULAR: No chest pain or palpitations  GASTROINTESTINAL: No abdominal or epigastric pain. No nausea, vomiting, or hematemesis; No diarrhea or constipation. No melena or hematochezia.  GENITOURINARY: No dysuria, frequency or hematuria  NEUROLOGICAL: No numbness or weakness  SKIN: No itching, burning, rashes, or lesions   All other review of systems is negative unless indicated above. limited ROS given significant aphasia and word finding difficulty

## 2020-03-14 NOTE — ED PROVIDER NOTE - CLINICAL SUMMARY MEDICAL DECISION MAKING FREE TEXT BOX
Tootie Carrillo MD 74 F w PMH of CAD s/p stent and CABG, HTN, DM, anxiety, presents to the ED w/ aphasia. Pt is accompanied w/ daughter who states that the patient cannot communicate  when asking questions, she states "yes" or answers w/ a nonsensical answer. Pt was seen by PCP on Friday who ordered EKG and blood work, pt was told to follow up with neurologist. Today the patient reports that she had a headache, and the daughter brought pt to the ed. Pt is unable to speak in full sentances, she answers "yes" inappropriately to questions. she is unable to follow commands. She is on metformin, atorvastatin, metoprolol, furosemide, pantoprazol, farxiga, , Pt daughter is Mackenzie cabrera [phone]. Plan for labs and reassessment. Concern for intracranial process- mass, stroke, pt outside window for tpa. ?electrolyte disturbance, will likely admit for MRI if CT negative and consult neurology

## 2020-03-14 NOTE — H&P ADULT - PROBLEM SELECTOR PLAN 3
likely due to volume depletion from furosemide and dapafliglozin   will give 1L NS, hold lasix for today, recheck Na in AM and can resume as needed

## 2020-03-14 NOTE — ED ADULT NURSE NOTE - OBJECTIVE STATEMENT
Patient  is  alert  and oriented   x3.  Color  is  good  and  skin warm  to touch.  She  is  c/o  headache   and  nausea.  Speech  has  been  uncleared  for  a  few days .  She  is  able  to  move  all  extremities   without  any  difficulty.

## 2020-03-14 NOTE — H&P ADULT - PROBLEM SELECTOR PLAN 1
CT head negative for acute/subacute infarct however nature of presentation supports most probably a subacute CVA; has risk factors of uncontrolled T2DM and CAD  UA pos however pt denies urinary sxs; hold off on abx  f/u EKG performed in ED  pending MR Brain and MRA brain w/ and w/out cont, MRA neck w/ cont  pending vEEG 24h to r/o focal seizure  c/w asa and atorvastatin CT head negative for acute/subacute infarct however nature of presentation supports most probably a subacute CVA; has risk factors of uncontrolled T2DM and CAD  UA pos however pt denies urinary sxs; hold off on abx  pending MR Brain and MRA brain w/ and w/out cont, MRA neck w/ cont  pending vEEG 24h to r/o focal seizure  c/w asa and atorvastatin  f/u further neurology recs

## 2020-03-14 NOTE — H&P ADULT - ATTENDING COMMENTS
Pt seen and examined. Pt's speech is limited to yes/no and the occasional word; unable to repeat words but awake/alert and spontaneously moving all extremities. c/f subacute stroke given symptoms x 1 weeks; CTH negative, pending full MR work-up and EEG, neuro following.    Patient assigned to me by night hospitalist in charge for management and care for patient for this evening only. Care to be resumed by day hospitalist in the morning and thereafter.

## 2020-03-14 NOTE — H&P ADULT - PROBLEM SELECTOR PLAN 4
likely ERAZO  cont to monitor  check GGT likely ERAZO  cont to monitor  check GGT  obtain RUQ US possibly ERAZO given hx of DM, CAD  cont to monitor  check GGT  obtain RUQ US

## 2020-03-14 NOTE — H&P ADULT - PROBLEM SELECTOR PLAN 8
hold furosemide for one dose, give 1L NS and recheck Na and volume status in AM  can resume as needed  check TTE w/ bubble study c/w asa and atorvastatin

## 2020-03-14 NOTE — H&P ADULT - NSHPLABSRESULTS_GEN_ALL_CORE
Labs, imaging and EKG personally reviewed and interpreted by me. EKG sinus 50s, TWI V6, no acute ischemic changes, .                           10.2   7.54  )-----------( 328      ( 14 Mar 2020 14:48 )             31.8     03-14    128<L>  |  93<L>  |  25<H>  ----------------------------<  333<H>  5.3   |  21<L>  |  0.73    Ca    9.4      14 Mar 2020 14:48    TPro  8.5<H>  /  Alb  3.5  /  TBili  0.3  /  DBili  x   /  AST  91<H>  /  ALT  52<H>  /  AlkPhos  372<H>  14      PT/INR - ( 14 Mar 2020 14:48 )   PT: 11.3 sec;   INR: 0.98 ratio    PTT - ( 14 Mar 2020 14:48 )  PTT:20.6 sec    Urinalysis Basic - ( 14 Mar 2020 18:38 )  Color: Yellow / Appearance: Clear / S.025 / pH: x  Gluc: x / Ketone: Negative  / Bili: Negative / Urobili: Negative   Blood: x / Protein: Trace / Nitrite: Negative   Leuk Esterase: Large / RBC: 2 /hpf / WBC 31 /HPF   Sq Epi: x / Non Sq Epi: 2 / Bacteria: Few    < from: CT Head No Cont (20 @ 14:49) >    Multiple axial sections were performed from base of skull to vertex for contrast enhancement. Coronal and sagittal reconstructions were performed as well    Parenchymal volume loss and chronic microvascular ischemic changes are identified    There is no evidence acute hemorrhage mass or mass effect in the posterior fossa or supratentorial region.    Evaluation of the structures with the appropriate window appears unremarkable    Thickening of the surrounding bone is seen involving the left maxillary sinuses which is likely compatible chronic inflammatory change.    Both mastoid and middle ear regions appear clear.    Impression: No acute hemorrhage mass or mass effect.    < end of copied text >

## 2020-03-14 NOTE — HISTORY OF PRESENT ILLNESS
[Family Member] : family member [FreeTextEntry1] : follow-up after CVA [de-identified] : Patient is a 74 year old female with a past medical history as below who presents for follow-up after a CVA. She has noted issues with speech/difficulty communicating for the past week. She notes hiccups and nausea. Her daughter states she is able to ambulate and drive. Patient has been taking Aspirin 81mg daily. Patient's daughter inquires about a referral to a neurologist. The patient knows what she wants tosay, but different words come out (speech apraxia).

## 2020-03-15 ENCOUNTER — TRANSCRIPTION ENCOUNTER (OUTPATIENT)
Age: 75
End: 2020-03-15

## 2020-03-15 DIAGNOSIS — Z02.9 ENCOUNTER FOR ADMINISTRATIVE EXAMINATIONS, UNSPECIFIED: ICD-10-CM

## 2020-03-15 DIAGNOSIS — G93.40 ENCEPHALOPATHY, UNSPECIFIED: ICD-10-CM

## 2020-03-15 DIAGNOSIS — R82.71 BACTERIURIA: ICD-10-CM

## 2020-03-15 DIAGNOSIS — N39.0 URINARY TRACT INFECTION, SITE NOT SPECIFIED: ICD-10-CM

## 2020-03-15 LAB
25(OH)D3 SERPL-MCNC: 15.9 NG/ML
ALBUMIN SERPL ELPH-MCNC: 3.4 G/DL — SIGNIFICANT CHANGE UP (ref 3.3–5)
ALBUMIN SERPL ELPH-MCNC: 3.5 G/DL — SIGNIFICANT CHANGE UP (ref 3.3–5)
ALBUMIN SERPL ELPH-MCNC: 3.9 G/DL
ALP BLD-CCNC: 365 U/L
ALP SERPL-CCNC: 347 U/L — HIGH (ref 40–120)
ALP SERPL-CCNC: 387 U/L — HIGH (ref 40–120)
ALT FLD-CCNC: 50 U/L — HIGH (ref 10–45)
ALT FLD-CCNC: 53 U/L — HIGH (ref 10–45)
ALT SERPL-CCNC: 49 U/L
ANION GAP SERPL CALC-SCNC: 13 MMOL/L — SIGNIFICANT CHANGE UP (ref 5–17)
ANION GAP SERPL CALC-SCNC: 15 MMOL/L
ANION GAP SERPL CALC-SCNC: 18 MMOL/L — HIGH (ref 5–17)
APTT BLD: 31.5 SEC — SIGNIFICANT CHANGE UP (ref 27.5–36.3)
AST SERPL-CCNC: 52 U/L
AST SERPL-CCNC: 59 U/L — HIGH (ref 10–40)
AST SERPL-CCNC: 66 U/L — HIGH (ref 10–40)
BASE EXCESS BLDV CALC-SCNC: -2.2 MMOL/L — LOW (ref -2–2)
BASOPHILS # BLD AUTO: 0.02 K/UL
BASOPHILS # BLD AUTO: 0.02 K/UL — SIGNIFICANT CHANGE UP (ref 0–0.2)
BASOPHILS # BLD AUTO: 0.02 K/UL — SIGNIFICANT CHANGE UP (ref 0–0.2)
BASOPHILS NFR BLD AUTO: 0.3 %
BASOPHILS NFR BLD AUTO: 0.3 % — SIGNIFICANT CHANGE UP (ref 0–2)
BASOPHILS NFR BLD AUTO: 0.3 % — SIGNIFICANT CHANGE UP (ref 0–2)
BILIRUB SERPL-MCNC: 0.3 MG/DL
BILIRUB SERPL-MCNC: 0.3 MG/DL — SIGNIFICANT CHANGE UP (ref 0.2–1.2)
BILIRUB SERPL-MCNC: 0.4 MG/DL — SIGNIFICANT CHANGE UP (ref 0.2–1.2)
BUN SERPL-MCNC: 19 MG/DL — SIGNIFICANT CHANGE UP (ref 7–23)
BUN SERPL-MCNC: 20 MG/DL — SIGNIFICANT CHANGE UP (ref 7–23)
BUN SERPL-MCNC: 30 MG/DL
CA-I SERPL-SCNC: 1.17 MMOL/L — SIGNIFICANT CHANGE UP (ref 1.12–1.3)
CALCIUM SERPL-MCNC: 9 MG/DL — SIGNIFICANT CHANGE UP (ref 8.4–10.5)
CALCIUM SERPL-MCNC: 9.2 MG/DL — SIGNIFICANT CHANGE UP (ref 8.4–10.5)
CALCIUM SERPL-MCNC: 9.5 MG/DL
CHLORIDE BLDV-SCNC: 101 MMOL/L — SIGNIFICANT CHANGE UP (ref 96–108)
CHLORIDE SERPL-SCNC: 92 MMOL/L
CHLORIDE SERPL-SCNC: 95 MMOL/L — LOW (ref 96–108)
CHLORIDE SERPL-SCNC: 95 MMOL/L — LOW (ref 96–108)
CHOLEST SERPL-MCNC: 176 MG/DL
CHOLEST/HDLC SERPL: 2.8 RATIO
CO2 BLDV-SCNC: 22 MMOL/L — SIGNIFICANT CHANGE UP (ref 22–30)
CO2 SERPL-SCNC: 17 MMOL/L — LOW (ref 22–31)
CO2 SERPL-SCNC: 21 MMOL/L — LOW (ref 22–31)
CO2 SERPL-SCNC: 22 MMOL/L
CREAT SERPL-MCNC: 0.7 MG/DL — SIGNIFICANT CHANGE UP (ref 0.5–1.3)
CREAT SERPL-MCNC: 0.79 MG/DL — SIGNIFICANT CHANGE UP (ref 0.5–1.3)
CREAT SERPL-MCNC: 0.84 MG/DL
EOSINOPHIL # BLD AUTO: 0.02 K/UL — SIGNIFICANT CHANGE UP (ref 0–0.5)
EOSINOPHIL # BLD AUTO: 0.06 K/UL
EOSINOPHIL # BLD AUTO: 0.06 K/UL — SIGNIFICANT CHANGE UP (ref 0–0.5)
EOSINOPHIL NFR BLD AUTO: 0.3 % — SIGNIFICANT CHANGE UP (ref 0–6)
EOSINOPHIL NFR BLD AUTO: 0.8 %
EOSINOPHIL NFR BLD AUTO: 0.8 % — SIGNIFICANT CHANGE UP (ref 0–6)
ESTIMATED AVERAGE GLUCOSE: >398 MG/DL
GAS PNL BLDV: 132 MMOL/L — LOW (ref 135–145)
GAS PNL BLDV: SIGNIFICANT CHANGE UP
GGT SERPL-CCNC: 865 U/L — HIGH (ref 8–40)
GLUCOSE BLDC GLUCOMTR-MCNC: 230 MG/DL — HIGH (ref 70–99)
GLUCOSE BLDC GLUCOMTR-MCNC: 244 MG/DL — HIGH (ref 70–99)
GLUCOSE BLDC GLUCOMTR-MCNC: 289 MG/DL — HIGH (ref 70–99)
GLUCOSE BLDC GLUCOMTR-MCNC: 374 MG/DL — HIGH (ref 70–99)
GLUCOSE BLDC GLUCOMTR-MCNC: 413 MG/DL — HIGH (ref 70–99)
GLUCOSE BLDC GLUCOMTR-MCNC: 425 MG/DL — HIGH (ref 70–99)
GLUCOSE BLDV-MCNC: 430 MG/DL — HIGH (ref 70–99)
GLUCOSE SERPL-MCNC: 346 MG/DL
GLUCOSE SERPL-MCNC: 379 MG/DL — HIGH (ref 70–99)
GLUCOSE SERPL-MCNC: 450 MG/DL — CRITICAL HIGH (ref 70–99)
HBA1C MFR BLD HPLC: >15.5 %
HCO3 BLDV-SCNC: 22 MMOL/L — SIGNIFICANT CHANGE UP (ref 21–29)
HCT VFR BLD CALC: 31.3 % — LOW (ref 34.5–45)
HCT VFR BLD CALC: 33.8 % — LOW (ref 34.5–45)
HCT VFR BLD CALC: 36.4 %
HCT VFR BLDA CALC: 33 % — LOW (ref 39–50)
HCV AB S/CO SERPL IA: 0.23 S/CO — SIGNIFICANT CHANGE UP (ref 0–0.99)
HCV AB SERPL-IMP: SIGNIFICANT CHANGE UP
HDLC SERPL-MCNC: 63 MG/DL
HGB BLD CALC-MCNC: 10.8 G/DL — LOW (ref 11.5–15.5)
HGB BLD-MCNC: 10.5 G/DL — LOW (ref 11.5–15.5)
HGB BLD-MCNC: 11.1 G/DL
HGB BLD-MCNC: 9.8 G/DL — LOW (ref 11.5–15.5)
IMM GRANULOCYTES NFR BLD AUTO: 0.4 % — SIGNIFICANT CHANGE UP (ref 0–1.5)
IMM GRANULOCYTES NFR BLD AUTO: 0.5 %
IMM GRANULOCYTES NFR BLD AUTO: 0.8 % — SIGNIFICANT CHANGE UP (ref 0–1.5)
INR BLD: 1.03 RATIO — SIGNIFICANT CHANGE UP (ref 0.88–1.16)
LACTATE BLDV-MCNC: 3.4 MMOL/L — HIGH (ref 0.7–2)
LDLC SERPL CALC-MCNC: 63 MG/DL
LYMPHOCYTES # BLD AUTO: 1.18 K/UL — SIGNIFICANT CHANGE UP (ref 1–3.3)
LYMPHOCYTES # BLD AUTO: 1.46 K/UL — SIGNIFICANT CHANGE UP (ref 1–3.3)
LYMPHOCYTES # BLD AUTO: 15.3 % — SIGNIFICANT CHANGE UP (ref 13–44)
LYMPHOCYTES # BLD AUTO: 19.8 % — SIGNIFICANT CHANGE UP (ref 13–44)
LYMPHOCYTES # BLD AUTO: 2.15 K/UL
LYMPHOCYTES NFR BLD AUTO: 28.8 %
MAGNESIUM SERPL-MCNC: 1.6 MG/DL — SIGNIFICANT CHANGE UP (ref 1.6–2.6)
MAGNESIUM SERPL-MCNC: 1.7 MG/DL — SIGNIFICANT CHANGE UP (ref 1.6–2.6)
MAN DIFF?: NORMAL
MCHC RBC-ENTMCNC: 25.9 PG — LOW (ref 27–34)
MCHC RBC-ENTMCNC: 26.2 PG
MCHC RBC-ENTMCNC: 26.2 PG — LOW (ref 27–34)
MCHC RBC-ENTMCNC: 30.5 GM/DL
MCHC RBC-ENTMCNC: 31.1 GM/DL — LOW (ref 32–36)
MCHC RBC-ENTMCNC: 31.3 GM/DL — LOW (ref 32–36)
MCV RBC AUTO: 82.6 FL — SIGNIFICANT CHANGE UP (ref 80–100)
MCV RBC AUTO: 84.3 FL — SIGNIFICANT CHANGE UP (ref 80–100)
MCV RBC AUTO: 86.1 FL
MONOCYTES # BLD AUTO: 0.3 K/UL — SIGNIFICANT CHANGE UP (ref 0–0.9)
MONOCYTES # BLD AUTO: 0.52 K/UL — SIGNIFICANT CHANGE UP (ref 0–0.9)
MONOCYTES # BLD AUTO: 0.6 K/UL
MONOCYTES NFR BLD AUTO: 3.9 % — SIGNIFICANT CHANGE UP (ref 2–14)
MONOCYTES NFR BLD AUTO: 7.1 % — SIGNIFICANT CHANGE UP (ref 2–14)
MONOCYTES NFR BLD AUTO: 8 %
NEUTROPHILS # BLD AUTO: 4.6 K/UL
NEUTROPHILS # BLD AUTO: 5.28 K/UL — SIGNIFICANT CHANGE UP (ref 1.8–7.4)
NEUTROPHILS # BLD AUTO: 6.14 K/UL — SIGNIFICANT CHANGE UP (ref 1.8–7.4)
NEUTROPHILS NFR BLD AUTO: 61.6 %
NEUTROPHILS NFR BLD AUTO: 71.6 % — SIGNIFICANT CHANGE UP (ref 43–77)
NEUTROPHILS NFR BLD AUTO: 79.4 % — HIGH (ref 43–77)
NRBC # BLD: 0 /100 WBCS — SIGNIFICANT CHANGE UP (ref 0–0)
NRBC # BLD: 0 /100 WBCS — SIGNIFICANT CHANGE UP (ref 0–0)
OTHER CELLS CSF MANUAL: 11 ML/DL — LOW (ref 18–22)
PCO2 BLDV: 35 MMHG — SIGNIFICANT CHANGE UP (ref 35–50)
PH BLDV: 7.41 — SIGNIFICANT CHANGE UP (ref 7.35–7.45)
PHOSPHATE SERPL-MCNC: 3.6 MG/DL — SIGNIFICANT CHANGE UP (ref 2.5–4.5)
PHOSPHATE SERPL-MCNC: 4.1 MG/DL — SIGNIFICANT CHANGE UP (ref 2.5–4.5)
PLATELET # BLD AUTO: 330 K/UL — SIGNIFICANT CHANGE UP (ref 150–400)
PLATELET # BLD AUTO: 351 K/UL — SIGNIFICANT CHANGE UP (ref 150–400)
PLATELET # BLD AUTO: 410 K/UL
PO2 BLDV: 42 MMHG — SIGNIFICANT CHANGE UP (ref 25–45)
POTASSIUM BLDV-SCNC: 4.5 MMOL/L — SIGNIFICANT CHANGE UP (ref 3.5–5.3)
POTASSIUM SERPL-MCNC: 4.2 MMOL/L — SIGNIFICANT CHANGE UP (ref 3.5–5.3)
POTASSIUM SERPL-MCNC: 4.3 MMOL/L — SIGNIFICANT CHANGE UP (ref 3.5–5.3)
POTASSIUM SERPL-SCNC: 4.2 MMOL/L — SIGNIFICANT CHANGE UP (ref 3.5–5.3)
POTASSIUM SERPL-SCNC: 4.3 MMOL/L — SIGNIFICANT CHANGE UP (ref 3.5–5.3)
POTASSIUM SERPL-SCNC: 5.5 MMOL/L
PROT SERPL-MCNC: 8.1 G/DL — SIGNIFICANT CHANGE UP (ref 6–8.3)
PROT SERPL-MCNC: 8.5 G/DL — HIGH (ref 6–8.3)
PROT SERPL-MCNC: 8.7 G/DL
PROTHROM AB SERPL-ACNC: 11.8 SEC — SIGNIFICANT CHANGE UP (ref 10–12.9)
RBC # BLD: 3.79 M/UL — LOW (ref 3.8–5.2)
RBC # BLD: 4.01 M/UL — SIGNIFICANT CHANGE UP (ref 3.8–5.2)
RBC # BLD: 4.23 M/UL
RBC # FLD: 15 % — HIGH (ref 10.3–14.5)
RBC # FLD: 15.3 % — HIGH (ref 10.3–14.5)
RBC # FLD: 16.2 %
SAO2 % BLDV: 73 % — SIGNIFICANT CHANGE UP (ref 67–88)
SODIUM SERPL-SCNC: 129 MMOL/L
SODIUM SERPL-SCNC: 129 MMOL/L — LOW (ref 135–145)
SODIUM SERPL-SCNC: 130 MMOL/L — LOW (ref 135–145)
TRIGL SERPL-MCNC: 253 MG/DL
TROPONIN T, HIGH SENSITIVITY RESULT: 13 NG/L — SIGNIFICANT CHANGE UP (ref 0–51)
TSH SERPL-ACNC: 1.92 UIU/ML
WBC # BLD: 7.37 K/UL — SIGNIFICANT CHANGE UP (ref 3.8–10.5)
WBC # BLD: 7.72 K/UL — SIGNIFICANT CHANGE UP (ref 3.8–10.5)
WBC # FLD AUTO: 7.37 K/UL — SIGNIFICANT CHANGE UP (ref 3.8–10.5)
WBC # FLD AUTO: 7.47 K/UL
WBC # FLD AUTO: 7.72 K/UL — SIGNIFICANT CHANGE UP (ref 3.8–10.5)

## 2020-03-15 PROCEDURE — 99233 SBSQ HOSP IP/OBS HIGH 50: CPT | Mod: GC

## 2020-03-15 PROCEDURE — 93306 TTE W/DOPPLER COMPLETE: CPT | Mod: 26

## 2020-03-15 PROCEDURE — 70551 MRI BRAIN STEM W/O DYE: CPT | Mod: 26

## 2020-03-15 PROCEDURE — 70547 MR ANGIOGRAPHY NECK W/O DYE: CPT | Mod: 26

## 2020-03-15 PROCEDURE — 99291 CRITICAL CARE FIRST HOUR: CPT | Mod: 25

## 2020-03-15 PROCEDURE — 99223 1ST HOSP IP/OBS HIGH 75: CPT

## 2020-03-15 PROCEDURE — 93010 ELECTROCARDIOGRAM REPORT: CPT

## 2020-03-15 RX ORDER — ALPRAZOLAM 0.25 MG
0.5 TABLET ORAL ONCE
Refills: 0 | Status: DISCONTINUED | OUTPATIENT
Start: 2020-03-15 | End: 2020-03-15

## 2020-03-15 RX ORDER — SODIUM CHLORIDE 9 MG/ML
1000 INJECTION, SOLUTION INTRAVENOUS
Refills: 0 | Status: DISCONTINUED | OUTPATIENT
Start: 2020-03-15 | End: 2020-03-31

## 2020-03-15 RX ORDER — SODIUM CHLORIDE 9 MG/ML
1000 INJECTION INTRAMUSCULAR; INTRAVENOUS; SUBCUTANEOUS
Refills: 0 | Status: DISCONTINUED | OUTPATIENT
Start: 2020-03-15 | End: 2020-03-15

## 2020-03-15 RX ORDER — DEXTROSE 50 % IN WATER 50 %
25 SYRINGE (ML) INTRAVENOUS ONCE
Refills: 0 | Status: DISCONTINUED | OUTPATIENT
Start: 2020-03-15 | End: 2020-03-15

## 2020-03-15 RX ORDER — INSULIN LISPRO 100/ML
VIAL (ML) SUBCUTANEOUS AT BEDTIME
Refills: 0 | Status: DISCONTINUED | OUTPATIENT
Start: 2020-03-15 | End: 2020-03-31

## 2020-03-15 RX ORDER — ALPRAZOLAM 0.25 MG
0.25 TABLET ORAL ONCE
Refills: 0 | Status: DISCONTINUED | OUTPATIENT
Start: 2020-03-15 | End: 2020-03-15

## 2020-03-15 RX ORDER — DEXTROSE 50 % IN WATER 50 %
15 SYRINGE (ML) INTRAVENOUS ONCE
Refills: 0 | Status: DISCONTINUED | OUTPATIENT
Start: 2020-03-15 | End: 2020-03-31

## 2020-03-15 RX ORDER — DEXTROSE 50 % IN WATER 50 %
25 SYRINGE (ML) INTRAVENOUS ONCE
Refills: 0 | Status: DISCONTINUED | OUTPATIENT
Start: 2020-03-15 | End: 2020-03-31

## 2020-03-15 RX ORDER — LEVETIRACETAM 250 MG/1
1500 TABLET, FILM COATED ORAL ONCE
Refills: 0 | Status: COMPLETED | OUTPATIENT
Start: 2020-03-15 | End: 2020-03-15

## 2020-03-15 RX ORDER — LEVETIRACETAM 250 MG/1
1000 TABLET, FILM COATED ORAL ONCE
Refills: 0 | Status: DISCONTINUED | OUTPATIENT
Start: 2020-03-15 | End: 2020-03-15

## 2020-03-15 RX ORDER — INSULIN GLARGINE 100 [IU]/ML
10 INJECTION, SOLUTION SUBCUTANEOUS AT BEDTIME
Refills: 0 | Status: DISCONTINUED | OUTPATIENT
Start: 2020-03-15 | End: 2020-03-17

## 2020-03-15 RX ORDER — LEVETIRACETAM 250 MG/1
750 TABLET, FILM COATED ORAL EVERY 12 HOURS
Refills: 0 | Status: DISCONTINUED | OUTPATIENT
Start: 2020-03-15 | End: 2020-03-23

## 2020-03-15 RX ORDER — ACYCLOVIR SODIUM 500 MG
700 VIAL (EA) INTRAVENOUS EVERY 8 HOURS
Refills: 0 | Status: DISCONTINUED | OUTPATIENT
Start: 2020-03-16 | End: 2020-03-26

## 2020-03-15 RX ORDER — DEXTROSE 50 % IN WATER 50 %
12.5 SYRINGE (ML) INTRAVENOUS ONCE
Refills: 0 | Status: DISCONTINUED | OUTPATIENT
Start: 2020-03-15 | End: 2020-03-15

## 2020-03-15 RX ORDER — DEXTROSE 50 % IN WATER 50 %
15 SYRINGE (ML) INTRAVENOUS ONCE
Refills: 0 | Status: DISCONTINUED | OUTPATIENT
Start: 2020-03-15 | End: 2020-03-15

## 2020-03-15 RX ORDER — GLUCAGON INJECTION, SOLUTION 0.5 MG/.1ML
1 INJECTION, SOLUTION SUBCUTANEOUS ONCE
Refills: 0 | Status: DISCONTINUED | OUTPATIENT
Start: 2020-03-15 | End: 2020-03-31

## 2020-03-15 RX ORDER — GLUCAGON INJECTION, SOLUTION 0.5 MG/.1ML
1 INJECTION, SOLUTION SUBCUTANEOUS ONCE
Refills: 0 | Status: DISCONTINUED | OUTPATIENT
Start: 2020-03-15 | End: 2020-03-15

## 2020-03-15 RX ORDER — INSULIN LISPRO 100/ML
VIAL (ML) SUBCUTANEOUS EVERY 6 HOURS
Refills: 0 | Status: DISCONTINUED | OUTPATIENT
Start: 2020-03-15 | End: 2020-03-15

## 2020-03-15 RX ORDER — CEFTRIAXONE 500 MG/1
1000 INJECTION, POWDER, FOR SOLUTION INTRAMUSCULAR; INTRAVENOUS EVERY 24 HOURS
Refills: 0 | Status: COMPLETED | OUTPATIENT
Start: 2020-03-15 | End: 2020-03-17

## 2020-03-15 RX ORDER — ACYCLOVIR SODIUM 500 MG
VIAL (EA) INTRAVENOUS
Refills: 0 | Status: DISCONTINUED | OUTPATIENT
Start: 2020-03-15 | End: 2020-03-26

## 2020-03-15 RX ORDER — ACYCLOVIR SODIUM 500 MG
700 VIAL (EA) INTRAVENOUS ONCE
Refills: 0 | Status: COMPLETED | OUTPATIENT
Start: 2020-03-15 | End: 2020-03-15

## 2020-03-15 RX ORDER — SODIUM CHLORIDE 9 MG/ML
1000 INJECTION, SOLUTION INTRAVENOUS
Refills: 0 | Status: DISCONTINUED | OUTPATIENT
Start: 2020-03-15 | End: 2020-03-15

## 2020-03-15 RX ORDER — DEXTROSE 50 % IN WATER 50 %
12.5 SYRINGE (ML) INTRAVENOUS ONCE
Refills: 0 | Status: DISCONTINUED | OUTPATIENT
Start: 2020-03-15 | End: 2020-03-31

## 2020-03-15 RX ORDER — INSULIN LISPRO 100/ML
VIAL (ML) SUBCUTANEOUS
Refills: 0 | Status: DISCONTINUED | OUTPATIENT
Start: 2020-03-15 | End: 2020-03-31

## 2020-03-15 RX ADMIN — Medication 4: at 23:34

## 2020-03-15 RX ADMIN — CEFTRIAXONE 100 MILLIGRAM(S): 500 INJECTION, POWDER, FOR SOLUTION INTRAMUSCULAR; INTRAVENOUS at 09:53

## 2020-03-15 RX ADMIN — LEVETIRACETAM 400 MILLIGRAM(S): 250 TABLET, FILM COATED ORAL at 22:31

## 2020-03-15 RX ADMIN — Medication 4: at 12:23

## 2020-03-15 RX ADMIN — Medication 0.5 MILLIGRAM(S): at 05:14

## 2020-03-15 RX ADMIN — Medication 0.25 MILLIGRAM(S): at 13:37

## 2020-03-15 RX ADMIN — Medication 2: at 17:15

## 2020-03-15 RX ADMIN — Medication 5: at 05:53

## 2020-03-15 RX ADMIN — INSULIN GLARGINE 10 UNIT(S): 100 INJECTION, SOLUTION SUBCUTANEOUS at 23:34

## 2020-03-15 RX ADMIN — Medication 25 MILLIGRAM(S): at 05:14

## 2020-03-15 RX ADMIN — PANTOPRAZOLE SODIUM 40 MILLIGRAM(S): 20 TABLET, DELAYED RELEASE ORAL at 05:14

## 2020-03-15 RX ADMIN — Medication 264 MILLIGRAM(S): at 23:34

## 2020-03-15 RX ADMIN — Medication 3: at 00:10

## 2020-03-15 NOTE — PROGRESS NOTE ADULT - PROBLEM SELECTOR PLAN 4
- UA pos, unable to elicit ROS, pe conversation with sister patient had been complaining of urinary burning will tx with ceftriaxone x 3 days Complicated. Contributory to mental status?  - UA pos, unable to elicit ROS, pe conversation with sister patient had been complaining of urinary burning will tx with ceftriaxone

## 2020-03-15 NOTE — CHART NOTE - NSCHARTNOTEFT_GEN_A_CORE
Please see RRT sheet for detailed assessment and plan. I personally provided  35 minutes for critical care time for  acute change in mental state and Seizure , evaluated with exam , cardiac monitor, stat labs,  treated with IV Keppra. Case discussed with primary team and family notified , Neurology consulted.

## 2020-03-15 NOTE — PHYSICAL THERAPY INITIAL EVALUATION ADULT - IMPAIRMENTS FOUND, PT EVAL
cognitive impairment/gait, locomotion, and balance/muscle strength/arousal, attention, and cognition

## 2020-03-15 NOTE — DISCHARGE NOTE PROVIDER - NSFOLLOWUPCLINICS_GEN_ALL_ED_FT
[facility]  Hematology/Oncology  [address] [id number]  Phone: [phone]  Fax:   Follow Up Time:

## 2020-03-15 NOTE — DISCHARGE NOTE PROVIDER - CARE PROVIDER_API CALL
Robin Villegas)  Internal Medicine  [address] [place] [address]  Phone: [phone]  Fax: [phone]  Follow Up Time:     Wagner Mendieta)  Neurology  [address] [place] [address]  Phone: [phone]  Fax: [phone]  Follow Up Time:

## 2020-03-15 NOTE — PROGRESS NOTE ADULT - ATTENDING COMMENTS
Patient seen and examined with housestaff on rounds - does not verbalize more than "yes" - can mimic commands - raises UE when examiner raises UE. Does not follow verbal commands. Widened palpebral fissure on the right.  Moves and withdraws UE and LE symmetrically    Both expressive and receptive aphasia - with a widened right palpebral fissure.  MRI today.  EEG. Patient seen and examined today. I agree with the above findings, assessment, and plan with the following additions and exceptions:     Neurology consult greatly appreciated.  MRI brain, MRA head and neck, and EEG pending.  Hold asa pending final MR reads.   Change from low SSi to moderate SSI. Dose lantus tonight. Na will improve with correction of hyperglycemia. Encourage PO intake.   Rest of plan as above.     Dr. Luther Crenshaw, DO  Attending Physician  Division of Hospital Medicine  [facility]  Pager:  [phone]

## 2020-03-15 NOTE — PROGRESS NOTE ADULT - PROBLEM SELECTOR PLAN 10
ISTOP: Reference #: [id number]; last alprazolam filled in 2014  DVT ppx: lovenox qdaily  Diet: NPO except meds until further recommendations from speech and swallow team  Dispo: pending PT and speech therapy consult    Miriam Jones PGY-3  Pager # [phone]/ [phone]    1.  Name of PCP: Felipe Manuel   2.  PCP Contacted on Admission: [ ] Y    [ ] N    3.  PCP contacted at Discharge: [ ] Y    [ ] N    [ ] N/A  4.  Post-Discharge Appointment Date and Location:  5.  Summary of Handoff given to PCP:

## 2020-03-15 NOTE — PROGRESS NOTE ADULT - PROBLEM SELECTOR PLAN 7
check Hga1c in AM  - will consider adding long acting insulin depending on FS   c/w JOCELYNE and monitor FS ac and hs   hold home metformin and dapagliflozin check Hga1c    - will consider adding long acting insulin depending on FS   c/w JOCELYNE and monitor FS ac and hs   hold home metformin and dapagliflozin

## 2020-03-15 NOTE — DISCHARGE NOTE PROVIDER - NSDCCPCAREPLAN_GEN_ALL_CORE_FT
PRINCIPAL DISCHARGE DIAGNOSIS  Diagnosis: Aphasia  Assessment and Plan of Treatment: PRINCIPAL DISCHARGE DIAGNOSIS  Diagnosis: Aphasia  Assessment and Plan of Treatment: You had aphasia, which means that you had difficulty finding words when speaking. You had imaging of your head, which was negative for any processes. You had a lumbar puncture to examine the fluid in your brain. You were started on a medication that combats viruses, and your symptoms gradually improved.      SECONDARY DISCHARGE DIAGNOSES  Diagnosis: Seizure  Assessment and Plan of Treatment: You had a seizure during your hospital stay. You were given medications to prevent further seizures.

## 2020-03-15 NOTE — PHYSICAL THERAPY INITIAL EVALUATION ADULT - DIAGNOSIS, PT EVAL
pt is aphasic w/speech impairment has decreased activity tolerance, decreased functional mobility capacity, decreased strength, impaired balance

## 2020-03-15 NOTE — PROGRESS NOTE ADULT - PROBLEM SELECTOR PLAN 9
hold furosemide for now  can resume as needed once improved volume status   check TTE w/ bubble study

## 2020-03-15 NOTE — SWALLOW BEDSIDE ASSESSMENT ADULT - SLP PERTINENT HISTORY OF CURRENT PROBLEM
74F with T2DM, systolic HF, CABG presents with HA x 1 day and aphasia for a 1 week. Per dtr, Pt lives with sister and initially noted pt was only answering in yes/no format. Later that week noted nonsensical speech pattern. Per daughter it appeared that patient's cognitive function was intact and was still able to follow commands. Had been ambulating well and otherwise keeping up her regular routine. Pt was referred to outpt neurology by PCP. On day of admission pt started pointing to her head and saying "headache," which prompted ED visit. Per dtr, pt has hx of headaches, unclear if migraine. Headache now resolved. Daughter mentioned that patient takes alprazolam 0.5 mg prior to blood draws due to extreme fear and panic. Of note, pt has hx of 3 prior episode of word finding difficulties lasting approx 15-20 minutes. Admit with possible subacute CVA vs less likely focal seizures. UA pos however pt denies urinary sxs; hold off on abx. Pending MRI. 24hr vEEG. c/w asa and atorvastatin.

## 2020-03-15 NOTE — SWALLOW BEDSIDE ASSESSMENT ADULT - SLP GENERAL OBSERVATIONS
Pt encountered asleep, reclined in bed. Alerted to verbal stimuli. Daughter at bedside. Prior to evaluation, daughter provided SLP with history and indicated "pt is stubborn and not likely to participate with evaluation." Pt appeared A&Ox2. Unable to verbalize name or name of hospital, answers "No" to most questions. Demonstrated difficulty answering yes/no questions; however, difficult to discern if due to comprehension or expression deficit. Pt did not follow commands for oral motor exam. Per daughter, pt has good comprehension and doesn't follow commands due to stubbornness. Voice judged WNL. Pt speech characterized with phonemic and semantic paraphasias. See full speech-language evaluation for more detail.

## 2020-03-15 NOTE — DISCHARGE NOTE PROVIDER - NSDCMRMEDTOKEN_GEN_ALL_CORE_FT
ALPRAZolam 0.25 mg oral tablet: 1 tab(s) orally prior to blood draws  aspirin 325 mg oral delayed release tablet: 1 tab(s) orally once a day  atorvastatin 40 mg oral tablet: 1 tab(s) orally once a day (at bedtime)  Farxiga 10 mg oral tablet: 1 tab(s) orally once a day  furosemide 40 mg oral tablet: 1 tab(s) orally once a day  metFORMIN 500 mg oral tablet: 1 tab(s) orally 2 times a day  metoprolol tartrate 25 mg oral tablet: 1 tab(s) orally once a day  pantoprazole 40 mg oral delayed release tablet: 1 tab(s) orally once a day (before a meal) ALPRAZolam 0.25 mg oral tablet: 1 tab(s) orally prior to blood draws  amLODIPine 5 mg oral tablet: 1 tab(s) orally once a day  aspirin 325 mg oral delayed release tablet: 1 tab(s) orally once a day  atorvastatin 10 mg oral tablet: 1 tab(s) orally once a day (at bedtime)  Farxiga 10 mg oral tablet: 1 tab(s) orally once a day  furosemide 40 mg oral tablet: 1 tab(s) orally once a day  levETIRAcetam 750 mg oral tablet: 1 tab(s) orally 2 times a day  metFORMIN 500 mg oral tablet: 1 tab(s) orally 2 times a day  metoprolol tartrate 25 mg oral tablet: 1 tab(s) orally once a day  pantoprazole 40 mg oral delayed release tablet: 1 tab(s) orally once a day (before a meal)  ursodiol 500 mg oral tablet: 1 tab(s) orally every 12 hours

## 2020-03-15 NOTE — DISCHARGE NOTE PROVIDER - HOSPITAL COURSE
HPI:        Patient is 74F with T2DM, systolic HF, CABG presents with HA x 1 day and aphasia for a 1 week. History obtained from daughter, as pt unable to provide 2/2 aphasia. Pt lives with sister and dtr initially noted during a phone conversation approx one week ago that pt was only answering in yes/no format. She saw her later in the week and noted nonsensical speech pattern. Per daughter it appeared that patient's cognitive function was intact and was still able to follow commands, however she was having difficulty speak the words. Pt has been ambulating well and has otherwise been keeping up her regualr routine - she drove to [employer] and grocery store yesterday without issue. Dtr took pt to PMDs office and was referred to outpt neurology. On day of admission pt started pointing to her head and saying "headache," which prompted ED visit. Per daughter pt has hx of headaches, unclear if migraine. Daughter denied any grand mal seizure activity, syncope, chest pain, urinary sxs, fever, cough, sob. Currently pt denies headache, blurred vision, focal numbness/weakness. Daughter mentioned that patient takes alprazolam 0.5 mg prior to blood draws due to extreme fear and panic.    Of note, pt has hx of 3 prior episode of word finding difficulties lasting approx 15-20 minutes.        Hospitalization course:  During hospitalization stay patient had CT head that was negative for an acute finding. Neurology evaluated patient and recommended to obtain MRI brain w/wo contrast, MRA head w/o contrast, and MRA neck w contrast as well as a video EEG. Patient was evaluated by S&S and recommended a soft diet. Patient was noted to have a + U/A, per family patient had been endorsing urinary symptoms prior, so she was started on ceftriaxone. HPI:        Patient is 74F with T2DM, systolic HF, CABG presents with HA x 1 day and aphasia for a 1 week. History obtained from daughter, as pt unable to provide 2/2 aphasia. Pt lives with sister and dtr initially noted during a phone conversation approx one week ago that pt was only answering in yes/no format. She saw her later in the week and noted nonsensical speech pattern. Per daughter it appeared that patient's cognitive function was intact and was still able to follow commands, however she was having difficulty speak the words. Pt has been ambulating well and has otherwise been keeping up her regualr routine - she drove to [place] and grocery store yesterday without issue. Dtr took pt to PMDs office and was referred to outpt neurology. On day of admission pt started pointing to her head and saying "headache," which prompted ED visit. Per daughter pt has hx of headaches, unclear if migraine. Daughter denied any grand mal seizure activity, syncope, chest pain, urinary sxs, fever, cough, sob. Currently pt denies headache, blurred vision, focal numbness/weakness. Daughter mentioned that patient takes alprazolam 0.5 mg prior to blood draws due to extreme fear and panic.    Of note, pt has hx of 3 prior episode of word finding difficulties lasting approx 15-20 minutes.        Hospitalization course:  During hospitalization stay patient had CT head that was negative for an acute finding. Neurology evaluated patient and recommended to obtain MRI brain w/wo contrast, MRA head w/o contrast, and MRA neck w contrast as well as a video EEG. Patient was evaluated by S&S and recommended a soft diet. Patient was noted to have a + U/A, per family patient had been endorsing urinary symptoms prior, so she was started on ceftriaxone.         RRT was called as pt had seizure lasting 45 seconds, resolved spontaneously. Suspicion for herpes encephalitis given distribution of pt's symptoms during seizure, so pt was started on acyclovir, with gradual improvement.She was started on keppra, repeat MRI head was unremarkable. Pt refused EEG. Lumbar puncture was performed, demonstrated _________________________. HPI:        Patient is 74F with T2DM, systolic HF, CABG presents with HA x 1 day and aphasia for a 1 week. History obtained from daughter, as pt unable to provide 2/2 aphasia. Pt lives with sister and dtr initially noted during a phone conversation approx one week ago that pt was only answering in yes/no format. She saw her later in the week and noted nonsensical speech pattern. Per daughter it appeared that patient's cognitive function was intact and was still able to follow commands, however she was having difficulty speak the words. Pt has been ambulating well and has otherwise been keeping up her regualr routine - she drove to [employer] and grocery store yesterday without issue. Dtr took pt to PMDs office and was referred to outpt neurology. On day of admission pt started pointing to her head and saying "headache," which prompted ED visit. Per daughter pt has hx of headaches, unclear if migraine. Daughter denied any grand mal seizure activity, syncope, chest pain, urinary sxs, fever, cough, sob. Currently pt denies headache, blurred vision, focal numbness/weakness. Daughter mentioned that patient takes alprazolam 0.5 mg prior to blood draws due to extreme fear and panic.    Of note, pt has hx of 3 prior episode of word finding difficulties lasting approx 15-20 minutes.        Hospitalization course:  During hospitalization stay patient had CT head that was negative for an acute finding. Neurology evaluated patient and recommended to obtain MRI brain w/wo contrast, MRA head w/o contrast, and MRA neck w contrast as well as a video EEG. Patient was evaluated by S&S and recommended a soft diet. Patient was noted to have a + U/A, per family patient had been endorsing urinary symptoms prior, so she was started on ceftriaxone.         RRT was called as pt had seizure lasting 45 seconds, resolved spontaneously. Suspicion for herpes encephalitis given distribution of pt's symptoms during seizure, so pt was started on acyclovir, with gradual improvement.She was started on keppra, repeat MRI head was unremarkable. Pt refused EEG. Lumbar puncture was performed, which was unremarkable. Patient completed 14 day course of acyclovir. Initially refusing IV line placement, psych consult demonstrating patient wo capacity. After sedating patient, IV line was placed by IV nurse at bedside. Patient found to have transaminitis and given positive AMA, hepatology recommending liver biopsy to characterize autoimmune hepatitis. IR guided liver biopsy scheduled for 3.30. HPI:        Patient is 74F with T2DM, systolic HF, CABG presents with HA x 1 day and aphasia for a 1 week. History obtained from daughter, as pt unable to provide 2/2 aphasia. Pt lives with sister and dtr initially noted during a phone conversation approx one week ago that pt was only answering in yes/no format. She saw her later in the week and noted nonsensical speech pattern. Per daughter it appeared that patient's cognitive function was intact and was still able to follow commands, however she was having difficulty speak the words. Pt has been ambulating well and has otherwise been keeping up her regualr routine - she drove to [employer] and grocery store yesterday without issue. Dtr took pt to PMDs office and was referred to outpt neurology. On day of admission pt started pointing to her head and saying "headache," which prompted ED visit. Per daughter pt has hx of headaches, unclear if migraine. Daughter denied any grand mal seizure activity, syncope, chest pain, urinary sxs, fever, cough, sob. Currently pt denies headache, blurred vision, focal numbness/weakness. Daughter mentioned that patient takes alprazolam 0.5 mg prior to blood draws due to extreme fear and panic.    Of note, pt has hx of 3 prior episode of word finding difficulties lasting approx 15-20 minutes.        Hospitalization course:  During hospitalization stay patient had CT head that was negative for an acute finding. Neurology evaluated patient and recommended to obtain MRI brain w/wo contrast, MRA head w/o contrast, and MRA neck w contrast as well as a video EEG. Patient was evaluated by S&S and recommended a soft diet. Patient was noted to have a + U/A, per family patient had been endorsing urinary symptoms prior, so she was started on ceftriaxone.         RRT was called as pt had seizure lasting 45 seconds, resolved spontaneously. Suspicion for herpes encephalitis given distribution of pt's symptoms during seizure, so pt was started on acyclovir, with gradual improvement.She was started on keppra, repeat MRI head was unremarkable. Pt refused EEG. Lumbar puncture was performed, which was unremarkable. Patient completed 14 day course of acyclovir. Initially refusing IV line placement, psych consult demonstrating patient wo capacity. After sedating patient, IV line was placed by IV nurse at bedside. Patient found to have transaminitis and given positive AMA, hepatology recommending liver biopsy to characterize autoimmune hepatitis. IR guided liver biopsy done for 3.30 and will follow up the results as an outpt with Dr. Naranjo.  She also had CT chest, abdomen and pelvis to r/o occult malignancy which only showed several < 5mm pulmonary nodules and spoke with oncology who said this can be followed with repeat imaging as an outpatient.

## 2020-03-15 NOTE — DISCHARGE NOTE PROVIDER - CARE PROVIDERS DIRECT ADDRESSES
[email or web address]

## 2020-03-15 NOTE — DISCHARGE NOTE PROVIDER - NSDCFUADDAPPT_GEN_ALL_CORE_FT
Schedule appointment with Dr. Villegas for results of Liver biopsy  Schedule appointment at [facility] to follow up pulmonary nodules and MGUS proteins  Schedule appointment with Neurology regarding when to come off of [facility]

## 2020-03-15 NOTE — SWALLOW BEDSIDE ASSESSMENT ADULT - COMMENTS
No overt, clinical s/s of aspiration/penetration No overt, clinical s/s of aspiration/penetration. However, during eval, pt refused soft textures. Accepted hard solids; however, pt baseline is soft due to dentition. Therefore, recommend soft texture diet given oral phase deficits with hard solids on this exam.

## 2020-03-15 NOTE — SPEECH LANGUAGE PATHOLOGY EVALUATION - SLP GENERAL OBSERVATIONS
Pt encountered asleep, reclined in bed. Alerted to verbal stimuli. Daughter at bedside. Prior to evaluation, daughter provided SLP with history and indicated "pt is stubborn and not likely to participate with evaluation." Pt appeared A&Ox2. Unable to verbalize name or name of hospital, answers "No" to most questions. Demonstrated difficulty answering yes/no questions; however, difficult to discern if due to comprehension or expression deficit. Pt did not follow commands for oral motor exam. Per daughter, pt has good comprehension and doesn't follow commands due to stubbornness. Voice judged WNL. Pt speech characterized with phonemic and semantic paraphasias.

## 2020-03-15 NOTE — SWALLOW BEDSIDE ASSESSMENT ADULT - SWALLOW EVAL: DIAGNOSIS
Pt seen for clinical bedside swallow evaluation s/p being admitted with disfluent speech, HA, possible subacute CVA. CTH negative, pending MRI. Daughter at bedside provided history. Today, patient presented with 1) oral phase dysphagia characterized by increased mastication time and decreased AP transport with post swallow lingual residue, which was cleared with liquid wash. No overt, clinical s/s of aspiration/penetration. Pt required soft textures at baseline due to dentition. 2) Aphasia (see speech-language evaluation for details).

## 2020-03-15 NOTE — RAPID RESPONSE TEAM SUMMARY - NSSITUATIONBACKGROUNDRRT_GEN_ALL_CORE
74F with T2DM, systolic HF, CABG admitted for aphasia, possible subacute CVA vs less likely focal seizures. RRT called for twitching of face and upper arms. Pt VSS T 97.3 /83 HR 81 RR 16 O2SAT 99% RA. FSBG 413. Pt was dysarthric, uncooperative with neuro exam, however pupils reactive, agitated, moving all four extremities, negative Babinski. No facial droop observed. Lung sounds were clear, mildly tachycardic. Labs including CBC cmp, cardiac enzymes and prolactin drawn. EKG was performed; similar to previous EKGs. Neurology called. Pt was again observed to have mouth twitching; loaded with 1.5g Keppra upon neurology recommendations. EEG ordered, family notified. 74F with T2DM, systolic HF, CABG admitted for aphasia, possible subacute CVA vs less likely focal seizures. RRT called for twitching of face and upper arms. Pt VSS T 97.3 /83 HR 81 RR 16 O2SAT 99% RA. FSBG 413. Pt was dysarthric, uncooperative with neuro exam, however pupils reactive, agitated, moving all four extremities, negative Babinski. No facial droop observed. Lung sounds were clear, mildly tachycardic. Labs including CBC cmp, cardiac enzymes and prolactin drawn. EKG was performed; similar to previous EKGs. Neurology called. Pt was again observed to have mouth twitching; loaded with 1.5g Keppra upon neurology recommendations. 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 EEG ordered, family notified.

## 2020-03-15 NOTE — CHART NOTE - NSCHARTNOTEFT_GEN_A_CORE
Briefly, 74F w/ PMH DMII, CHF, CABG presented w/ headache and intermittent aphasia for 1 week. MRI brain w/o contrast negative for CVA.     I was called to bedside as patient had another episode of intermittent aphasia. During my examination patient had a seizure for which I witnessed. Semiology: R. facial twitching, R. conjugate rhythmic eye movements, R. head version followed by (marching) R. arm jerking movements. Entire episode lasted ~45s before spontaneously resolving. Patient was minimally responsive during the episode, moving the left side spontaneously (non-rhythmic) but not following commands. She was not speaking after the episode but was tracking and following some commands.     The semiology of the seizure lateralizes to the L. hemisphere, possibly to the L. temporal lobe given the march from the face to the arm and aphasia. The new onset seizures and subacute aphasia are concerning for temporal lobe localization, most concerning being HSV encephalitis (despite no fever or WBC). The MRI does not demonstrate diffusion restriction, T2 enhancement, or +SWI, though this is not 100% sensitive.     Impression: Subacute aphasia, headache, new onset focal w/ preserved awareness seizures concerning for L. hemispheric dysfunction, possibly temporal lobe involvement. R/o HSV encephalitis. Doubt CVA given negative MRI brain.    Plan:   [] Load 1.5g IV Keppra  [] Start Keppra maintenance 750mg IV BID  [] Continuos EEG STAT  [] ID consult   [] LP - PRO, GLU, Cell count, PCR, HSV, crypto   [] If LP cannot be done tonight, please start Acyclovir for ppx coverage   [] If patient continues to seize through the Keppra, please obtain MICU consult to upgrade to ICU level of care for status epilepticus.  [] Cardiac tele  [] Continous spO2 Briefly, 74F w/ PMH DMII, CHF, CABG presented w/ headache and intermittent aphasia for 1 week. MRI brain w/o contrast negative for CVA.     I was called to bedside as patient had another episode of intermittent aphasia. During my examination patient had a seizure for which I witnessed. Semiology: R. facial twitching, R. conjugate rhythmic eye movements, R. head version followed by (marching) R. arm jerking movements. Entire episode lasted ~45s before spontaneously resolving. Patient was minimally responsive during the episode, moving the left side spontaneously (non-rhythmic) but not following commands. She was not speaking after the episode but was tracking and following some commands.     The semiology of the seizure lateralizes to the L. hemisphere, possibly to the L. temporal lobe given the march from the face to the arm and aphasia. The new onset seizures and subacute aphasia are concerning for temporal lobe localization, most concerning being HSV encephalitis (despite no fever or WBC). The MRI does not demonstrate diffusion restriction, T2 enhancement, or +SWI, though this is not 100% sensitive.     Impression: Subacute aphasia, headache, new onset focal w/ preserved awareness seizures concerning for L. hemispheric dysfunction, possibly temporal lobe involvement, of unclear etiology ----Ddx includes: provoked seizure 2/2 UTI and significant hyperglycemia, though provoked seizures are usually generalized onset. R/o HSV encephalitis (given stereotyped seizures lateralizing to the L. hemisphere). Doubt CVA given negative MRI brain.    Plan:   [] Load 1.5g IV Keppra  [] Start Keppra maintenance 750mg IV BID  [] Continuos EEG STAT  [] ID consult   [] LP - PRO, GLU, Cell count, PCR, HSV, crypto   [] If LP cannot be done tonight, please start Acyclovir for ppx coverage   [] If patient continues to seize through the Keppra, please obtain MICU consult to upgrade to ICU level of care for status epilepticus.  [] Cardiac tele  [] Continous spO2 Briefly, 74F w/ PMH DMII, CHF, CABG presented w/ headache and intermittent aphasia for 1 week. MRI brain w/o contrast negative for CVA.     I was called to bedside as patient had another episode of intermittent aphasia. During my examination patient had a seizure for which I witnessed. Semiology: R. facial twitching, R. conjugate rhythmic eye movements, R. head version followed by (marching) R. arm jerking movements. Entire episode lasted ~45s before spontaneously resolving. Patient was minimally responsive during the episode, moving the left side spontaneously (non-rhythmic) but not following commands. She was not speaking after the episode but was tracking and following some commands.     The semiology of the seizure lateralizes to the L. hemisphere, perhaps localizing to the L. temporal lobe given the aphasia and march from the face to the arm. The new onset seizures and subacute aphasia are concerning temporal lobe seizures, w/ HSV encephalitis as a possible etiology (despite no fever or WBC). The MRI does not demonstrate diffusion restriction, T2 enhancement, or +SWI, though this is not 100% sensitive for HSV encephalitis.     Impression: Subacute aphasia, headache, new onset focal w/ preserved awareness seizures concerning for L. hemispheric dysfunction, possibly temporal lobe localization, of unclear etiology ----Ddx includes: Temporal lobe epilepsy secondary to encephalitic process (HSV vs. Autoimmune vs. paraneoplastic encephalitis (given stereotyped seizures lateralizing to the L. hemisphere) vs. provoked seizure 2/2 UTI and significant hyperglycemia (though provoked seizures are usually generalized onset).     Plan:   [] Load 1.5g IV Keppra  [] Start Keppra maintenance 750mg IV BID  [] Continuos EEG STAT  [] ID consult   [] LP - PRO, GLU, Cell count, PCR, HSV, crypto, [place], [place]   [] If LP cannot be done tonight, please start Acyclovir for ppx coverage   [] If patient continues to seize through the Keppra, please obtain MICU consult to upgrade to ICU level of care for status epilepticus.  [] Cardiac tele  [] Continous spO2  [] Will defer to Neurology day team whether patient needs repeat MRI brain w/ contrast  [] Further autoimmune and paraneoplastic workup depending on CSF findings.     [place] [phone] if patient seizes again overnight. Briefly, 74F w/ PMH DMII, CHF, CABG presented w/ headache and intermittent aphasia for 1 week. MRI brain w/o contrast negative for CVA.     I was called to bedside as patient had another episode of intermittent aphasia. During my examination patient had a seizure for which I witnessed. Semiology: R. facial twitching, R. conjugate rhythmic eye movements, R. head version followed by (marching) R. arm jerking movements. Entire episode lasted ~45s before spontaneously resolving. Patient was minimally responsive during the episode, moving the left side spontaneously (non-rhythmic) but not following commands. She was not speaking after the episode but was tracking and following some commands.     The semiology of the seizure lateralizes to the L. hemisphere, perhaps localizing to the L. temporal lobe given the aphasia and march from the face to the arm. The new onset seizures and subacute aphasia are concerning temporal lobe seizures, w/ HSV encephalitis as a possible etiology (despite no fever or WBC). The MRI does not demonstrate diffusion restriction, T2 enhancement, or +SWI, though this is not 100% sensitive for HSV encephalitis.     Impression: Subacute aphasia, headache, new onset focal w/ preserved awareness seizures lateralizing to the L. hemisphere, perhaps localizing to the temporal lobe, of unclear etiology ----Ddx includes: Temporal lobe onset seizures secondary to encephalitic process (HSV vs. Autoimmune vs. paraneoplastic (given stereotyped seizures lateralizing to the L. hemisphere) vs. provoked seizure 2/2 UTI and significant hyperglycemia (though provoked seizures are usually generalized onset).     Plan:   [] Load 1.5g IV Keppra  [] Start Keppra maintenance 750mg IV BID  [] Continuos EEG STAT  [] ID consult   [] LP - PRO, GLU, Cell count, PCR, HSV, crypto, [place], [place]   [] If LP cannot be done tonight, please start Acyclovir for ppx coverage   [] If patient continues to seize through the Keppra, please obtain MICU consult to upgrade to ICU level of care for status epilepticus.  [] Cardiac tele  [] Continous spO2  [] Will defer to Neurology day team whether patient needs repeat MRI brain w/ contrast  [] Autoimmune panel     [place] at [phone] if patient seizes again overnight. Briefly, 74F w/ PMH DMII, CHF, CABG presented w/ headache and intermittent aphasia for 1 week. MRI brain w/o contrast negative for CVA.     I was called to bedside as patient had another episode of intermittent aphasia. During my examination patient had a seizure for which I witnessed. Semiology: R. facial twitching, R. conjugate rhythmic eye movements, R. head version followed by (marching) R. arm jerking movements. Entire episode lasted ~45s before spontaneously resolving. Patient was minimally responsive during the episode, moving the left side spontaneously (non-rhythmic) but not following commands. She was not speaking after the episode but was tracking and following some commands.     The semiology of the seizure lateralizes to the L. hemisphere, perhaps localizing to the L. temporal lobe given the aphasia and march from the face to the arm. The new onset seizures and subacute aphasia are concerning temporal lobe seizures, w/ HSV encephalitis as a possible etiology (despite no fever or WBC). The MRI does not demonstrate diffusion restriction, T2 enhancement, or +SWI, though this is not 100% sensitive for HSV encephalitis.     Impression: Subacute aphasia, headache, new onset focal w/ preserved awareness seizures lateralizing to the L. hemisphere, perhaps localizing to the temporal lobe, of unclear etiology ----Ddx includes: Temporal lobe onset seizures secondary to encephalitic process (HSV vs. Autoimmune vs. paraneoplastic (given stereotyped seizures lateralizing to the L. hemisphere) vs. provoked seizure 2/2 UTI and significant hyperglycemia (though provoked seizures are usually generalized onset).     Plan:   [] Load 1.5g IV Keppra  [] Start Keppra maintenance 750mg IV BID  [] Continuos EEG STAT  [] ID consult   [] LP - PRO, GLU, Cell count, PCR, HSV, crypto, [place], [place]   [] If LP cannot be done tonight, please start Acyclovir for ppx coverage   [] If patient continues to seize through the Keppra, please obtain MICU consult to upgrade to ICU level of care for status epilepticus.  [] Cardiac tele  [] Continous spO2  [] Will defer to Neurology day team whether patient needs repeat MRI brain w/ contrast  [] Autoimmune encephalitis panel depending on CSF studies     [place] [phone] if patient seizes again overnight. Briefly, 74F w/ PMH DMII, CHF, CABG presented w/ headache and intermittent aphasia for 1 week. MRI brain w/o contrast negative for CVA.     I was called to bedside as patient had another episode of intermittent aphasia. During my examination patient had a seizure for which I witnessed. Semiology: R. facial twitching, R. conjugate rhythmic eye movements, R. head version followed by (marching) R. arm clonic jerks. Entire episode lasted ~45s before spontaneously resolving. Patient was minimally responsive during the episode, moving the left side spontaneously (non-rhythmic) but not following commands. She was not speaking after the episode but was tracking and following some commands.     The semiology of the seizure lateralizes to the L. hemisphere, perhaps localizing to the L. temporal lobe given the aphasia and march from the face to the arm. The new onset seizures and subacute aphasia are concerning temporal lobe seizures, w/ HSV encephalitis as a possible etiology (despite no fever or WBC). The MRI does not demonstrate diffusion restriction, T2 enhancement, or +SWI, though this is not 100% sensitive for HSV encephalitis.     Impression: Subacute aphasia, headache, new onset focal w/ preserved awareness seizures lateralizing to the L. hemisphere, perhaps localizing to the temporal lobe, of unclear etiology ----Ddx includes: Temporal lobe onset seizures secondary to encephalitic process (HSV vs. Autoimmune vs. paraneoplastic (given stereotyped seizures lateralizing to the L. hemisphere) vs. provoked seizure 2/2 UTI and significant hyperglycemia (though provoked seizures are usually generalized onset).     Plan:   [] Load 1.5g IV Keppra  [] Start Keppra maintenance 750mg IV BID  [] Continuos EEG STAT  [] ID consult   [] LP - PRO, GLU, Cell count, PCR, HSV, crypto, [place], [place]   [] If LP cannot be done tonight, please start Acyclovir for ppx coverage   [] If patient continues to seize through the Keppra, please obtain MICU consult to upgrade to ICU level of care for status epilepticus.  [] Cardiac tele  [] Continous spO2  [] Will defer to Neurology day team whether patient needs repeat MRI brain w/ contrast  [] Autoimmune encephalitis panel depending on CSF studies     Please [place]y [place] at [phone] if patient seizes again overnight. Briefly, 74F w/ PMH DMII, CHF, CABG presented w/ headache and intermittent aphasia for 1 week. MRI brain w/o contrast negative for CVA.     I was called to bedside as patient had another episode of intermittent aphasia. During my examination patient had a seizure for which I witnessed. Semiology: R. facial twitching, R. conjugate rhythmic eye movements, R. head version followed by (marching) R. arm clonic jerks. Entire episode lasted ~45s before spontaneously resolving. Patient was minimally responsive during the episode, moving the left side spontaneously (non-rhythmic) but not following commands. She was not speaking after the episode but was tracking and following some commands.     The semiology of the seizure lateralizes to the L. hemisphere, perhaps localizing to the L. temporal lobe given the aphasia and march from the face to the arm. The new onset seizures and subacute aphasia are concerning temporal lobe seizures, w/ HSV encephalitis as a possible etiology (despite no fever or WBC). The MRI does not demonstrate diffusion restriction, T2 enhancement, or +SWI, though this is not 100% sensitive for HSV encephalitis.     Impression: Subacute aphasia, headache, new onset focal w/ preserved awareness seizures lateralizing to the L. hemisphere, perhaps localizing to the temporal lobe, of unclear etiology ----Ddx includes: Temporal lobe onset seizures secondary to encephalitic process (HSV vs. Autoimmune vs. paraneoplastic (given stereotyped seizures lateralizing to the L. hemisphere) vs. provoked seizure 2/2 UTI and significant hyperglycemia (though provoked seizures are usually generalized onset).     Plan:   [] Load 1.5g IV Keppra  [] Start Keppra maintenance 750mg IV BID  [] Continuos EEG STAT  [] ID consult   [] LP - PRO, GLU, Cell count, PCR, HSV, crypto, WNV, Myelin Basic Protein, IgG index, Oligoclonal bands, [place] autoimmune panel   [] If LP cannot be done tonight, please start Acyclovir for ppx coverage   [] If patient continues to seize through the Keppra, please obtain MICU consult to upgrade to ICU level of care for status epilepticus.  [] Cardiac tele  [] Continous spO2  [] Will defer to Neurology day team whether patient needs repeat MRI brain w/ contrast  [] Autoimmune encephalitis panel depending on CSF studies     Please [place] at [phone] if patient seizes again overnight. Briefly, 74F w/ PMH DMII, CHF, CABG presented w/ headache and intermittent aphasia for 1 week. MRI brain w/o contrast negative for CVA.     I was called to bedside as patient had another episode of intermittent aphasia. During my examination patient had a seizure for which I witnessed. Semiology: R. facial twitching, R. conjugate rhythmic eye movements, R. head version followed by (marching) R. arm clonic jerks. Entire episode lasted ~45s before spontaneously resolving. Patient was minimally responsive during the episode, moving the left side spontaneously (non-rhythmic) but not following commands. She was not speaking after the episode but was tracking and following some commands.     The semiology of the seizure lateralizes to the L. hemisphere, perhaps localizing to the L. temporal lobe given the aphasia and march from the face to the arm. The new onset seizures and subacute aphasia are concerning temporal lobe seizures, w/ HSV encephalitis as a possible etiology (despite no fever or WBC). The MRI does not demonstrate diffusion restriction, T2 enhancement, or +SWI, though this is not 100% sensitive for HSV encephalitis.     Impression: Subacute aphasia, headache, new onset focal w/ preserved awareness seizures lateralizing to the L. hemisphere, perhaps localizing to the temporal lobe, of unclear etiology ----Ddx includes: Temporal lobe encephalitis (HSV vs. Autoimmune vs. paraneoplastic (given stereotyped seizures lateralizing to the L. hemisphere) vs. provoked seizure 2/2 UTI and significant hyperglycemia (though provoked seizures are usually generalized onset).     Plan:   [] Load 1.5g IV Keppra  [] Start Keppra maintenance 750mg IV BID  [] Continuos EEG STAT  [] ID consult   [] LP - PRO, GLU, Cell count, PCR, HSV, crypto, WNV, Myelin Basic Protein, IgG index, Oligoclonal bands, [place] autoimmune panel   [] If LP cannot be done tonight, please start Acyclovir for ppx coverage   [] If patient continues to seize through the Keppra, please obtain MICU consult to upgrade to ICU level of care for status epilepticus.  [] Cardiac tele  [] Continous spO2  [] Will defer to Neurology day team whether patient needs repeat MRI brain w/ contrast  [] Autoimmune encephalitis panel depending on CSF studies     [place] at [phone] if patient seizes again overnight.

## 2020-03-15 NOTE — PHYSICAL THERAPY INITIAL EVALUATION ADULT - ADDITIONAL COMMENTS
Pt's daughter states her mom/pt lives in a PVT house w/1 steps to enter. Pt's daughter states independent in ADLs and ambulation prior to hospitalization. Pt's daughter also stated that her mom has a RW that she only uses for long distance. Pt's daughter stated that her mom has a shower chair and w/c from pt's spouse, who is no longer alive. Pt's daughter stated that she does not think her mom/pt has a glucometer at home and even if she did she would not be compliant with using it.

## 2020-03-15 NOTE — PROGRESS NOTE ADULT - SUBJECTIVE AND OBJECTIVE BOX
Tahira Padilla  PGY-3  [facility] [phone]      Patient is a 74y old  Female who presents with a chief complaint of headache (14 Mar 2020 20:00)      SUBJECTIVE / OVERNIGHT EVENTS:  Patient seen and examined at bedside. No acute events overnight. Patient evaluated at the bedside. No ROS elicited as patient not able to verbalize. Patient did not endorse any pain. Able to follow simple commands.     MEDICATIONS  (STANDING):  atorvastatin 40 milliGRAM(s) Oral at bedtime  dextrose 5%. 1000 milliLiter(s) (50 mL/Hr) IV Continuous <Continuous>  dextrose 50% Injectable 12.5 Gram(s) IV Push once  dextrose 50% Injectable 25 Gram(s) IV Push once  dextrose 50% Injectable 25 Gram(s) IV Push once  insulin lispro (HumaLOG) corrective regimen sliding scale   SubCutaneous every 6 hours  metoprolol tartrate 25 milliGRAM(s) Oral daily  pantoprazole    Tablet 40 milliGRAM(s) Oral before breakfast    MEDICATIONS  (PRN):  dextrose 40% Gel 15 Gram(s) Oral once PRN Blood Glucose LESS THAN 70 milliGRAM(s)/deciliter  glucagon  Injectable 1 milliGRAM(s) IntraMuscular once PRN Glucose LESS THAN 70 milligrams/deciliter      Vital Signs Last 24 Hrs  T(C): 36.7 (15 Mar 2020 05:12), Max: 37 (14 Mar 2020 19:29)  T(F): 98.1 (15 Mar 2020 05:12), Max: 98.6 (14 Mar 2020 19:29)  HR: 65 (15 Mar 2020 05:13) (58 - 75)  BP: 110/61 (15 Mar 2020 05:12) (110/61 - 132/56)  BP(mean): --  RR: 18 (15 Mar 2020 05:12) (17 - 22)  SpO2: 95% (15 Mar 2020 05:12) (95% - 98%)  CAPILLARY BLOOD GLUCOSE      POCT Blood Glucose.: 374 mg/dL (15 Mar 2020 05:46)  POCT Blood Glucose.: 289 mg/dL (14 Mar 2020 23:58)  POCT Blood Glucose.: 189 mg/dL (14 Mar 2020 18:24)    I&O's Summary      PHYSICAL EXAM:  GENERAL: NAD   HEAD:  Atraumatic, Normocephalic  EYES:  conjunctiva and sclera clear  NECK: Supple  CHEST/LUNG: Clear to auscultation bilaterally; No wheeze  HEART: Regular rate and rhythm; No murmurs, rubs, or gallops  ABDOMEN: Soft, Nontender, Nondistended; Bowel sounds present  EXTREMITIES:  No clubbing, cyanosis, or edema  NEUROLOGY: Able to follow simple commands.  Raises arms on command, 5/5 strength. Raises legs 4/5 bilaterally.   SKIN: No rashes or lesions    LABS:                        9.8    7.72  )-----------( 330      ( 15 Mar 2020 06:47 )             31.3     WBC Trend: 7.72<--, 7.54<--  03-15    129<L>  |  95<L>  |  19  ----------------------------<  379<H>  4.2   |  21<L>  |  0.70    Ca    9.2      15 Mar 2020 06:43  Phos  4.1     03-15  Mg     1.7     03-15    TPro  8.1  /  Alb  3.4  /  TBili  0.4  /  DBili  x   /  AST  59<H>  /  ALT  50<H>  /  AlkPhos  347<H>  03-15    Creatinine Trend: 0.70<--, 0.73<--  PT/INR - ( 14 Mar 2020 14:48 )   PT: 11.3 sec;   INR: 0.98 ratio         PTT - ( 14 Mar 2020 14:48 )  PTT:20.6 sec      Urinalysis Basic - ( 14 Mar 2020 18:38 )    Color: Yellow / Appearance: Clear / S.025 / pH: x  Gluc: x / Ketone: Negative  / Bili: Negative / Urobili: Negative   Blood: x / Protein: Trace / Nitrite: Negative   Leuk Esterase: Large / RBC: 2 /hpf / WBC 31 /HPF   Sq Epi: x / Non Sq Epi: 2 / Bacteria: Few          RADIOLOGY & ADDITIONAL TESTS:    Imaging Personally Reviewed:    Consultant(s) Notes Reviewed:      Care Discussed with Consultants/Other Providers: Tahira Padilla  PGY-3  [facility] [phone]      Patient is a 74y old  Female who presents with a chief complaint of headache (14 Mar 2020 20:00)      SUBJECTIVE / OVERNIGHT EVENTS:  Patient seen and examined at bedside. No acute events overnight. Patient evaluated at the bedside. No ROS elicited as patient not able to verbalize. Patient did not endorse any pain.     MEDICATIONS  (STANDING):  atorvastatin 40 milliGRAM(s) Oral at bedtime  dextrose 5%. 1000 milliLiter(s) (50 mL/Hr) IV Continuous <Continuous>  dextrose 50% Injectable 12.5 Gram(s) IV Push once  dextrose 50% Injectable 25 Gram(s) IV Push once  dextrose 50% Injectable 25 Gram(s) IV Push once  insulin lispro (HumaLOG) corrective regimen sliding scale   SubCutaneous every 6 hours  metoprolol tartrate 25 milliGRAM(s) Oral daily  pantoprazole    Tablet 40 milliGRAM(s) Oral before breakfast    MEDICATIONS  (PRN):  dextrose 40% Gel 15 Gram(s) Oral once PRN Blood Glucose LESS THAN 70 milliGRAM(s)/deciliter  glucagon  Injectable 1 milliGRAM(s) IntraMuscular once PRN Glucose LESS THAN 70 milligrams/deciliter      Vital Signs Last 24 Hrs  T(C): 36.7 (15 Mar 2020 05:12), Max: 37 (14 Mar 2020 19:29)  T(F): 98.1 (15 Mar 2020 05:12), Max: 98.6 (14 Mar 2020 19:29)  HR: 65 (15 Mar 2020 05:13) (58 - 75)  BP: 110/61 (15 Mar 2020 05:12) (110/61 - 132/56)  BP(mean): --  RR: 18 (15 Mar 2020 05:12) (17 - 22)  SpO2: 95% (15 Mar 2020 05:12) (95% - 98%)  CAPILLARY BLOOD GLUCOSE      POCT Blood Glucose.: 374 mg/dL (15 Mar 2020 05:46)  POCT Blood Glucose.: 289 mg/dL (14 Mar 2020 23:58)  POCT Blood Glucose.: 189 mg/dL (14 Mar 2020 18:24)    I&O's Summary      PHYSICAL EXAM:  GENERAL: NAD   HEAD:  Atraumatic, Normocephalic  EYES:  conjunctiva and sclera clear  NECK: Supple  CHEST/LUNG: Clear to auscultation bilaterally; No wheeze  HEART: Regular rate and rhythm; No murmurs, rubs, or gallops  ABDOMEN: Soft, Nontender, Nondistended; Bowel sounds present  EXTREMITIES:  No clubbing, cyanosis, or edema  NEUROLOGY: Awake. Able to follow commands.  Raises arms on command, 5/5 strength. Raises legs 4/5 bilaterally. Aphasia. Appears to comprehend.   SKIN: No rashes or lesions    LABS:                        9.8    7.72  )-----------( 330      ( 15 Mar 2020 06:47 )             31.3     WBC Trend: 7.72<--, 7.54<--  03-15    129<L>  |  95<L>  |  19  ----------------------------<  379<H>  4.2   |  21<L>  |  0.70    Ca    9.2      15 Mar 2020 06:43  Phos  4.1     -15  Mg     1.7     -15    TPro  8.1  /  Alb  3.4  /  TBili  0.4  /  DBili  x   /  AST  59<H>  /  ALT  50<H>  /  AlkPhos  347<H>  -15    Creatinine Trend: 0.70<--, 0.73<--  PT/INR - ( 14 Mar 2020 14:48 )   PT: 11.3 sec;   INR: 0.98 ratio         PTT - ( 14 Mar 2020 14:48 )  PTT:20.6 sec      Urinalysis Basic - ( 14 Mar 2020 18:38 )    Color: Yellow / Appearance: Clear / S.025 / pH: x  Gluc: x / Ketone: Negative  / Bili: Negative / Urobili: Negative   Blood: x / Protein: Trace / Nitrite: Negative   Leuk Esterase: Large / RBC: 2 /hpf / WBC 31 /HPF   Sq Epi: x / Non Sq Epi: 2 / Bacteria: Few          RADIOLOGY & ADDITIONAL TESTS:    Imaging Personally Reviewed:    Consultant(s) Notes Reviewed:      Care Discussed with Consultants/Other Providers:

## 2020-03-15 NOTE — PHYSICAL THERAPY INITIAL EVALUATION ADULT - GENERAL OBSERVATIONS, REHAB EVAL
Pt semi-supine in bed in NAD, +IV lock. Pt aphasic with speech impairment. Pt can follow commands and did so during PT Eval.

## 2020-03-15 NOTE — SWALLOW BEDSIDE ASSESSMENT ADULT - SWALLOW EVAL: ORAL MUSCULATURE
Pt daughter, pt can follow commands; however, is being stubborn/unable to assess due to poor participation/comprehension

## 2020-03-15 NOTE — PHYSICAL THERAPY INITIAL EVALUATION ADULT - CRITERIA FOR SKILLED THERAPEUTIC INTERVENTIONS
risk reduction/prevention/rehab potential/anticipated discharge recommendation/functional limitations in following categories/predicted duration of therapy intervention

## 2020-03-15 NOTE — RAPID RESPONSE TEAM SUMMARY - NSADDTLFINDINGSRRT_GEN_ALL_CORE
-of note MRI earlier in day neg for acute CVA  -primary team advised to treat hyperglycemia, f/u EEG -of note MRI earlier in day neg for acute CVA  -primary team advised to treat hyperglycemia, f/u EEG, consider LP in AM

## 2020-03-15 NOTE — PROGRESS NOTE ADULT - PROBLEM SELECTOR PLAN 1
-CT head negative for acute/subacute infarct however nature of presentation supports most probably a subacute CVA; has risk factors of uncontrolled T2DM and CAD  -pending MR Brain and MRA brain w/ and w/out cont, MRA neck w/ cont  -pending vEEG 24h to r/o focal seizure  -c/w atorvastatin]  -hold AC and ASA for now   - f/u further neurology recs Unclear etiology.  -CT head negative for acute/subacute infarct however nature of presentation supports most probably a subacute CVA; has risk factors of uncontrolled T2DM and CAD  -pending MR Brain and MRA brain w/ and w/out cont, MRA neck w/ cont  -pending vEEG 24h to r/o focal seizure  -c/w atorvastatin]  -hold AC and ASA for now   - f/u further neurology recs  -UTI may be contributory.   -b12, folate, tsh.

## 2020-03-15 NOTE — PHYSICAL THERAPY INITIAL EVALUATION ADULT - PERTINENT HX OF CURRENT PROBLEM, REHAB EVAL
74F with T2DM, systolic HF, CABG admitted for possible subacute CVA vs less likely focal seizures. Dx: CVA, unspecified mechanism, hyponatremia, transaminitis, Normocytic anemia, DM2 w/hyperglycemia, asymptomatic bacterium, CAD 74F with T2DM, systolic HF, CABG admitted for possible subacute CVA vs less likely focal seizures. Dx: encephalopathy, hyponatremia, transaminitis, Normocytic anemia, DM2 w/hyperglycemia, asymptomatic bacterium, CAD. MRI brain w/o contrast negative for CVA. RRT called for twitching of face and upper arms.

## 2020-03-15 NOTE — DISCHARGE NOTE PROVIDER - NSDCPNSUBOBJ_GEN_ALL_CORE
Pt admitted for AMS had extensive w/u including LP and was emperically treated for HCV encephalitis.  She completed 14 days of Acyclovir and also was seen by neurology and started on Keppra.  She was also seen by oncology as well who will follow her up for MGUS at [facility] and for repeat imaging of the CT chest pulmonary nodules. Plan was discussed with daughter Mackenzie. Discharge home with outpatient PT. Discharge time 49 minutes.

## 2020-03-16 DIAGNOSIS — R56.9 UNSPECIFIED CONVULSIONS: ICD-10-CM

## 2020-03-16 LAB
ALBUMIN SERPL ELPH-MCNC: 3.5 G/DL — SIGNIFICANT CHANGE UP (ref 3.3–5)
ALP SERPL-CCNC: 382 U/L — HIGH (ref 40–120)
ALT FLD-CCNC: 55 U/L — HIGH (ref 10–45)
ANION GAP SERPL CALC-SCNC: 15 MMOL/L — SIGNIFICANT CHANGE UP (ref 5–17)
APPEARANCE CSF: CLEAR — SIGNIFICANT CHANGE UP
APPEARANCE SPUN FLD: COLORLESS — SIGNIFICANT CHANGE UP
AST SERPL-CCNC: 74 U/L — HIGH (ref 10–40)
BILIRUB SERPL-MCNC: 0.3 MG/DL — SIGNIFICANT CHANGE UP (ref 0.2–1.2)
BUN SERPL-MCNC: 19 MG/DL — SIGNIFICANT CHANGE UP (ref 7–23)
CALCIUM SERPL-MCNC: 9.2 MG/DL — SIGNIFICANT CHANGE UP (ref 8.4–10.5)
CHLORIDE SERPL-SCNC: 96 MMOL/L — SIGNIFICANT CHANGE UP (ref 96–108)
CHOLEST SERPL-MCNC: 150 MG/DL — SIGNIFICANT CHANGE UP (ref 10–199)
CO2 SERPL-SCNC: 21 MMOL/L — LOW (ref 22–31)
COLOR CSF: SIGNIFICANT CHANGE UP
CREAT SERPL-MCNC: 0.74 MG/DL — SIGNIFICANT CHANGE UP (ref 0.5–1.3)
CULTURE RESULTS: SIGNIFICANT CHANGE UP
FERRITIN SERPL-MCNC: 36 NG/ML — SIGNIFICANT CHANGE UP (ref 15–150)
FOLATE SERPL-MCNC: 8 NG/ML — SIGNIFICANT CHANGE UP
GLUCOSE BLDC GLUCOMTR-MCNC: 255 MG/DL — HIGH (ref 70–99)
GLUCOSE BLDC GLUCOMTR-MCNC: 295 MG/DL — HIGH (ref 70–99)
GLUCOSE BLDC GLUCOMTR-MCNC: 315 MG/DL — HIGH (ref 70–99)
GLUCOSE BLDC GLUCOMTR-MCNC: 333 MG/DL — HIGH (ref 70–99)
GLUCOSE BLDC GLUCOMTR-MCNC: 359 MG/DL — HIGH (ref 70–99)
GLUCOSE CSF-MCNC: 178 MG/DL — HIGH (ref 40–70)
GLUCOSE SERPL-MCNC: 279 MG/DL — HIGH (ref 70–99)
HAPTOGLOB SERPL-MCNC: 239 MG/DL — HIGH (ref 34–200)
HAV IGM SER-ACNC: SIGNIFICANT CHANGE UP
HBA1C BLD-MCNC: >15.5 % — HIGH (ref 4–5.6)
HBV CORE IGM SER-ACNC: SIGNIFICANT CHANGE UP
HBV SURFACE AB SER-ACNC: SIGNIFICANT CHANGE UP
HBV SURFACE AG SER-ACNC: SIGNIFICANT CHANGE UP
HCT VFR BLD CALC: 34.5 % — SIGNIFICANT CHANGE UP (ref 34.5–45)
HCV AB S/CO SERPL IA: 0.26 S/CO — SIGNIFICANT CHANGE UP (ref 0–0.99)
HCV AB SERPL-IMP: SIGNIFICANT CHANGE UP
HDLC SERPL-MCNC: 61 MG/DL — SIGNIFICANT CHANGE UP
HGB BLD-MCNC: 10.8 G/DL — LOW (ref 11.5–15.5)
IRON SATN MFR SERPL: 31 UG/DL — SIGNIFICANT CHANGE UP (ref 30–160)
IRON SATN MFR SERPL: 9 % — LOW (ref 14–50)
LDH CSF L TO P-CCNC: 19 U/L — SIGNIFICANT CHANGE UP
LDH FLD-CCNC: 19 U/L — SIGNIFICANT CHANGE UP
LDH SERPL L TO P-CCNC: 179 U/L — SIGNIFICANT CHANGE UP (ref 50–242)
LIPID PNL WITH DIRECT LDL SERPL: 58 MG/DL — SIGNIFICANT CHANGE UP
LYMPHOCYTES # CSF: 82 % — HIGH (ref 40–80)
MAGNESIUM SERPL-MCNC: 1.7 MG/DL — SIGNIFICANT CHANGE UP (ref 1.6–2.6)
MCHC RBC-ENTMCNC: 25.7 PG — LOW (ref 27–34)
MCHC RBC-ENTMCNC: 31.3 GM/DL — LOW (ref 32–36)
MCV RBC AUTO: 81.9 FL — SIGNIFICANT CHANGE UP (ref 80–100)
MONOS+MACROS NFR CSF: 11 % — LOW (ref 15–45)
NEUTROPHILS # CSF: 7 % — HIGH (ref 0–6)
NRBC # BLD: 0 /100 WBCS — SIGNIFICANT CHANGE UP (ref 0–0)
NRBC NFR CSF: 1 /UL — SIGNIFICANT CHANGE UP (ref 0–5)
PHOSPHATE SERPL-MCNC: 3.3 MG/DL — SIGNIFICANT CHANGE UP (ref 2.5–4.5)
PLATELET # BLD AUTO: 351 K/UL — SIGNIFICANT CHANGE UP (ref 150–400)
POTASSIUM SERPL-MCNC: 4.1 MMOL/L — SIGNIFICANT CHANGE UP (ref 3.5–5.3)
POTASSIUM SERPL-SCNC: 4.1 MMOL/L — SIGNIFICANT CHANGE UP (ref 3.5–5.3)
PROLACTIN SERPL-MCNC: 11.2 NG/ML — SIGNIFICANT CHANGE UP (ref 3.4–24.1)
PROT CSF-MCNC: 59 MG/DL — HIGH (ref 15–45)
PROT SERPL-MCNC: 8.2 G/DL — SIGNIFICANT CHANGE UP (ref 6–8.3)
RBC # BLD: 4.21 M/UL — SIGNIFICANT CHANGE UP (ref 3.8–5.2)
RBC # CSF: 0 /UL — SIGNIFICANT CHANGE UP (ref 0–0)
RBC # FLD: 15.3 % — HIGH (ref 10.3–14.5)
SODIUM SERPL-SCNC: 132 MMOL/L — LOW (ref 135–145)
SPECIMEN SOURCE: SIGNIFICANT CHANGE UP
TIBC SERPL-MCNC: 344 UG/DL — SIGNIFICANT CHANGE UP (ref 220–430)
TOTAL CHOLESTEROL/HDL RATIO MEASUREMENT: 2.5 RATIO — LOW (ref 3.3–7.1)
TRANSFERRIN SERPL-MCNC: 289 MG/DL — SIGNIFICANT CHANGE UP (ref 200–360)
TRIGL SERPL-MCNC: 157 MG/DL — HIGH (ref 10–149)
TSH SERPL-MCNC: 3.59 UIU/ML — SIGNIFICANT CHANGE UP (ref 0.27–4.2)
TUBE TYPE: SIGNIFICANT CHANGE UP
UIBC SERPL-MCNC: 313 UG/DL — SIGNIFICANT CHANGE UP (ref 110–370)
VIT B12 SERPL-MCNC: 772 PG/ML — SIGNIFICANT CHANGE UP (ref 232–1245)
WBC # BLD: 9.38 K/UL — SIGNIFICANT CHANGE UP (ref 3.8–10.5)
WBC # FLD AUTO: 9.38 K/UL — SIGNIFICANT CHANGE UP (ref 3.8–10.5)

## 2020-03-16 PROCEDURE — 70450 CT HEAD/BRAIN W/O DYE: CPT | Mod: 26

## 2020-03-16 PROCEDURE — 73110 X-RAY EXAM OF WRIST: CPT | Mod: 26,RT

## 2020-03-16 PROCEDURE — 99233 SBSQ HOSP IP/OBS HIGH 50: CPT | Mod: 25

## 2020-03-16 PROCEDURE — 99233 SBSQ HOSP IP/OBS HIGH 50: CPT | Mod: GC

## 2020-03-16 PROCEDURE — 73502 X-RAY EXAM HIP UNI 2-3 VIEWS: CPT | Mod: 26,RT

## 2020-03-16 PROCEDURE — 62270 DX LMBR SPI PNXR: CPT

## 2020-03-16 PROCEDURE — 76705 ECHO EXAM OF ABDOMEN: CPT | Mod: 26,RT

## 2020-03-16 PROCEDURE — 99232 SBSQ HOSP IP/OBS MODERATE 35: CPT

## 2020-03-16 RX ORDER — SODIUM CHLORIDE 9 MG/ML
500 INJECTION INTRAMUSCULAR; INTRAVENOUS; SUBCUTANEOUS ONCE
Refills: 0 | Status: COMPLETED | OUTPATIENT
Start: 2020-03-16 | End: 2020-03-16

## 2020-03-16 RX ORDER — INSULIN LISPRO 100/ML
1 VIAL (ML) SUBCUTANEOUS ONCE
Refills: 0 | Status: COMPLETED | OUTPATIENT
Start: 2020-03-16 | End: 2020-03-16

## 2020-03-16 RX ORDER — ATORVASTATIN CALCIUM 80 MG/1
40 TABLET, FILM COATED ORAL AT BEDTIME
Refills: 0 | Status: DISCONTINUED | OUTPATIENT
Start: 2020-03-16 | End: 2020-03-25

## 2020-03-16 RX ORDER — HALOPERIDOL DECANOATE 100 MG/ML
2 INJECTION INTRAMUSCULAR ONCE
Refills: 0 | Status: COMPLETED | OUTPATIENT
Start: 2020-03-16 | End: 2020-03-16

## 2020-03-16 RX ORDER — HALOPERIDOL DECANOATE 100 MG/ML
3 INJECTION INTRAMUSCULAR ONCE
Refills: 0 | Status: COMPLETED | OUTPATIENT
Start: 2020-03-16 | End: 2020-03-16

## 2020-03-16 RX ORDER — INSULIN LISPRO 100/ML
3 VIAL (ML) SUBCUTANEOUS
Refills: 0 | Status: DISCONTINUED | OUTPATIENT
Start: 2020-03-16 | End: 2020-03-17

## 2020-03-16 RX ADMIN — Medication 4: at 10:07

## 2020-03-16 RX ADMIN — Medication 1 UNIT(S): at 05:26

## 2020-03-16 RX ADMIN — HALOPERIDOL DECANOATE 2 MILLIGRAM(S): 100 INJECTION INTRAMUSCULAR at 17:15

## 2020-03-16 RX ADMIN — Medication 264 MILLIGRAM(S): at 14:46

## 2020-03-16 RX ADMIN — SODIUM CHLORIDE 500 MILLILITER(S): 9 INJECTION INTRAMUSCULAR; INTRAVENOUS; SUBCUTANEOUS at 11:46

## 2020-03-16 RX ADMIN — HALOPERIDOL DECANOATE 2 MILLIGRAM(S): 100 INJECTION INTRAMUSCULAR at 16:15

## 2020-03-16 RX ADMIN — INSULIN GLARGINE 10 UNIT(S): 100 INJECTION, SOLUTION SUBCUTANEOUS at 21:52

## 2020-03-16 RX ADMIN — Medication 2: at 21:53

## 2020-03-16 RX ADMIN — Medication 25 MILLIGRAM(S): at 06:17

## 2020-03-16 RX ADMIN — Medication 5: at 13:08

## 2020-03-16 RX ADMIN — Medication 264 MILLIGRAM(S): at 21:54

## 2020-03-16 RX ADMIN — Medication 3: at 17:36

## 2020-03-16 RX ADMIN — HALOPERIDOL DECANOATE 3 MILLIGRAM(S): 100 INJECTION INTRAMUSCULAR at 16:25

## 2020-03-16 RX ADMIN — Medication 264 MILLIGRAM(S): at 07:25

## 2020-03-16 RX ADMIN — PANTOPRAZOLE SODIUM 40 MILLIGRAM(S): 20 TABLET, DELAYED RELEASE ORAL at 06:17

## 2020-03-16 RX ADMIN — ATORVASTATIN CALCIUM 40 MILLIGRAM(S): 80 TABLET, FILM COATED ORAL at 21:54

## 2020-03-16 RX ADMIN — CEFTRIAXONE 100 MILLIGRAM(S): 500 INJECTION, POWDER, FOR SOLUTION INTRAMUSCULAR; INTRAVENOUS at 10:07

## 2020-03-16 RX ADMIN — LEVETIRACETAM 400 MILLIGRAM(S): 250 TABLET, FILM COATED ORAL at 06:17

## 2020-03-16 RX ADMIN — LEVETIRACETAM 400 MILLIGRAM(S): 250 TABLET, FILM COATED ORAL at 17:49

## 2020-03-16 NOTE — PROGRESS NOTE ADULT - ASSESSMENT
74F with T2DM, systolic HF, CABG admitted for possible subacute CVA vs less likely focal seizures. 74F with T2DM, systolic HF, CABG admitted for encephalopathy and aphasia with hospital course c/b seizure.

## 2020-03-16 NOTE — SPEECH LANGUAGE PATHOLOGY EVALUATION - SLP GENERAL OBSERVATIONS
Pt encountered awake, reclined in bed. Pt smiled upon SLP arrival; likely recalled SLP from previous encounter on 3/15. SLP elevated HOB. Pt denied pain via verbal response. However, pt does not accurately answer yes/no questions during this exam.

## 2020-03-16 NOTE — PROGRESS NOTE ADULT - SUBJECTIVE AND OBJECTIVE BOX
Neurology Progress Note    S: Interval history: Patient seen and examined at bedside. Overnight, patient had witnessed seizure by overnight Neurology resident described as semiology: R. facial twitching, R. conjugate rhythmic eye movements, R. head versive movement followed by (marching) R. arm clonic jerks. Entire episode lasted ~45s before spontaneously resolving. Patient was minimally responsive during the episode, moving the left side spontaneously (non-rhythmic) but not following commands. She was not speaking after the episode but was tracking and following some commands.   On 3/16 morning, patient noted to be awake but not participating in the interview process.     PSH:   S/P breast lumpectomy  History of tonsillectomy  H/O abdominal hysterectomy    OBJECTIVE  Vital Signs Last 24 Hrs  T(C): 36.7 (16 Mar 2020 05:50), Max: 36.7 (16 Mar 2020 04:48)  T(F): 98 (16 Mar 2020 05:50), Max: 98.1 (16 Mar 2020 04:48)  HR: 67 (16 Mar 2020 05:50) (59 - 68)  BP: 138/67 (16 Mar 2020 05:50) (113/59 - 138/67)  BP(mean): --  RR: 18 (16 Mar 2020 05:50) (18 - 19)  SpO2: 96% (16 Mar 2020 05:50) (96% - 97%)    PHYSICAL EXAM:  Neurologic  MS/Language: Awake, alert. +Minimally verbal (sporadically states one word phrases such as "no" and "bye") Cannot repeat.     CNs: R gaze palsy. Less blinks to threat on R visual field, no facial asymmetry.   Motor: Spontaneously moving all 4 extremities at least against gravity. PT unable to fully follow commands for full strength testing.   Sensory: Grimaces equally throughout all 4 extremities.   Reflexes: 1+ throughout, toes downgoing         LABS:                    10.8   9.38  )-----------( 351      ( 16 Mar 2020 07:28 )             34.5     03-16    132<L>  |  96  |  19  ----------------------------<  279<H>  4.1   |  21<L>  |  0.74    Ca    9.2      16 Mar 2020 07:28  Phos  3.3     03-16  Mg     1.7     03-16    TPro  8.2  /  Alb  3.5  /  TBili  0.3  /  DBili  x   /  AST  74<H>  /  ALT  55<H>  /  AlkPhos  382<H>  03-16    LIVER FUNCTIONS - ( 16 Mar 2020 07:28 )  Alb: 3.5 g/dL / Pro: 8.2 g/dL / ALK PHOS: 382 U/L / ALT: 55 U/L / AST: 74 U/L / GGT: x             CT Head No Cont: 03/14/2020    Impression: No acute hemorrhage mass or mass effect.    < from: MR Head No Cont (03.15.20 @ 15:34) >  Impression: No acute territorial infarct seen.  Unremarkable MRA of the neck and [place].  < end of copied text > Neurology Progress Note    S: Interval history: Patient seen and examined at bedside. Overnight, patient had witnessed seizure by overnight Neurology resident described as semiology: R. facial twitching, R. conjugate rhythmic eye movements, R. head versive movement followed by (marching) R. arm clonic jerks. Entire episode lasted ~45s before spontaneously resolving. Patient was minimally responsive during the episode, moving the left side spontaneously (non-rhythmic) but not following commands. She was not speaking after the episode but was tracking and following some commands.   On 3/16 morning, patient noted to be awake but not participating in the interview process.     PSH:   S/P breast lumpectomy  History of tonsillectomy  H/O abdominal hysterectomy    OBJECTIVE  Vital Signs Last 24 Hrs  T(C): 36.7 (16 Mar 2020 05:50), Max: 36.7 (16 Mar 2020 04:48)  T(F): 98 (16 Mar 2020 05:50), Max: 98.1 (16 Mar 2020 04:48)  HR: 67 (16 Mar 2020 05:50) (59 - 68)  BP: 138/67 (16 Mar 2020 05:50) (113/59 - 138/67)  BP(mean): --  RR: 18 (16 Mar 2020 05:50) (18 - 19)  SpO2: 96% (16 Mar 2020 05:50) (96% - 97%)    PHYSICAL EXAM:  Neurologic  MS/Language: Awake, alert. +Minimally verbal (sporadically states one word phrases such as "no" and "bye") Cannot repeat.  Not following commands  CNs: decreased R gaze . No blink to threat bilaterally, no facial asymmetry.   Motor: Spontaneously moving all 4 extremities at least against gravity. PT unable to fully follow commands for full strength testing.   Sensory: Grimaces equally throughout all 4 extremities.   Reflexes: 1+ throughout, toes downgoing         LABS:                    10.8   9.38  )-----------( 351      ( 16 Mar 2020 07:28 )             34.5     03-16    132<L>  |  96  |  19  ----------------------------<  279<H>  4.1   |  21<L>  |  0.74    Ca    9.2      16 Mar 2020 07:28  Phos  3.3     03-16  Mg     1.7     03-16    TPro  8.2  /  Alb  3.5  /  TBili  0.3  /  DBili  x   /  AST  74<H>  /  ALT  55<H>  /  AlkPhos  382<H>  03-16    LIVER FUNCTIONS - ( 16 Mar 2020 07:28 )  Alb: 3.5 g/dL / Pro: 8.2 g/dL / ALK PHOS: 382 U/L / ALT: 55 U/L / AST: 74 U/L / GGT: x             CT Head No Cont: 03/14/2020    Impression: No acute hemorrhage mass or mass effect.    < from: MR Head No Cont (03.15.20 @ 15:34) >  Impression: No acute territorial infarct seen.  Unremarkable MRA of the neck and [place].  < end of copied text >

## 2020-03-16 NOTE — CONSULT NOTE ADULT - ASSESSMENT
Assessment:  74y R-handed F with h/o DM, CAD s/p stent and CABG p/w subacute onset mixed aphasia with motor predominance likely due to left hemispheric dysfunction secondary to subacute infarct vs. mass vs. less likely but possibly focal seizures w/o impaired awareness.     Impression: Subacute aphasia, headache, new onset focal w/ preserved awareness seizures lateralizing to the L. hemisphere, perhaps localizing to the temporal lobe, of unclear etiology ----Ddx includes: Temporal lobe encephalitis (HSV vs. Autoimmune vs. paraneoplastic (given stereotyped seizures lateralizing to the L. hemisphere) vs. provoked seizure 2/2 UTI and significant hyperglycemia (though provoked seizures are usually generalized onset).     MRI /MRA neagtive  24 hr vEEG: pending  Pt getting a LP for  protein, glucose, Cell count, gram stain, culture, ( routine, fungal and AFB)   PCR, HSV, VZV,  crypto, [place] virus, Myelin Basic Protein, IgG index, Oligoclonal bands, [place] state autoimmune panel (ENC-1)  would add protein for Cesar crutzfeld, NMDA ab, encephalitis screen ( wrong season)  -  CHECK ESR,   - check /Thyroid panel/Lyme titers/Ds-DNA/REECE/ SPEP/UPEP/Immunofixation/IgG index/RF/Sjogren's Syndrome/Ammonia level  - check opening pressure if possible   check blood cultures

## 2020-03-16 NOTE — CONSULT NOTE ADULT - SUBJECTIVE AND OBJECTIVE BOX
Patient is a 74y old  Female who presents with a chief complaint of headache (16 Mar 2020 10:42)      HPI:  74F with T2DM, systolic HF, CABG presents with HA x 1 day and aphasia for a 1 week. History obtained from daughter, as pt unable to provide 2/2 aphasia. Pt lives with sister and dtr initially noted during a phone conversation approx one week ago that pt was only answering in yes/no format. She saw her later in the week and noted nonsensical speech pattern. Per daughter it appeared that patient's cognitive function was intact and was still able to follow commands, however she was having difficulty speak the words. Pt has been ambulating well and has otherwise been keeping up her regualr routine - she drove to [employer] and grocery store yesterday without issue. Dtr took pt to PMDs office and was referred to outpt neurology. On day of admission pt started pointing to her head and saying "headache," which prompted ED visit. Per daughter pt has hx of headaches, unclear if migraine. Daughter denied any grand mal seizure activity, syncope, chest pain, urinary sxs, fever, cough, sob. Currently pt denies headache, blurred vision, focal numbness/weakness. Daughter mentioned that patient takes alprazolam 0.5 mg prior to blood draws due to extreme fear and panic.  Of note, pt has hx of 3 prior episode of word finding difficulties lasting approx 15-20 minutes. (14 Mar 2020 20:00)  3/15 pt with a RRt for a possible seizure        PAST MEDICAL & SURGICAL HISTORY:  Chronic systolic heart failure  Diabetes  S/P CABG x 1  S/P breast lumpectomy: left  History of tonsillectomy  H/O abdominal hysterectomy:       Social history:   pt unable to give history  per chart lives with sister and denies toxic habits      FAMILY HISTORY:  FH: type 2 diabetes      REVIEW OF SYSTEMS  Per HPI  Pt unable to provide additional history    Allergies    clams - itching (Other)  No Known Drug Allergies  Peroxide (Blisters)    Intolerances        Antimicrobials:       MEDICATIONS  (prior antimicrobials ):  acyclovir IVPB   264 mL/Hr IV Intermittent (03-15-20 @ 23:34)    acyclovir IVPB   264 mL/Hr IV Intermittent (20 @ 14:46)   264 mL/Hr IV Intermittent (20 @ 07:25)    cefTRIAXone   IVPB   100 mL/Hr IV Intermittent (20 @ 10:07)   100 mL/Hr IV Intermittent (03-15-20 @ 09:53)             acyclovir IVPB      acyclovir IVPB 700 milliGRAM(s) IV Intermittent every 8 hours  cefTRIAXone   IVPB 1000 milliGRAM(s) IV Intermittent every 24 hours      MEDICATIONS  (STANDING):  acyclovir IVPB      acyclovir IVPB 700 milliGRAM(s) IV Intermittent every 8 hours  atorvastatin 40 milliGRAM(s) Oral at bedtime  cefTRIAXone   IVPB 1000 milliGRAM(s) IV Intermittent every 24 hours  dextrose 5%. 1000 milliLiter(s) (50 mL/Hr) IV Continuous <Continuous>  dextrose 50% Injectable 12.5 Gram(s) IV Push once  dextrose 50% Injectable 25 Gram(s) IV Push once  dextrose 50% Injectable 25 Gram(s) IV Push once  haloperidol    Injectable 2 milliGRAM(s) IV Push once  insulin glargine Injectable (LANTUS) 10 Unit(s) SubCutaneous at bedtime  insulin lispro (HumaLOG) corrective regimen sliding scale   SubCutaneous three times a day before meals  insulin lispro (HumaLOG) corrective regimen sliding scale   SubCutaneous at bedtime  levETIRAcetam  IVPB 750 milliGRAM(s) IV Intermittent every 12 hours  metoprolol tartrate 25 milliGRAM(s) Oral daily  pantoprazole    Tablet 40 milliGRAM(s) Oral before breakfast    MEDICATIONS  (PRN):  dextrose 40% Gel 15 Gram(s) Oral once PRN Blood Glucose LESS THAN 70 milliGRAM(s)/deciliter  glucagon  Injectable 1 milliGRAM(s) IntraMuscular once PRN Glucose LESS THAN 70 milligrams/deciliter        Vital Signs Last 24 Hrs  T(C): 36.7 (16 Mar 2020 05:50), Max: 36.7 (16 Mar 2020 04:48)  T(F): 98 (16 Mar 2020 05:50), Max: 98.1 (16 Mar 2020 04:48)  HR: 74 (16 Mar 2020 15:21) (66 - 74)  BP: 114/65 (16 Mar 2020 15:21) (113/59 - 138/67)  BP(mean): --  RR: 18 (16 Mar 2020 14:31) (18 - 18)  SpO2: 93% (16 Mar 2020 15:21) (93% - 97%)    PHYSICAL EXAM:  pt nonverbal  awake but doesn't answer questions and occasionally says a word    Eyes:PERRL EOMI.NO discharge or conjunctival injection    ENMT:No sinus tenderness.No thrush.No pharyngeal exudate or erythema.Fair dental hygiene    Neck:Supple,No LN,no JVD      Respiratory:Good air entry bilaterally,CTA    Cardiovascular:S1 S2     Gastrointestinal:Soft BS(+) no tenderness n    Extremities:No cyanosis,clubbing or edema.    Vascular:peripheral pulses felt      Skin:No rash     Lymph Nodes:No palpable LNs    Musculoskeletal:No joint swelling or LOM    Psychiatric:Affect normal.                                10.8   9.38  )-----------( 351      ( 16 Mar 2020 07:28 )             34.5     LIVER FUNCTIONS - ( 16 Mar 2020 07:28 )  Alb: 3.5 g/dL / Pro: 8.2 g/dL / ALK PHOS: 382 U/L / ALT: 55 U/L / AST: 74 U/L / GGT: x             -    132<L>  |  96  |  19  ----------------------------<  279<H>  4.1   |  21<L>  |  0.74    Ca    9.2      16 Mar 2020 07:28  Phos  3.3     03-16  Mg     1.7     -    TPro  8.2  /  Alb  3.5  /  TBili  0.3  /  DBili  x   /  AST  74<H>  /  ALT  55<H>  /  AlkPhos  382<H>            Urinalysis Basic - ( 14 Mar 2020 18:38 )    Color: Yellow / Appearance: Clear / S.025 / pH: x  Gluc: x / Ketone: Negative  / Bili: Negative / Urobili: Negative   Blood: x / Protein: Trace / Nitrite: Negative   Leuk Esterase: Large / RBC: 2 /hpf / WBC 31 /HPF   Sq Epi: x / Non Sq Epi: 2 / Bacteria: Few        RECENT CULTURES:   @ 23:15  .Urine Clean Catch (Midstream)  --  --  --    >=3 organisms. Probable collection contamination.  --      MICROBIOLOGY:  Culture - Urine (20 @ 23:15)    Specimen Source: .Urine Clean Catch (Midstream)    Culture Results:   >=3 organisms. Probable collection contamination.    MRSA/MSSA PCR (17 @ 22:56)    MRSA PCR Result.: NotDetec: MSSA/MRSA PCR assay is a qualitative in vitro diagnostic test for the  direct detection and differentiation of methicillin susceptible  Staphylococcus aureus (SA) and methicillin-resistant Staphylococcus  aureus (MRSA) DNA from nasal swabs in patients at risk for nasal  colonization. It is not intended to diagnose MRSA or SA infections nor  guide or monitor treatment for MRSA/SA infections. A negative result does  not preclude nasal colonization. The assay is FDA-approved and its  performance hasbeen established by [place], [place] and the [facility], [place], [place]    MSSA PCR Result.: [facility]            Radiology:      < from: US Abdomen Upper Quadrant Right (20 @ 09:24) >  EXAM:  US ABDOMEN RT UPR QUADRANT                            PROCEDURE DATE:  2020            INTERPRETATION:  CLINICAL INFORMATION: Transaminitis.    COMPARISON: CT chest 2017.    TECHNIQUE: Sonography of the right upper quadrant.     FINDINGS:    Liver: The right hepatic lobe measures 22.7 cm. Left lobe is enlarged. Smooth surface contour.    Bile ducts: Normal caliber. Common bile duct measures 3 mm.     Gallbladder: Cholelithiasis. Few small gallstones.  No gallbladder wall thickening.        Pancreas: Visualized portions are within normal limits.    Right kidney: 13.5 cm. No hydronephrosis. A 4.5 x 1.5 x 4.0 cm left lower pole cyst is noted. A 7.6 x 2.5 x 4.9 septated middle pole left parapelvic cyst is also noted.    Ascites: None.    Aorta and IVC: Visualized portions are within normal limits. Atherosclerosis of the visualized abdominal aorta.    IMPRESSION:     Cholelithiasis without sonographic evidence of acute cholecystitis.    Hepatomegaly.    Left renal cysts.    < end of copied text >        < from: MR Head No Cont (03.15.20 @ 15:34) >  EXAM:  MR ANGIO BRAIN                          EXAM:  MR ANGIO NECK                          EXAM:  MR BRAIN                            PROCEDURE DATE:  03/15/2020            INTERPRETATION:  Clinical indications: Aphasia.    MRI of the brain was performed using sagittal T1, axial T1 T2 T2 FLAIR diffusion weighted sequence.    MRA of the neck was formed using 2-D and 3-D time flight technique    MRA of the [place] was performed using 3-D [place] technique    This exam is compared with prior noncontrast head CT performed on 2020.    Extensive parenchymal volume loss and chronic microvascular ischemic changes are identified    There is no evidence acute hemorrhage mass or mass effect in the posterior fossa or supratentorial region.    Evaluation of the diffusion weighted sequence demonstrates no abnormal areas of restricted diffusion to suggest acute infarct.    There are no abnormal intra or extra axial collections seen    The large vessels demonstrate normal flow voids    MRA of the neck is somewhat limited by motion    Both distal common carotid, proximal internal and external carotid arteries appear grossly normal    Dominant left vertebral artery is identified.     Both distal internal carotid arteriesappear normal. Hypoplastic left A1 segment is seen. Evaluation of the anterior cerebral, middle cerebral basilar and posterior cerebral arteries appear normal without evidence of an aneurysm or significant stenosis.    Impression: No acute territorial infarct seen.    Unremarkable MRA of the neck and [place].                    BEVERLY MONCADA M.D., ATTENDING RADIOLOGIST  This document has been electronically signed. Mar 15 2020  3:41PM        < end of copied text >

## 2020-03-16 NOTE — PROGRESS NOTE ADULT - PROBLEM SELECTOR PLAN 1
Unclear etiology.  -CT head negative for acute/subacute infarct however nature of presentation supports most probably a subacute CVA; has risk factors of uncontrolled T2DM and CAD  -pending MR Brain and MRA brain w/ and w/out cont, MRA neck w/ cont  -pending vEEG 24h to r/o focal seizure  -c/w atorvastatin]  -hold AC and ASA for now   - f/u further neurology recs  -UTI may be contributory.   -b12, folate, tsh. Unclear etiology.  -CT head negative for acute/subacute infarct however nature of presentation supports most probably a subacute CVA; has risk factors of uncontrolled T2DM and CAD  - MR Brain and MRA brain w/ and w/out cont, MRA neck w/ cont negative for stroke, acute processes  -pending vEEG 24h to r/o focal seizure  -c/w atorvastatin  -hold AC and ASA for now   -pending LP  - f/u further neurology recs  -UTI may be contributory.   -b12, folate, tsh wnl Unclear etiology.  -CT head negative for acute/subacute infarct however nature of presentation supports most probably a subacute CVA; has risk factors of uncontrolled T2DM and CAD  - MR Brain and MRA brain w/ and w/out cont, MRA neck w/ cont negative for stroke, acute processes  - pending vEEG 24h to r/o focal seizure  - c/w atorvastatin  - hold AC and ASA for now   - pending LP  - f/u further neurology recs  - Unlikely UTI, urine culture contaminated  - b12, folate, tsh wnl

## 2020-03-16 NOTE — PROGRESS NOTE ADULT - PROBLEM SELECTOR PLAN 3
- likely due to volume depletion from furosemide and dapagliflozin, corrected Na for glucose 136  - will monitor BMP at 3pm and reassess needs for IVF  -Strict I/Os possibly ERAZO given hx of DM, CAD  cont to monitor  GGT elevated  RUQ US demonstrates hepatomegaly

## 2020-03-16 NOTE — SPEECH LANGUAGE PATHOLOGY EVALUATION - SLP DIAGNOSIS
SLP attempted assessment of speech-language and cognitive function. However, transport in to take pt for MRI. SLP to follow up with complete evaluation 3/16. Daughter at bedside in agreement. Team B aware and in agreement.
Pt presents with expressive and receptive language deficits. Severe impairment in verbal output and ability to produce automatic sequences, repetition of words, confrontational and responsive naming. Receptive deficits noted in ability to answer yes/no questions (using stimuli card), follow 1-step directives, and comprehend words. Pt unable to verbally read words; however, strengths noted in symbol recognition, number matching, and picture-word matching. Pt would benefit from picture communication board to express wants/needs.

## 2020-03-16 NOTE — PROGRESS NOTE ADULT - ASSESSMENT
Assessment:  74y R-handed F with h/o DM, CAD s/p stent and CABG p/w subacute onset mixed aphasia with motor predominance likely due to left frontal lobe dysfunction secondary to subacute infarct vs. mass vs. less likely but possibly focal seizures w/o impaired awareness.     Impression: Subacute aphasia, headache, new onset focal w/ preserved awareness seizures lateralizing to the L. hemisphere, perhaps localizing to the temporal lobe, of unclear etiology ----Ddx includes: Temporal lobe encephalitis (HSV vs. Autoimmune vs. paraneoplastic (given stereotyped seizures lateralizing to the L. hemisphere) vs. provoked seizure 2/2 UTI and significant hyperglycemia (though provoked seizures are usually generalized onset).     Plan  [x] MRI brain w/wo contrast: negative  [x] MRA head w/o contrast: negative  [x] MRA neck w contrast: negative  [] 24 hr vEEG: pending  [] Continue Keppra 750mg BID  [] ID consult   [] LP - protein, glucose, Cell count, gram stain, culture, PCR, HSV, crypto, west nile virus, Myelin Basic Protein, IgG index, Oligoclonal bands, [place] state autoimmune panel (ENC-1)  [] Would repeat MRI brain w/ & w/o contrast; obtain LP in the meantime and would not delay LP for MRI.   [] SERUM ESR/CRP/Thyroid panel/Lyme titers/Ds-DNA/REECE/ SPEP/UPEP/Immunofixation/IgG index/RF/Sjogren's Syndrome  [] If patient continues to seize through the Keppra, please obtain MICU consult to upgrade to ICU level of care for status epilepticus. Assessment:  74y R-handed F with h/o DM, CAD s/p stent and CABG p/w subacute onset mixed aphasia with motor predominance likely due to left frontal lobe dysfunction secondary to subacute infarct vs. mass vs. less likely but possibly focal seizures w/o impaired awareness.     Impression: Subacute aphasia, headache, new onset focal w/ preserved awareness seizures lateralizing to the L. hemisphere, perhaps localizing to the temporal lobe, of unclear etiology ----Ddx includes: Temporal lobe encephalitis (HSV vs. Autoimmune vs. paraneoplastic (given stereotyped seizures lateralizing to the L. hemisphere) vs. provoked seizure 2/2 UTI and significant hyperglycemia (though provoked seizures are usually generalized onset).     Plan  [x] MRI brain w/wo contrast: negative  [x] MRA head w/o contrast: negative  [x] MRA neck w contrast: negative  [] 24 hr vEEG: pending  [] Continue Keppra 750mg BID  [] ID consult   [] LP - protein, glucose, Cell count, gram stain, culture, PCR, HSV, crypto, west nile virus, Myelin Basic Protein, IgG index, Oligoclonal bands, [place] state autoimmune panel (ENC-1)  [] Would repeat MRI brain w/ & w/o contrast; obtain LP in the meantime and would not delay LP for MRI.   [] SERUM ESR/CRP/Thyroid panel/Lyme titers/Ds-DNA/REECE/ SPEP/UPEP/Immunofixation/IgG index/RF/Sjogren's Syndrome/Ammonia level  [] If patient continues to seize through the Keppra, please obtain MICU consult to upgrade to ICU level of care for status epilepticus.   [] Consider Hepatology/GI evaluation for elevated LFT's/GGT/Alk phos Assessment:  74y R-handed F with h/o DM, CAD s/p stent and CABG p/w subacute onset mixed aphasia with motor predominance likely due to left hemispheric dysfunction secondary to subacute infarct vs. mass vs. less likely but possibly focal seizures w/o impaired awareness.     Impression: Subacute aphasia, headache, new onset focal w/ preserved awareness seizures lateralizing to the L. hemisphere, perhaps localizing to the temporal lobe, of unclear etiology ----Ddx includes: Temporal lobe encephalitis (HSV vs. Autoimmune vs. paraneoplastic (given stereotyped seizures lateralizing to the L. hemisphere) vs. provoked seizure 2/2 UTI and significant hyperglycemia (though provoked seizures are usually generalized onset).     Plan  [x] MRI brain w/wo contrast: negative  [x] MRA head w/o contrast: negative  [x] MRA neck w contrast: negative  [] 24 hr vEEG: pending  [] Continue Keppra 750mg BID  [] ID consult   [] LP - protein, glucose, Cell count, gram stain, culture, PCR, HSV, crypto, west nile virus, Myelin Basic Protein, IgG index, Oligoclonal bands, [place] state autoimmune panel (ENC-1)  [] Would repeat MRI brain w/ & w/o contrast; obtain LP in the meantime and would not delay LP for MRI.   [] SERUM ESR/CRP/Thyroid panel/Lyme titers/Ds-DNA/REECE/ SPEP/UPEP/Immunofixation/IgG index/RF/Sjogren's Syndrome/Ammonia level  [] If patient continues to seize through the Keppra, please obtain MICU consult to upgrade to ICU level of care for status epilepticus.   [] Consider Hepatology/GI evaluation for elevated LFT's/GGT/Alk phos

## 2020-03-16 NOTE — PROGRESS NOTE ADULT - SUBJECTIVE AND OBJECTIVE BOX
Dandre Farmer, PGY1  Pager: [place] [phone], [place] M [phone]    ----------------------------------------------------------------------------------------------------------------------------------------------------------------------------------    PROGRESS NOTE:     Patient is a 74y old  Female who presents with a chief complaint of headache (16 Mar 2020 09:19)      SUBJECTIVE / OVERNIGHT EVENTS:    ADDITIONAL REVIEW OF SYSTEMS:    MEDICATIONS  (STANDING):  acyclovir IVPB      acyclovir IVPB 700 milliGRAM(s) IV Intermittent every 8 hours  atorvastatin 40 milliGRAM(s) Oral at bedtime  cefTRIAXone   IVPB 1000 milliGRAM(s) IV Intermittent every 24 hours  dextrose 5%. 1000 milliLiter(s) (50 mL/Hr) IV Continuous <Continuous>  dextrose 50% Injectable 12.5 Gram(s) IV Push once  dextrose 50% Injectable 25 Gram(s) IV Push once  dextrose 50% Injectable 25 Gram(s) IV Push once  insulin glargine Injectable (LANTUS) 10 Unit(s) SubCutaneous at bedtime  insulin lispro (HumaLOG) corrective regimen sliding scale   SubCutaneous three times a day before meals  insulin lispro (HumaLOG) corrective regimen sliding scale   SubCutaneous at bedtime  levETIRAcetam  IVPB 750 milliGRAM(s) IV Intermittent every 12 hours  metoprolol tartrate 25 milliGRAM(s) Oral daily  pantoprazole    Tablet 40 milliGRAM(s) Oral before breakfast    MEDICATIONS  (PRN):  dextrose 40% Gel 15 Gram(s) Oral once PRN Blood Glucose LESS THAN 70 milliGRAM(s)/deciliter  glucagon  Injectable 1 milliGRAM(s) IntraMuscular once PRN Glucose LESS THAN 70 milligrams/deciliter      CAPILLARY BLOOD GLUCOSE      POCT Blood Glucose.: 333 mg/dL (16 Mar 2020 10:03)  POCT Blood Glucose.: 295 mg/dL (16 Mar 2020 04:28)  POCT Blood Glucose.: 425 mg/dL (15 Mar 2020 23:30)  POCT Blood Glucose.: 413 mg/dL (15 Mar 2020 21:43)  POCT Blood Glucose.: 230 mg/dL (15 Mar 2020 17:09)  POCT Blood Glucose.: 244 mg/dL (15 Mar 2020 12:16)    I&O's Summary    15 Mar 2020 07:01  -  16 Mar 2020 07:00  --------------------------------------------------------  IN: 410 mL / OUT: 800 mL / NET: -390 mL        PHYSICAL EXAM:  Vital Signs Last 24 Hrs  T(C): 36.7 (16 Mar 2020 05:50), Max: 36.7 (16 Mar 2020 04:48)  T(F): 98 (16 Mar 2020 05:50), Max: 98.1 (16 Mar 2020 04:48)  HR: 67 (16 Mar 2020 05:50) (59 - 68)  BP: 138/67 (16 Mar 2020 05:50) (113/59 - 138/67)  BP(mean): --  RR: 18 (16 Mar 2020 05:50) (18 - 19)  SpO2: 96% (16 Mar 2020 05:50) (96% - 97%)    CONSTITUTIONAL: NAD, well-developed  RESPIRATORY: Normal respiratory effort; lungs are clear to auscultation bilaterally  CARDIOVASCULAR: normal S1 and S2, rrr, no mrg; No lower extremity edema; Peripheral pulses are 2+ bilaterally  ABDOMEN: soft, NTND, BS+; No hepatosplenomegaly  MUSCULOSKELETAL: no clubbing or cyanosis of digits; no joint swelling or tenderness to palpation  PSYCH: A+O to person, place, and time; affect appropriate    LABS:                        10.8   9.38  )-----------( 351      ( 16 Mar 2020 07:28 )             34.5     03-16    132<L>  |  96  |  19  ----------------------------<  279<H>  4.1   |  21<L>  |  0.74    Ca    9.2      16 Mar 2020 07:28  Phos  3.3       Mg     1.7         TPro  8.2  /  Alb  3.5  /  TBili  0.3  /  DBili  x   /  AST  74<H>  /  ALT  55<H>  /  AlkPhos  382<H>      PT/INR - ( 15 Mar 2020 22:20 )   PT: 11.8 sec;   INR: 1.03 ratio         PTT - ( 15 Mar 2020 22:20 )  PTT:31.5 sec      Urinalysis Basic - ( 14 Mar 2020 18:38 )    Color: Yellow / Appearance: Clear / S.025 / pH: x  Gluc: x / Ketone: Negative  / Bili: Negative / Urobili: Negative   Blood: x / Protein: Trace / Nitrite: Negative   Leuk Esterase: Large / RBC: 2 /hpf / WBC 31 /HPF   Sq Epi: x / Non Sq Epi: 2 / Bacteria: Few        Culture - Urine (collected 14 Mar 2020 23:15)  Source: .Urine Clean Catch (Midstream)  Final Report (16 Mar 2020 06:21):    >=3 organisms. Probable collection contamination.        RADIOLOGY & ADDITIONAL TESTS:  Results Reviewed:   Imaging Personally Reviewed:  Electrocardiogram Personally Reviewed: Dandre Farmer, PGY1  Pager: [place] [phone], J [phone]    ----------------------------------------------------------------------------------------------------------------------------------------------------------------------------------    PROGRESS NOTE:     Patient is a 74y old  Female who presents with a chief complaint of headache (16 Mar 2020 09:19)      SUBJECTIVE / OVERNIGHT EVENTS: pt had RRT called overnight for seizure lasting 45 seconds. Had rhythmic mouth movements and upper extremity twitching. Loaded with keppra, seizure resolved spontaneously. Started on acyclovir as neurology has suspicion of herpes encephalitis. Pt found later in the night on the ground, presumably fell. Hip XR negative. This morning, pt continues to be aphasic, is responsive but has difficulty to     ADDITIONAL REVIEW OF SYSTEMS:    MEDICATIONS  (STANDING):  acyclovir IVPB      acyclovir IVPB 700 milliGRAM(s) IV Intermittent every 8 hours  atorvastatin 40 milliGRAM(s) Oral at bedtime  cefTRIAXone   IVPB 1000 milliGRAM(s) IV Intermittent every 24 hours  dextrose 5%. 1000 milliLiter(s) (50 mL/Hr) IV Continuous <Continuous>  dextrose 50% Injectable 12.5 Gram(s) IV Push once  dextrose 50% Injectable 25 Gram(s) IV Push once  dextrose 50% Injectable 25 Gram(s) IV Push once  insulin glargine Injectable (LANTUS) 10 Unit(s) SubCutaneous at bedtime  insulin lispro (HumaLOG) corrective regimen sliding scale   SubCutaneous three times a day before meals  insulin lispro (HumaLOG) corrective regimen sliding scale   SubCutaneous at bedtime  levETIRAcetam  IVPB 750 milliGRAM(s) IV Intermittent every 12 hours  metoprolol tartrate 25 milliGRAM(s) Oral daily  pantoprazole    Tablet 40 milliGRAM(s) Oral before breakfast    MEDICATIONS  (PRN):  dextrose 40% Gel 15 Gram(s) Oral once PRN Blood Glucose LESS THAN 70 milliGRAM(s)/deciliter  glucagon  Injectable 1 milliGRAM(s) IntraMuscular once PRN Glucose LESS THAN 70 milligrams/deciliter      CAPILLARY BLOOD GLUCOSE      POCT Blood Glucose.: 333 mg/dL (16 Mar 2020 10:03)  POCT Blood Glucose.: 295 mg/dL (16 Mar 2020 04:28)  POCT Blood Glucose.: 425 mg/dL (15 Mar 2020 23:30)  POCT Blood Glucose.: 413 mg/dL (15 Mar 2020 21:43)  POCT Blood Glucose.: 230 mg/dL (15 Mar 2020 17:09)  POCT Blood Glucose.: 244 mg/dL (15 Mar 2020 12:16)    I&O's Summary    15 Mar 2020 07:01  -  16 Mar 2020 07:00  --------------------------------------------------------  IN: 410 mL / OUT: 800 mL / NET: -390 mL        PHYSICAL EXAM:  Vital Signs Last 24 Hrs  T(C): 36.7 (16 Mar 2020 05:50), Max: 36.7 (16 Mar 2020 04:48)  T(F): 98 (16 Mar 2020 05:50), Max: 98.1 (16 Mar 2020 04:48)  HR: 67 (16 Mar 2020 05:50) (59 - 68)  BP: 138/67 (16 Mar 2020 05:50) (113/59 - 138/67)  BP(mean): --  RR: 18 (16 Mar 2020 05:50) (18 - 19)  SpO2: 96% (16 Mar 2020 05:50) (96% - 97%)    CONSTITUTIONAL: NAD, well-developed  RESPIRATORY: Normal respiratory effort; lungs are clear to auscultation bilaterally  CARDIOVASCULAR: normal S1 and S2, rrr, no mrg; No lower extremity edema; Peripheral pulses are 2+ bilaterally  ABDOMEN: soft, NTND, BS+; No hepatosplenomegaly  MUSCULOSKELETAL: no clubbing or cyanosis of digits; no joint swelling or tenderness to palpation  PSYCH: A+O to person, place, and time; affect appropriate    LABS:                        10.8   9.38  )-----------( 351      ( 16 Mar 2020 07:28 )             34.5     03-16    132<L>  |  96  |  19  ----------------------------<  279<H>  4.1   |  21<L>  |  0.74    Ca    9.2      16 Mar 2020 07:28  Phos  3.3     03-16  Mg     1.7     -16    TPro  8.2  /  Alb  3.5  /  TBili  0.3  /  DBili  x   /  AST  74<H>  /  ALT  55<H>  /  AlkPhos  382<H>  -16    PT/INR - ( 15 Mar 2020 22:20 )   PT: 11.8 sec;   INR: 1.03 ratio         PTT - ( 15 Mar 2020 22:20 )  PTT:31.5 sec      Urinalysis Basic - ( 14 Mar 2020 18:38 )    Color: Yellow / Appearance: Clear / S.025 / pH: x  Gluc: x / Ketone: Negative  / Bili: Negative / Urobili: Negative   Blood: x / Protein: Trace / Nitrite: Negative   Leuk Esterase: Large / RBC: 2 /hpf / WBC 31 /HPF   Sq Epi: x / Non Sq Epi: 2 / Bacteria: Few        Culture - Urine (collected 14 Mar 2020 23:15)  Source: .Urine Clean Catch (Midstream)  Final Report (16 Mar 2020 06:21):    >=3 organisms. Probable collection contamination.        RADIOLOGY & ADDITIONAL TESTS:  Results Reviewed:   Imaging Personally Reviewed:  Electrocardiogram Personally Reviewed: Dandre Farmer, PGY1  Pager: [place] [phone], [facility] [phone]    ----------------------------------------------------------------------------------------------------------------------------------------------------------------------------------    PROGRESS NOTE:     Patient is a 74y old  Female who presents with a chief complaint of headache (16 Mar 2020 09:19)      SUBJECTIVE / OVERNIGHT EVENTS: pt had RRT called overnight for seizure lasting 45 seconds. Had rhythmic mouth movements and upper extremity twitching. Loaded with keppra, seizure resolved spontaneously. Started on acyclovir as neurology has suspicion of herpes encephalitis. Pt found later in the night on the ground, presumably fell. Hip XR negative. This morning, pt continues to be aphasic, is responsive but has difficulty articulating. States that she feels okay overall.     ADDITIONAL REVIEW OF SYSTEMS:  CONSTITUTIONAL: no fevers or chills  EYES/ENT: No visual changes;  No dysphagia  NECK: No pain or stiffness  RESPIRATORY: No cough, wheezing, hemoptysis; No shortness of breath  CARDIOVASCULAR: No chest pain or palpitations; No lower extremity edema  GASTROINTESTINAL: No abdominal or epigastric pain. No nausea, vomiting, or hematemesis; No diarrhea or constipation. No melena or hematochezia.  NEUROLOGICAL: dysarthric    MEDICATIONS  (STANDING):  acyclovir IVPB      acyclovir IVPB 700 milliGRAM(s) IV Intermittent every 8 hours  atorvastatin 40 milliGRAM(s) Oral at bedtime  cefTRIAXone   IVPB 1000 milliGRAM(s) IV Intermittent every 24 hours  dextrose 5%. 1000 milliLiter(s) (50 mL/Hr) IV Continuous <Continuous>  dextrose 50% Injectable 12.5 Gram(s) IV Push once  dextrose 50% Injectable 25 Gram(s) IV Push once  dextrose 50% Injectable 25 Gram(s) IV Push once  insulin glargine Injectable (LANTUS) 10 Unit(s) SubCutaneous at bedtime  insulin lispro (HumaLOG) corrective regimen sliding scale   SubCutaneous three times a day before meals  insulin lispro (HumaLOG) corrective regimen sliding scale   SubCutaneous at bedtime  levETIRAcetam  IVPB 750 milliGRAM(s) IV Intermittent every 12 hours  metoprolol tartrate 25 milliGRAM(s) Oral daily  pantoprazole    Tablet 40 milliGRAM(s) Oral before breakfast    MEDICATIONS  (PRN):  dextrose 40% Gel 15 Gram(s) Oral once PRN Blood Glucose LESS THAN 70 milliGRAM(s)/deciliter  glucagon  Injectable 1 milliGRAM(s) IntraMuscular once PRN Glucose LESS THAN 70 milligrams/deciliter      CAPILLARY BLOOD GLUCOSE      POCT Blood Glucose.: 333 mg/dL (16 Mar 2020 10:03)  POCT Blood Glucose.: 295 mg/dL (16 Mar 2020 04:28)  POCT Blood Glucose.: 425 mg/dL (15 Mar 2020 23:30)  POCT Blood Glucose.: 413 mg/dL (15 Mar 2020 21:43)  POCT Blood Glucose.: 230 mg/dL (15 Mar 2020 17:09)  POCT Blood Glucose.: 244 mg/dL (15 Mar 2020 12:16)    I&O's Summary    15 Mar 2020 07:01  -  16 Mar 2020 07:00  --------------------------------------------------------  IN: 410 mL / OUT: 800 mL / NET: -390 mL        PHYSICAL EXAM:  Vital Signs Last 24 Hrs  T(C): 36.7 (16 Mar 2020 05:50), Max: 36.7 (16 Mar 2020 04:48)  T(F): 98 (16 Mar 2020 05:50), Max: 98.1 (16 Mar 2020 04:48)  HR: 67 (16 Mar 2020 05:50) (59 - 68)  BP: 138/67 (16 Mar 2020 05:50) (113/59 - 138/67)  BP(mean): --  RR: 18 (16 Mar 2020 05:50) (18 - 19)  SpO2: 96% (16 Mar 2020 05:50) (96% - 97%)    CONSTITUTIONAL: NAD, well-developed, aox3  RESPIRATORY: Normal respiratory effort; lungs are clear to auscultation bilaterally  CARDIOVASCULAR: normal S1 and S2, rrr, no mrg; No lower extremity edema; Peripheral pulses are 2+ bilaterally  ABDOMEN: soft, NTND, BS+; No hepatosplenomegaly  MUSCULOSKELETAL: no clubbing or cyanosis of digits; no joint swelling or tenderness to palpation  NEURO: pt not fully cooperative with exam  PSYCH: A+O to person, place, and time; affect appropriate    LABS:                        10.8   9.38  )-----------( 351      ( 16 Mar 2020 07:28 )             34.5     03-16    132<L>  |  96  |  19  ----------------------------<  279<H>  4.1   |  21<L>  |  0.74    Ca    9.2      16 Mar 2020 07:28  Phos  3.3     03-16  Mg     1.7     03-16    TPro  8.2  /  Alb  3.5  /  TBili  0.3  /  DBili  x   /  AST  74<H>  /  ALT  55<H>  /  AlkPhos  382<H>  03-16    PT/INR - ( 15 Mar 2020 22:20 )   PT: 11.8 sec;   INR: 1.03 ratio         PTT - ( 15 Mar 2020 22:20 )  PTT:31.5 sec      Urinalysis Basic - ( 14 Mar 2020 18:38 )    Color: Yellow / Appearance: Clear / S.025 / pH: x  Gluc: x / Ketone: Negative  / Bili: Negative / Urobili: Negative   Blood: x / Protein: Trace / Nitrite: Negative   Leuk Esterase: Large / RBC: 2 /hpf / WBC 31 /HPF   Sq Epi: x / Non Sq Epi: 2 / Bacteria: Few        Culture - Urine (collected 14 Mar 2020 23:15)  Source: .Urine Clean Catch (Midstream)  Final Report (16 Mar 2020 06:21):    >=3 organisms. Probable collection contamination.        RADIOLOGY & ADDITIONAL TESTS:  Results Reviewed:   Imaging Personally Reviewed:  Electrocardiogram Personally Reviewed: Dandre Farmer, PGY1  Pager: [place] [phone], [facility] [phone]    ----------------------------------------------------------------------------------------------------------------------------------------------------------------------------------    PROGRESS NOTE:     Patient is a 74y old  Female who presents with a chief complaint of headache (16 Mar 2020 09:19)      SUBJECTIVE / OVERNIGHT EVENTS: pt had RRT called overnight for seizure lasting 45 seconds. Had rhythmic mouth movements and upper extremity twitching. Loaded with keppra, seizure resolved spontaneously. Started on acyclovir as neurology has suspicion of herpes encephalitis. Pt found later in the night on the ground, presumably fell. Hip XR negative. This morning, pt continues to be aphasic, is responsive but has difficulty articulating. States that she feels okay overall.     ADDITIONAL REVIEW OF SYSTEMS:  CONSTITUTIONAL: no fevers or chills  EYES/ENT: No visual changes;  No dysphagia  NECK: No pain or stiffness  RESPIRATORY: No cough, wheezing, hemoptysis; No shortness of breath  CARDIOVASCULAR: No chest pain or palpitations; No lower extremity edema  GASTROINTESTINAL: No abdominal or epigastric pain. No nausea, vomiting, or hematemesis; No diarrhea or constipation. No melena or hematochezia.  NEUROLOGICAL: dysarthric    MEDICATIONS  (STANDING):  acyclovir IVPB      acyclovir IVPB 700 milliGRAM(s) IV Intermittent every 8 hours  atorvastatin 40 milliGRAM(s) Oral at bedtime  cefTRIAXone   IVPB 1000 milliGRAM(s) IV Intermittent every 24 hours  dextrose 5%. 1000 milliLiter(s) (50 mL/Hr) IV Continuous <Continuous>  dextrose 50% Injectable 12.5 Gram(s) IV Push once  dextrose 50% Injectable 25 Gram(s) IV Push once  dextrose 50% Injectable 25 Gram(s) IV Push once  insulin glargine Injectable (LANTUS) 10 Unit(s) SubCutaneous at bedtime  insulin lispro (HumaLOG) corrective regimen sliding scale   SubCutaneous three times a day before meals  insulin lispro (HumaLOG) corrective regimen sliding scale   SubCutaneous at bedtime  levETIRAcetam  IVPB 750 milliGRAM(s) IV Intermittent every 12 hours  metoprolol tartrate 25 milliGRAM(s) Oral daily  pantoprazole    Tablet 40 milliGRAM(s) Oral before breakfast    MEDICATIONS  (PRN):  dextrose 40% Gel 15 Gram(s) Oral once PRN Blood Glucose LESS THAN 70 milliGRAM(s)/deciliter  glucagon  Injectable 1 milliGRAM(s) IntraMuscular once PRN Glucose LESS THAN 70 milligrams/deciliter      CAPILLARY BLOOD GLUCOSE      POCT Blood Glucose.: 333 mg/dL (16 Mar 2020 10:03)  POCT Blood Glucose.: 295 mg/dL (16 Mar 2020 04:28)  POCT Blood Glucose.: 425 mg/dL (15 Mar 2020 23:30)  POCT Blood Glucose.: 413 mg/dL (15 Mar 2020 21:43)  POCT Blood Glucose.: 230 mg/dL (15 Mar 2020 17:09)  POCT Blood Glucose.: 244 mg/dL (15 Mar 2020 12:16)    I&O's Summary    15 Mar 2020 07:01  -  16 Mar 2020 07:00  --------------------------------------------------------  IN: 410 mL / OUT: 800 mL / NET: -390 mL        PHYSICAL EXAM:  Vital Signs Last 24 Hrs  T(C): 36.7 (16 Mar 2020 05:50), Max: 36.7 (16 Mar 2020 04:48)  T(F): 98 (16 Mar 2020 05:50), Max: 98.1 (16 Mar 2020 04:48)  HR: 67 (16 Mar 2020 05:50) (59 - 68)  BP: 138/67 (16 Mar 2020 05:50) (113/59 - 138/67)  BP(mean): --  RR: 18 (16 Mar 2020 05:50) (18 - 19)  SpO2: 96% (16 Mar 2020 05:50) (96% - 97%)    CONSTITUTIONAL: NAD, well-developed, aox3  RESPIRATORY: Normal respiratory effort; lungs are clear to auscultation bilaterally  CARDIOVASCULAR: normal S1 and S2, rrr, no mrg; No lower extremity edema; Peripheral pulses are 2+ bilaterally  ABDOMEN: soft, NTND, BS+; No hepatosplenomegaly  MUSCULOSKELETAL: no clubbing or cyanosis of digits; no joint swelling or tenderness to palpation  NEURO: pt not fully cooperative with exam, Cn 2-12 grossly intact, strength 4/5 in UE b/L, 3/5 in LE  PSYCH: A+O to person, place, and time; affect appropriate    LABS:                        10.8   9.38  )-----------( 351      ( 16 Mar 2020 07:28 )             34.5     03-16    132<L>  |  96  |  19  ----------------------------<  279<H>  4.1   |  21<L>  |  0.74    Ca    9.2      16 Mar 2020 07:28  Phos  3.3     03-16  Mg     1.7     03-16    TPro  8.2  /  Alb  3.5  /  TBili  0.3  /  DBili  x   /  AST  74<H>  /  ALT  55<H>  /  AlkPhos  382<H>  03-16    PT/INR - ( 15 Mar 2020 22:20 )   PT: 11.8 sec;   INR: 1.03 ratio         PTT - ( 15 Mar 2020 22:20 )  PTT:31.5 sec      Urinalysis Basic - ( 14 Mar 2020 18:38 )    Color: Yellow / Appearance: Clear / S.025 / pH: x  Gluc: x / Ketone: Negative  / Bili: Negative / Urobili: Negative   Blood: x / Protein: Trace / Nitrite: Negative   Leuk Esterase: Large / RBC: 2 /hpf / WBC 31 /HPF   Sq Epi: x / Non Sq Epi: 2 / Bacteria: Few        Culture - Urine (collected 14 Mar 2020 23:15)  Source: .Urine Clean Catch (Midstream)  Final Report (16 Mar 2020 06:21):    >=3 organisms. Probable collection contamination.        RADIOLOGY & ADDITIONAL TESTS:  Results Reviewed:   Imaging Personally Reviewed:  Electrocardiogram Personally Reviewed: Dandre Farmer, PGY1  Pager: [place] [phone], [facility] [phone]    ----------------------------------------------------------------------------------------------------------------------------------------------------------------------------------    PROGRESS NOTE:     Patient is a 74y old  Female who presents with a chief complaint of headache (16 Mar 2020 09:19)      SUBJECTIVE / OVERNIGHT EVENTS: pt had RRT called overnight for seizure lasting 45 seconds. Had rhythmic mouth movements and upper extremity twitching. Loaded with keppra, seizure resolved spontaneously. Started on acyclovir as neurology has suspicion of herpes encephalitis. Pt found later in the night on the ground, presumably fell. Hip XR negative. This morning, pt continues to be aphasic, is responsive but has difficulty articulating. States that she feels okay overall.     ADDITIONAL REVIEW OF SYSTEMS:  CONSTITUTIONAL: no fevers or chills  EYES/ENT: No visual changes;  No dysphagia  NECK: No pain or stiffness  RESPIRATORY: No cough, wheezing, hemoptysis; No shortness of breath  CARDIOVASCULAR: No chest pain or palpitations; No lower extremity edema  GASTROINTESTINAL: No abdominal or epigastric pain. No nausea, vomiting, or hematemesis; No diarrhea or constipation. No melena or hematochezia.  NEUROLOGICAL: dysarthric    MEDICATIONS  (STANDING):  acyclovir IVPB      acyclovir IVPB 700 milliGRAM(s) IV Intermittent every 8 hours  atorvastatin 40 milliGRAM(s) Oral at bedtime  cefTRIAXone   IVPB 1000 milliGRAM(s) IV Intermittent every 24 hours  dextrose 5%. 1000 milliLiter(s) (50 mL/Hr) IV Continuous <Continuous>  dextrose 50% Injectable 12.5 Gram(s) IV Push once  dextrose 50% Injectable 25 Gram(s) IV Push once  dextrose 50% Injectable 25 Gram(s) IV Push once  insulin glargine Injectable (LANTUS) 10 Unit(s) SubCutaneous at bedtime  insulin lispro (HumaLOG) corrective regimen sliding scale   SubCutaneous three times a day before meals  insulin lispro (HumaLOG) corrective regimen sliding scale   SubCutaneous at bedtime  levETIRAcetam  IVPB 750 milliGRAM(s) IV Intermittent every 12 hours  metoprolol tartrate 25 milliGRAM(s) Oral daily  pantoprazole    Tablet 40 milliGRAM(s) Oral before breakfast    MEDICATIONS  (PRN):  dextrose 40% Gel 15 Gram(s) Oral once PRN Blood Glucose LESS THAN 70 milliGRAM(s)/deciliter  glucagon  Injectable 1 milliGRAM(s) IntraMuscular once PRN Glucose LESS THAN 70 milligrams/deciliter      CAPILLARY BLOOD GLUCOSE      POCT Blood Glucose.: 333 mg/dL (16 Mar 2020 10:03)  POCT Blood Glucose.: 295 mg/dL (16 Mar 2020 04:28)  POCT Blood Glucose.: 425 mg/dL (15 Mar 2020 23:30)  POCT Blood Glucose.: 413 mg/dL (15 Mar 2020 21:43)  POCT Blood Glucose.: 230 mg/dL (15 Mar 2020 17:09)  POCT Blood Glucose.: 244 mg/dL (15 Mar 2020 12:16)    I&O's Summary    15 Mar 2020 07:01  -  16 Mar 2020 07:00  --------------------------------------------------------  IN: 410 mL / OUT: 800 mL / NET: -390 mL        PHYSICAL EXAM:  Vital Signs Last 24 Hrs  T(C): 36.7 (16 Mar 2020 05:50), Max: 36.7 (16 Mar 2020 04:48)  T(F): 98 (16 Mar 2020 05:50), Max: 98.1 (16 Mar 2020 04:48)  HR: 67 (16 Mar 2020 05:50) (59 - 68)  BP: 138/67 (16 Mar 2020 05:50) (113/59 - 138/67)  BP(mean): --  RR: 18 (16 Mar 2020 05:50) (18 - 19)  SpO2: 96% (16 Mar 2020 05:50) (96% - 97%)    CONSTITUTIONAL: Anxious, aox0, Uncomfortable appearing  RESPIRATORY: Normal respiratory effort; lungs are clear to auscultation bilaterally  CARDIOVASCULAR: normal S1 and S2, rrr, no mrg; No lower extremity edema; Peripheral pulses are 2+ bilaterally  ABDOMEN: soft, NTND, BS+; No hepatosplenomegaly  MUSCULOSKELETAL: no clubbing or cyanosis of digits; no joint swelling or tenderness to palpation  NEURO: pt not fully cooperative with exam, Cn 2-12 grossly intact, strength 4/5 in UE b/L, 3/5 in LE  SKIN: bruise on right hand, otherwise no rashes, etc  PSYCH: A+O x0, anxious, combative    LABS:                        10.8   9.38  )-----------( 351      ( 16 Mar 2020 07:28 )             34.5     03-16    132<L>  |  96  |  19  ----------------------------<  279<H>  4.1   |  21<L>  |  0.74    Ca    9.2      16 Mar 2020 07:28  Phos  3.3     03-16  Mg     1.7     03-16    TPro  8.2  /  Alb  3.5  /  TBili  0.3  /  DBili  x   /  AST  74<H>  /  ALT  55<H>  /  AlkPhos  382<H>  03-16    PT/INR - ( 15 Mar 2020 22:20 )   PT: 11.8 sec;   INR: 1.03 ratio         PTT - ( 15 Mar 2020 22:20 )  PTT:31.5 sec      Urinalysis Basic - ( 14 Mar 2020 18:38 )    Color: Yellow / Appearance: Clear / S.025 / pH: x  Gluc: x / Ketone: Negative  / Bili: Negative / Urobili: Negative   Blood: x / Protein: Trace / Nitrite: Negative   Leuk Esterase: Large / RBC: 2 /hpf / WBC 31 /HPF   Sq Epi: x / Non Sq Epi: 2 / Bacteria: Few        Culture - Urine (collected 14 Mar 2020 23:15)  Source: .Urine Clean Catch (Midstream)  Final Report (16 Mar 2020 06:21):    >=3 organisms. Probable collection contamination.        RADIOLOGY & ADDITIONAL TESTS:  Results Reviewed:   Imaging Personally Reviewed:  Electrocardiogram Personally Reviewed:    Plan of care dsicussed with the following consultants: ID, Neurology

## 2020-03-16 NOTE — PROGRESS NOTE ADULT - PROBLEM SELECTOR PLAN 7
check Hga1c    - will consider adding long acting insulin depending on FS   c/w JOCELYNE and monitor FS ac and hs   hold home metformin and dapagliflozin

## 2020-03-16 NOTE — PROGRESS NOTE ADULT - PROBLEM SELECTOR PLAN 10
ISTOP: Reference #: [id number]; last alprazolam filled in 2014  DVT ppx: lovenox qdaily  Diet: NPO except meds until further recommendations from speech and swallow team  Dispo: pending PT and speech therapy consult    Miriam Jones PGY-3  Pager # [phone]/ [phone]    1.  Name of PCP: Felipe Manuel   2.  PCP Contacted on Admission: [ ] Y    [ ] N    3.  PCP contacted at Discharge: [ ] Y    [ ] N    [ ] N/A  4.  Post-Discharge Appointment Date and Location:  5.  Summary of Handoff given to PCP: ISTOP: Reference #: [id number]; last alprazolam filled in 2014  DVT ppx: lovenox qdaily  Diet: soft diet  Dispo: pending PT and speech therapy consult    1.  Name of PCP: Felipe Manuel   2.  PCP Contacted on Admission: [ ] Y    [ ] N    3.  PCP contacted at Discharge: [ ] Y    [ ] N    [ ] N/A  4.  Post-Discharge Appointment Date and Location:  5.  Summary of Handoff given to PCP:

## 2020-03-16 NOTE — SPEECH LANGUAGE PATHOLOGY EVALUATION - COMMENTS
MRI brain w/o contrast negative for CVA. 2/15: RRT called for seizure. Per neuro, Subacute aphasia, headache, new onset focal w/ preserved awareness seizures lateralizing to the L. hemisphere, perhaps localizing to the temporal lobe, of unclear etiology. Dxx: Temporal lobe encephalitis, HSV vs. Autoimmune vs. paraneoplastic vs. provoked seizure 2/2 UTI and significant hyperglycemia.   3/16; pt had fall over night. hip xray: No acute fracture or dislocation. Plan for LP. Recommend SLP services at next level of care Symbol Recognition:   Number Matchin/4 High level cognitive skills unable to be assessed due to expressive language deficits

## 2020-03-16 NOTE — PROGRESS NOTE ADULT - PROBLEM SELECTOR PLAN 2
-Not endorsing any acute complaints   - can give acetaminophen PRN -c/w keppra  -acyclovir for possible HSV  -f/u EEG   -LP

## 2020-03-16 NOTE — PROGRESS NOTE ADULT - PROBLEM SELECTOR PLAN 9
hold furosemide for now  can resume as needed once improved volume status   check TTE w/ bubble study hold furosemide for now  can resume as needed once improved volume status   TTE shows normal LVSF

## 2020-03-16 NOTE — SPEECH LANGUAGE PATHOLOGY EVALUATION - SLP CONVERSATIONAL SPEECH
This writer called and spoke to [facility] authority - They have all of his information and are in the process of reviewing for funding    Clary Klein - Phone: [phone] - left her a vm - told her his first choice is [facility]; second choice is [facility].    Asked for call back to discuss.    Pt with sparse verbal output; during exam, produced "no" and "I don't hay." Pt also with semantic and phonemic paraphasias.

## 2020-03-16 NOTE — PROGRESS NOTE ADULT - PROBLEM SELECTOR PLAN 5
possibly ERAZO given hx of DM, CAD  cont to monitor  check GGT  obtain RUQ US Complicated. Possibly contributory to mental status  - UA pos, unable to elicit ROS, per conversation with sister patient had been complaining of urinary burning. Will tx with ceftriaxone

## 2020-03-16 NOTE — PROGRESS NOTE ADULT - ATTENDING COMMENTS
Patient seen and examined. 74 year old female with a history of uncontrolled diabetes, CAD s/p stent and CABG presenting with subacute/ acute encephalopathy and severe headache, with hospital course complicated by new seizure.     1. Encephalopathy, headache, seizure: ? encephalitis  MR unrevealing  - LP completed: CSF studies sent and pending, empirically covered with acyclovir (10 mg/kg)  - Will send serum viral encephalitis labs as well: CMV, EBV, HHV-6, VZV, HSV  - Neuro following: will appreciate recs  - EEG planned for 3/17    2. Reportedly was found down overnight. ? fall. Will obtain head CT, wrist XR. Fall precautions in place.     3. Transaminitis: ? If related to systemic viral syndrome. Will send EBV, CMV as above. Acute hep panel. Will not hold statin at this time, as long term medication and low suspicion that this is the cause.

## 2020-03-16 NOTE — PROGRESS NOTE ADULT - PROVIDER SPECIALTY LIST ADULT
Internal Medicine Subjective    Lee Medellin is a 55 y.o. male here for:    Chief Complaint   Patient presents with   • Hypertension     6 mo f/u    • Arthritis     both hands have been hurting when he grabs things does not know if it is bone spurs or arthritis        Hand arthritis, recurrent, worsening over time. Associated with aching pain, stiffness. Taking NSAIDs, some relief.    poison ivy on arms recently, over the counter creams minimally beneficial. Resolving gradually, rash was red, blisters, itchy. Exposed to rhus outside    GERD: chronic, stable on PPI.    Patient concerned regarding possible carotid artery disease. Smoker.      Hypertension   This is a chronic problem. The current episode started more than 1 year ago. The problem is controlled. Pertinent negatives include no anxiety, blurred vision, chest pain, headaches, neck pain, peripheral edema, shortness of breath or sweats. Agents associated with hypertension include NSAIDs. Risk factors for coronary artery disease include obesity, sedentary lifestyle, smoking/tobacco exposure and male gender. Current antihypertension treatment includes beta blockers and diuretics. The current treatment provides significant improvement. Compliance problems include exercise.  Hypertensive end-organ damage includes kidney disease. There is no history of CAD/MI or CVA. Identifiable causes of hypertension include chronic renal disease and renovascular disease (unilateral).          The following portions of the patient's history were reviewed and updated as appropriate: allergies, current medications, past family history, past medical history, past social history, past surgical history and problem list.    Health Maintenance   Topic Date Due   • LIPID PANEL  08/31/2019 (Originally 5/3/2019)   • LUNG CANCER SCREENING  08/31/2019 (Originally 11/12/2018)   • ZOSTER VACCINE (1 of 2) 06/15/2020 (Originally 11/12/2013)   • INFLUENZA VACCINE  08/01/2019   • ANNUAL PHYSICAL  11/30/2019  "  • TDAP/TD VACCINES (2 - Td) 01/01/2026   • COLONOSCOPY  10/13/2026   • HEPATITIS C SCREENING  Completed   • PNEUMOCOCCAL VACCINE (19-64 MEDIUM RISK)  Discontinued       Review of Systems   Constitutional: Positive for fatigue.   Eyes: Negative for blurred vision.   Respiratory: Negative for shortness of breath.    Cardiovascular: Negative for chest pain.   Musculoskeletal: Positive for arthralgias. Negative for neck pain.   Skin: Positive for rash and skin lesions.       Vitals:    06/07/19 0906   BP: 142/88   Pulse: 59   Resp: 16   Temp: 97.5 °F (36.4 °C)   TempSrc: Temporal   SpO2: 97%   Weight: 106 kg (233 lb)   Height: 175.3 cm (69.02\")         Objective   Physical Exam   Constitutional: He is oriented to person, place, and time. Vital signs are normal. He appears well-developed and well-nourished. He is active.  Non-toxic appearance. He does not have a sickly appearance. He does not appear ill. No distress. He is obese.  HENT:   Head: Normocephalic and atraumatic.   Right Ear: Hearing and external ear normal.   Left Ear: Hearing and external ear normal.   Nose: Nose normal.   Mouth/Throat: Oropharynx is clear and moist. Mucous membranes are not dry.   Eyes: Conjunctivae, EOM and lids are normal. No scleral icterus.   Neck: Phonation normal. Neck supple. Carotid bruit is not present. No tracheal deviation present.   Cardiovascular: Regular rhythm and normal heart sounds. Bradycardia present. Exam reveals no gallop and no friction rub.   No murmur heard.  Pulmonary/Chest: Effort normal and breath sounds normal.   Musculoskeletal: He exhibits no deformity.   Neurological: He is alert and oriented to person, place, and time. He displays no tremor. No cranial nerve deficit. He exhibits normal muscle tone. Gait normal.   Skin: Skin is warm. Rash (erythema right forearm) noted. He is not diaphoretic. No cyanosis. No pallor. Nails show no clubbing.   Psychiatric: He has a normal mood and affect. His speech is normal " and behavior is normal. Judgment and thought content normal. Cognition and memory are normal.   Nursing note and vitals reviewed.        Assessment/Plan     Problem List Items Addressed This Visit        Cardiovascular and Mediastinum    Essential hypertension - Primary    Overview     · Left renal artery stenosis  · Current therapy: Coreg 25 mg, chlorthalidone 25 mg         Relevant Orders    CBC & Differential    Comprehensive Metabolic Panel    TSH    T4, free    Other hyperlipidemia    Relevant Orders    Lipid Panel    US Carotid Bilateral       Digestive    Class 1 obesity due to excess calories with serious comorbidity and body mass index (BMI) of 34.0 to 34.9 in adult    Overview     · Associated with hypertension         Relevant Orders    Lipid Panel    Comprehensive Metabolic Panel    Gastroesophageal reflux disease without esophagitis    Relevant Orders    Vitamin B12 & Folate    Magnesium    Comprehensive Metabolic Panel       Genitourinary    Chronic kidney disease, stage 3 (CMS/HCC)    Relevant Orders    CBC & Differential    Vitamin D 25 Hydroxy       Other    Tobacco abuse    Relevant Orders    US Carotid Bilateral      Other Visit Diagnoses     Rhus dermatitis        Relevant Medications    triamcinolone (KENALOG) 0.1 % ointment    Encounter for monitoring long-term proton pump inhibitor therapy        Relevant Orders    Vitamin B12 & Folate    Magnesium    Need for hepatitis A vaccination        Relevant Orders    Hepatitis A Vaccine Adult IM (Completed)    Screening, lipid        Relevant Orders    Lipid Panel          · Patient's Body mass index is 34.39 kg/m². BMI is above normal parameters. Recommendations include: exercise counseling and nutrition counseling.  ·     Return in about 6 months (around 12/7/2019) for Annual physical. or sooner if needed.    Casandra Hammond MD

## 2020-03-16 NOTE — PROGRESS NOTE ADULT - PROBLEM SELECTOR PLAN 4
Complicated. Contributory to mental status?  - UA pos, unable to elicit ROS, pe conversation with sister patient had been complaining of urinary burning will tx with ceftriaxone - likely due to volume depletion from furosemide and dapagliflozin, corrected Na for glucose 136  - gave 500 cc bolus today, will monitor BMP daily  -Strict I/Os

## 2020-03-16 NOTE — SPEECH LANGUAGE PATHOLOGY EVALUATION - SLP PERTINENT HISTORY OF CURRENT PROBLEM
74F with T2DM, systolic HF, CABG presents with HA x 1 day and aphasia for a 1 week. Per dtr, Pt lives with sister and initially noted pt was only answering in yes/no format. Later that week noted nonsensical speech pattern. Per daughter it appeared that patient's cognitive function was intact and was still able to follow commands. Had been ambulating well and otherwise keeping up her regular routine. Pt was referred to outpt neurology by PCP. On day of admission pt started pointing to her head and saying "headache," which prompted ED visit. Per dtr, pt has hx of headaches, unclear if migraine. Headache now resolved. Daughter mentioned that patient takes alprazolam 0.5 mg prior to blood draws due to extreme fear and panic. Of note, pt has hx of 3 prior episode of word finding difficulties lasting approx 15-20 minutes. Admit with possible subacute CVA vs less likely focal seizures. UA pos however pt denies urinary sxs; hold off on abx. 24hr vEEG. c/w asa and atorvastatin.

## 2020-03-17 LAB
% ALBUMIN: 42.6 % — SIGNIFICANT CHANGE UP
% ALPHA 1: 5.6 % — SIGNIFICANT CHANGE UP
% ALPHA 2: 14.5 % — SIGNIFICANT CHANGE UP
% BETA: 11.9 % — SIGNIFICANT CHANGE UP
% GAMMA: 25.4 % — SIGNIFICANT CHANGE UP
% M SPIKE: 3.3 % — SIGNIFICANT CHANGE UP
ALBUMIN SERPL ELPH-MCNC: 3.2 G/DL — LOW (ref 3.6–5.5)
ALBUMIN SERPL ELPH-MCNC: 3.4 G/DL — SIGNIFICANT CHANGE UP (ref 3.3–5)
ALBUMIN/GLOB SERPL ELPH: 0.8 RATIO — SIGNIFICANT CHANGE UP
ALP SERPL-CCNC: 423 U/L — HIGH (ref 40–120)
ALPHA1 GLOB SERPL ELPH-MCNC: 0.4 G/DL — SIGNIFICANT CHANGE UP (ref 0.1–0.4)
ALPHA2 GLOB SERPL ELPH-MCNC: 1.1 G/DL — HIGH (ref 0.5–1)
ALT FLD-CCNC: 70 U/L — HIGH (ref 10–45)
AMMONIA BLD-MCNC: 37 UMOL/L — SIGNIFICANT CHANGE UP (ref 11–55)
AMPHET UR-MCNC: NEGATIVE — SIGNIFICANT CHANGE UP
ANION GAP SERPL CALC-SCNC: 13 MMOL/L — SIGNIFICANT CHANGE UP (ref 5–17)
AST SERPL-CCNC: 108 U/L — HIGH (ref 10–40)
B BURGDOR C6 AB SER-ACNC: NEGATIVE — SIGNIFICANT CHANGE UP
B BURGDOR IGG+IGM SER-ACNC: 0.06 INDEX — SIGNIFICANT CHANGE UP (ref 0.01–0.89)
B-GLOBULIN SERPL ELPH-MCNC: 0.9 G/DL — SIGNIFICANT CHANGE UP (ref 0.5–1)
BARBITURATES UR SCN-MCNC: NEGATIVE — SIGNIFICANT CHANGE UP
BASOPHILS # BLD AUTO: 0.04 K/UL — SIGNIFICANT CHANGE UP (ref 0–0.2)
BASOPHILS NFR BLD AUTO: 0.7 % — SIGNIFICANT CHANGE UP (ref 0–2)
BENZODIAZ UR-MCNC: NEGATIVE — SIGNIFICANT CHANGE UP
BILIRUB SERPL-MCNC: 0.4 MG/DL — SIGNIFICANT CHANGE UP (ref 0.2–1.2)
BUN SERPL-MCNC: 18 MG/DL — SIGNIFICANT CHANGE UP (ref 7–23)
CALCIUM SERPL-MCNC: 9.1 MG/DL — SIGNIFICANT CHANGE UP (ref 8.4–10.5)
CHLORIDE SERPL-SCNC: 100 MMOL/L — SIGNIFICANT CHANGE UP (ref 96–108)
CMV DNA CSF QL NAA+PROBE: SIGNIFICANT CHANGE UP
CMV DNA SPEC NAA+PROBE-LOG#: SIGNIFICANT CHANGE UP LOG10IU/ML
CO2 SERPL-SCNC: 21 MMOL/L — LOW (ref 22–31)
COCAINE METAB.OTHER UR-MCNC: NEGATIVE — SIGNIFICANT CHANGE UP
CREAT SERPL-MCNC: 0.78 MG/DL — SIGNIFICANT CHANGE UP (ref 0.5–1.3)
CRP SERPL-MCNC: 1.21 MG/DL — HIGH (ref 0–0.4)
CRYPTOC AG FLD QL: NEGATIVE — SIGNIFICANT CHANGE UP
CSF PCR RESULT: SIGNIFICANT CHANGE UP
DSDNA AB FLD-ACNC: <0.2 AI — SIGNIFICANT CHANGE UP
DSDNA AB SER-ACNC: <12 IU/ML — SIGNIFICANT CHANGE UP
EBV EA AB SER IA-ACNC: >150 U/ML — HIGH
EBV EA AB TITR SER IF: POSITIVE
EBV EA IGG SER-ACNC: POSITIVE
EBV NA IGG SER IA-ACNC: 343 U/ML — HIGH
EBV PATRN SPEC IB-IMP: SIGNIFICANT CHANGE UP
EBV VCA IGG AVIDITY SER QL IA: POSITIVE
EBV VCA IGM SER IA-ACNC: 44.3 U/ML — HIGH
EBV VCA IGM SER IA-ACNC: >750 U/ML — HIGH
EBV VCA IGM TITR FLD: POSITIVE
ENA SS-A AB FLD IA-ACNC: 0.3 AI — SIGNIFICANT CHANGE UP
EOSINOPHIL # BLD AUTO: 0.18 K/UL — SIGNIFICANT CHANGE UP (ref 0–0.5)
EOSINOPHIL NFR BLD AUTO: 3 % — SIGNIFICANT CHANGE UP (ref 0–6)
ERYTHROCYTE [SEDIMENTATION RATE] IN BLOOD: 109 MM/HR — HIGH (ref 0–20)
GAMMA GLOBULIN: 1.9 G/DL — HIGH (ref 0.6–1.6)
GLUCOSE BLDC GLUCOMTR-MCNC: 249 MG/DL — HIGH (ref 70–99)
GLUCOSE BLDC GLUCOMTR-MCNC: 269 MG/DL — HIGH (ref 70–99)
GLUCOSE BLDC GLUCOMTR-MCNC: 292 MG/DL — HIGH (ref 70–99)
GLUCOSE BLDC GLUCOMTR-MCNC: 325 MG/DL — HIGH (ref 70–99)
GLUCOSE SERPL-MCNC: 296 MG/DL — HIGH (ref 70–99)
GRAM STN FLD: SIGNIFICANT CHANGE UP
HAV IGM SER-ACNC: SIGNIFICANT CHANGE UP
HBV CORE IGM SER-ACNC: SIGNIFICANT CHANGE UP
HBV SURFACE AG SER-ACNC: SIGNIFICANT CHANGE UP
HCT VFR BLD CALC: 29.8 % — LOW (ref 34.5–45)
HCV AB S/CO SERPL IA: 0.28 S/CO — SIGNIFICANT CHANGE UP (ref 0–0.99)
HCV AB SERPL-IMP: SIGNIFICANT CHANGE UP
HGB BLD-MCNC: 9.4 G/DL — LOW (ref 11.5–15.5)
HIV 1+2 AB+HIV1 P24 AG SERPL QL IA: SIGNIFICANT CHANGE UP
HSV1 IGG SER-ACNC: 28.7 INDEX — HIGH
HSV1 IGG SERPL QL IA: POSITIVE
HSV2 IGG FLD-ACNC: 8.09 INDEX — HIGH
HSV2 IGG SERPL QL IA: POSITIVE
IMM GRANULOCYTES NFR BLD AUTO: 0.5 % — SIGNIFICANT CHANGE UP (ref 0–1.5)
INTERPRETATION SERPL IFE-IMP: SIGNIFICANT CHANGE UP
LABORATORY COMMENT REPORT: SIGNIFICANT CHANGE UP
LACTATE SERPL-SCNC: 0.9 MMOL/L — SIGNIFICANT CHANGE UP (ref 0.7–2)
LYMPHOCYTES # BLD AUTO: 1.54 K/UL — SIGNIFICANT CHANGE UP (ref 1–3.3)
LYMPHOCYTES # BLD AUTO: 25.3 % — SIGNIFICANT CHANGE UP (ref 13–44)
M-SPIKE: 0.2 G/DL — HIGH (ref 0–0)
MAGNESIUM SERPL-MCNC: 1.6 MG/DL — SIGNIFICANT CHANGE UP (ref 1.6–2.6)
MCHC RBC-ENTMCNC: 25.6 PG — LOW (ref 27–34)
MCHC RBC-ENTMCNC: 31.5 GM/DL — LOW (ref 32–36)
MCV RBC AUTO: 81.2 FL — SIGNIFICANT CHANGE UP (ref 80–100)
METHADONE UR-MCNC: NEGATIVE — SIGNIFICANT CHANGE UP
MONOCYTES # BLD AUTO: 0.49 K/UL — SIGNIFICANT CHANGE UP (ref 0–0.9)
MONOCYTES NFR BLD AUTO: 8.1 % — SIGNIFICANT CHANGE UP (ref 2–14)
NEUTROPHILS # BLD AUTO: 3.8 K/UL — SIGNIFICANT CHANGE UP (ref 1.8–7.4)
NEUTROPHILS NFR BLD AUTO: 62.4 % — SIGNIFICANT CHANGE UP (ref 43–77)
NRBC # BLD: 0 /100 WBCS — SIGNIFICANT CHANGE UP (ref 0–0)
OPIATES UR-MCNC: NEGATIVE — SIGNIFICANT CHANGE UP
OXYCODONE UR-MCNC: NEGATIVE — SIGNIFICANT CHANGE UP
PCP SPEC-MCNC: SIGNIFICANT CHANGE UP
PCP UR-MCNC: NEGATIVE — SIGNIFICANT CHANGE UP
PHOSPHATE SERPL-MCNC: 3.2 MG/DL — SIGNIFICANT CHANGE UP (ref 2.5–4.5)
PLATELET # BLD AUTO: 339 K/UL — SIGNIFICANT CHANGE UP (ref 150–400)
POTASSIUM SERPL-MCNC: 3.8 MMOL/L — SIGNIFICANT CHANGE UP (ref 3.5–5.3)
POTASSIUM SERPL-SCNC: 3.8 MMOL/L — SIGNIFICANT CHANGE UP (ref 3.5–5.3)
PROT PATTERN SERPL ELPH-IMP: SIGNIFICANT CHANGE UP
PROT SERPL-MCNC: 7.4 G/DL — SIGNIFICANT CHANGE UP (ref 6–8.3)
PROT SERPL-MCNC: 7.4 G/DL — SIGNIFICANT CHANGE UP (ref 6–8.3)
PROT SERPL-MCNC: 7.9 G/DL — SIGNIFICANT CHANGE UP (ref 6–8.3)
RBC # BLD: 3.67 M/UL — LOW (ref 3.8–5.2)
RBC # FLD: 15.1 % — HIGH (ref 10.3–14.5)
RHEUMATOID FACT SERPL-ACNC: 13 IU/ML — SIGNIFICANT CHANGE UP (ref 0–13)
SODIUM SERPL-SCNC: 134 MMOL/L — LOW (ref 135–145)
SOURCE HSV 1/2: SIGNIFICANT CHANGE UP
SPECIMEN SOURCE: SIGNIFICANT CHANGE UP
T PALLIDUM AB TITR SER: NEGATIVE — SIGNIFICANT CHANGE UP
T3 SERPL-MCNC: 93 NG/DL — SIGNIFICANT CHANGE UP (ref 80–200)
T4 AB SER-ACNC: 8.3 UG/DL — SIGNIFICANT CHANGE UP (ref 4.6–12)
THC UR QL: NEGATIVE — SIGNIFICANT CHANGE UP
TSH SERPL-MCNC: 2.42 UIU/ML — SIGNIFICANT CHANGE UP (ref 0.27–4.2)
WBC # BLD: 6.08 K/UL — SIGNIFICANT CHANGE UP (ref 3.8–10.5)
WBC # FLD AUTO: 6.08 K/UL — SIGNIFICANT CHANGE UP (ref 3.8–10.5)

## 2020-03-17 PROCEDURE — 99232 SBSQ HOSP IP/OBS MODERATE 35: CPT

## 2020-03-17 PROCEDURE — 70551 MRI BRAIN STEM W/O DYE: CPT | Mod: 26

## 2020-03-17 PROCEDURE — 99233 SBSQ HOSP IP/OBS HIGH 50: CPT | Mod: GC

## 2020-03-17 RX ORDER — ACETAMINOPHEN 500 MG
650 TABLET ORAL EVERY 6 HOURS
Refills: 0 | Status: DISCONTINUED | OUTPATIENT
Start: 2020-03-17 | End: 2020-03-18

## 2020-03-17 RX ORDER — ASPIRIN/CALCIUM CARB/MAGNESIUM 324 MG
81 TABLET ORAL DAILY
Refills: 0 | Status: DISCONTINUED | OUTPATIENT
Start: 2020-03-17 | End: 2020-03-31

## 2020-03-17 RX ORDER — INSULIN GLARGINE 100 [IU]/ML
14 INJECTION, SOLUTION SUBCUTANEOUS AT BEDTIME
Refills: 0 | Status: DISCONTINUED | OUTPATIENT
Start: 2020-03-17 | End: 2020-03-18

## 2020-03-17 RX ORDER — INSULIN LISPRO 100/ML
4 VIAL (ML) SUBCUTANEOUS
Refills: 0 | Status: DISCONTINUED | OUTPATIENT
Start: 2020-03-17 | End: 2020-03-18

## 2020-03-17 RX ADMIN — Medication 3: at 12:12

## 2020-03-17 RX ADMIN — Medication 81 MILLIGRAM(S): at 16:02

## 2020-03-17 RX ADMIN — Medication 264 MILLIGRAM(S): at 23:52

## 2020-03-17 RX ADMIN — ATORVASTATIN CALCIUM 40 MILLIGRAM(S): 80 TABLET, FILM COATED ORAL at 21:09

## 2020-03-17 RX ADMIN — LEVETIRACETAM 400 MILLIGRAM(S): 250 TABLET, FILM COATED ORAL at 05:03

## 2020-03-17 RX ADMIN — Medication 3 UNIT(S): at 12:12

## 2020-03-17 RX ADMIN — Medication 264 MILLIGRAM(S): at 16:04

## 2020-03-17 RX ADMIN — Medication 2: at 22:15

## 2020-03-17 RX ADMIN — Medication 650 MILLIGRAM(S): at 23:02

## 2020-03-17 RX ADMIN — Medication 650 MILLIGRAM(S): at 23:45

## 2020-03-17 RX ADMIN — Medication 4 UNIT(S): at 17:29

## 2020-03-17 RX ADMIN — LEVETIRACETAM 400 MILLIGRAM(S): 250 TABLET, FILM COATED ORAL at 17:51

## 2020-03-17 RX ADMIN — Medication 25 MILLIGRAM(S): at 05:04

## 2020-03-17 RX ADMIN — PANTOPRAZOLE SODIUM 40 MILLIGRAM(S): 20 TABLET, DELAYED RELEASE ORAL at 06:04

## 2020-03-17 RX ADMIN — Medication 2: at 17:29

## 2020-03-17 RX ADMIN — INSULIN GLARGINE 14 UNIT(S): 100 INJECTION, SOLUTION SUBCUTANEOUS at 22:15

## 2020-03-17 RX ADMIN — Medication 264 MILLIGRAM(S): at 05:04

## 2020-03-17 RX ADMIN — Medication 3: at 08:29

## 2020-03-17 RX ADMIN — CEFTRIAXONE 100 MILLIGRAM(S): 500 INJECTION, POWDER, FOR SOLUTION INTRAMUSCULAR; INTRAVENOUS at 08:30

## 2020-03-17 RX ADMIN — Medication 3 UNIT(S): at 08:29

## 2020-03-17 NOTE — PROGRESS NOTE ADULT - ASSESSMENT
74F with T2DM, systolic HF, CABG admitted for encephalopathy and aphasia with hospital course c/b seizure. 74F with T2DM, systolic HF, CABG p/w encephalopathic aphasia c/b seizure. 74F with T2DM, systolic HF, CABG p/w subacute/ acute? encephalopathiy and aphasia c/b seizure, currently undergoing work up.

## 2020-03-17 NOTE — PROGRESS NOTE ADULT - SUBJECTIVE AND OBJECTIVE BOX
Patient is a 74y old  Female who presents with a chief complaint of headache (17 Mar 2020 11:43)    Being followed by ID for        Interval history:  No other acute events      ROS:  No cough,SOB,CP  No N/V/D  No abd pain  No urinary complaints  No HA  No joint or limb pain  No other complaints    PAST MEDICAL & SURGICAL HISTORY:  Chronic systolic heart failure  Diabetes  S/P CABG x 1  S/P breast lumpectomy: left  History of tonsillectomy  H/O abdominal hysterectomy: 1991    Allergies    clams - itching (Other)  No Known Drug Allergies  Peroxide (Blisters)    Intolerances      Antimicrobials:    acyclovir IVPB      acyclovir IVPB 700 milliGRAM(s) IV Intermittent every 8 hours    MEDICATIONS  (STANDING):  acyclovir IVPB      acyclovir IVPB 700 milliGRAM(s) IV Intermittent every 8 hours  aspirin  chewable 81 milliGRAM(s) Oral daily  atorvastatin 40 milliGRAM(s) Oral at bedtime  dextrose 5%. 1000 milliLiter(s) (50 mL/Hr) IV Continuous <Continuous>  dextrose 50% Injectable 12.5 Gram(s) IV Push once  dextrose 50% Injectable 25 Gram(s) IV Push once  dextrose 50% Injectable 25 Gram(s) IV Push once  insulin glargine Injectable (LANTUS) 10 Unit(s) SubCutaneous at bedtime  insulin lispro (HumaLOG) corrective regimen sliding scale   SubCutaneous three times a day before meals  insulin lispro (HumaLOG) corrective regimen sliding scale   SubCutaneous at bedtime  insulin lispro Injectable (HumaLOG) 3 Unit(s) SubCutaneous three times a day before meals  levETIRAcetam  IVPB 750 milliGRAM(s) IV Intermittent every 12 hours  metoprolol tartrate 25 milliGRAM(s) Oral daily  pantoprazole    Tablet 40 milliGRAM(s) Oral before breakfast      Vital Signs Last 24 Hrs  T(C): 36.4 (03-17-20 @ 13:14), Max: 36.9 (03-16-20 @ 20:00)  T(F): 97.5 (03-17-20 @ 13:14), Max: 98.5 (03-16-20 @ 20:00)  HR: 76 (03-17-20 @ 13:14) (67 - 77)  BP: 129/54 (03-17-20 @ 13:14) (102/71 - 138/77)  BP(mean): --  RR: 18 (03-17-20 @ 13:14) (18 - 18)  SpO2: 95% (03-17-20 @ 13:14) (95% - 97%)    Physical Exam:    Constitutional well preserved,comfortable,pleasant    HEENT PERRLA EOMI,No pallor or icterus    No oral exudate or erythema    Neck supple no JVD or LN    Chest Good AE,CTA    CVS RRR S1 S2 WNl No murmur or rub or gallop    Abd soft BS normal No tenderness no masses    Ext No cyanosis clubbing or edema    IV site no erythema tenderness or discharge    Joints no swelling or LOM    CNS AAO X 3 no focal    Lab Data:                          9.4    6.08  )-----------( 339      ( 17 Mar 2020 06:23 )             29.8       03-17    134<L>  |  100  |  18  ----------------------------<  296<H>  3.8   |  21<L>  |  0.78    Ca    9.1      17 Mar 2020 06:23  Phos  3.2     03-17  Mg     1.6     03-17    TPro  7.4  /  Alb  x   /  TBili  x   /  DBili  x   /  AST  x   /  ALT  x   /  AlkPhos  x   03-17      Cerebrospinal Fluid Cell Count-1 (03.16.20 @ 18:10)    Total Nucleated Cell Count, CSF: 1 /uL    RBC Count - Spinal Fluid: 0 /uL    CSF Color: No Color    Tube Type: Tube 4    CSF Appearance: Clear    CSF Lymphocytes: 82 %    CSF Monocytes/Macrophages: 11 %    CSF Segmented Neutrophils: 7: Differential is based on 27 cells counted after cytocentrifugation. %    Appearance Spun: Colorless        .CSF CSF  03-17-20   Testing in progress  --    polymorphonuclear leukocytes seen  No organisms seen  by cytocentrifuge      .Urine Clean Catch (Midstream)  03-14-20   >=3 organisms. Probable collection contamination.  --  --      HSV II Antibody IgG (03.17.20 @ 08:44)    Herpes simplex 2 IgG Ab Interp: Positive: Method CLIA  Reference Range:                                          Index         Negative                 < 0.90         Equivocal               0.91-1.10         Positive                   >1.10    Herpes simplex 2 IgG Ab Result: 8.09 Index        CSF PCR Panel (03.17.20 @ 02:56)    CSF PCR Result: NotDete: The meningitis/encephalitis (ME) panel (CSFPCR) is a PCR based assay that  screens for: Escherichia coli; Haemophilus influenzae; Listeria  monocytogenes; Neisseria meningitidis; Streptococcus agalactiae;  Streptococcus pneumoniae; CMV; Enterovirus; HSV-1; HSV-2; Human  herpesvirus 6; Parechovirus; VZV and Cryptococcus. Result should be  interpreted in context of clinical presentation, imaging and other lab  tests. Positive predictive value may be lower in patients with normal CSF  chemistry and cell count.      HSV I Antibody IgG (03.17.20 @ 08:44)    Herpes simplex 1 IgG Ab Result: 28.70 Index    Herpes simplex 1 IgG Ab Interp: Positive: Method CLIA  Reference Range:                                          Index         Negative                 < 0.90         Equivocal               0.90 - 1.09         Positive                   >= 1.10      Herpes Simplex Virus 1/2 PCR, CSF (03.17.20 @ 02:56)    Source HSV 1/2: CSF    HSV 1/2 PCR: NotDete: HSV1/2 PCR assay is a real-time PCR test that detects and differentiates  HSV-1 and/or HSV-2 DNA in CSF (Vitreous Fluid). The results of this test  should be interpreted with consideration of all clinical and laboratory  findings.      Lactate Dehydrogenase, CSF (03.16.20 @ 18:10)    Lactate Dehydrogenase, CSF: 19: Reference Ranges have NOT been established for CSF LDH.  The  has not determined the efficacy of this test when  performed on CSF specimens. The performance characteristics of this test  were determined by [facility]. U/L          WBC Count: 6.08 (03-17-20 @ 06:23)  WBC Count: 9.38 (03-16-20 @ 07:28)  WBC Count: 7.37 (03-15-20 @ 22:21)  WBC Count: 7.72 (03-15-20 @ 06:47)  WBC Count: 7.54 (03-14-20 @ 14:48) Patient is a 74y old  Female who presents with a chief complaint of headache (17 Mar 2020 11:43)    Being followed by ID for        Interval history:  pt slightly more responsive today  utters some words but not appropriate - having word finding issues  No other acute events        PAST MEDICAL & SURGICAL HISTORY:  Chronic systolic heart failure  Diabetes  S/P CABG x 1  S/P breast lumpectomy: left  History of tonsillectomy  H/O abdominal hysterectomy: 1991    Allergies    clams - itching (Other)  No Known Drug Allergies  Peroxide (Blisters)    Intolerances      Antimicrobials:    acyclovir IVPB      acyclovir IVPB 700 milliGRAM(s) IV Intermittent every 8 hours    MEDICATIONS  (STANDING):  acyclovir IVPB      acyclovir IVPB 700 milliGRAM(s) IV Intermittent every 8 hours  aspirin  chewable 81 milliGRAM(s) Oral daily  atorvastatin 40 milliGRAM(s) Oral at bedtime  dextrose 5%. 1000 milliLiter(s) (50 mL/Hr) IV Continuous <Continuous>  dextrose 50% Injectable 12.5 Gram(s) IV Push once  dextrose 50% Injectable 25 Gram(s) IV Push once  dextrose 50% Injectable 25 Gram(s) IV Push once  insulin glargine Injectable (LANTUS) 10 Unit(s) SubCutaneous at bedtime  insulin lispro (HumaLOG) corrective regimen sliding scale   SubCutaneous three times a day before meals  insulin lispro (HumaLOG) corrective regimen sliding scale   SubCutaneous at bedtime  insulin lispro Injectable (HumaLOG) 3 Unit(s) SubCutaneous three times a day before meals  levETIRAcetam  IVPB 750 milliGRAM(s) IV Intermittent every 12 hours  metoprolol tartrate 25 milliGRAM(s) Oral daily  pantoprazole    Tablet 40 milliGRAM(s) Oral before breakfast      Vital Signs Last 24 Hrs  T(C): 36.4 (03-17-20 @ 13:14), Max: 36.9 (03-16-20 @ 20:00)  T(F): 97.5 (03-17-20 @ 13:14), Max: 98.5 (03-16-20 @ 20:00)  HR: 76 (03-17-20 @ 13:14) (67 - 77)  BP: 129/54 (03-17-20 @ 13:14) (102/71 - 138/77)  BP(mean): --  RR: 18 (03-17-20 @ 13:14) (18 - 18)  SpO2: 95% (03-17-20 @ 13:14) (95% - 97%)    Physical Exam:    Constitutional confused    HEENT PERRLA EOMI,No pallor or icterus    missing multiple teeth    Neck supple no JVD or LN    Chest Good AE,CTA    CVS RRR S1 S2 WNl     Abd soft BS normal No tenderness     Ext No cyanosis clubbing or edema    IV site no erythema tenderness or discharge    Joints no swelling or LOM    CNS AAO X 3 no focal    Lab Data:                          9.4    6.08  )-----------( 339      ( 17 Mar 2020 06:23 )             29.8       03-17    134<L>  |  100  |  18  ----------------------------<  296<H>  3.8   |  21<L>  |  0.78    Ca    9.1      17 Mar 2020 06:23  Phos  3.2     03-17  Mg     1.6     03-17    TPro  7.4  /  Alb  x   /  TBili  x   /  DBili  x   /  AST  x   /  ALT  x   /  AlkPhos  x   03-17  Alkaline Phosphatase, Serum: 423 U/L (03.17.20 @ 06:23)    Alanine Aminotransferase (ALT/SGPT): 70 U/L (03.17.20 @ 06:23)    Aspartate Aminotransferase (AST/SGOT): 108 U/L (03.17.20 @ 06:23)        Cerebrospinal Fluid Cell Count-1 (03.16.20 @ 18:10)    Total Nucleated Cell Count, CSF: 1 /uL    RBC Count - Spinal Fluid: 0 /uL    CSF Color: No Color    Tube Type: Tube 4    CSF Appearance: Clear    CSF Lymphocytes: 82 %    CSF Monocytes/Macrophages: 11 %    CSF Segmented Neutrophils: 7: Differential is based on 27 cells counted after cytocentrifugation. %    Appearance Spun: Colorless    Syphilis Screen (03.17.20 @ 08:44)    Treponema Pallidum Antibody Interpretation: Negative: A negative treponemal  antibody screen indicates no serologic evidence of  syphilis (early infection cannot be excluded) and no additional testing  is required.   A positive screen is followed by testing for RPR.  Positive RPR indicates serologic evidence of new, inadequately treated,  or persistent syphilis infection and titer will be performed.  A negative  RPR following a positive treponemal screen may indicate adequately  treated or resolved past syphilis infection.  A negative RPR will trigger  a specific treponemal  antibody test, TPPA (treponema pallidum passive  particle agglutination).   A positive TPPA confirms previous or current  syphilis infection.   A negative TPPA suggests that the original  treponemal antibody screen was a false positive, but clinical assessment  of the patient is recommended.    Borrelia burgdorferi IgG/IgM Antibodies (03.17.20 @ 08:44)    LYME IgG/IgM Antibodies Result: 0.06 Index    Lyme C6 Interpretation: Negative: METHOD: Liaison Chemiluminescent Immunoassay      Reference Range: (values expressed as Lyme Index )                                < 0.90        Negative                                0.90 - 1.09   Equivocal                                >= 1.10     Positive  CDC/ASTPHLD Guidelines recommend that all samples judged equivocal or  positive be re-tested by immunoblot for confirmation of results.        .CSF CSF  03-17-20   Testing in progress  --    polymorphonuclear leukocytes seen  No organisms seen  by cytocentrifuge      .Urine Clean Catch (Midstream)  03-14-20   >=3 organisms. Probable collection contamination.  --  --      HSV II Antibody IgG (03.17.20 @ 08:44)    Herpes simplex 2 IgG Ab Interp: Positive: Method CLIA  Reference Range:                                          Index         Negative                 < 0.90         Equivocal               0.91-1.10         Positive                   >1.10    Herpes simplex 2 IgG Ab Result: 8.09 Index        CSF PCR Panel (03.17.20 @ 02:56)    CSF PCR Result: NotDetec: The meningitis/encephalitis (ME) panel (CSFPCR) is a PCR based assay that  screens for: Escherichia coli; Haemophilus influenzae; Listeria  monocytogenes; Neisseria meningitidis; Streptococcus agalactiae;  Streptococcus pneumoniae; CMV; Enterovirus; HSV-1; HSV-2; Human  herpesvirus 6; Parechovirus; VZV and Cryptococcus. Result should be  interpreted in context of clinical presentation, imaging and other lab  tests. Positive predictive value may be lower in patients with normal CSF  chemistry and cell count.      HSV I Antibody IgG (03.17.20 @ 08:44)    Herpes simplex 1 IgG Ab Result: 28.70 Index    Herpes simplex 1 IgG Ab Interp: Positive: Method CLIA  Reference Range:                                          Index         Negative                 < 0.90         Equivocal               0.90 - 1.09         Positive                   >= 1.10      Herpes Simplex Virus 1/2 PCR, CSF (03.17.20 @ 02:56)    Source HSV 1/2: CSF    HSV 1/2 PCR: NotDete: HSV1/2 PCR assay is a real-time PCR test that detects and differentiates  HSV-1 and/or HSV-2 DNA in CSF (Vitreous Fluid). The results of this test  should be interpreted with consideration of all clinical and laboratory  findings.      Lactate Dehydrogenase, CSF (03.16.20 @ 18:10)    Lactate Dehydrogenase, CSF: 19: Reference Ranges have NOT been established for CSF LDH.  The  has not determined the efficacy of this test when  performed on CSF specimens. The performance characteristics of this test  were determined by [facility]. U/L          WBC Count: 6.08 (03-17-20 @ 06:23)  WBC Count: 9.38 (03-16-20 @ 07:28)  WBC Count: 7.37 (03-15-20 @ 22:21)  WBC Count: 7.72 (03-15-20 @ 06:47)  WBC Count: 7.54 (03-14-20 @ 14:48)

## 2020-03-17 NOTE — PROGRESS NOTE ADULT - PROBLEM SELECTOR PLAN 4
- likely due to volume depletion from furosemide and dapagliflozin, corrected Na for glucose 136  - gave 500 cc bolus today, will monitor BMP daily  -Strict I/Os - likely due to volume depletion from furosemide and dapagliflozin, corrected Na for glucose 136  -monitor BMP daily  -Strict I/Os

## 2020-03-17 NOTE — PROGRESS NOTE ADULT - ATTENDING COMMENTS
Patient seen and examined. 74 year old female with a history of uncontrolled diabetes, CAD s/p stent and CABG presenting with subacute/ acute encephalopathy and severe headache, with hospital course complicated by new seizure.   Clinically improved on my exam, more interactive. Answering questions.     1. Encephalopathy, headache, seizure: ? encephalitis  MR unrevealing  - Per Neuro, will repeat MR  - LP completed: 1 WBC, inconsistent with infectious process. PCR panel is negative (which includes many viral etiologies). AI pending. Continue to empirically cover with acyclovir (10 mg/kg)  - Follow up serum/ CSF studies  - Neuro following: will appreciate recs  - EEG planned for 3/17, patient was uncooperative. Will hold off for now. Neurology aware    2. Transaminitis: ? If related to systemic viral syndrome. Will send EBV, CMV as above.  We will also send ceruloplasmin

## 2020-03-17 NOTE — PROGRESS NOTE ADULT - ASSESSMENT
74 year old female with a history of uncontrolled diabetes, CAD s/p stent and CABG presenting with subacute/ acute encephalopathy and severe headache, with hospital course complicated by new seizure.   Clinically improved on my exam, more interactive. Answering questions.     1. Encephalopathy, headache, seizure: ? encephalitis  MR unrevealing  - Per Neuro, will repeat MR  - LP completed: 1 WBC, inconsistent with infectious process. PCR panel is negative (which includes many viral etiologies). AI pending. Continue to empirically cover with acyclovir   AS patient has improved , will continue . Await EEG to see if has characteristic spikes  - Follow up serum/ CSF studies  - Neuro following: will appreciate recs  - EEG planned for 3/17, patient was uncooperative. Will hold off for now. Neurology aware    2. Transaminitis: ? If related to systemic viral syndrome. Will send EBV, CMV as above.  We will also send ceruloplasmin .     CHeck Mycoplasma  await NMDA  await multiple studies   Check rheumatologic workup  await 14-3-3

## 2020-03-17 NOTE — PROGRESS NOTE ADULT - SUBJECTIVE AND OBJECTIVE BOX
Dandre Farmer, PGY1  Pager: [place] [phone], [facility] [id number]    ----------------------------------------------------------------------------------------------------------------------------------------------------------------------------------    PROGRESS NOTE:     Patient is a 74y old  Female who presents with a chief complaint of headache (16 Mar 2020 17:08)      SUBJECTIVE / OVERNIGHT EVENTS:    ADDITIONAL REVIEW OF SYSTEMS:    MEDICATIONS  (STANDING):  acyclovir IVPB      acyclovir IVPB 700 milliGRAM(s) IV Intermittent every 8 hours  atorvastatin 40 milliGRAM(s) Oral at bedtime  dextrose 5%. 1000 milliLiter(s) (50 mL/Hr) IV Continuous <Continuous>  dextrose 50% Injectable 12.5 Gram(s) IV Push once  dextrose 50% Injectable 25 Gram(s) IV Push once  dextrose 50% Injectable 25 Gram(s) IV Push once  insulin glargine Injectable (LANTUS) 10 Unit(s) SubCutaneous at bedtime  insulin lispro (HumaLOG) corrective regimen sliding scale   SubCutaneous three times a day before meals  insulin lispro (HumaLOG) corrective regimen sliding scale   SubCutaneous at bedtime  insulin lispro Injectable (HumaLOG) 3 Unit(s) SubCutaneous three times a day before meals  levETIRAcetam  IVPB 750 milliGRAM(s) IV Intermittent every 12 hours  metoprolol tartrate 25 milliGRAM(s) Oral daily  pantoprazole    Tablet 40 milliGRAM(s) Oral before breakfast    MEDICATIONS  (PRN):  dextrose 40% Gel 15 Gram(s) Oral once PRN Blood Glucose LESS THAN 70 milliGRAM(s)/deciliter  glucagon  Injectable 1 milliGRAM(s) IntraMuscular once PRN Glucose LESS THAN 70 milligrams/deciliter      CAPILLARY BLOOD GLUCOSE      POCT Blood Glucose.: 292 mg/dL (17 Mar 2020 08:25)  POCT Blood Glucose.: 315 mg/dL (16 Mar 2020 21:26)  POCT Blood Glucose.: 255 mg/dL (16 Mar 2020 16:58)  POCT Blood Glucose.: 359 mg/dL (16 Mar 2020 13:07)    I&O's Summary    16 Mar 2020 07:01  -  17 Mar 2020 07:00  --------------------------------------------------------  IN: 250 mL / OUT: 825 mL / NET: -575 mL        PHYSICAL EXAM:  Vital Signs Last 24 Hrs  T(C): 36.4 (17 Mar 2020 04:46), Max: 36.9 (16 Mar 2020 20:00)  T(F): 97.5 (17 Mar 2020 04:46), Max: 98.5 (16 Mar 2020 20:00)  HR: 67 (17 Mar 2020 04:46) (67 - 77)  BP: 102/71 (17 Mar 2020 04:46) (102/71 - 138/77)  BP(mean): --  RR: 18 (17 Mar 2020 04:46) (18 - 18)  SpO2: 96% (17 Mar 2020 04:46) (93% - 97%)    CONSTITUTIONAL: NAD, well-developed  RESPIRATORY: Normal respiratory effort; lungs are clear to auscultation bilaterally  CARDIOVASCULAR: normal S1 and S2, rrr, no mrg; No lower extremity edema; Peripheral pulses are 2+ bilaterally  ABDOMEN: soft, NTND, BS+; No hepatosplenomegaly  MUSCULOSKELETAL: no clubbing or cyanosis of digits; no joint swelling or tenderness to palpation  PSYCH: A+O to person, place, and time; affect appropriate    LABS:                        9.4    6.08  )-----------( 339      ( 17 Mar 2020 06:23 )             29.8     03-17    134<L>  |  100  |  18  ----------------------------<  296<H>  3.8   |  21<L>  |  0.78    Ca    9.1      17 Mar 2020 06:23  Phos  3.2     03-17  Mg     1.6     03-17    TPro  7.4  /  Alb  x   /  TBili  x   /  DBili  x   /  AST  x   /  ALT  x   /  AlkPhos  x   03-17    PT/INR - ( 15 Mar 2020 22:20 )   PT: 11.8 sec;   INR: 1.03 ratio         PTT - ( 15 Mar 2020 22:20 )  PTT:31.5 sec          Culture - Fungal, CSF (collected 17 Mar 2020 00:22)  Source: .CSF CSF  Preliminary Report (17 Mar 2020 08:17):    Testing in progress    Culture - CSF with Gram Stain (collected 17 Mar 2020 00:22)  Source: .CSF CSF  Gram Stain (17 Mar 2020 01:31):    polymorphonuclear leukocytes seen    No organisms seen    by cytocentrifuge    Culture - Urine (collected 14 Mar 2020 23:15)  Source: .Urine Clean Catch (Midstream)  Final Report (16 Mar 2020 06:21):    >=3 organisms. Probable collection contamination.        RADIOLOGY & ADDITIONAL TESTS:  Results Reviewed:   Imaging Personally Reviewed:  Electrocardiogram Personally Reviewed: Dandre Farmer, PGY1  Pager: [place] [phone], [facility] [id number]    ----------------------------------------------------------------------------------------------------------------------------------------------------------------------------------    PROGRESS NOTE:     Patient is a 74y old  Female who presents with a chief complaint of headache (16 Mar 2020 17:08)      SUBJECTIVE / OVERNIGHT EVENTS: no acute events overnight. Pt states that this morning, she feels tired. Is conversational, responds in 2-3 word sentences, but does not answer all questions.     ADDITIONAL REVIEW OF SYSTEMS:  CONSTITUTIONAL: No weakness, fevers or chills  EYES/ENT: No visual changes;  No dysphagia  NECK: No pain or stiffness  RESPIRATORY: No cough, wheezing, hemoptysis; No shortness of breath  CARDIOVASCULAR: No chest pain or palpitations; No lower extremity edema    MEDICATIONS  (STANDING):  acyclovir IVPB      acyclovir IVPB 700 milliGRAM(s) IV Intermittent every 8 hours  atorvastatin 40 milliGRAM(s) Oral at bedtime  dextrose 5%. 1000 milliLiter(s) (50 mL/Hr) IV Continuous <Continuous>  dextrose 50% Injectable 12.5 Gram(s) IV Push once  dextrose 50% Injectable 25 Gram(s) IV Push once  dextrose 50% Injectable 25 Gram(s) IV Push once  insulin glargine Injectable (LANTUS) 10 Unit(s) SubCutaneous at bedtime  insulin lispro (HumaLOG) corrective regimen sliding scale   SubCutaneous three times a day before meals  insulin lispro (HumaLOG) corrective regimen sliding scale   SubCutaneous at bedtime  insulin lispro Injectable (HumaLOG) 3 Unit(s) SubCutaneous three times a day before meals  levETIRAcetam  IVPB 750 milliGRAM(s) IV Intermittent every 12 hours  metoprolol tartrate 25 milliGRAM(s) Oral daily  pantoprazole    Tablet 40 milliGRAM(s) Oral before breakfast    MEDICATIONS  (PRN):  dextrose 40% Gel 15 Gram(s) Oral once PRN Blood Glucose LESS THAN 70 milliGRAM(s)/deciliter  glucagon  Injectable 1 milliGRAM(s) IntraMuscular once PRN Glucose LESS THAN 70 milligrams/deciliter      CAPILLARY BLOOD GLUCOSE      POCT Blood Glucose.: 292 mg/dL (17 Mar 2020 08:25)  POCT Blood Glucose.: 315 mg/dL (16 Mar 2020 21:26)  POCT Blood Glucose.: 255 mg/dL (16 Mar 2020 16:58)  POCT Blood Glucose.: 359 mg/dL (16 Mar 2020 13:07)    I&O's Summary    16 Mar 2020 07:01  -  17 Mar 2020 07:00  --------------------------------------------------------  IN: 250 mL / OUT: 825 mL / NET: -575 mL        PHYSICAL EXAM:  Vital Signs Last 24 Hrs  T(C): 36.4 (17 Mar 2020 04:46), Max: 36.9 (16 Mar 2020 20:00)  T(F): 97.5 (17 Mar 2020 04:46), Max: 98.5 (16 Mar 2020 20:00)  HR: 67 (17 Mar 2020 04:46) (67 - 77)  BP: 102/71 (17 Mar 2020 04:46) (102/71 - 138/77)  BP(mean): --  RR: 18 (17 Mar 2020 04:46) (18 - 18)  SpO2: 96% (17 Mar 2020 04:46) (93% - 97%)    CONSTITUTIONAL: NAD, well-developed, aphasic  RESPIRATORY: Normal respiratory effort; lungs are clear to auscultation bilaterally  CARDIOVASCULAR: normal S1 and S2, rrr, no mrg; No lower extremity edema; Peripheral pulses are 2+ bilaterally  ABDOMEN: soft, NTND, BS+; No hepatosplenomegaly  MUSCULOSKELETAL: no clubbing or cyanosis of digits; no joint swelling or tenderness to palpation  NEURO: not fully complaint with cranial nerve exam, no cranial nerve focal deficits appreciated. Strength 4/5 in UE b/L and 3/5 in LE b/L    LABS:                        9.4    6.08  )-----------( 339      ( 17 Mar 2020 06:23 )             29.8     03-17    134<L>  |  100  |  18  ----------------------------<  296<H>  3.8   |  21<L>  |  0.78    Ca    9.1      17 Mar 2020 06:23  Phos  3.2     03-17  Mg     1.6     03-17    TPro  7.4  /  Alb  x   /  TBili  x   /  DBili  x   /  AST  x   /  ALT  x   /  AlkPhos  x   03-17    PT/INR - ( 15 Mar 2020 22:20 )   PT: 11.8 sec;   INR: 1.03 ratio         PTT - ( 15 Mar 2020 22:20 )  PTT:31.5 sec          Culture - Fungal, CSF (collected 17 Mar 2020 00:22)  Source: .CSF CSF  Preliminary Report (17 Mar 2020 08:17):    Testing in progress    Culture - CSF with Gram Stain (collected 17 Mar 2020 00:22)  Source: .CSF CSF  Gram Stain (17 Mar 2020 01:31):    polymorphonuclear leukocytes seen    No organisms seen    by cytocentrifuge    Culture - Urine (collected 14 Mar 2020 23:15)  Source: .Urine Clean Catch (Midstream)  Final Report (16 Mar 2020 06:21):    >=3 organisms. Probable collection contamination.        RADIOLOGY & ADDITIONAL TESTS:  Results Reviewed:   Imaging Personally Reviewed:  Electrocardiogram Personally Reviewed: Dandre Farmer, PGY1  Pager: [place] [phone], [facility] [id number]    ----------------------------------------------------------------------------------------------------------------------------------------------------------------------------------    PROGRESS NOTE:     Patient is a 74y old  Female who presents with a chief complaint of headache (16 Mar 2020 17:08)      SUBJECTIVE / OVERNIGHT EVENTS: no acute events overnight. Pt states that this morning, she feels tired. Is conversational, responds in 2-3 word sentences, but does not answer all questions.     ADDITIONAL REVIEW OF SYSTEMS:  CONSTITUTIONAL: No weakness, fevers or chills  EYES/ENT: No visual changes;  No dysphagia  NECK: No pain or stiffness  RESPIRATORY: No cough, wheezing, hemoptysis; No shortness of breath  CARDIOVASCULAR: No chest pain or palpitations; No lower extremity edema    MEDICATIONS  (STANDING):  acyclovir IVPB      acyclovir IVPB 700 milliGRAM(s) IV Intermittent every 8 hours  atorvastatin 40 milliGRAM(s) Oral at bedtime  dextrose 5%. 1000 milliLiter(s) (50 mL/Hr) IV Continuous <Continuous>  dextrose 50% Injectable 12.5 Gram(s) IV Push once  dextrose 50% Injectable 25 Gram(s) IV Push once  dextrose 50% Injectable 25 Gram(s) IV Push once  insulin glargine Injectable (LANTUS) 10 Unit(s) SubCutaneous at bedtime  insulin lispro (HumaLOG) corrective regimen sliding scale   SubCutaneous three times a day before meals  insulin lispro (HumaLOG) corrective regimen sliding scale   SubCutaneous at bedtime  insulin lispro Injectable (HumaLOG) 3 Unit(s) SubCutaneous three times a day before meals  levETIRAcetam  IVPB 750 milliGRAM(s) IV Intermittent every 12 hours  metoprolol tartrate 25 milliGRAM(s) Oral daily  pantoprazole    Tablet 40 milliGRAM(s) Oral before breakfast    MEDICATIONS  (PRN):  dextrose 40% Gel 15 Gram(s) Oral once PRN Blood Glucose LESS THAN 70 milliGRAM(s)/deciliter  glucagon  Injectable 1 milliGRAM(s) IntraMuscular once PRN Glucose LESS THAN 70 milligrams/deciliter      CAPILLARY BLOOD GLUCOSE      POCT Blood Glucose.: 292 mg/dL (17 Mar 2020 08:25)  POCT Blood Glucose.: 315 mg/dL (16 Mar 2020 21:26)  POCT Blood Glucose.: 255 mg/dL (16 Mar 2020 16:58)  POCT Blood Glucose.: 359 mg/dL (16 Mar 2020 13:07)    I&O's Summary    16 Mar 2020 07:01  -  17 Mar 2020 07:00  --------------------------------------------------------  IN: 250 mL / OUT: 825 mL / NET: -575 mL        PHYSICAL EXAM:  Vital Signs Last 24 Hrs  T(C): 36.4 (17 Mar 2020 04:46), Max: 36.9 (16 Mar 2020 20:00)  T(F): 97.5 (17 Mar 2020 04:46), Max: 98.5 (16 Mar 2020 20:00)  HR: 67 (17 Mar 2020 04:46) (67 - 77)  BP: 102/71 (17 Mar 2020 04:46) (102/71 - 138/77)  BP(mean): --  RR: 18 (17 Mar 2020 04:46) (18 - 18)  SpO2: 96% (17 Mar 2020 04:46) (93% - 97%)    CONSTITUTIONAL: NAD, well-developed, aphasic  RESPIRATORY: Normal respiratory effort; lungs are clear to auscultation bilaterally  CARDIOVASCULAR: normal S1 and S2, rrr, no mrg; No lower extremity edema; Peripheral pulses are 2+ bilaterally  ABDOMEN: soft, NTND, BS+; No hepatosplenomegaly  MUSCULOSKELETAL: no clubbing or cyanosis of digits; no joint swelling or tenderness to palpation  NEURO: not fully complaint with cranial nerve exam, no cranial nerve focal deficits appreciated. Strength 4/5 in UE b/L and 3/5 in LE b/L    LABS:                        9.4    6.08  )-----------( 339      ( 17 Mar 2020 06:23 )             29.8     03-17    134<L>  |  100  |  18  ----------------------------<  296<H>  3.8   |  21<L>  |  0.78    Ca    9.1      17 Mar 2020 06:23  Phos  3.2     03-17  Mg     1.6     03-17    TPro  7.4  /  Alb  x   /  TBili  x   /  DBili  x   /  AST  x   /  ALT  x   /  AlkPhos  x   03-17    PT/INR - ( 15 Mar 2020 22:20 )   PT: 11.8 sec;   INR: 1.03 ratio         PTT - ( 15 Mar 2020 22:20 )  PTT:31.5 sec          Culture - Fungal, CSF (collected 17 Mar 2020 00:22)  Source: .CSF CSF  Preliminary Report (17 Mar 2020 08:17):    Testing in progress    Culture - CSF with Gram Stain (collected 17 Mar 2020 00:22)  Source: .CSF CSF  Gram Stain (17 Mar 2020 01:31):    polymorphonuclear leukocytes seen    No organisms seen    by cytocentrifuge    Culture - Urine (collected 14 Mar 2020 23:15)  Source: .Urine Clean Catch (Midstream)  Final Report (16 Mar 2020 06:21):    >=3 organisms. Probable collection contamination.        RADIOLOGY & ADDITIONAL TESTS:  Results Reviewed:   Imaging Personally Reviewed:  Electrocardiogram Personally Reviewed:    Plan of care discuseed with: Neurology, ID

## 2020-03-17 NOTE — PROGRESS NOTE ADULT - PROBLEM SELECTOR PLAN 5
Complicated. Possibly contributory to mental status  - UA pos, unable to elicit ROS, per conversation with sister patient had been complaining of urinary burning. Will tx with ceftriaxone

## 2020-03-17 NOTE — PROGRESS NOTE ADULT - PROBLEM SELECTOR PLAN 3
possibly ERAZO given hx of DM, CAD  cont to monitor  GGT elevated  RUQ US demonstrates hepatomegaly possibly ERAZO given hx of DM, CAD  cont to monitor  GGT elevated  Will send ceruloplasmin  RUQ US demonstrates hepatomegaly

## 2020-03-17 NOTE — CHART NOTE - NSCHARTNOTEFT_GEN_A_CORE
Pt refusing EEG, will proceed with remainder of w/u and come back to EEG if still needed to elucidate diagnosis.

## 2020-03-17 NOTE — PROGRESS NOTE ADULT - PROBLEM SELECTOR PLAN 7
check Hga1c    - will consider adding long acting insulin depending on FS   c/w JOCELYNE and monitor FS ac and hs   hold home metformin and dapagliflozin A1c 15.5%  - will adjust insulin dosing  c/w JOCELYNE and monitor FS ac and hs   hold home metformin and dapagliflozin

## 2020-03-17 NOTE — PROGRESS NOTE ADULT - PROBLEM SELECTOR PLAN 10
ISTOP: Reference #: [id number]; last alprazolam filled in 2014  DVT ppx: lovenox qdaily  Diet: soft diet  Dispo: pending PT and speech therapy consult    1.  Name of PCP: Felipe Manuel   2.  PCP Contacted on Admission: [ ] Y    [ ] N    3.  PCP contacted at Discharge: [ ] Y    [ ] N    [ ] N/A  4.  Post-Discharge Appointment Date and Location:  5.  Summary of Handoff given to PCP:

## 2020-03-17 NOTE — PROGRESS NOTE ADULT - PROBLEM SELECTOR PLAN 1
Unclear etiology.  -CT head negative for acute/subacute infarct however nature of presentation supports most probably a subacute CVA; has risk factors of uncontrolled T2DM and CAD  - MR Brain and MRA brain w/ and w/out cont, MRA neck w/ cont negative for stroke, acute processes  - pending vEEG 24h to r/o focal seizure  - c/w atorvastatin  - hold AC and ASA for now   - pending LP  - f/u further neurology recs  - Unlikely UTI, urine culture contaminated  - b12, folate, tsh wnl Unclear etiology.  -CT head negative for acute/subacute infarct however nature of presentation supports most probably a subacute CVA; has risk factors of uncontrolled T2DM and CAD  - MR Brain and MRA brain w/ and w/out cont, MRA neck w/ cont negative for stroke, acute processes  - pt not complaint with EEG  - c/w atorvastatin  - LP performed on 3/16, pending CSF and serum studies. So far, serum HSV IgG positive. Hep panel, HIV negative, ESR and CRP elevated. Thyroid panel wnl.   - repeat MRI, as per neuro recs  - b12, folate, tsh wnl Unclear etiology.  -CT head negative for acute/subacute infarct  -MR Brain and MRA brain w/ and w/out cont, MRA neck w/ cont negative for stroke, acute processes  -pt not complaint with EEG  -c/w atorvastatin  -LP performed on 3/16, pending CSF and serum studies. So far, serum HSV IgG positive. Hep panel, HIV negative, ESR and CRP elevated. Thyroid panel wnl.   - repeat MRI, as per neuro recs  - b12, folate, tsh wnl  - ID consulted, will send studies as outlined within their note

## 2020-03-17 NOTE — PROVIDER CONTACT NOTE (OTHER) - SITUATION
pt is aphasic, but is able to express that she has pain. /84 P76 pt is aphasic, but is able to make facial expression that she has pain.   /84 P76

## 2020-03-18 LAB
ALBUMIN SERPL ELPH-MCNC: 3.2 G/DL — LOW (ref 3.3–5)
ALP SERPL-CCNC: 512 U/L — HIGH (ref 40–120)
ALT FLD-CCNC: 118 U/L — HIGH (ref 10–45)
ANA PAT FLD IF-IMP: ABNORMAL
ANA TITR SER: ABNORMAL
ANION GAP SERPL CALC-SCNC: 14 MMOL/L — SIGNIFICANT CHANGE UP (ref 5–17)
AST SERPL-CCNC: 184 U/L — HIGH (ref 10–40)
BASOPHILS # BLD AUTO: 0.02 K/UL — SIGNIFICANT CHANGE UP (ref 0–0.2)
BASOPHILS NFR BLD AUTO: 0.4 % — SIGNIFICANT CHANGE UP (ref 0–2)
BILIRUB SERPL-MCNC: 0.3 MG/DL — SIGNIFICANT CHANGE UP (ref 0.2–1.2)
BUN SERPL-MCNC: 18 MG/DL — SIGNIFICANT CHANGE UP (ref 7–23)
CALCIUM SERPL-MCNC: 9.1 MG/DL — SIGNIFICANT CHANGE UP (ref 8.4–10.5)
CERULOPLASMIN SERPL-MCNC: 48 MG/DL — HIGH (ref 16–45)
CHLORIDE SERPL-SCNC: 101 MMOL/L — SIGNIFICANT CHANGE UP (ref 96–108)
CO2 SERPL-SCNC: 20 MMOL/L — LOW (ref 22–31)
CREAT SERPL-MCNC: 0.76 MG/DL — SIGNIFICANT CHANGE UP (ref 0.5–1.3)
EOSINOPHIL # BLD AUTO: 0.21 K/UL — SIGNIFICANT CHANGE UP (ref 0–0.5)
EOSINOPHIL NFR BLD AUTO: 3.9 % — SIGNIFICANT CHANGE UP (ref 0–6)
GLUCOSE BLDC GLUCOMTR-MCNC: 185 MG/DL — HIGH (ref 70–99)
GLUCOSE BLDC GLUCOMTR-MCNC: 239 MG/DL — HIGH (ref 70–99)
GLUCOSE BLDC GLUCOMTR-MCNC: 286 MG/DL — HIGH (ref 70–99)
GLUCOSE BLDC GLUCOMTR-MCNC: 296 MG/DL — HIGH (ref 70–99)
GLUCOSE SERPL-MCNC: 314 MG/DL — HIGH (ref 70–99)
HCT VFR BLD CALC: 30 % — LOW (ref 34.5–45)
HGB BLD-MCNC: 9.7 G/DL — LOW (ref 11.5–15.5)
HHV6 AB SER-ACNC: SIGNIFICANT CHANGE UP COPIES/ML
IGG SERPL-MCNC: 1968 MG/DL — HIGH (ref 700–1600)
IGG1 SER-MCNC: 932 MG/DL — HIGH (ref 248–810)
IGG2 SER-MCNC: 443 MG/DL — SIGNIFICANT CHANGE UP (ref 130–555)
IGG3 SER-MCNC: 103 MG/DL — HIGH (ref 15–102)
IGG4 SER-MCNC: 12 MG/DL — SIGNIFICANT CHANGE UP (ref 2–96)
IMM GRANULOCYTES NFR BLD AUTO: 0.4 % — SIGNIFICANT CHANGE UP (ref 0–1.5)
LYMPHOCYTES # BLD AUTO: 1.41 K/UL — SIGNIFICANT CHANGE UP (ref 1–3.3)
LYMPHOCYTES # BLD AUTO: 26.1 % — SIGNIFICANT CHANGE UP (ref 13–44)
MAGNESIUM SERPL-MCNC: 1.6 MG/DL — SIGNIFICANT CHANGE UP (ref 1.6–2.6)
MCHC RBC-ENTMCNC: 26.2 PG — LOW (ref 27–34)
MCHC RBC-ENTMCNC: 32.3 GM/DL — SIGNIFICANT CHANGE UP (ref 32–36)
MCV RBC AUTO: 81.1 FL — SIGNIFICANT CHANGE UP (ref 80–100)
MONOCYTES # BLD AUTO: 0.47 K/UL — SIGNIFICANT CHANGE UP (ref 0–0.9)
MONOCYTES NFR BLD AUTO: 8.7 % — SIGNIFICANT CHANGE UP (ref 2–14)
NEUTROPHILS # BLD AUTO: 3.28 K/UL — SIGNIFICANT CHANGE UP (ref 1.8–7.4)
NEUTROPHILS NFR BLD AUTO: 60.5 % — SIGNIFICANT CHANGE UP (ref 43–77)
NMDAR IGG TITR CSF IF: SIGNIFICANT CHANGE UP
NRBC # BLD: 0 /100 WBCS — SIGNIFICANT CHANGE UP (ref 0–0)
PHOSPHATE SERPL-MCNC: 3.7 MG/DL — SIGNIFICANT CHANGE UP (ref 2.5–4.5)
PLATELET # BLD AUTO: 349 K/UL — SIGNIFICANT CHANGE UP (ref 150–400)
POTASSIUM SERPL-MCNC: 3.7 MMOL/L — SIGNIFICANT CHANGE UP (ref 3.5–5.3)
POTASSIUM SERPL-SCNC: 3.7 MMOL/L — SIGNIFICANT CHANGE UP (ref 3.5–5.3)
PROT CSF-MCNC: 59 MG/DL — HIGH (ref 15–45)
PROT SERPL-MCNC: 7.8 G/DL — SIGNIFICANT CHANGE UP (ref 6–8.3)
RBC # BLD: 3.7 M/UL — LOW (ref 3.8–5.2)
RBC # FLD: 15.2 % — HIGH (ref 10.3–14.5)
SODIUM SERPL-SCNC: 135 MMOL/L — SIGNIFICANT CHANGE UP (ref 135–145)
WBC # BLD: 5.41 K/UL — SIGNIFICANT CHANGE UP (ref 3.8–10.5)
WBC # FLD AUTO: 5.41 K/UL — SIGNIFICANT CHANGE UP (ref 3.8–10.5)
WNV IGG CSF IA-ACNC: NEGATIVE — SIGNIFICANT CHANGE UP
WNV IGM CSF IA-ACNC: NEGATIVE — SIGNIFICANT CHANGE UP

## 2020-03-18 PROCEDURE — 99232 SBSQ HOSP IP/OBS MODERATE 35: CPT

## 2020-03-18 PROCEDURE — 99233 SBSQ HOSP IP/OBS HIGH 50: CPT | Mod: GC

## 2020-03-18 RX ORDER — ENOXAPARIN SODIUM 100 MG/ML
60 INJECTION SUBCUTANEOUS DAILY
Refills: 0 | Status: DISCONTINUED | OUTPATIENT
Start: 2020-03-18 | End: 2020-03-18

## 2020-03-18 RX ORDER — INSULIN LISPRO 100/ML
6 VIAL (ML) SUBCUTANEOUS
Refills: 0 | Status: DISCONTINUED | OUTPATIENT
Start: 2020-03-18 | End: 2020-03-21

## 2020-03-18 RX ORDER — IBUPROFEN 200 MG
400 TABLET ORAL EVERY 6 HOURS
Refills: 0 | Status: DISCONTINUED | OUTPATIENT
Start: 2020-03-18 | End: 2020-03-20

## 2020-03-18 RX ORDER — POLYETHYLENE GLYCOL 3350 17 G/17G
17 POWDER, FOR SOLUTION ORAL
Refills: 0 | Status: DISCONTINUED | OUTPATIENT
Start: 2020-03-18 | End: 2020-03-31

## 2020-03-18 RX ORDER — INSULIN GLARGINE 100 [IU]/ML
18 INJECTION, SOLUTION SUBCUTANEOUS AT BEDTIME
Refills: 0 | Status: DISCONTINUED | OUTPATIENT
Start: 2020-03-18 | End: 2020-03-21

## 2020-03-18 RX ADMIN — Medication 6 UNIT(S): at 17:54

## 2020-03-18 RX ADMIN — Medication 1: at 17:54

## 2020-03-18 RX ADMIN — ATORVASTATIN CALCIUM 40 MILLIGRAM(S): 80 TABLET, FILM COATED ORAL at 22:33

## 2020-03-18 RX ADMIN — Medication 264 MILLIGRAM(S): at 22:33

## 2020-03-18 RX ADMIN — Medication 4 UNIT(S): at 11:57

## 2020-03-18 RX ADMIN — Medication 25 MILLIGRAM(S): at 05:11

## 2020-03-18 RX ADMIN — Medication 264 MILLIGRAM(S): at 05:12

## 2020-03-18 RX ADMIN — Medication 81 MILLIGRAM(S): at 12:01

## 2020-03-18 RX ADMIN — LEVETIRACETAM 400 MILLIGRAM(S): 250 TABLET, FILM COATED ORAL at 05:12

## 2020-03-18 RX ADMIN — Medication 3: at 08:44

## 2020-03-18 RX ADMIN — INSULIN GLARGINE 18 UNIT(S): 100 INJECTION, SOLUTION SUBCUTANEOUS at 22:34

## 2020-03-18 RX ADMIN — Medication 4 UNIT(S): at 08:44

## 2020-03-18 RX ADMIN — LEVETIRACETAM 400 MILLIGRAM(S): 250 TABLET, FILM COATED ORAL at 18:55

## 2020-03-18 RX ADMIN — Medication 264 MILLIGRAM(S): at 14:03

## 2020-03-18 RX ADMIN — Medication 3: at 11:56

## 2020-03-18 RX ADMIN — PANTOPRAZOLE SODIUM 40 MILLIGRAM(S): 20 TABLET, DELAYED RELEASE ORAL at 06:03

## 2020-03-18 RX ADMIN — POLYETHYLENE GLYCOL 3350 17 GRAM(S): 17 POWDER, FOR SOLUTION ORAL at 17:11

## 2020-03-18 NOTE — DIETITIAN INITIAL EVALUATION ADULT. - OTHER INFO
DIET Hx:     T2DM Hx: DM is medically managed with Farxiga and Metformin. Noted per chart, pt is scared of needles.     WEIGHT Hx: Per previous RD note, pt reported UBW of 191 pounds (11/2017). Current dosing weight noted as 154 pounds (3/14), suggesting 37 pound (19%) weight loss in 2 years.     Skin per chart: intact  Edema per chart: none noted    Ht: 67 inches, Wt: 154 pounds (3/14), BMI: 24.2 kg/m2, IBW: 135 pounds (+/-10%), %IBW: 114% Limited subjective information able to be obtained as pt noted with aphasia. RD attempted yes/no questions and offered writing materials with little success. Pt noted with history of T2DM and CHF. Denies following certain modified diet. States that her family prepares meals for her and that she hates vegetables. Per chart, pt with allergy to clams (reaction: itching). Per chart, DM is medically managed with Farxiga and Metformin. Unclear at this time whether pt checks fingersticks at home; however, noted in Patient Profile that pt is scared on needles. Recent HbA1c >15.5% (3/15) suggests poor glycemic maintenance PTA.    Per previous RD note, pt reported UBW of 191 pounds (11/2017). Current dosing weight noted as 154 pounds (3/14), suggesting 37 pound (19%) weight loss in 2 years. Pt unable to comment on weight loss at this time. Suspect uncontrolled diabetes may be contributing factor to weight loss. Nutrition Focused Physical Exam unable to be conducted as pt requested RD leave during interview. Visually appears well-nourished. Per discussion with RN, pt with good PO intake in-house. Denies complaints of nausea/vomiting, diarrhea/constipation, swallowing difficulties. In regards to chewing, pt noted with visible poor dentition; however, states is tolerating current diet order of Soft. Education on T2DM, heart failure nutrition therapy deferred at this time as per pt wishes; RD to follow up to discuss with pt/family as they are amenable. Noted at time of previous RD note 11/2017, pt demonstrated a lack of value for behavior change to follow therapeutic diet recommendations and refused nutrition education at that time.     Skin per chart: intact  Edema per chart: none noted    Ht: 67 inches, Wt: 154 pounds (3/14), BMI: 24.2 kg/m2, IBW: 135 pounds (+/-10%), %IBW: 114%

## 2020-03-18 NOTE — DIETITIAN INITIAL EVALUATION ADULT. - PROBLEM SELECTOR PLAN 9
hold furosemide for one dose, give 1L NS and recheck Na and volume status in AM  can resume as needed  check TTE w/ bubble study

## 2020-03-18 NOTE — PROGRESS NOTE ADULT - ASSESSMENT
74F with T2DM, systolic HF, CABG p/w subacute/ acute? encephalopathiy and aphasia c/b seizure, currently undergoing work up.

## 2020-03-18 NOTE — PROGRESS NOTE ADULT - PROBLEM SELECTOR PLAN 4
- likely due to volume depletion from furosemide and dapagliflozin, corrected Na for glucose 136  -monitor BMP daily  -Strict I/Os RESOLVED  Complicated. Possibly contributory to mental status  - Completed 3 day course of ceftriaxone.

## 2020-03-18 NOTE — PROGRESS NOTE ADULT - PROBLEM SELECTOR PLAN 10
ISTOP: Reference #: [id number]; last alprazolam filled in 2014  DVT ppx: lovenox qdaily  Diet: soft diet  Dispo: pending PT and speech therapy consult    1.  Name of PCP: Felipe Manuel   2.  PCP Contacted on Admission: [ ] Y    [ ] N    3.  PCP contacted at Discharge: [ ] Y    [ ] N    [ ] N/A  4.  Post-Discharge Appointment Date and Location:  5.  Summary of Handoff given to PCP:

## 2020-03-18 NOTE — DIETITIAN INITIAL EVALUATION ADULT. - REASON INDICATOR FOR ASSESSMENT
Seen for: length of stay on 5MON  Source: patient, previous RD note, EMR    Per chart, pt is a 74 year female with PMH of T2DM, systolic HF, CABG p/w subacute/ acute? encephalopathiy and aphasia c/b seizure, currently undergoing work up.

## 2020-03-18 NOTE — PROGRESS NOTE ADULT - ATTENDING COMMENTS
Patient seen and examined. 74 year old female with a history of uncontrolled diabetes, CAD s/p stent and CABG presenting with subacute/ acute encephalopathy and severe headache, with hospital course complicated by new seizure.   Continues to improve    1. Encephalopathy, headache, seizure: ? encephalitis, viral vs AI   ESR >100, consistent with inflammatory process  Repeat imaging unchanged  - EBV serum IgM, IgG +, will send PCR  - LP completed: 1 WBC, inconsistent with infectious process. PCR panel is negative (which includes many viral etiologies). HSV negative. AI, NMDA pending. Continue to empirically cover with acyclovir (10 mg/kg). Will follow up rest of CSF studies.  - Follow up serum/ CSF studies  - Neuro following: will appreciate guidance  - EEG planned for 3/17, patient was uncooperative. Will hold off for now. Neurology aware    2. Transaminitis: ? If related to systemic viral syndrome. Will send EBV, CMV as above.  RUQ with hepatomegaly, acute hep panel negative  - EBV serum IgM, IgG +, will send PCR  - Ceruloplasmin pending  - Will consult hepatology  - LFT's continue to trend up, will call

## 2020-03-18 NOTE — DIETITIAN INITIAL EVALUATION ADULT. - ADD RECOMMEND
Change diet to Consistent Carbohydrate, Low Sodium in setting of T2DM, CHF. Consider collaboration of care with endocrinology to assess for appropriate insulin regimen as pt with persistent hyperglycemia. RD to follow up to attempt nutrition education. Monitor tolerance to diet prescription, nutritional intake, weight trends, labs and skin integrity. RD availability made known. Change diet to Consistent Carbohydrate, Low Sodium in setting of T2DM, CHF - defer texture/consistency to medical team. Consider collaboration of care with endocrinology to assess for appropriate insulin regimen as pt with persistent hyperglycemia. RD to follow up to attempt nutrition education. Monitor tolerance to diet prescription, nutritional intake, weight trends, labs and skin integrity. RD availability made known.

## 2020-03-18 NOTE — PROGRESS NOTE ADULT - PROBLEM SELECTOR PLAN 7
A1c 15.5%  - will adjust insulin dosing  c/w JOCELYNE and monitor FS ac and hs   hold home metformin and dapagliflozin c/w asa and atorvastatin

## 2020-03-18 NOTE — PROGRESS NOTE ADULT - PROBLEM SELECTOR PLAN 3
suspect this is related to patients acute illness  cont to monitor  GGT elevated  Ceruloplasmin 48  RUQ US demonstrates hepatomegaly  consult hepatology  May need to hold the atorvastatin

## 2020-03-18 NOTE — PROGRESS NOTE ADULT - PROBLEM SELECTOR PLAN 1
Unclear etiology.  -CT head negative for acute/subacute infarct  -MR Brain and MRA brain w/ and w/out cont, MRA neck w/ cont negative for stroke, acute processes  -pt not complaint with EEG  -c/w atorvastatin  -LP performed on 3/16, pending CSF and serum studies. So far, serum HSV IgG positive. Hep panel, HIV negative, ESR and CRP elevated. Thyroid panel wnl.   - repeat MRI, as per neuro recs  - b12, folate, tsh wnl  - ID consulted, will send studies as outlined within their note Unclear etiology.  -CT head negative for acute/subacute infarct  -MR Brain and MRA brain w/ and w/out cont, MRA neck w/ cont negative for stroke, acute processes  -pt not complaint with EEG  -c/w atorvastatin  -LP performed on 3/16, pending CSF and serum studies. So far, serum HSV IgG positive, EBV positive. Hep panel, HIV negative, ESR and CRP elevated. Thyroid panel wnl.    - repeat MRI on 3/17 unremarkable  - b12, folate, tsh wnl  - ID consulted, will send studies as outlined within their note Unclear etiology.  -CT head negative for acute/subacute infarct  -MR Brain and MRA brain w/ and w/out cont, MRA neck w/ cont negative for stroke, acute processes  -pt not complaint with EEG  -c/w atorvastatin  -LP performed on 3/16, pending CSF and serum studies. So far, serum HSV IgG positive, EBV antigen positive, will f/u EBV DNA by PCR. Hep panel, HIV negative, ESR and CRP elevated. Thyroid panel wnl.    - repeat MRI head on 3/17 unremarkable  - b12, folate, tsh wnl  - ID consulted, will send studies as outlined within their note

## 2020-03-18 NOTE — PROGRESS NOTE ADULT - PROBLEM SELECTOR PLAN 3
possibly ERAZO given hx of DM, CAD  cont to monitor  GGT elevated  Will send ceruloplasmin  RUQ US demonstrates hepatomegaly possibly ERAZO given hx of DM, CAD. Worsening today.  cont to monitor  GGT elevated  Ceruloplasmin 48  RUQ US demonstrates hepatomegaly  -will consult hepatology

## 2020-03-18 NOTE — PROVIDER CONTACT NOTE (OTHER) - SITUATION
Pt's IVL needed to be removed and she does not have access. Two RNs tried but unable to get a new IVL. Keppra is due now.

## 2020-03-18 NOTE — PROGRESS NOTE ADULT - PROBLEM SELECTOR PLAN 1
Unclear etiology.  -CT head negative for acute/subacute infarct  -MR Brain and MRA brain w/ and w/out cont, MRA neck w/ cont negative for stroke, acute processes  -pt not complaint with EEG  -check quaNTEFERON  CHECK hiv STATUS  -LP performed on 3/16, pending CSF and serum studies. So far, serum HSV IgG positive, EBV antigen positive, will f/u EBV DNA by PCR. Hep panel, HIV negative, ESR and CRP elevated. Thyroid panel wnl.    - repeat MRI head on 3/17 unremarkable  - b12, folate, tsh wnl  continue empiric acyclovir as patient is improving  await several studies

## 2020-03-18 NOTE — PROGRESS NOTE ADULT - PROBLEM SELECTOR PLAN 6
Hgb at baseline  can perform anemia work up outpatient  monitor CBC A1c 15.5%  - will adjust insulin dosing  c/w JOCELYNE and monitor FS ac and hs   hold home metformin and dapagliflozin

## 2020-03-18 NOTE — PROGRESS NOTE ADULT - PROBLEM SELECTOR PROBLEM 6
Normocytic anemia Type 2 diabetes mellitus with hyperglycemia, without long-term current use of insulin

## 2020-03-18 NOTE — DIETITIAN INITIAL EVALUATION ADULT. - PERTINENT LABORATORY DATA
(3/18) POCT blood glucose 286-296, Glucose 314, Albumin 3.2, Alkaline Phosphatase 512, , , (3/15) HbA1c >15.5%

## 2020-03-18 NOTE — PROGRESS NOTE ADULT - SUBJECTIVE AND OBJECTIVE BOX
Dandre Farmer, PGY1  Pager: [place] [phone], [place] M [phone]    ----------------------------------------------------------------------------------------------------------------------------------------------------------------------------------    PROGRESS NOTE:     Patient is a 74y old  Female who presents with a chief complaint of headache (17 Mar 2020 15:58)      SUBJECTIVE / OVERNIGHT EVENTS:    ADDITIONAL REVIEW OF SYSTEMS:    MEDICATIONS  (STANDING):  acyclovir IVPB      acyclovir IVPB 700 milliGRAM(s) IV Intermittent every 8 hours  aspirin  chewable 81 milliGRAM(s) Oral daily  atorvastatin 40 milliGRAM(s) Oral at bedtime  dextrose 5%. 1000 milliLiter(s) (50 mL/Hr) IV Continuous <Continuous>  dextrose 50% Injectable 12.5 Gram(s) IV Push once  dextrose 50% Injectable 25 Gram(s) IV Push once  dextrose 50% Injectable 25 Gram(s) IV Push once  insulin glargine Injectable (LANTUS) 14 Unit(s) SubCutaneous at bedtime  insulin lispro (HumaLOG) corrective regimen sliding scale   SubCutaneous three times a day before meals  insulin lispro (HumaLOG) corrective regimen sliding scale   SubCutaneous at bedtime  insulin lispro Injectable (HumaLOG) 4 Unit(s) SubCutaneous three times a day before meals  levETIRAcetam  IVPB 750 milliGRAM(s) IV Intermittent every 12 hours  metoprolol tartrate 25 milliGRAM(s) Oral daily  pantoprazole    Tablet 40 milliGRAM(s) Oral before breakfast    MEDICATIONS  (PRN):  dextrose 40% Gel 15 Gram(s) Oral once PRN Blood Glucose LESS THAN 70 milliGRAM(s)/deciliter  glucagon  Injectable 1 milliGRAM(s) IntraMuscular once PRN Glucose LESS THAN 70 milligrams/deciliter  ibuprofen  Tablet. 400 milliGRAM(s) Oral every 6 hours PRN Severe Pain (7 - 10)      CAPILLARY BLOOD GLUCOSE      POCT Blood Glucose.: 296 mg/dL (18 Mar 2020 08:41)  POCT Blood Glucose.: 325 mg/dL (17 Mar 2020 21:51)  POCT Blood Glucose.: 249 mg/dL (17 Mar 2020 17:24)  POCT Blood Glucose.: 269 mg/dL (17 Mar 2020 11:58)    I&O's Summary    17 Mar 2020 07:01  -  18 Mar 2020 07:00  --------------------------------------------------------  IN: 490 mL / OUT: 600 mL / NET: -110 mL        PHYSICAL EXAM:  Vital Signs Last 24 Hrs  T(C): 36.6 (18 Mar 2020 04:53), Max: 36.6 (18 Mar 2020 04:53)  T(F): 97.8 (18 Mar 2020 04:53), Max: 97.8 (18 Mar 2020 04:53)  HR: 73 (18 Mar 2020 04:53) (73 - 89)  BP: 151/74 (18 Mar 2020 04:53) (129/54 - 174/84)  BP(mean): --  RR: 18 (18 Mar 2020 04:53) (18 - 19)  SpO2: 100% (18 Mar 2020 04:53) (95% - 100%)    CONSTITUTIONAL: NAD, well-developed  RESPIRATORY: Normal respiratory effort; lungs are clear to auscultation bilaterally  CARDIOVASCULAR: normal S1 and S2, rrr, no mrg; No lower extremity edema; Peripheral pulses are 2+ bilaterally  ABDOMEN: soft, NTND, BS+; No hepatosplenomegaly  MUSCULOSKELETAL: no clubbing or cyanosis of digits; no joint swelling or tenderness to palpation  PSYCH: A+O to person, place, and time; affect appropriate    LABS:                        9.7    5.41  )-----------( 349      ( 18 Mar 2020 07:11 )             30.0     03-18    135  |  101  |  18  ----------------------------<  314<H>  3.7   |  20<L>  |  0.76    Ca    9.1      18 Mar 2020 07:11  Phos  3.7     03-18  Mg     1.6     03-18    TPro  7.8  /  Alb  3.2<L>  /  TBili  0.3  /  DBili  x   /  AST  184<H>  /  ALT  118<H>  /  AlkPhos  512<H>  03-18              Culture - Blood (collected 17 Mar 2020 08:30)  Source: .Blood Blood-Venous  Preliminary Report (18 Mar 2020 09:01):    No growth to date.    Culture - Fungal, CSF (collected 17 Mar 2020 00:22)  Source: .CSF CSF  Preliminary Report (17 Mar 2020 08:17):    Testing in progress    Culture - CSF with Gram Stain (collected 17 Mar 2020 00:22)  Source: .CSF CSF  Gram Stain (17 Mar 2020 01:31):    polymorphonuclear leukocytes seen    No organisms seen    by cytocentrifuge  Preliminary Report (17 Mar 2020 19:10):    No growth        RADIOLOGY & ADDITIONAL TESTS:  Results Reviewed:   Imaging Personally Reviewed:  Electrocardiogram Personally Reviewed: Dandre Farmer, PGY1  Pager: [place] [phone], [facility] [phone]    ----------------------------------------------------------------------------------------------------------------------------------------------------------------------------------    PROGRESS NOTE:     Patient is a 74y old  Female who presents with a chief complaint of headache (17 Mar 2020 15:58)      SUBJECTIVE / OVERNIGHT EVENTS: no acute events overnight. Pt was given tylenol because she had HA. Denies dizziness, vision changes, neck stiffness, worsening weakness, endorses occasional ringing in her ears. Is more talkative today, able to use three word phrases.     ADDITIONAL REVIEW OF SYSTEMS:  CONSTITUTIONAL: No weakness, fevers or chills  EYES/ENT: No visual changes;  No dysphagia  NECK: No pain or stiffness  RESPIRATORY: No cough, wheezing, hemoptysis; No shortness of breath  CARDIOVASCULAR: No chest pain or palpitations  GASTROINTESTINAL: No abdominal or epigastric pain. No nausea, vomiting, or hematemesis; No diarrhea or constipation. No melena or hematochezia.  NEUROLOGICAL: aphasia    MEDICATIONS  (STANDING):  acyclovir IVPB      acyclovir IVPB 700 milliGRAM(s) IV Intermittent every 8 hours  aspirin  chewable 81 milliGRAM(s) Oral daily  atorvastatin 40 milliGRAM(s) Oral at bedtime  dextrose 5%. 1000 milliLiter(s) (50 mL/Hr) IV Continuous <Continuous>  dextrose 50% Injectable 12.5 Gram(s) IV Push once  dextrose 50% Injectable 25 Gram(s) IV Push once  dextrose 50% Injectable 25 Gram(s) IV Push once  insulin glargine Injectable (LANTUS) 14 Unit(s) SubCutaneous at bedtime  insulin lispro (HumaLOG) corrective regimen sliding scale   SubCutaneous three times a day before meals  insulin lispro (HumaLOG) corrective regimen sliding scale   SubCutaneous at bedtime  insulin lispro Injectable (HumaLOG) 4 Unit(s) SubCutaneous three times a day before meals  levETIRAcetam  IVPB 750 milliGRAM(s) IV Intermittent every 12 hours  metoprolol tartrate 25 milliGRAM(s) Oral daily  pantoprazole    Tablet 40 milliGRAM(s) Oral before breakfast    MEDICATIONS  (PRN):  dextrose 40% Gel 15 Gram(s) Oral once PRN Blood Glucose LESS THAN 70 milliGRAM(s)/deciliter  glucagon  Injectable 1 milliGRAM(s) IntraMuscular once PRN Glucose LESS THAN 70 milligrams/deciliter  ibuprofen  Tablet. 400 milliGRAM(s) Oral every 6 hours PRN Severe Pain (7 - 10)      CAPILLARY BLOOD GLUCOSE      POCT Blood Glucose.: 296 mg/dL (18 Mar 2020 08:41)  POCT Blood Glucose.: 325 mg/dL (17 Mar 2020 21:51)  POCT Blood Glucose.: 249 mg/dL (17 Mar 2020 17:24)  POCT Blood Glucose.: 269 mg/dL (17 Mar 2020 11:58)    I&O's Summary    17 Mar 2020 07:01  -  18 Mar 2020 07:00  --------------------------------------------------------  IN: 490 mL / OUT: 600 mL / NET: -110 mL        PHYSICAL EXAM:  Vital Signs Last 24 Hrs  T(C): 36.6 (18 Mar 2020 04:53), Max: 36.6 (18 Mar 2020 04:53)  T(F): 97.8 (18 Mar 2020 04:53), Max: 97.8 (18 Mar 2020 04:53)  HR: 73 (18 Mar 2020 04:53) (73 - 89)  BP: 151/74 (18 Mar 2020 04:53) (129/54 - 174/84)  BP(mean): --  RR: 18 (18 Mar 2020 04:53) (18 - 19)  SpO2: 100% (18 Mar 2020 04:53) (95% - 100%)    CONSTITUTIONAL: NAD, well-developed, lying in bed  RESPIRATORY: Normal respiratory effort; lungs are clear to auscultation bilaterally  CARDIOVASCULAR: normal S1 and S2, rrr, no mrg; No lower extremity edema; Peripheral pulses are 2+ bilaterally  ABDOMEN: soft, NTND, BS+; No hepatosplenomegaly  MUSCULOSKELETAL: no clubbing or cyanosis of digits; no joint swelling or tenderness to palpation  NEURO: aphasic but responsive and conversive, cranial nerves grossly intact, strength 5/5 in UE b/L and 4/5 in LE b/L    LABS:                        9.7    5.41  )-----------( 349      ( 18 Mar 2020 07:11 )             30.0     03-18    135  |  101  |  18  ----------------------------<  314<H>  3.7   |  20<L>  |  0.76    Ca    9.1      18 Mar 2020 07:11  Phos  3.7     03-18  Mg     1.6     03-18    TPro  7.8  /  Alb  3.2<L>  /  TBili  0.3  /  DBili  x   /  AST  184<H>  /  ALT  118<H>  /  AlkPhos  512<H>  03-18              Culture - Blood (collected 17 Mar 2020 08:30)  Source: .Blood Blood-Venous  Preliminary Report (18 Mar 2020 09:01):    No growth to date.    Culture - Fungal, CSF (collected 17 Mar 2020 00:22)  Source: .CSF CSF  Preliminary Report (17 Mar 2020 08:17):    Testing in progress    Culture - CSF with Gram Stain (collected 17 Mar 2020 00:22)  Source: .CSF CSF  Gram Stain (17 Mar 2020 01:31):    polymorphonuclear leukocytes seen    No organisms seen    by cytocentrifuge  Preliminary Report (17 Mar 2020 19:10):    No growth        RADIOLOGY & ADDITIONAL TESTS:  Results Reviewed:   Imaging Personally Reviewed:  Electrocardiogram Personally Reviewed:

## 2020-03-18 NOTE — PROGRESS NOTE ADULT - PROBLEM SELECTOR PLAN 8
c/w asa and atorvastatin hold furosemide for now  can resume as needed once improved volume status   TTE shows normal LVSF

## 2020-03-18 NOTE — DIETITIAN INITIAL EVALUATION ADULT. - PROBLEM SELECTOR PLAN 1
CT head negative for acute/subacute infarct however nature of presentation supports most probably a subacute CVA; has risk factors of uncontrolled T2DM and CAD  UA pos however pt denies urinary sxs; hold off on abx  pending MR Brain and MRA brain w/ and w/out cont, MRA neck w/ cont  pending vEEG 24h to r/o focal seizure  c/w asa and atorvastatin  f/u further neurology recs

## 2020-03-18 NOTE — DIETITIAN INITIAL EVALUATION ADULT. - PROBLEM SELECTOR PLAN 10
ISTOP: Reference #: [id number]; last alprazolam filled in 2014  DVT ppx: lovenox qdaily  Diet: NPO except meds until further recommendations from speech and swallow team  Dispo: pending PT and speech therapy consult    Miriam Jones PGY-3  Pager # [phone]/ [phone]    1.  Name of PCP: Felipe Manuel   2.  PCP Contacted on Admission: [ ] Y    [ ] N    3.  PCP contacted at Discharge: [ ] Y    [ ] N    [ ] N/A  4.  Post-Discharge Appointment Date and Location:  5.  Summary of Handoff given to PCP:

## 2020-03-18 NOTE — PROGRESS NOTE ADULT - PROBLEM SELECTOR PLAN 9
hold furosemide for now  can resume as needed once improved volume status   TTE shows normal LVSF ISTOP: Reference #: [id number]; last alprazolam filled in 2014  DVT ppx: lovenox qdaily  Diet: soft diet  Dispo: pending PT and speech therapy consult    1.  Name of PCP: Felipe Manuel   2.  PCP Contacted on Admission: [ ] Y    [ ] N    3.  PCP contacted at Discharge: [ ] Y    [ ] N    [ ] N/A  4.  Post-Discharge Appointment Date and Location:  5.  Summary of Handoff given to PCP:

## 2020-03-18 NOTE — DIETITIAN INITIAL EVALUATION ADULT. - PROBLEM SELECTOR PLAN 7
pt with large LE and 31 WBCs, per dtr, denies any recent urinary symptoms  will monitor off abx for now  f/u urine culture

## 2020-03-18 NOTE — PROGRESS NOTE ADULT - SUBJECTIVE AND OBJECTIVE BOX
Patient is a 74y old  Female who presents with a chief complaint of headache (18 Mar 2020 09:11)    Being followed by ID for        Interval history:  pt more verbal but still very confused and aphasic  No other acute events      PAST MEDICAL & SURGICAL HISTORY:  Chronic systolic heart failure  Diabetes  S/P CABG x 1  S/P breast lumpectomy: left  History of tonsillectomy  H/O abdominal hysterectomy: 1991    Allergies    clams - itching (Other)  No Known Drug Allergies  Peroxide (Blisters)    Intolerances      Antimicrobials:    acyclovir IVPB      acyclovir IVPB 700 milliGRAM(s) IV Intermittent every 8 hours    MEDICATIONS  (STANDING):  acyclovir IVPB      acyclovir IVPB 700 milliGRAM(s) IV Intermittent every 8 hours  aspirin  chewable 81 milliGRAM(s) Oral daily  atorvastatin 40 milliGRAM(s) Oral at bedtime  dextrose 5%. 1000 milliLiter(s) (50 mL/Hr) IV Continuous <Continuous>  dextrose 50% Injectable 12.5 Gram(s) IV Push once  dextrose 50% Injectable 25 Gram(s) IV Push once  dextrose 50% Injectable 25 Gram(s) IV Push once  insulin glargine Injectable (LANTUS) 18 Unit(s) SubCutaneous at bedtime  insulin lispro (HumaLOG) corrective regimen sliding scale   SubCutaneous three times a day before meals  insulin lispro (HumaLOG) corrective regimen sliding scale   SubCutaneous at bedtime  insulin lispro Injectable (HumaLOG) 6 Unit(s) SubCutaneous three times a day before meals  levETIRAcetam  IVPB 750 milliGRAM(s) IV Intermittent every 12 hours  metoprolol tartrate 25 milliGRAM(s) Oral daily  pantoprazole    Tablet 40 milliGRAM(s) Oral before breakfast  polyethylene glycol 3350 17 Gram(s) Oral two times a day      Vital Signs Last 24 Hrs  T(C): 36.7 (03-18-20 @ 14:39), Max: 36.7 (03-18-20 @ 14:39)  T(F): 98 (03-18-20 @ 14:39), Max: 98 (03-18-20 @ 14:39)  HR: 65 (03-18-20 @ 14:39) (65 - 89)  BP: 150/70 (03-18-20 @ 14:39) (149/87 - 174/84)  BP(mean): --  RR: 18 (03-18-20 @ 14:39) (18 - 19)  SpO2: 95% (03-18-20 @ 14:39) (95% - 100%)    Physical Exam:    Constitutional well preserved,comfortable,pleasant    HEENT PERRLA EOMI,No pallor or icterus    No oral exudate or erythema    Neck supple no JVD or LN    Chest Good AE,CTA    CVS RRR S1 S2     Abd soft BS normal No tenderness     Ext No cyanosis clubbing or edema    IV site no erythema tenderness or discharge    Joints no swelling or LOM    Lab Data:                          9.7    5.41  )-----------( 349      ( 18 Mar 2020 07:11 )             30.0       03-18    135  |  101  |  18  ----------------------------<  314<H>  3.7   |  20<L>  |  0.76    Ca    9.1      18 Mar 2020 07:11  Phos  3.7     03-18  Mg     1.6     03-18    TPro  7.8  /  Alb  3.2<L>  /  TBili  0.3  /  DBili  x   /  AST  184<H>  /  ALT  118<H>  /  AlkPhos  512<H>  03-18      .Blood Blood-Peripheral  03-17-20   No growth to date.  --  --      .Blood Blood-Venous  03-17-20   No growth to date.  --  --      .CSF CSF  03-17-20   No growth  --    polymorphonuclear leukocytes seen  No organisms seen  by cytocentrifuge      .Urine Clean Catch (Midstream)  03-14-20   >=3 organisms. Probable collection contamination.  --  --    Cytomegalovirus By PCR (03.17.20 @ 07:58)    Cytomegalovirus By PCR:   NotDetec    CMVPCR Log: NotDetec: Assay lower limit of detection (LOD):  31.2 IU/mL (1.49 log10/mL)  Assay dynamic range:  50 to 156,000,000 (156M) CMV DNA IU/mL (1.70 log10/mL – 8.19 log10/mL)  Plasma CMV DNA Quantification by PCR using Abbott m2000:  The results of this test shouldbe interpreted with consideration of all  clinical and laboratory findings. In particular, caution should be used  when interpreting low level positive results when the test is used for  diagnostic purposes. Repeat testing on an additional sample is  recommended to confirm low level positive results. A result of "Not  Detected" does not preclude the possibility of infection with CMV.  CMV  DNA may be present below the detection limit of assay. Uak44AP/mL                    WBC Count: 5.41 (03-18-20 @ 07:11)  WBC Count: 6.08 (03-17-20 @ 06:23)  WBC Count: 9.38 (03-16-20 @ 07:28)  WBC Count: 7.37 (03-15-20 @ 22:21)  WBC Count: 7.72 (03-15-20 @ 06:47)  WBC Count: 7.54 (03-14-20 @ 14:48)

## 2020-03-18 NOTE — PROGRESS NOTE ADULT - PROBLEM SELECTOR PLAN 5
Complicated. Possibly contributory to mental status  - UA pos, unable to elicit ROS, per conversation with sister patient had been complaining of urinary burning. Will tx with ceftriaxone Hgb at baseline  can perform anemia work up outpatient  monitor CBC

## 2020-03-19 LAB
ALBUMIN SERPL ELPH-MCNC: 3.5 G/DL — SIGNIFICANT CHANGE UP (ref 3.3–5)
ALP SERPL-CCNC: 596 U/L — HIGH (ref 40–120)
ALT FLD-CCNC: 136 U/L — HIGH (ref 10–45)
ANION GAP SERPL CALC-SCNC: 16 MMOL/L — SIGNIFICANT CHANGE UP (ref 5–17)
AST SERPL-CCNC: 167 U/L — HIGH (ref 10–40)
BASOPHILS # BLD AUTO: 0.03 K/UL — SIGNIFICANT CHANGE UP (ref 0–0.2)
BASOPHILS NFR BLD AUTO: 0.5 % — SIGNIFICANT CHANGE UP (ref 0–2)
BILIRUB SERPL-MCNC: 0.3 MG/DL — SIGNIFICANT CHANGE UP (ref 0.2–1.2)
BUN SERPL-MCNC: 17 MG/DL — SIGNIFICANT CHANGE UP (ref 7–23)
CALCIUM SERPL-MCNC: 9.6 MG/DL — SIGNIFICANT CHANGE UP (ref 8.4–10.5)
CHLORIDE SERPL-SCNC: 100 MMOL/L — SIGNIFICANT CHANGE UP (ref 96–108)
CO2 SERPL-SCNC: 20 MMOL/L — LOW (ref 22–31)
CREAT SERPL-MCNC: 0.68 MG/DL — SIGNIFICANT CHANGE UP (ref 0.5–1.3)
CULTURE RESULTS: NO GROWTH — SIGNIFICANT CHANGE UP
EOSINOPHIL # BLD AUTO: 0.17 K/UL — SIGNIFICANT CHANGE UP (ref 0–0.5)
EOSINOPHIL NFR BLD AUTO: 2.8 % — SIGNIFICANT CHANGE UP (ref 0–6)
GLUCOSE BLDC GLUCOMTR-MCNC: 159 MG/DL — HIGH (ref 70–99)
GLUCOSE BLDC GLUCOMTR-MCNC: 210 MG/DL — HIGH (ref 70–99)
GLUCOSE BLDC GLUCOMTR-MCNC: 212 MG/DL — HIGH (ref 70–99)
GLUCOSE BLDC GLUCOMTR-MCNC: 318 MG/DL — HIGH (ref 70–99)
GLUCOSE SERPL-MCNC: 253 MG/DL — HIGH (ref 70–99)
HCT VFR BLD CALC: 31.5 % — LOW (ref 34.5–45)
HGB BLD-MCNC: 9.6 G/DL — LOW (ref 11.5–15.5)
IMM GRANULOCYTES NFR BLD AUTO: 0.3 % — SIGNIFICANT CHANGE UP (ref 0–1.5)
LYMPHOCYTES # BLD AUTO: 1.51 K/UL — SIGNIFICANT CHANGE UP (ref 1–3.3)
LYMPHOCYTES # BLD AUTO: 24.6 % — SIGNIFICANT CHANGE UP (ref 13–44)
M PNEUMO IGG SER IA-ACNC: 0.84 INDEX — SIGNIFICANT CHANGE UP
M PNEUMO IGG SER IA-ACNC: NEGATIVE — SIGNIFICANT CHANGE UP
M PNEUMO IGM SER-ACNC: 98 UNITS/ML — SIGNIFICANT CHANGE UP
M PNEUMO IGM SER-ACNC: <20 UNITS/ML — SIGNIFICANT CHANGE UP
MAGNESIUM SERPL-MCNC: 1.7 MG/DL — SIGNIFICANT CHANGE UP (ref 1.6–2.6)
MCHC RBC-ENTMCNC: 25.5 PG — LOW (ref 27–34)
MCHC RBC-ENTMCNC: 30.5 GM/DL — LOW (ref 32–36)
MCV RBC AUTO: 83.6 FL — SIGNIFICANT CHANGE UP (ref 80–100)
MONOCYTES # BLD AUTO: 0.47 K/UL — SIGNIFICANT CHANGE UP (ref 0–0.9)
MONOCYTES NFR BLD AUTO: 7.6 % — SIGNIFICANT CHANGE UP (ref 2–14)
MYCOPLASMA AG SPEC QL: NEGATIVE — SIGNIFICANT CHANGE UP
MYCOPLASMA AG SPEC QL: NEGATIVE — SIGNIFICANT CHANGE UP
NEUTROPHILS # BLD AUTO: 3.95 K/UL — SIGNIFICANT CHANGE UP (ref 1.8–7.4)
NEUTROPHILS NFR BLD AUTO: 64.2 % — SIGNIFICANT CHANGE UP (ref 43–77)
NRBC # BLD: 0 /100 WBCS — SIGNIFICANT CHANGE UP (ref 0–0)
PHOSPHATE SERPL-MCNC: 3.6 MG/DL — SIGNIFICANT CHANGE UP (ref 2.5–4.5)
PLATELET # BLD AUTO: 397 K/UL — SIGNIFICANT CHANGE UP (ref 150–400)
POTASSIUM SERPL-MCNC: 3.8 MMOL/L — SIGNIFICANT CHANGE UP (ref 3.5–5.3)
POTASSIUM SERPL-SCNC: 3.8 MMOL/L — SIGNIFICANT CHANGE UP (ref 3.5–5.3)
PROT SERPL-MCNC: 8.7 G/DL — HIGH (ref 6–8.3)
RBC # BLD: 3.77 M/UL — LOW (ref 3.8–5.2)
RBC # FLD: 15.3 % — HIGH (ref 10.3–14.5)
SODIUM SERPL-SCNC: 136 MMOL/L — SIGNIFICANT CHANGE UP (ref 135–145)
SPECIMEN SOURCE: SIGNIFICANT CHANGE UP
WBC # BLD: 6.15 K/UL — SIGNIFICANT CHANGE UP (ref 3.8–10.5)
WBC # FLD AUTO: 6.15 K/UL — SIGNIFICANT CHANGE UP (ref 3.8–10.5)

## 2020-03-19 PROCEDURE — 99233 SBSQ HOSP IP/OBS HIGH 50: CPT | Mod: GC

## 2020-03-19 PROCEDURE — 99223 1ST HOSP IP/OBS HIGH 75: CPT | Mod: GC

## 2020-03-19 PROCEDURE — 99232 SBSQ HOSP IP/OBS MODERATE 35: CPT

## 2020-03-19 RX ORDER — SODIUM CHLORIDE 9 MG/ML
1000 INJECTION INTRAMUSCULAR; INTRAVENOUS; SUBCUTANEOUS
Refills: 0 | Status: DISCONTINUED | OUTPATIENT
Start: 2020-03-19 | End: 2020-03-30

## 2020-03-19 RX ADMIN — Medication 1: at 17:56

## 2020-03-19 RX ADMIN — Medication 81 MILLIGRAM(S): at 11:50

## 2020-03-19 RX ADMIN — Medication 264 MILLIGRAM(S): at 21:48

## 2020-03-19 RX ADMIN — SODIUM CHLORIDE 100 MILLILITER(S): 9 INJECTION INTRAMUSCULAR; INTRAVENOUS; SUBCUTANEOUS at 10:05

## 2020-03-19 RX ADMIN — Medication 25 MILLIGRAM(S): at 05:38

## 2020-03-19 RX ADMIN — INSULIN GLARGINE 18 UNIT(S): 100 INJECTION, SOLUTION SUBCUTANEOUS at 21:33

## 2020-03-19 RX ADMIN — Medication 6 UNIT(S): at 10:04

## 2020-03-19 RX ADMIN — POLYETHYLENE GLYCOL 3350 17 GRAM(S): 17 POWDER, FOR SOLUTION ORAL at 17:17

## 2020-03-19 RX ADMIN — ATORVASTATIN CALCIUM 40 MILLIGRAM(S): 80 TABLET, FILM COATED ORAL at 21:33

## 2020-03-19 RX ADMIN — SODIUM CHLORIDE 100 MILLILITER(S): 9 INJECTION INTRAMUSCULAR; INTRAVENOUS; SUBCUTANEOUS at 23:49

## 2020-03-19 RX ADMIN — Medication 6 UNIT(S): at 14:31

## 2020-03-19 RX ADMIN — LEVETIRACETAM 400 MILLIGRAM(S): 250 TABLET, FILM COATED ORAL at 17:17

## 2020-03-19 RX ADMIN — POLYETHYLENE GLYCOL 3350 17 GRAM(S): 17 POWDER, FOR SOLUTION ORAL at 05:38

## 2020-03-19 RX ADMIN — LEVETIRACETAM 400 MILLIGRAM(S): 250 TABLET, FILM COATED ORAL at 05:38

## 2020-03-19 RX ADMIN — Medication 4: at 10:04

## 2020-03-19 RX ADMIN — Medication 2: at 14:30

## 2020-03-19 RX ADMIN — PANTOPRAZOLE SODIUM 40 MILLIGRAM(S): 20 TABLET, DELAYED RELEASE ORAL at 05:38

## 2020-03-19 RX ADMIN — Medication 264 MILLIGRAM(S): at 05:38

## 2020-03-19 RX ADMIN — Medication 264 MILLIGRAM(S): at 14:30

## 2020-03-19 RX ADMIN — Medication 6 UNIT(S): at 17:56

## 2020-03-19 NOTE — PROGRESS NOTE ADULT - PROBLEM SELECTOR PLAN 3
possibly ERAZO given hx of DM, CAD. Worsening today.  cont to monitor  GGT elevated  Ceruloplasmin 48  RUQ US demonstrates hepatomegaly  -will consult hepatology

## 2020-03-19 NOTE — PROGRESS NOTE ADULT - ATTENDING COMMENTS
improved clinical status per staff  eating soft diet when seen  some word finding difficulty  moves all extremities  will d/w family baseline clinical status.  f/up neuro/ID/hepatology recs    Angel Luis Bermeo MD, MHA, FACP, [facility]  Pager: [phone]  If no response or off-hours, page [phone]

## 2020-03-19 NOTE — PROGRESS NOTE ADULT - PROBLEM SELECTOR PLAN 9
ISTOP: Reference #: [id number]; last alprazolam filled in 2014  DVT ppx: lovenox qdaily  Diet: soft diet  Dispo: pending PT and speech therapy consult    1.  Name of PCP: Felipe Manuel   2.  PCP Contacted on Admission: [ ] Y    [ ] N    3.  PCP contacted at Discharge: [ ] Y    [ ] N    [ ] N/A  4.  Post-Discharge Appointment Date and Location:  5.  Summary of Handoff given to PCP:

## 2020-03-19 NOTE — PROGRESS NOTE ADULT - PROBLEM SELECTOR PLAN 1
Unclear etiology.  -CT head negative for acute/subacute infarct  -MR Brain and MRA brain w/ and w/out cont, MRA neck w/ cont negative for stroke, acute processes  -pt not complaint with EEG  -c/w atorvastatin  -LP performed on 3/16, pending CSF and serum studies. So far, serum HSV IgG positive, EBV antigen positive, will f/u EBV DNA by PCR. Hep panel, HIV negative, ESR and CRP elevated. Thyroid panel wnl.    - repeat MRI head on 3/17 unremarkable  - b12, folate, tsh wnl  - ID consulted, will send studies as outlined within their note

## 2020-03-19 NOTE — CHART NOTE - NSCHARTNOTEFT_GEN_A_CORE
Received call from primary team to evaluate for CNS vasculitis or other autoimmune processes causing patient's acute encephalopathy and expressive aphasia. Discussed case with primary team attending. Patient has had numerous lab and imaging studies which taken together make an autoimmune process unlikely in this patient. Patient has had an REECE 1:80 which is not clinically significant, has had negative dsDNA, SS-A, SS-B, IgG4, RF. LP with CSF done showing cell count of 1. MRA head anc neck showing no abnormalities in the blood vessels. For completion can send for TAYLA (Sm, RNP) and would recommend following up on ID workup and neuro workup, possible EEG.    Discussed with Rheumatology attending.    Rolf Lunsford MD  Rheumatology Fellow PGY V

## 2020-03-19 NOTE — PROGRESS NOTE ADULT - PROBLEM SELECTOR PLAN 6
A1c 15.5%  - will adjust insulin dosing  c/w JOCELYNE and monitor FS ac and hs   hold home metformin and dapagliflozin

## 2020-03-19 NOTE — CONSULT NOTE ADULT - ASSESSMENT
74F with T2DM, systolic HF, CABG p/w subacute/ acute? encephalopathiy and aphasia c/b seizure, currently undergoing work up. GI consulted for further workup of transaminitis.    # Transaminitis  R factor of 0.4, cholestatic liver injury. US abdomen showing hepatomegaly but no other abnormalities. Hep serologies negative. REECE 1:80. Ceruloplasmin of 49, EBV IgM + and HSV IgG +.  DDx: Infectious hepatitis (EBV?) vs. ERAZO vs. autoimmune vs. metabolic vs. DILI  #Acute Encephalopathy  Negative workup so far. LP results pending.    -	Please check HSV IgM and PCR and EBV PCR  -	Please check iron studies, ferritin  -	Please check total immunoglobulin, check A1AT  -	would recommend repeat US abdomen complete  -	avoid hepatotoxic medications  -	ID consulted, f/u recommendations 74F with T2DM, systolic HF, CABG p/w subacute/ acute? encephalopathiy and aphasia c/b seizure, currently undergoing work up. GI consulted for further workup of transaminitis.    # Transaminitis  R factor of 0.4, cholestatic liver injury. US abdomen showing hepatomegaly but no other abnormalities. Hep serologies negative. REECE 1:80. Ceruloplasmin of 49, EBV IgM + and HSV IgG +.  DDx: Infectious hepatitis (EBV?) vs. ERAZO vs. autoimmune vs. metabolic vs. DILI  #Acute Encephalopathy/Aphasia  Negative workup so far. LP results pending.    -	Please check HSV IgM and PCR and EBV PCR  -	Please check iron studies, ferritin  -	Please check total immunoglobulin, check A1AT  -	would recommend repeat US abdomen complete  -	avoid hepatotoxic medications  -	ID consulted, f/u recommendations 74F with T2DM, systolic HF, CABG p/w subacute/ acute? encephalopathiy and aphasia c/b seizure, currently undergoing work up. GI consulted for further workup of transaminitis.    # Transaminitis  R factor of 0.4, cholestatic liver injury. US abdomen showing hepatomegaly but no other abnormalities. Hep serologies negative. REECE 1:80. Ceruloplasmin of 49, EBV IgM + and HSV IgG +.  DDx: DILI vs. autoimmune (PBC?) vs Infectious hepatitis (EBV?) vs. ERAZO  #Acute Encephalopathy/Aphasia  Negative workup so far. LP results pending.    -	please check AMA, SMA  -	please get MRI abdomen  -	Please check HSV IgM and PCR and EBV PCR  -	Please check total immunoglobulin  -	avoid hepatotoxic medications. If possible would consider switching keppra to alternative anti seizure mediation if LFTs do not improve  -	please consider discontinuing NSAIDs (ibuprofen)  -	ID consulted, f/u recommendations 74F with T2DM, systolic HF, CABG p/w subacute/ acute? encephalopathiy and aphasia c/b seizure, currently undergoing work up. Hepatology consulted for evaluation of abnormal liver chemistries. She has a mild increase in ALT/AST with a rise in alkaline phosphatase with a normal bilirubin and INR. The R factor of 0.4 consistent with cholestatic liver injury. US abdomen showing hepatomegaly but no other abnormalities. Hep serologies negative to date. REECE 1:80. Ceruloplasmin of 49, EBV IgM + and HSV IgG +. The differential diagnosis includes DDx: Autoimmune (PBC or autoimmune hepatitis) vs Infectious hepatitis (EBV) vs. ERAZO vs drug induced liver injury (less likely) vs possible hypothyroidism. She does have mental changes c/w aphasia. this is unlikely secondary to liver disease    I recommend:    -	 Check AMA, SMA, serum IgG, IgM  -	MRI/MRCP of abdomen with contrast  -	Check HSV IgM and PCR and EBV PCR  -	Avoid hepatotoxic medications. If possible would consider switching keppra to alternative anti seizure mediation if liver enzymes do not improve  -	please consider discontinuing NSAIDs (ibuprofen)  -	ID consulted, f/u recommendations

## 2020-03-19 NOTE — PROGRESS NOTE ADULT - ASSESSMENT
74 year old female with a history of uncontrolled diabetes, CAD s/p stent and CABG presenting with subacute/ acute encephalopathy and severe headache, with hospital course complicated by new seizure.   Clinically improved on my exam, more interactive. Answering questions.     1. Encephalopathy, headache, seizure: ? encephalitis  MRI unrevealing  - Per Neuro, will repeat MR  - LP completed: 1 WBC, inconsistent with infectious process. PCR panel is negative (which includes many viral etiologies). AI pending. Continue to empirically cover with acyclovir   AS patient has improved , will continue . Await EEG to see if has characteristic spikes  - Follow up serum/ CSF studies  - Neuro following: will appreciate recs      2. Transaminitis: ? If related to systemic viral syndrome. Will send EBV, CMV as above.  We will also send ceruloplasmin .     Ceck Mycoplasma  await NMDA  await multiple studies     await 14-3-3      Pt with a m spike- suggest Hematology/oncology workup

## 2020-03-19 NOTE — CONSULT NOTE ADULT - ATTENDING COMMENTS
Yen Morgan M.D. ,   Pager [phone]     after 5PM/ weekends [phone]
I spoke with the patient about her condition and the plan above.

## 2020-03-19 NOTE — CONSULT NOTE ADULT - SUBJECTIVE AND OBJECTIVE BOX
Chief Complaint:  Patient is a 74y old  Female who presents with a chief complaint of headache (19 Mar 2020 12:59)      HPI:  74F with T2DM, systolic HF, CABG p/w subacute/ acute? encephalopathiy and aphasia c/b seizure, currently undergoing work up. GI consulted for further workup of transaminitis.    During admission, patient had workup for acute encephalopathy. CT and MRI done without any remarkable findings. LP done, results pending. EEG poor study due to patient noncompliance. Patient treated prophylactically for HSV with acyclovir as well as Keppra. On admission, patient found to have alk phos of 372 and AST and ALT of 91 and 52 which has been worsening since admission. Sed rate of 109. HSV IgG positive and EBV IgM positive.      Allergies:  clams - itching (Other)  No Known Drug Allergies  Peroxide (Blisters)      Home Medications:    Hospital Medications:  acyclovir IVPB      acyclovir IVPB 700 milliGRAM(s) IV Intermittent every 8 hours  aspirin  chewable 81 milliGRAM(s) Oral daily  atorvastatin 40 milliGRAM(s) Oral at bedtime  dextrose 40% Gel 15 Gram(s) Oral once PRN  dextrose 5%. 1000 milliLiter(s) IV Continuous <Continuous>  dextrose 50% Injectable 12.5 Gram(s) IV Push once  dextrose 50% Injectable 25 Gram(s) IV Push once  dextrose 50% Injectable 25 Gram(s) IV Push once  glucagon  Injectable 1 milliGRAM(s) IntraMuscular once PRN  ibuprofen  Tablet. 400 milliGRAM(s) Oral every 6 hours PRN  insulin glargine Injectable (LANTUS) 18 Unit(s) SubCutaneous at bedtime  insulin lispro (HumaLOG) corrective regimen sliding scale   SubCutaneous three times a day before meals  insulin lispro (HumaLOG) corrective regimen sliding scale   SubCutaneous at bedtime  insulin lispro Injectable (HumaLOG) 6 Unit(s) SubCutaneous three times a day before meals  levETIRAcetam  IVPB 750 milliGRAM(s) IV Intermittent every 12 hours  metoprolol tartrate 25 milliGRAM(s) Oral daily  pantoprazole    Tablet 40 milliGRAM(s) Oral before breakfast  polyethylene glycol 3350 17 Gram(s) Oral two times a day  sodium chloride 0.9%. 1000 milliLiter(s) IV Continuous <Continuous>      PMHX/PSHX:  Chronic systolic heart failure  Diabetes  S/P CABG x 1  S/P breast lumpectomy  History of tonsillectomy  H/O abdominal hysterectomy      Family history:  FH: type 2 diabetes      Social History:     ROS:     General:  No wt loss, fevers, chills, night sweats, fatigue,   Eyes:  Good vision, no reported pain  ENT:  No sore throat, pain, runny nose, dysphagia  CV:  No pain, palpitations, hypo/hypertension  Resp:  No dyspnea, cough, tachypnea, wheezing  GI:  See HPI  :  No pain, bleeding, incontinence, nocturia  Muscle:  No pain, weakness  Neuro:  No weakness, tingling, memory problems  Psych:  No fatigue, insomnia, mood problems, depression  Endocrine:  No polyuria, polydipsia, cold/heat intolerance  Heme:  No petechiae, ecchymosis, easy bruisability  Skin:  No rash, edema      PHYSICAL EXAM:     GENERAL:  Appears stated age, well-groomed, well-nourished, no distress  HEENT:  NC/AT,  conjunctivae clear and pink,  no JVD  CHEST:  Full & symmetric excursion, no increased effort, breath sounds clear  HEART:  Regular rhythm, S1, S2, no murmur/rub/S3/S4, no abdominal bruit, no edema  ABDOMEN:  Soft, non-tender, non-distended, normoactive bowel sounds,  no masses ,  EXTREMITIES:  no cyanosis,clubbing or edema  SKIN:  No rash/erythema/ecchymoses/petechiae/wounds/abscess/warm/dry  NEURO:  Alert, oriented    Vital Signs:  Vital Signs Last 24 Hrs  T(C): 36.5 (19 Mar 2020 08:23), Max: 36.7 (18 Mar 2020 14:39)  T(F): 97.7 (19 Mar 2020 08:23), Max: 98 (18 Mar 2020 14:39)  HR: 59 (19 Mar 2020 08:23) (59 - 69)  BP: 161/72 (19 Mar 2020 08:23) (134/63 - 161/77)  BP(mean): --  RR: 18 (19 Mar 2020 08:23) (18 - 18)  SpO2: 97% (19 Mar 2020 08:23) (91% - 97%)  Daily     Daily Weight in k.8 (18 Mar 2020 16:07)    LABS:                        9.6    6.15  )-----------( 397      ( 19 Mar 2020 09:34 )             31.5         136  |  100  |  17  ----------------------------<  253<H>  3.8   |  20<L>  |  0.68    Ca    9.6      19 Mar 2020 06:42  Phos  3.6       Mg     1.7         TPro  8.7<H>  /  Alb  3.5  /  TBili  0.3  /  DBili  x   /  AST  167<H>  /  ALT  136<H>  /  AlkPhos  596<H>      LIVER FUNCTIONS - ( 19 Mar 2020 06:42 )  Alb: 3.5 g/dL / Pro: 8.7 g/dL / ALK PHOS: 596 U/L / ALT: 136 U/L / AST: 167 U/L / GGT: x                   Imaging: Chief Complaint:  Patient is a 74y old  Female who presents with a chief complaint of headache (19 Mar 2020 12:59)      HPI:  74F with T2DM, systolic HF, CABG p/w subacute/ acute? encephalopathiy and aphasia c/b seizure, currently undergoing work up. GI consulted for further workup of transaminitis.    During admission, patient had workup for acute encephalopathy. CT and MRI done without any remarkable findings. LP done, results pending. EEG poor study due to patient noncompliance. Patient treated prophylactically for HSV with acyclovir as well as Keppra. On admission, patient found to have alk phos of 372 and AST and ALT of 91 and 52 which has been worsening since admission. Sed rate of 109. HSV IgG positive and EBV IgM positive.    Patient denies any abdominal pain, fevers, N/V. Denies any liver problems in the past.       Allergies:  clams - itching (Other)  No Known Drug Allergies  Peroxide (Blisters)      Home Medications:    Hospital Medications:  acyclovir IVPB      acyclovir IVPB 700 milliGRAM(s) IV Intermittent every 8 hours  aspirin  chewable 81 milliGRAM(s) Oral daily  atorvastatin 40 milliGRAM(s) Oral at bedtime  dextrose 40% Gel 15 Gram(s) Oral once PRN  dextrose 5%. 1000 milliLiter(s) IV Continuous <Continuous>  dextrose 50% Injectable 12.5 Gram(s) IV Push once  dextrose 50% Injectable 25 Gram(s) IV Push once  dextrose 50% Injectable 25 Gram(s) IV Push once  glucagon  Injectable 1 milliGRAM(s) IntraMuscular once PRN  ibuprofen  Tablet. 400 milliGRAM(s) Oral every 6 hours PRN  insulin glargine Injectable (LANTUS) 18 Unit(s) SubCutaneous at bedtime  insulin lispro (HumaLOG) corrective regimen sliding scale   SubCutaneous three times a day before meals  insulin lispro (HumaLOG) corrective regimen sliding scale   SubCutaneous at bedtime  insulin lispro Injectable (HumaLOG) 6 Unit(s) SubCutaneous three times a day before meals  levETIRAcetam  IVPB 750 milliGRAM(s) IV Intermittent every 12 hours  metoprolol tartrate 25 milliGRAM(s) Oral daily  pantoprazole    Tablet 40 milliGRAM(s) Oral before breakfast  polyethylene glycol 3350 17 Gram(s) Oral two times a day  sodium chloride 0.9%. 1000 milliLiter(s) IV Continuous <Continuous>      PMHX/PSHX:  Chronic systolic heart failure  Diabetes  S/P CABG x 1  S/P breast lumpectomy  History of tonsillectomy  H/O abdominal hysterectomy      Family history:  FH: type 2 diabetes      Social History:     ROS:     General:  No wt loss, fevers, chills, night sweats, fatigue,   Eyes:  Good vision, no reported pain  ENT:  No sore throat, pain, runny nose, dysphagia  CV:  No pain, palpitations, hypo/hypertension  Resp:  No dyspnea, cough, tachypnea, wheezing  GI:  See HPI  :  No pain, bleeding, incontinence, nocturia  Muscle:  No pain, weakness  Neuro:  No weakness, tingling, memory problems  Psych:  No fatigue, insomnia, mood problems, depression  Endocrine:  No polyuria, polydipsia, cold/heat intolerance  Heme:  No petechiae, ecchymosis, easy bruisability  Skin:  No rash, edema      PHYSICAL EXAM:     GENERAL:  NAD  HEENT: sclera anicteric  CHEST:  Full & symmetric excursion  HEART:  Regular rhythm, S1, S2  ABDOMEN:  Soft, non-tender, non-distended, normoactive bowel sounds,  no masses ,  EXTREMITIES:  no cyanosis,clubbing or edema  SKIN:  No rash/erythema/ecchymoses/petechiae/wounds/abscess/warm/dry  NEURO:  aaox3 (but difficulty finding and pronouncing words)    Vital Signs:  Vital Signs Last 24 Hrs  T(C): 36.5 (19 Mar 2020 08:23), Max: 36.7 (18 Mar 2020 14:39)  T(F): 97.7 (19 Mar 2020 08:23), Max: 98 (18 Mar 2020 14:39)  HR: 59 (19 Mar 2020 08:23) (59 - 69)  BP: 161/72 (19 Mar 2020 08:23) (134/63 - 161/77)  BP(mean): --  RR: 18 (19 Mar 2020 08:23) (18 - 18)  SpO2: 97% (19 Mar 2020 08:23) (91% - 97%)  Daily     Daily Weight in k.8 (18 Mar 2020 16:07)    LABS:                        9.6    6.15  )-----------( 397      ( 19 Mar 2020 09:34 )             31.5     03-19    136  |  100  |  17  ----------------------------<  253<H>  3.8   |  20<L>  |  0.68    Ca    9.6      19 Mar 2020 06:42  Phos  3.6       Mg     1.7         TPro  8.7<H>  /  Alb  3.5  /  TBili  0.3  /  DBili  x   /  AST  167<H>  /  ALT  136<H>  /  AlkPhos  596<H>      LIVER FUNCTIONS - ( 19 Mar 2020 06:42 )  Alb: 3.5 g/dL / Pro: 8.7 g/dL / ALK PHOS: 596 U/L / ALT: 136 U/L / AST: 167 U/L / GGT: x                   Imaging:

## 2020-03-19 NOTE — PROGRESS NOTE ADULT - PROBLEM SELECTOR PLAN 4
RESOLVED  Complicated. Possibly contributory to mental status  - Completed 3 day course of ceftriaxone.

## 2020-03-19 NOTE — PROGRESS NOTE ADULT - SUBJECTIVE AND OBJECTIVE BOX
Patient is a 74y old  Female who presents with a chief complaint of headache (19 Mar 2020 14:24)    Being followed by ID for        Interval history:  pt more verbal but still very off  unable to fully express herself  watching "miriam doreen' seems to follow  No other acute events        PAST MEDICAL & SURGICAL HISTORY:  Chronic systolic heart failure  Diabetes  S/P CABG x 1  S/P breast lumpectomy: left  History of tonsillectomy  H/O abdominal hysterectomy: 1991    Allergies    clams - itching (Other)  No Known Drug Allergies  Peroxide (Blisters)    Intolerances      Antimicrobials:    acyclovir IVPB      acyclovir IVPB 700 milliGRAM(s) IV Intermittent every 8 hours    MEDICATIONS  (STANDING):  acyclovir IVPB      acyclovir IVPB 700 milliGRAM(s) IV Intermittent every 8 hours  aspirin  chewable 81 milliGRAM(s) Oral daily  atorvastatin 40 milliGRAM(s) Oral at bedtime  dextrose 5%. 1000 milliLiter(s) (50 mL/Hr) IV Continuous <Continuous>  dextrose 50% Injectable 12.5 Gram(s) IV Push once  dextrose 50% Injectable 25 Gram(s) IV Push once  dextrose 50% Injectable 25 Gram(s) IV Push once  insulin glargine Injectable (LANTUS) 18 Unit(s) SubCutaneous at bedtime  insulin lispro (HumaLOG) corrective regimen sliding scale   SubCutaneous three times a day before meals  insulin lispro (HumaLOG) corrective regimen sliding scale   SubCutaneous at bedtime  insulin lispro Injectable (HumaLOG) 6 Unit(s) SubCutaneous three times a day before meals  levETIRAcetam  IVPB 750 milliGRAM(s) IV Intermittent every 12 hours  metoprolol tartrate 25 milliGRAM(s) Oral daily  pantoprazole    Tablet 40 milliGRAM(s) Oral before breakfast  polyethylene glycol 3350 17 Gram(s) Oral two times a day  sodium chloride 0.9%. 1000 milliLiter(s) (100 mL/Hr) IV Continuous <Continuous>      Vital Signs Last 24 Hrs  T(C): 36.4 (03-19-20 @ 15:45), Max: 36.7 (03-18-20 @ 23:50)  T(F): 97.6 (03-19-20 @ 15:45), Max: 98 (03-18-20 @ 23:50)  HR: 79 (03-19-20 @ 15:45) (59 - 79)  BP: 122/76 (03-19-20 @ 15:45) (122/76 - 161/77)  BP(mean): --  RR: 18 (03-19-20 @ 15:45) (18 - 18)  SpO2: 94% (03-19-20 @ 15:45) (91% - 97%)    Physical Exam:    Constitutional resting quietly    HEENT PERRLA EOMI,No pallor or icterus    missing teeth    Neck supple no JVD or LN    Chest Good AE,CTA    CVS RRR S1 S2 WNl     Abd soft BS normal No tenderness     Ext No cyanosis clubbing or edema    IV site no erythema tenderness or discharge    Joints no swelling or LOM    knew she was in hospital and knew her name    Lab Data:                          9.6    6.15  )-----------( 397      ( 19 Mar 2020 09:34 )             31.5       03-19    136  |  100  |  17  ----------------------------<  253<H>  3.8   |  20<L>  |  0.68    Ca    9.6      19 Mar 2020 06:42  Phos  3.6     03-19  Mg     1.7     03-19    TPro  8.7<H>  /  Alb  3.5  /  TBili  0.3  /  DBili  x   /  AST  167<H>  /  ALT  136<H>  /  AlkPhos  596<H>  03-19      .Blood Blood-Peripheral  03-17-20   No growth to date.  --  --      .Blood Blood-Venous  03-17-20   No growth to date.  --  --      .CSF CSF  03-17-20   No growth  --    polymorphonuclear leukocytes seen  No organisms seen  by cytocentrifuge      .Urine Clean Catch (Midstream)  03-14-20   >=3 organisms. Probable collection contamination.  --  --        Human Herpes Virus 6 Detection by PCR (03.17.20 @ 14:06)    H-Herpes Virus-6 Result: NotDetec: TESTING PERFORMED AT LOW VOLUME ON CSF SPECIMEN FOR HHV6, MAY AFFECT  RESULTS.  Assay Range: 81 copies/mL to 1.00E+08 copies/mL  The limit of quantitation (LOQ) is 81 copies/mL. HHV-6 DNA detected  below the LOQ will be reported as Detected:<81 copies/mL.  This test was developed and its performance characteristics  determined by [facility]. It has not been cleared or approved  by the [place] Food and Drug Administration. Results should be used in  conjunction with clinical findings, and should not form the sole  basis for a diagnosis or treatment decision.  ____________________________________________________________  Performed at:  [facility]  [address] [place] [address]  [phone]  : Sirisha Ren PhD HCLD([facility])  CLIA# [id number] copies/mL      Cytomegalovirus By PCR (03.17.20 @ 07:58)    Cytomegalovirus By PCR:   NotDetec    CMVPCR Log: NotDetec: Assay lower limit of detection (LOD):  31.2 IU/mL (1.49 log10/mL)  Assay dynamic range:  50 to 156,000,000 (156M) CMV DNA IU/mL (1.70 log10/mL – 8.19 log10/mL)  Plasma CMV DNA Quantification by PCR using Abbott m2000:  The results of this test shouldbe interpreted with consideration of all  clinical and laboratory findings. In particular, caution should be used  when interpreting low level positive results when the test is used for  diagnostic purposes. Repeat testing on an additional sample is  recommended to confirm low level positive results. A result of "Not  Detected" does not preclude the possibility of infection with CMV.  CMV  DNA may be present below the detection limit of assay. [id number]      HIV-1/2 Antigen/Antibody Screen by CMIA (03.17.20 @ 07:55)    HIV-1/2 Combo Result: Nonreact: The HIV Ag/Ab Combo test performed screens for HIV-1 p24 antigen,  antibodies to HIV-1 (group M and group O), and antibodies to HIV-2. All  specimens repeatedly reactive will reflex to an HIV 1/2 antibody  confirmation and differentiation test. This assay detects p24 antigen  which may be present prior to the development of HIV antibodies,  therefore a reactive result with a negative HIV 1/2 AB Confirmation  should be followed up with HIV-1 RNA, HIV-2 RNA and repeat testing in 4-8  weeks. A nonreactive result does not preclude previous exposure to or  infection with HIV-1 or HIV-2.      CSF PCR Panel (03.17.20 @ 02:56)    CSF PCR Result: NotDetec: The meningitis/encephalitis (ME) panel (CSFPCR) is a PCR based assay that  screens for: Escherichia coli; Haemophilus influenzae; Listeria  monocytogenes; Neisseria meningitidis; Streptococcus agalactiae;  Streptococcus pneumoniae; CMV; Enterovirus; HSV-1; HSV-2; Human  herpesvirus 6; Parechovirus; VZV and Cryptococcus. Result should be  interpreted in context of clinical presentation, imaging and other lab  tests. Positive predictive value may be lower in patients with normal CSF  chemistry and cell count.          WBC Count: 6.15 (03-19-20 @ 09:34)  WBC Count: 5.41 (03-18-20 @ 07:11)  WBC Count: 6.08 (03-17-20 @ 06:23)  WBC Count: 9.38 (03-16-20 @ 07:28)  WBC Count: 7.37 (03-15-20 @ 22:21)  WBC Count: 7.72 (03-15-20 @ 06:47)  WBC Count: 7.54 (03-14-20 @ 14:48)        < from: MR Head No Cont (03.17.20 @ 17:52) >    Impression:    No acute hemorrhage or infarct.  Sphenoid sinus mucosal thickening. Correlate clinically for sinusitis.    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

## 2020-03-19 NOTE — PROGRESS NOTE ADULT - SUBJECTIVE AND OBJECTIVE BOX
INTERVAL HPI/OVERNIGHT EVENTS:    SUBJECTIVE: Patient seen and examined at bedside.     CONSTITUTIONAL: No weakness, fevers or chills  EYES/ENT: No visual changes;  No vertigo or throat pain   NECK: No pain or stiffness  RESPIRATORY: No cough, wheezing, hemoptysis; No shortness of breath  CARDIOVASCULAR: No chest pain or palpitations  GASTROINTESTINAL: No abdominal or epigastric pain. No nausea, vomiting, or hematemesis; No diarrhea or constipation. No melena or hematochezia.  GENITOURINARY: No dysuria, frequency or hematuria  NEUROLOGICAL: No numbness or weakness  SKIN: No itching, rashes    OBJECTIVE:    VITAL SIGNS:  ICU Vital Signs Last 24 Hrs  T(C): 36.5 (19 Mar 2020 08:23), Max: 36.7 (18 Mar 2020 14:39)  T(F): 97.7 (19 Mar 2020 08:23), Max: 98 (18 Mar 2020 14:39)  HR: 59 (19 Mar 2020 08:23) (59 - 69)  BP: 161/72 (19 Mar 2020 08:23) (134/63 - 161/77)  BP(mean): --  ABP: --  ABP(mean): --  RR: 18 (19 Mar 2020 08:23) (18 - 18)  SpO2: 97% (19 Mar 2020 08:23) (91% - 97%)        03-18 @ 07:01  -  03-19 @ 07:00  --------------------------------------------------------  IN: 1000 mL / OUT: 0 mL / NET: 1000 mL    03-19 @ 07:01  -  03-19 @ 13:01  --------------------------------------------------------  IN: 280 mL / OUT: 0 mL / NET: 280 mL      CAPILLARY BLOOD GLUCOSE      POCT Blood Glucose.: 318 mg/dL (19 Mar 2020 09:59)      PHYSICAL EXAM:    General: NAD  HEENT: NC/AT; PERRL, clear conjunctiva  Neck: supple  Respiratory: CTA b/l  Cardiovascular: +S1/S2; RRR  Abdomen: soft, NT/ND; +BS x4  Extremities: WWP, 2+ peripheral pulses b/l; no LE edema  Skin: normal color and turgor; no rash  Neurological:    MEDICATIONS:  MEDICATIONS  (STANDING):  acyclovir IVPB      acyclovir IVPB 700 milliGRAM(s) IV Intermittent every 8 hours  aspirin  chewable 81 milliGRAM(s) Oral daily  atorvastatin 40 milliGRAM(s) Oral at bedtime  dextrose 5%. 1000 milliLiter(s) (50 mL/Hr) IV Continuous <Continuous>  dextrose 50% Injectable 12.5 Gram(s) IV Push once  dextrose 50% Injectable 25 Gram(s) IV Push once  dextrose 50% Injectable 25 Gram(s) IV Push once  insulin glargine Injectable (LANTUS) 18 Unit(s) SubCutaneous at bedtime  insulin lispro (HumaLOG) corrective regimen sliding scale   SubCutaneous three times a day before meals  insulin lispro (HumaLOG) corrective regimen sliding scale   SubCutaneous at bedtime  insulin lispro Injectable (HumaLOG) 6 Unit(s) SubCutaneous three times a day before meals  levETIRAcetam  IVPB 750 milliGRAM(s) IV Intermittent every 12 hours  metoprolol tartrate 25 milliGRAM(s) Oral daily  pantoprazole    Tablet 40 milliGRAM(s) Oral before breakfast  polyethylene glycol 3350 17 Gram(s) Oral two times a day  sodium chloride 0.9%. 1000 milliLiter(s) (100 mL/Hr) IV Continuous <Continuous>    MEDICATIONS  (PRN):  dextrose 40% Gel 15 Gram(s) Oral once PRN Blood Glucose LESS THAN 70 milliGRAM(s)/deciliter  glucagon  Injectable 1 milliGRAM(s) IntraMuscular once PRN Glucose LESS THAN 70 milligrams/deciliter  ibuprofen  Tablet. 400 milliGRAM(s) Oral every 6 hours PRN Severe Pain (7 - 10)      ALLERGIES:  Allergies    clams - itching (Other)  No Known Drug Allergies  Peroxide (Blisters)    Intolerances        LABS:                        9.6    6.15  )-----------( 397      ( 19 Mar 2020 09:34 )             31.5     03-19    136  |  100  |  17  ----------------------------<  253<H>  3.8   |  20<L>  |  0.68    Ca    9.6      19 Mar 2020 06:42  Phos  3.6     03-19  Mg     1.7     03-19    TPro  8.7<H>  /  Alb  3.5  /  TBili  0.3  /  DBili  x   /  AST  167<H>  /  ALT  136<H>  /  AlkPhos  596<H>  03-19          RADIOLOGY & ADDITIONAL TESTS: Reviewed. INTERVAL HPI/OVERNIGHT EVENTS:    SUBJECTIVE: Patient seen and examined at bedside. No overnight events. Patient improving clinically.     ROS neg except as above    OBJECTIVE:    VITAL SIGNS:  ICU Vital Signs Last 24 Hrs  T(C): 36.5 (19 Mar 2020 08:23), Max: 36.7 (18 Mar 2020 14:39)  T(F): 97.7 (19 Mar 2020 08:23), Max: 98 (18 Mar 2020 14:39)  HR: 59 (19 Mar 2020 08:23) (59 - 69)  BP: 161/72 (19 Mar 2020 08:23) (134/63 - 161/77)  BP(mean): --  ABP: --  ABP(mean): --  RR: 18 (19 Mar 2020 08:23) (18 - 18)  SpO2: 97% (19 Mar 2020 08:23) (91% - 97%)        03-18 @ 07:01  -  03-19 @ 07:00  --------------------------------------------------------  IN: 1000 mL / OUT: 0 mL / NET: 1000 mL    03-19 @ 07:01  -  03-19 @ 13:01  --------------------------------------------------------  IN: 280 mL / OUT: 0 mL / NET: 280 mL      CAPILLARY BLOOD GLUCOSE      POCT Blood Glucose.: 318 mg/dL (19 Mar 2020 09:59)      PHYSICAL EXAM:    CONSTITUTIONAL: NAD, well-developed, lying in bed  RESPIRATORY: Normal respiratory effort; lungs are clear to auscultation bilaterally  CARDIOVASCULAR: normal S1 and S2, rrr, no mrg; No lower extremity edema; Peripheral pulses are 2+ bilaterally  ABDOMEN: soft, NTND, BS+; No hepatosplenomegaly  MUSCULOSKELETAL: no clubbing or cyanosis of digits; no joint swelling or tenderness to palpation  NEURO: aphasic but responsive and conversive, cranial nerves grossly intact, strength 5/5 in UE b/L and 4/5 in LE b/L      MEDICATIONS:  MEDICATIONS  (STANDING):  acyclovir IVPB      acyclovir IVPB 700 milliGRAM(s) IV Intermittent every 8 hours  aspirin  chewable 81 milliGRAM(s) Oral daily  atorvastatin 40 milliGRAM(s) Oral at bedtime  dextrose 5%. 1000 milliLiter(s) (50 mL/Hr) IV Continuous <Continuous>  dextrose 50% Injectable 12.5 Gram(s) IV Push once  dextrose 50% Injectable 25 Gram(s) IV Push once  dextrose 50% Injectable 25 Gram(s) IV Push once  insulin glargine Injectable (LANTUS) 18 Unit(s) SubCutaneous at bedtime  insulin lispro (HumaLOG) corrective regimen sliding scale   SubCutaneous three times a day before meals  insulin lispro (HumaLOG) corrective regimen sliding scale   SubCutaneous at bedtime  insulin lispro Injectable (HumaLOG) 6 Unit(s) SubCutaneous three times a day before meals  levETIRAcetam  IVPB 750 milliGRAM(s) IV Intermittent every 12 hours  metoprolol tartrate 25 milliGRAM(s) Oral daily  pantoprazole    Tablet 40 milliGRAM(s) Oral before breakfast  polyethylene glycol 3350 17 Gram(s) Oral two times a day  sodium chloride 0.9%. 1000 milliLiter(s) (100 mL/Hr) IV Continuous <Continuous>    MEDICATIONS  (PRN):  dextrose 40% Gel 15 Gram(s) Oral once PRN Blood Glucose LESS THAN 70 milliGRAM(s)/deciliter  glucagon  Injectable 1 milliGRAM(s) IntraMuscular once PRN Glucose LESS THAN 70 milligrams/deciliter  ibuprofen  Tablet. 400 milliGRAM(s) Oral every 6 hours PRN Severe Pain (7 - 10)      ALLERGIES:  Allergies    clams - itching (Other)  No Known Drug Allergies  Peroxide (Blisters)    Intolerances        LABS:                        9.6    6.15  )-----------( 397      ( 19 Mar 2020 09:34 )             31.5     03-19    136  |  100  |  17  ----------------------------<  253<H>  3.8   |  20<L>  |  0.68    Ca    9.6      19 Mar 2020 06:42  Phos  3.6     03-19  Mg     1.7     03-19    TPro  8.7<H>  /  Alb  3.5  /  TBili  0.3  /  DBili  x   /  AST  167<H>  /  ALT  136<H>  /  AlkPhos  596<H>  03-19          RADIOLOGY & ADDITIONAL TESTS: Reviewed.

## 2020-03-20 LAB
% ALBUMIN: 40.9 % — SIGNIFICANT CHANGE UP
% ALPHA 1: 5.3 % — SIGNIFICANT CHANGE UP
% ALPHA 2: 15.4 % — SIGNIFICANT CHANGE UP
% BETA: 12.6 % — SIGNIFICANT CHANGE UP
% GAMMA: 25.8 % — SIGNIFICANT CHANGE UP
% M SPIKE: 4.2 % — SIGNIFICANT CHANGE UP
A1AT SERPL-MCNC: 187 MG/DL — SIGNIFICANT CHANGE UP (ref 90–200)
ALBUMIN SERPL ELPH-MCNC: 3.2 G/DL — LOW (ref 3.6–5.5)
ALBUMIN/GLOB SERPL ELPH: 0.7 RATIO — SIGNIFICANT CHANGE UP
ALPHA1 GLOB SERPL ELPH-MCNC: 0.4 G/DL — SIGNIFICANT CHANGE UP (ref 0.1–0.4)
ALPHA2 GLOB SERPL ELPH-MCNC: 1.2 G/DL — HIGH (ref 0.5–1)
B-GLOBULIN SERPL ELPH-MCNC: 1 G/DL — SIGNIFICANT CHANGE UP (ref 0.5–1)
B2 MICROGLOB SERPL-MCNC: 4.3 MG/L — HIGH (ref 0.8–2.2)
EBV DNA SERPL NAA+PROBE-ACNC: SIGNIFICANT CHANGE UP IU/ML
EBV DNA SERPL NAA+PROBE-ACNC: SIGNIFICANT CHANGE UP IU/ML
EBVPCR LOG: SIGNIFICANT CHANGE UP LOG10IU/ML
EBVPCR LOG: SIGNIFICANT CHANGE UP LOG10IU/ML
FERRITIN SERPL-MCNC: 45 NG/ML — SIGNIFICANT CHANGE UP (ref 15–150)
GAMMA GLOBULIN: 2 G/DL — HIGH (ref 0.6–1.6)
GLUCOSE BLDC GLUCOMTR-MCNC: 221 MG/DL — HIGH (ref 70–99)
GLUCOSE BLDC GLUCOMTR-MCNC: 285 MG/DL — HIGH (ref 70–99)
GLUCOSE BLDC GLUCOMTR-MCNC: 292 MG/DL — HIGH (ref 70–99)
HSV1 AB FLD QL: SIGNIFICANT CHANGE UP TITER
HSV1 IGG SER-ACNC: 29.4 INDEX — HIGH
HSV1 IGG SERPL QL IA: POSITIVE
HSV2 AB FLD-ACNC: SIGNIFICANT CHANGE UP TITER
IGA FLD-MCNC: 140 MG/DL — SIGNIFICANT CHANGE UP (ref 84–499)
IGG FLD-MCNC: 2173 MG/DL — HIGH (ref 610–1660)
IGM SERPL-MCNC: 340 MG/DL — HIGH (ref 35–242)
IRON SATN MFR SERPL: 31 UG/DL — SIGNIFICANT CHANGE UP (ref 30–160)
IRON SATN MFR SERPL: 9 % — LOW (ref 14–50)
KAPPA LC SER QL IFE: 5.28 MG/DL — HIGH (ref 0.33–1.94)
KAPPA LC SER QL IFE: 5.28 MG/DL — HIGH (ref 0.33–1.94)
KAPPA/LAMBDA FREE LIGHT CHAIN RATIO, SERUM: 0.72 RATIO — SIGNIFICANT CHANGE UP (ref 0.26–1.65)
KAPPA/LAMBDA FREE LIGHT CHAIN RATIO, SERUM: 0.72 RATIO — SIGNIFICANT CHANGE UP (ref 0.26–1.65)
LAMBDA LC SER QL IFE: 7.34 MG/DL — HIGH (ref 0.57–2.63)
LAMBDA LC SER QL IFE: 7.34 MG/DL — HIGH (ref 0.57–2.63)
LEPTOSPIRA AB TITR SER: NEGATIVE — SIGNIFICANT CHANGE UP
M-SPIKE: 0.3 G/DL — HIGH (ref 0–0)
PROT PATTERN SERPL ELPH-IMP: SIGNIFICANT CHANGE UP
PROT SERPL-MCNC: 7.8 G/DL — SIGNIFICANT CHANGE UP (ref 6–8.3)
PROT SERPL-MCNC: 7.8 G/DL — SIGNIFICANT CHANGE UP (ref 6–8.3)
TIBC SERPL-MCNC: 360 UG/DL — SIGNIFICANT CHANGE UP (ref 220–430)
UIBC SERPL-MCNC: 328 UG/DL — SIGNIFICANT CHANGE UP (ref 110–370)
WNV AB SPEC QL: SIGNIFICANT CHANGE UP
WNV IGG TITR FLD: NEGATIVE — SIGNIFICANT CHANGE UP
WNV IGM SPEC QL: NEGATIVE — SIGNIFICANT CHANGE UP

## 2020-03-20 PROCEDURE — 99233 SBSQ HOSP IP/OBS HIGH 50: CPT | Mod: GC

## 2020-03-20 PROCEDURE — 99232 SBSQ HOSP IP/OBS MODERATE 35: CPT | Mod: GC

## 2020-03-20 PROCEDURE — 76700 US EXAM ABDOM COMPLETE: CPT | Mod: 26

## 2020-03-20 PROCEDURE — 99232 SBSQ HOSP IP/OBS MODERATE 35: CPT

## 2020-03-20 PROCEDURE — 70552 MRI BRAIN STEM W/DYE: CPT | Mod: 26

## 2020-03-20 RX ADMIN — Medication 264 MILLIGRAM(S): at 21:49

## 2020-03-20 RX ADMIN — Medication 6 UNIT(S): at 17:43

## 2020-03-20 RX ADMIN — INSULIN GLARGINE 18 UNIT(S): 100 INJECTION, SOLUTION SUBCUTANEOUS at 21:50

## 2020-03-20 RX ADMIN — Medication 264 MILLIGRAM(S): at 05:16

## 2020-03-20 RX ADMIN — Medication 81 MILLIGRAM(S): at 17:26

## 2020-03-20 RX ADMIN — ATORVASTATIN CALCIUM 40 MILLIGRAM(S): 80 TABLET, FILM COATED ORAL at 21:49

## 2020-03-20 RX ADMIN — POLYETHYLENE GLYCOL 3350 17 GRAM(S): 17 POWDER, FOR SOLUTION ORAL at 17:26

## 2020-03-20 RX ADMIN — POLYETHYLENE GLYCOL 3350 17 GRAM(S): 17 POWDER, FOR SOLUTION ORAL at 05:17

## 2020-03-20 RX ADMIN — PANTOPRAZOLE SODIUM 40 MILLIGRAM(S): 20 TABLET, DELAYED RELEASE ORAL at 05:17

## 2020-03-20 RX ADMIN — Medication 3: at 17:43

## 2020-03-20 RX ADMIN — Medication 264 MILLIGRAM(S): at 16:41

## 2020-03-20 RX ADMIN — LEVETIRACETAM 400 MILLIGRAM(S): 250 TABLET, FILM COATED ORAL at 06:23

## 2020-03-20 RX ADMIN — Medication 3: at 13:12

## 2020-03-20 RX ADMIN — SODIUM CHLORIDE 100 MILLILITER(S): 9 INJECTION INTRAMUSCULAR; INTRAVENOUS; SUBCUTANEOUS at 21:50

## 2020-03-20 RX ADMIN — LEVETIRACETAM 400 MILLIGRAM(S): 250 TABLET, FILM COATED ORAL at 17:25

## 2020-03-20 RX ADMIN — Medication 25 MILLIGRAM(S): at 05:16

## 2020-03-20 NOTE — PROGRESS NOTE ADULT - SUBJECTIVE AND OBJECTIVE BOX
Chief Complaint:  Patient is a 75y old  Female who presents with a chief complaint of headache (19 Mar 2020 16:42)      Interval Events:   No acute events.    Allergies:  clams - itching (Other)  No Known Drug Allergies  Peroxide (Blisters)      Hospital Medications:  acyclovir IVPB      acyclovir IVPB 700 milliGRAM(s) IV Intermittent every 8 hours  aspirin  chewable 81 milliGRAM(s) Oral daily  atorvastatin 40 milliGRAM(s) Oral at bedtime  dextrose 40% Gel 15 Gram(s) Oral once PRN  dextrose 5%. 1000 milliLiter(s) IV Continuous <Continuous>  dextrose 50% Injectable 12.5 Gram(s) IV Push once  dextrose 50% Injectable 25 Gram(s) IV Push once  dextrose 50% Injectable 25 Gram(s) IV Push once  glucagon  Injectable 1 milliGRAM(s) IntraMuscular once PRN  ibuprofen  Tablet. 400 milliGRAM(s) Oral every 6 hours PRN  insulin glargine Injectable (LANTUS) 18 Unit(s) SubCutaneous at bedtime  insulin lispro (HumaLOG) corrective regimen sliding scale   SubCutaneous three times a day before meals  insulin lispro (HumaLOG) corrective regimen sliding scale   SubCutaneous at bedtime  insulin lispro Injectable (HumaLOG) 6 Unit(s) SubCutaneous three times a day before meals  levETIRAcetam  IVPB 750 milliGRAM(s) IV Intermittent every 12 hours  metoprolol tartrate 25 milliGRAM(s) Oral daily  pantoprazole    Tablet 40 milliGRAM(s) Oral before breakfast  polyethylene glycol 3350 17 Gram(s) Oral two times a day  sodium chloride 0.9%. 1000 milliLiter(s) IV Continuous <Continuous>      PMHX/PSHX:  Chronic systolic heart failure  Diabetes  S/P CABG x 1  S/P breast lumpectomy  History of tonsillectomy  H/O abdominal hysterectomy      Family history:  FH: type 2 diabetes      ROS:  General: no night sweats, wt loss  CV: no pain, palpitation  Resp: no cough wheezing  Muscles: no weakness or pain  Endocrine: no polyuria, polydipsia, cold/heat intolerance  Heme: No petechia, ecchymosis    PHYSICAL EXAM:   GENERAL:  NAD  HEENT: sclera anicteric  CHEST:  Full & symmetric excursion  HEART:  Regular rhythm, S1, S2  ABDOMEN:  Soft, non-tender, non-distended, normoactive bowel sounds,  no masses ,  EXTREMITIES:  no cyanosis,clubbing or edema  SKIN:  No rash/erythema/ecchymoses/petechiae/wounds/abscess/warm/dry  NEURO:  aaox3 (but difficulty finding and pronouncing words)    Vital Signs:  Vital Signs Last 24 Hrs  T(C): 36.9 (20 Mar 2020 07:20), Max: 37.2 (20 Mar 2020 05:07)  T(F): 98.4 (20 Mar 2020 07:20), Max: 99 (20 Mar 2020 05:07)  HR: 82 (20 Mar 2020 07:20) (59 - 89)  BP: 117/67 (20 Mar 2020 07:20) (117/67 - 161/72)  BP(mean): --  RR: 18 (20 Mar 2020 07:20) (18 - 19)  SpO2: 98% (20 Mar 2020 07:20) (94% - 99%)  Daily     Daily     LABS:                        9.6    6.15  )-----------( 397      ( 19 Mar 2020 09:34 )             31.5     03-19    136  |  100  |  17  ----------------------------<  253<H>  3.8   |  20<L>  |  0.68    Ca    9.6      19 Mar 2020 06:42  Phos  3.6     03-19  Mg     1.7     03-19    TPro  8.7<H>  /  Alb  3.5  /  TBili  0.3  /  DBili  x   /  AST  167<H>  /  ALT  136<H>  /  AlkPhos  596<H>  03-19    LIVER FUNCTIONS - ( 19 Mar 2020 06:42 )  Alb: 3.5 g/dL / Pro: 8.7 g/dL / ALK PHOS: 596 U/L / ALT: 136 U/L / AST: 167 U/L / GGT: x                   Imaging:

## 2020-03-20 NOTE — PROGRESS NOTE ADULT - PROBLEM SELECTOR PLAN 9
ISTOP: Reference #: [id number]; last alprazolam filled in 2014  DVT ppx: lovenox qdaily  Diet: soft diet  Dispo: pending PT and speech therapy consult    1.  Name of PCP: Felipe Manuel   2.  PCP Contacted on Admission: [ ] Y    [ ] N    3.  PCP contacted at Discharge: [ ] Y    [ ] N    [ ] N/A  4.  Post-Discharge Appointment Date and Location:  5.  Summary of Handoff given to PCP:

## 2020-03-20 NOTE — PROGRESS NOTE ADULT - ATTENDING COMMENTS
continued clinical improvement.  walked without assistance  appreciate ID/Hepatology input.  d/w daughter 3/19 plan of care

## 2020-03-20 NOTE — PROGRESS NOTE ADULT - PROBLEM SELECTOR PLAN 3
possibly ERAZO given hx of DM, CAD. Worsening today.  cont to monitor  GGT elevated  Ceruloplasmin 48  RUQ US demonstrates hepatomegaly  -FU hep studies and reccs

## 2020-03-20 NOTE — PROGRESS NOTE ADULT - ASSESSMENT
74F with T2DM, systolic HF, CABG p/w subacute/ acute? encephalopathiy and aphasia c/b seizure, currently undergoing work up. Hepatology consulted for evaluation of abnormal liver chemistries. She has a mild increase in ALT/AST with a rise in alkaline phosphatase with a normal bilirubin and INR. The R factor of 0.4 consistent with cholestatic liver injury. US abdomen showing hepatomegaly but no other abnormalities. Hep serologies negative to date. REECE 1:80. Ceruloplasmin of 49, EBV IgM + and HSV IgG +. The differential diagnosis includes DDx: Autoimmune (PBC or autoimmune hepatitis) vs Infectious hepatitis (EBV) vs. ERAZO vs drug induced liver injury (less likely) vs possible hypothyroidism. She does have mental changes c/w aphasia. this is unlikely secondary to liver disease    I recommend:    -	 Check AMA, SMA, serum IgG, IgM  -	MRI/MRCP of abdomen with contrast  -	Check HSV IgM and PCR and EBV PCR  -	Avoid hepatotoxic medications. If possible would consider switching keppra to alternative anti seizure mediation if liver enzymes do not improve  -	please consider discontinuing NSAIDs (ibuprofen)  ID consulted, f/u recommendations

## 2020-03-20 NOTE — PROGRESS NOTE ADULT - ASSESSMENT
74 year old female with a history of uncontrolled diabetes, CAD s/p stent and CABG presenting with subacute/ acute encephalopathy and severe headache, with hospital course complicated by new seizure.   Clinically improved on my exam, more interactive. Answering questions.     1. Encephalopathy, headache, seizure: ? encephalitis  MRI unrevealing  - Per Neuro, s/p repeat MRI  - LP completed: 1 WBC, inconsistent with infectious process. PCR panel is negative (which includes many viral etiologies). AI pending. Continue to empirically cover with acyclovir   AS patient has improved , will continue . Await EEG to see if has characteristic spikes  - Follow up serum/ CSF studies  - Neuro following: appreciate recs      2. Transaminitis: ? If related to systemic viral syndrome. [place] has been nondiagnostic so far  We will also send ceruloplasmin .     await NMDA  await multiple studies     await 14-3-3      Pt with a m spike- suggest Hematology/oncology workup    Unclear what caused acute altered mental status but patient seems to have improved and the only intervention was the acyclovir, so will complete a 14 day course  please hydrate  monitor the creatine  day # 6

## 2020-03-20 NOTE — PROGRESS NOTE ADULT - SUBJECTIVE AND OBJECTIVE BOX
INTERVAL HPI/OVERNIGHT EVENTS:    SUBJECTIVE: Patient seen and examined at bedside. No overnight events. Patient without complaints.  Word finding difficulty persists.     ROS neg except as above    OBJECTIVE:    VITAL SIGNS:  ICU Vital Signs Last 24 Hrs  T(C): 36.9 (20 Mar 2020 07:20), Max: 37.2 (20 Mar 2020 05:07)  T(F): 98.4 (20 Mar 2020 07:20), Max: 99 (20 Mar 2020 05:07)  HR: 82 (20 Mar 2020 07:20) (79 - 89)  BP: 117/67 (20 Mar 2020 07:20) (117/67 - 156/71)  BP(mean): --  ABP: --  ABP(mean): --  RR: 18 (20 Mar 2020 07:20) (18 - 19)  SpO2: 98% (20 Mar 2020 07:20) (94% - 99%)        03-19 @ 07:01  -  03-20 @ 07:00  --------------------------------------------------------  IN: 1570 mL / OUT: 0 mL / NET: 1570 mL    03-20 @ 07:01  -  03-20 @ 08:40  --------------------------------------------------------  IN: 0 mL / OUT: 50 mL / NET: -50 mL      CAPILLARY BLOOD GLUCOSE      POCT Blood Glucose.: 210 mg/dL (19 Mar 2020 21:08)      PHYSICAL EXAM:    CONSTITUTIONAL: NAD, well-developed, lying in bed  RESPIRATORY: Normal respiratory effort; lungs are clear to auscultation bilaterally  CARDIOVASCULAR: normal S1 and S2, rrr, no mrg; No lower extremity edema; Peripheral pulses are 2+ bilaterally  ABDOMEN: soft, NTND, BS+; No hepatosplenomegaly  MUSCULOSKELETAL: no clubbing or cyanosis of digits; no joint swelling or tenderness to palpation  NEURO: aphasic but responsive and conversive, cranial nerves grossly intact, strength 5/5 in UE b/L and 4/5 in LE b/L      MEDICATIONS:  MEDICATIONS  (STANDING):  acyclovir IVPB      acyclovir IVPB 700 milliGRAM(s) IV Intermittent every 8 hours  aspirin  chewable 81 milliGRAM(s) Oral daily  atorvastatin 40 milliGRAM(s) Oral at bedtime  dextrose 5%. 1000 milliLiter(s) (50 mL/Hr) IV Continuous <Continuous>  dextrose 50% Injectable 12.5 Gram(s) IV Push once  dextrose 50% Injectable 25 Gram(s) IV Push once  dextrose 50% Injectable 25 Gram(s) IV Push once  insulin glargine Injectable (LANTUS) 18 Unit(s) SubCutaneous at bedtime  insulin lispro (HumaLOG) corrective regimen sliding scale   SubCutaneous three times a day before meals  insulin lispro (HumaLOG) corrective regimen sliding scale   SubCutaneous at bedtime  insulin lispro Injectable (HumaLOG) 6 Unit(s) SubCutaneous three times a day before meals  levETIRAcetam  IVPB 750 milliGRAM(s) IV Intermittent every 12 hours  metoprolol tartrate 25 milliGRAM(s) Oral daily  pantoprazole    Tablet 40 milliGRAM(s) Oral before breakfast  polyethylene glycol 3350 17 Gram(s) Oral two times a day  sodium chloride 0.9%. 1000 milliLiter(s) (100 mL/Hr) IV Continuous <Continuous>    MEDICATIONS  (PRN):  dextrose 40% Gel 15 Gram(s) Oral once PRN Blood Glucose LESS THAN 70 milliGRAM(s)/deciliter  glucagon  Injectable 1 milliGRAM(s) IntraMuscular once PRN Glucose LESS THAN 70 milligrams/deciliter  ibuprofen  Tablet. 400 milliGRAM(s) Oral every 6 hours PRN Severe Pain (7 - 10)      ALLERGIES:  Allergies    clams - itching (Other)  No Known Drug Allergies  Peroxide (Blisters)    Intolerances        LABS:                        9.6    6.15  )-----------( 397      ( 19 Mar 2020 09:34 )             31.5     03-19    136  |  100  |  17  ----------------------------<  253<H>  3.8   |  20<L>  |  0.68    Ca    9.6      19 Mar 2020 06:42  Phos  3.6     03-19  Mg     1.7     03-19    TPro  8.7<H>  /  Alb  3.5  /  TBili  0.3  /  DBili  x   /  AST  167<H>  /  ALT  136<H>  /  AlkPhos  596<H>  03-19          RADIOLOGY & ADDITIONAL TESTS: Reviewed.

## 2020-03-20 NOTE — PROGRESS NOTE ADULT - SUBJECTIVE AND OBJECTIVE BOX
Patient is a 75y old  Female who presents with a chief complaint of headache (20 Mar 2020 08:36)    Being followed by ID for        Interval history:  pt continues to improve daily, although still with moderate aphasia but putting together a string of words  No other acute events        PAST MEDICAL & SURGICAL HISTORY:  Chronic systolic heart failure  Diabetes  S/P CABG x 1  S/P breast lumpectomy: left  History of tonsillectomy  H/O abdominal hysterectomy: 1991    Allergies    clams - itching (Other)  No Known Drug Allergies  Peroxide (Blisters)    Intolerances      Antimicrobials:    acyclovir IVPB      acyclovir IVPB 700 milliGRAM(s) IV Intermittent every 8 hours    MEDICATIONS  (STANDING):  acyclovir IVPB      acyclovir IVPB 700 milliGRAM(s) IV Intermittent every 8 hours  aspirin  chewable 81 milliGRAM(s) Oral daily  atorvastatin 40 milliGRAM(s) Oral at bedtime  dextrose 5%. 1000 milliLiter(s) (50 mL/Hr) IV Continuous <Continuous>  dextrose 50% Injectable 12.5 Gram(s) IV Push once  dextrose 50% Injectable 25 Gram(s) IV Push once  dextrose 50% Injectable 25 Gram(s) IV Push once  insulin glargine Injectable (LANTUS) 18 Unit(s) SubCutaneous at bedtime  insulin lispro (HumaLOG) corrective regimen sliding scale   SubCutaneous three times a day before meals  insulin lispro (HumaLOG) corrective regimen sliding scale   SubCutaneous at bedtime  insulin lispro Injectable (HumaLOG) 6 Unit(s) SubCutaneous three times a day before meals  levETIRAcetam  IVPB 750 milliGRAM(s) IV Intermittent every 12 hours  metoprolol tartrate 25 milliGRAM(s) Oral daily  pantoprazole    Tablet 40 milliGRAM(s) Oral before breakfast  polyethylene glycol 3350 17 Gram(s) Oral two times a day  sodium chloride 0.9%. 1000 milliLiter(s) (100 mL/Hr) IV Continuous <Continuous>      Vital Signs Last 24 Hrs  T(C): 36.9 (03-20-20 @ 07:20), Max: 37.2 (03-20-20 @ 05:07)  T(F): 98.4 (03-20-20 @ 07:20), Max: 99 (03-20-20 @ 05:07)  HR: 82 (03-20-20 @ 07:20) (80 - 89)  BP: 117/67 (03-20-20 @ 07:20) (117/67 - 156/71)  BP(mean): --  RR: 18 (03-20-20 @ 07:20) (18 - 19)  SpO2: 98% (03-20-20 @ 07:20) (97% - 99%)    Physical Exam:    Constitutional well preserved,comfortable,pleasant    HEENT PERRLA EOMI,No pallor or icterus    No oral exudate or erythema    Neck supple no JVD or LN    Chest Good AE,CTA    CVS  S1 S2     Abd soft BS normal No tenderness    Ext No cyanosis clubbing or edema    IV site no erythema tenderness or discharge    Joints no swelling or LOM    CNS AAO X 3 no focal    Lab Data:                          9.6    6.15  )-----------( 397      ( 19 Mar 2020 09:34 )             31.5       03-19    136  |  100  |  17  ----------------------------<  253<H>  3.8   |  20<L>  |  0.68    Ca    9.6      19 Mar 2020 06:42  Phos  3.6     03-19  Mg     1.7     03-19    TPro  7.8  /  Alb  x   /  TBili  x   /  DBili  x   /  AST  x   /  ALT  x   /  AlkPhos  x   03-20        .Blood Blood-Peripheral  03-17-20   No growth to date.  --  --      .Blood Blood-Venous  03-17-20   No growth to date.  --  --      .CSF CSF  03-17-20   No growth  --    polymorphonuclear leukocytes seen  No organisms seen  by cytocentrifuge      .Urine Clean Catch (Midstream)  03-14-20   >=3 organisms. Probable collection contamination.  --  --      Cathy Barr Virus DNA by PCR - Blood (03.20.20 @ 08:16)    EBVPCR Log: NotDetec: Plasma EBV DNA Quantification by PCR using [employer] m2000:  Assay lower limit of detection: 25 IU/mL (1.40 log10 IU/mL)  Assay dynamic range:    50 to 20M (20,000,000) EBV DNA IU/mL (1.70-7.30  log10 IU/mL)  The results of this test should be interpreted with consideration of all  clinical and laboratory findings. In particular, caution should be used  when interpreting low level positive results when the test is used for  diagnostic purposes. Repeat testing on an additional sample is  recommended to confirm low level positive results. A result of  Not  Detected does not preclude the possibility of infection with EBV.  EBV  DNA may be present below the detection limit of assay.  This assay is performed using RealTime polymerase chain reaction (PCR)  and molecular probe detection with EBV analyte specific reagents ([facility], [place]). This test was validated and the performance  characteristics determined by [employer]. This test is  not cleared or approved by the USFood and Drug Administration (FDA). Pks62ML/mL      Human Herpes Virus 6 Detection by PCR (03.17.20 @ 14:06)    H-Herpes Virus-6 Result: NotDetec: TESTING PERFORMED AT LOW VOLUME ON CSF SPECIMEN FOR HHV6, MAY AFFECT  RESULTS.  Assay Range: 81 copies/mL to 1.00E+08 copies/mL  The limit of quantitation (LOQ) is 81 copies/mL. HHV-6 DNA detected  below the LOQ will be reported as Detected:<81 copies/mL.  This test was developed and its performance characteristics  determined by [facility]. It has not been cleared or approved  by the U.S. Food and Drug Administration. Results should be used in  conjunction with clinical findings, and should not form the sole  basis for a diagnosis or treatment decision.  ____________________________________________________________  Performed at:  [facility]  [address] [place] [address] [phone]  : Sirisha Ren PhD HCLD([facility])  CLIA# [id number] copies/mL      Herpes Simplex Virus 1/2 PCR, CSF (03.17.20 @ 02:56)    Source HSV 1/2: CSF    HSV 1/2 PCR: NotDetec: HSV1/2 PCR assay is a real-time PCR test that detects and differentiates  HSV-1 and/or HSV-2 DNA in CSF (Vitreous Fluid). The results of this test  should be interpreted with consideration of all clinical and laboratory  findings.              WBC Count: 6.15 (03-19-20 @ 09:34)  WBC Count: 5.41 (03-18-20 @ 07:11)  WBC Count: 6.08 (03-17-20 @ 06:23)  WBC Count: 9.38 (03-16-20 @ 07:28)  WBC Count: 7.37 (03-15-20 @ 22:21)  WBC Count: 7.72 (03-15-20 @ 06:47)  WBC Count: 7.54 (03-14-20 @ 14:48)

## 2020-03-20 NOTE — PROGRESS NOTE ADULT - ATTENDING COMMENTS
Yen Morgan M.D. ,   Pager [phone]     after 5PM/ weekends [phone]      ID service will be covering over the weekend. Please call for acute issues or questions. [phone]

## 2020-03-21 LAB
ALBUMIN SERPL ELPH-MCNC: 3.4 G/DL — SIGNIFICANT CHANGE UP (ref 3.3–5)
ALP SERPL-CCNC: 612 U/L — HIGH (ref 40–120)
ALT FLD-CCNC: 139 U/L — HIGH (ref 10–45)
ANION GAP SERPL CALC-SCNC: 13 MMOL/L — SIGNIFICANT CHANGE UP (ref 5–17)
AST SERPL-CCNC: 179 U/L — HIGH (ref 10–40)
BILIRUB SERPL-MCNC: 0.5 MG/DL — SIGNIFICANT CHANGE UP (ref 0.2–1.2)
BUN SERPL-MCNC: 12 MG/DL — SIGNIFICANT CHANGE UP (ref 7–23)
CALCIUM SERPL-MCNC: 9.1 MG/DL — SIGNIFICANT CHANGE UP (ref 8.4–10.5)
CHLORIDE SERPL-SCNC: 104 MMOL/L — SIGNIFICANT CHANGE UP (ref 96–108)
CO2 SERPL-SCNC: 20 MMOL/L — LOW (ref 22–31)
CREAT SERPL-MCNC: 0.65 MG/DL — SIGNIFICANT CHANGE UP (ref 0.5–1.3)
GLUCOSE BLDC GLUCOMTR-MCNC: 186 MG/DL — HIGH (ref 70–99)
GLUCOSE BLDC GLUCOMTR-MCNC: 221 MG/DL — HIGH (ref 70–99)
GLUCOSE SERPL-MCNC: 263 MG/DL — HIGH (ref 70–99)
HCT VFR BLD CALC: 30.5 % — LOW (ref 34.5–45)
HGB BLD-MCNC: 9.5 G/DL — LOW (ref 11.5–15.5)
MBP CSF-MCNC: <2 MCG/L — SIGNIFICANT CHANGE UP (ref 2–4)
MCHC RBC-ENTMCNC: 25.4 PG — LOW (ref 27–34)
MCHC RBC-ENTMCNC: 31.1 GM/DL — LOW (ref 32–36)
MCV RBC AUTO: 81.6 FL — SIGNIFICANT CHANGE UP (ref 80–100)
NRBC # BLD: 0 /100 WBCS — SIGNIFICANT CHANGE UP (ref 0–0)
PLATELET # BLD AUTO: 339 K/UL — SIGNIFICANT CHANGE UP (ref 150–400)
POTASSIUM SERPL-MCNC: 4.1 MMOL/L — SIGNIFICANT CHANGE UP (ref 3.5–5.3)
POTASSIUM SERPL-SCNC: 4.1 MMOL/L — SIGNIFICANT CHANGE UP (ref 3.5–5.3)
PROT SERPL-MCNC: 7.9 G/DL — SIGNIFICANT CHANGE UP (ref 6–8.3)
RBC # BLD: 3.74 M/UL — LOW (ref 3.8–5.2)
RBC # FLD: 15.2 % — HIGH (ref 10.3–14.5)
SODIUM SERPL-SCNC: 137 MMOL/L — SIGNIFICANT CHANGE UP (ref 135–145)
WBC # BLD: 6.67 K/UL — SIGNIFICANT CHANGE UP (ref 3.8–10.5)
WBC # FLD AUTO: 6.67 K/UL — SIGNIFICANT CHANGE UP (ref 3.8–10.5)

## 2020-03-21 PROCEDURE — 99232 SBSQ HOSP IP/OBS MODERATE 35: CPT | Mod: GC

## 2020-03-21 PROCEDURE — 99233 SBSQ HOSP IP/OBS HIGH 50: CPT

## 2020-03-21 RX ORDER — INSULIN GLARGINE 100 [IU]/ML
24 INJECTION, SOLUTION SUBCUTANEOUS AT BEDTIME
Refills: 0 | Status: DISCONTINUED | OUTPATIENT
Start: 2020-03-22 | End: 2020-03-31

## 2020-03-21 RX ORDER — PETROLATUM,WHITE
1 JELLY (GRAM) TOPICAL
Refills: 0 | Status: DISCONTINUED | OUTPATIENT
Start: 2020-03-21 | End: 2020-03-31

## 2020-03-21 RX ORDER — INSULIN LISPRO 100/ML
8 VIAL (ML) SUBCUTANEOUS
Refills: 0 | Status: DISCONTINUED | OUTPATIENT
Start: 2020-03-22 | End: 2020-03-31

## 2020-03-21 RX ADMIN — ATORVASTATIN CALCIUM 40 MILLIGRAM(S): 80 TABLET, FILM COATED ORAL at 21:32

## 2020-03-21 RX ADMIN — SODIUM CHLORIDE 100 MILLILITER(S): 9 INJECTION INTRAMUSCULAR; INTRAVENOUS; SUBCUTANEOUS at 21:32

## 2020-03-21 RX ADMIN — POLYETHYLENE GLYCOL 3350 17 GRAM(S): 17 POWDER, FOR SOLUTION ORAL at 06:36

## 2020-03-21 RX ADMIN — Medication 264 MILLIGRAM(S): at 05:01

## 2020-03-21 RX ADMIN — Medication 264 MILLIGRAM(S): at 17:13

## 2020-03-21 RX ADMIN — Medication 1: at 12:46

## 2020-03-21 RX ADMIN — Medication 25 MILLIGRAM(S): at 06:36

## 2020-03-21 RX ADMIN — PANTOPRAZOLE SODIUM 40 MILLIGRAM(S): 20 TABLET, DELAYED RELEASE ORAL at 06:36

## 2020-03-21 RX ADMIN — Medication 264 MILLIGRAM(S): at 21:33

## 2020-03-21 RX ADMIN — Medication 1 APPLICATION(S): at 21:33

## 2020-03-21 RX ADMIN — LEVETIRACETAM 400 MILLIGRAM(S): 250 TABLET, FILM COATED ORAL at 18:31

## 2020-03-21 RX ADMIN — Medication 81 MILLIGRAM(S): at 12:47

## 2020-03-21 RX ADMIN — SODIUM CHLORIDE 100 MILLILITER(S): 9 INJECTION INTRAMUSCULAR; INTRAVENOUS; SUBCUTANEOUS at 08:16

## 2020-03-21 RX ADMIN — LEVETIRACETAM 400 MILLIGRAM(S): 250 TABLET, FILM COATED ORAL at 06:36

## 2020-03-21 RX ADMIN — Medication 2: at 08:16

## 2020-03-21 NOTE — PROGRESS NOTE ADULT - SUBJECTIVE AND OBJECTIVE BOX
SUBJECTIVE:    Patient is a 62y old Male who presents with a chief complaint of intertrochanteric fracture of right hip (03 Feb 2020 06:03)    Currently admitted to medicine with the primary diagnosis of Intertrochanteric fracture of right hip     Today is hospital day 3d. This morning he is resting comfortably in bed and reports no new issues or overnight events.     ROS:   CONSTITUTIONAL: No weakness, fevers or chills   EYES/ENT: No visual changes; No vertigo or throat pain   NECK: No pain or stiffness   RESPIRATORY: No cough, wheezing, hemoptysis; No shortness of breath   CARDIOVASCULAR: No chest pain or palpitations   GASTROINTESTINAL: No abdominal or epigastric pain. No nausea, vomiting, or hematemesis; No diarrhea or constipation. No melena or hematochezia.  GENITOURINARY: No dysuria, frequency or hematuria  NEUROLOGICAL: No numbness or weakness  SKIN: No itching, rashes      PAST MEDICAL & SURGICAL HISTORY  Diabetic neuropathy  STEMI (ST elevation myocardial infarction)  Diverticulitis  MRSA bacteremia  History of celiac disease  CHF (congestive heart failure): EF ~ 25%  HTN (hypertension)  Diabetes: on insulin pump  Blood clot due to device, implant, or graft: was on blood thinners  HLD (hyperlipidemia)  Osteoarthritis  Atherosclerosis of coronary artery: CAD (coronary artery disease)  Status post percutaneous transluminal coronary angioplasty: in 2012  Surgery, elective: Right shoulder  Surgery, elective: right knee wound debridement  S/P TKR (total knee replacement), right: with infection Mrsa   per pt he was cleared from MRSA infection  S/P CABG x 1: 2018  Stented coronary artery: 10/18 heart attack  INFERIOR WALL MI  Other postprocedural status: Fixation hardware in foot LEFT    SOCIAL HISTORY:    ALLERGIES:  ACE inhibitors (Hives)  enalapril (Hives)  rifampin (Angioedema)  vancomycin (Angioedema)    MEDICATIONS:  STANDING MEDICATIONS  acetaminophen   Tablet .. 650 milliGRAM(s) Oral every 6 hours  amoxicillin  875 milliGRAM(s)/clavulanate 1 Tablet(s) Oral two times a day  aspirin enteric coated 81 milliGRAM(s) Oral daily  atorvastatin Oral Tab/Cap - Peds 40 milliGRAM(s) Oral daily  collagenase Ointment 1 Application(s) Topical two times a day  dextrose 5%. 1000 milliLiter(s) IV Continuous <Continuous>  dextrose 50% Injectable 12.5 Gram(s) IV Push once  dextrose 50% Injectable 25 Gram(s) IV Push once  dextrose 50% Injectable 25 Gram(s) IV Push once  digoxin     Tablet 0.125 milliGRAM(s) Oral daily  DULoxetine 90 milliGRAM(s) Oral daily  enoxaparin Injectable 40 milliGRAM(s) SubCutaneous daily  metolazone 2.5 milliGRAM(s) Oral daily  metoprolol succinate  milliGRAM(s) Oral daily  pantoprazole    Tablet 40 milliGRAM(s) Oral before breakfast  sodium chloride 0.9%. 1000 milliLiter(s) IV Continuous <Continuous>  sodium chloride 0.9%. 500 milliLiter(s) IV Continuous <Continuous>    PRN MEDICATIONS  dextrose 40% Gel 15 Gram(s) Oral once PRN  glucagon  Injectable 1 milliGRAM(s) IntraMuscular once PRN  morphine  - Injectable 2 milliGRAM(s) IV Push every 4 hours PRN  oxyCODONE    IR 5 milliGRAM(s) Oral every 4 hours PRN    VITALS:   T(F): 97  HR: 73  BP: 101/59  RR: 18  SpO2: --    LABS:  Negative for smoking/alcohol/drug use.                         9.4    8.58  )-----------( 233      ( 03 Feb 2020 04:47 )             30.8     02-03    135  |  99  |  42<H>  ----------------------------<  121<H>  4.4   |  22  |  1.3    Ca    8.5      03 Feb 2020 04:47  Phos  3.4     02-03  Mg     2.0     02-03      RADIOLOGY:  no new today  PHYSICAL EXAM:  GEN: No acute distress  HEENT: normocephalic, atraumatic, aniceteric  LUNGS: Clear to auscultation bilaterally, no rales/wheezing/ rhonchi  HEART: S1/S2 present. RRR, no murmurs  ABD: Soft, non-tender, non-distended. Bowel sounds present  EXT: no edema  NEURO: AAOX3, normal affect      ASSESSMENT AND PLAN:  The patient is a 62 year-old male with a PMH of CAD s/p CABG, HFrEF (25%), DM (on insulin pump), pulmonary hypertension, Celiac disease, diverticulitis, s/p right total knee replacement October 2019 complicated by infection s/p abx course w/ PICC, presenting following a mechanical fall, admitted for right hip intertrochanteric fracture.    # Mechanical fall s/p right hip intertrochanteric fracture  -Xray hip showed an intertrochanteric fracture of right hip w/ varus angulation of distal fragment; subtrochanteric extension w/ reverse obliquity and avulsion of lesser trochanter  -POD 3 s/p IMN fixation   -morphine, oxycodone PRN   -PT/rehab: possible 4a candidate     # S/p right knee total replacement complicated by infection/chronic wound s/p abx w/ PICC and NPWt  -Patient reports he had been on PO doxycycline to which he developed a reaction (edema in hands)  -Switched to Augmentin 875/125 PO twice daily but he does not know the recommended abx course  -s/p IV cefazolin   -c/w Augmentin 875/125 mg PO twice daily for now  - Burn following: possible STSG this week    # CAD s/p CABG  -C/w aspirin 81 mg PO qD,  metoprolol succinate  mg PO qD  - prasugrel 10 mg PO qD was held for right hip fracture repair (patient reports he last took the prasugrel 4 days ago as he did not feel he required it)  - atorvastatin 40 mg qhs (on zetia at home)    # HFrEF (EF 11% in December 2019)  -Patient reports he was seen by Dr. Garcia who recommended a biventricular pacemaker; no intervention done as of yet  -C/w digoxin 125 mcg PO qD,  metolazone 2.5 mg PO, metoprolol succinate  mg PO qD  -Vitals per routine    # DM w/ peripheral neuropathy  -On insulin pump; on Trulicity/Jardiance at home  -FS qAC, qHS; if > 180, adjust insulin pump as needed  -C/w duloxetine 90 mg PO qD    # HLD  -On Zetia at home  -c/w atorvastatin 40 mg PO qHS for now      # DVT ppx- Lovenox 40 mg SQ qHS  # GI ppx- Protonix 40 mg PO qAM  # Diet- carbohydrate consistent   # Activity- ambulate as tolerated   # Dispo- possible 4a  # Full code    Handoff: possible STSG with burn this week; dispo plan to rehab SUBJECTIVE: Patient seen and examined at the bedside by the neurology resident and attending. Patient stated that she would like to go home and initially stated that there is nothing wrong with her. She did later acknowledge that her speech is abnormal.     PAST MEDICAL & SURGICAL HISTORY:  Chronic systolic heart failure  Diabetes  S/P CABG x 1  S/P breast lumpectomy: left  History of tonsillectomy  H/O abdominal hysterectomy: 1991    FAMILY HISTORY:  FH: type 2 diabetes    SOCIAL HISTORY:   T/E/D:   Occupation:   Lives with:     MEDICATIONS (HOME):  Home Medications:  ALPRAZolam 0.25 mg oral tablet: 1 tab(s) orally prior to blood draws (14 Mar 2020 20:39)  aspirin 325 mg oral delayed release tablet: 1 tab(s) orally once a day (14 Mar 2020 20:39)  atorvastatin 40 mg oral tablet: 1 tab(s) orally once a day (at bedtime) (14 Mar 2020 20:39)  Farxiga 10 mg oral tablet: 1 tab(s) orally once a day (14 Mar 2020 20:39)  furosemide 40 mg oral tablet: 1 tab(s) orally once a day (14 Mar 2020 20:39)  metFORMIN 500 mg oral tablet: 1 tab(s) orally 2 times a day (14 Mar 2020 20:39)  metoprolol tartrate 25 mg oral tablet: 1 tab(s) orally once a day (14 Mar 2020 20:39)  pantoprazole 40 mg oral delayed release tablet: 1 tab(s) orally once a day (before a meal) (14 Mar 2020 20:39)    MEDICATIONS  (STANDING):  acyclovir IVPB      acyclovir IVPB 700 milliGRAM(s) IV Intermittent every 8 hours  aspirin  chewable 81 milliGRAM(s) Oral daily  atorvastatin 40 milliGRAM(s) Oral at bedtime  dextrose 5%. 1000 milliLiter(s) (50 mL/Hr) IV Continuous <Continuous>  dextrose 50% Injectable 12.5 Gram(s) IV Push once  dextrose 50% Injectable 25 Gram(s) IV Push once  dextrose 50% Injectable 25 Gram(s) IV Push once  insulin lispro (HumaLOG) corrective regimen sliding scale   SubCutaneous three times a day before meals  insulin lispro (HumaLOG) corrective regimen sliding scale   SubCutaneous at bedtime  levETIRAcetam  IVPB 750 milliGRAM(s) IV Intermittent every 12 hours  metoprolol tartrate 25 milliGRAM(s) Oral daily  pantoprazole    Tablet 40 milliGRAM(s) Oral before breakfast  polyethylene glycol 3350 17 Gram(s) Oral two times a day  sodium chloride 0.9%. 1000 milliLiter(s) (100 mL/Hr) IV Continuous <Continuous>    MEDICATIONS  (PRN):  dextrose 40% Gel 15 Gram(s) Oral once PRN Blood Glucose LESS THAN 70 milliGRAM(s)/deciliter  glucagon  Injectable 1 milliGRAM(s) IntraMuscular once PRN Glucose LESS THAN 70 milligrams/deciliter    ALLERGIES/INTOLERANCES:  Allergies  clams - itching (Other)  No Known Drug Allergies  Peroxide (Blisters)    Intolerances    VITALS & EXAMINATION:  Vital Signs Last 24 Hrs  T(C): 36.4 (21 Mar 2020 08:20), Max: 37.1 (21 Mar 2020 00:14)  T(F): 97.5 (21 Mar 2020 08:20), Max: 98.8 (21 Mar 2020 00:14)  HR: 63 (21 Mar 2020 08:20) (63 - 84)  BP: 152/72 (21 Mar 2020 08:20) (139/67 - 160/73)  BP(mean): --  RR: 18 (21 Mar 2020 08:20) (18 - 18)  SpO2: 93% (21 Mar 2020 08:20) (93% - 97%)    Exam limited as patient declined physical exam    General: Female, appears stated age, in no apparent distress including pain    Neurological (>12):  MS: Eyes closed initially, arousable with verbal stimuli, maintains alertness.    Language: Speech is mildly dysarthric, dysfluent, with preserved comprehension    CNs: EOMI. No gross facial asymmetry b/l. Hearing grossly normal b/l. Head turning intact b/l.     Fundoscopic: deferred    Motor: Normal muscle bulk & tone. No noticeable tremor or seizure. Moves all extremities spontaneously.	     Sensation: deferred    Cortical: Extinction on DSS (neglect): deferred    Reflexes: deferred            Coordination: deferred    Gait: deferred.     LABORATORY:  CBC                       9.5    6.67  )-----------( 339      ( 21 Mar 2020 09:21 )             30.5     Chem 03-21    137  |  104  |  12  ----------------------------<  263<H>  4.1   |  20<L>  |  0.65    Ca    9.1      21 Mar 2020 09:21    TPro  7.9  /  Alb  3.4  /  TBili  0.5  /  DBili  x   /  AST  179<H>  /  ALT  139<H>  /  AlkPhos  612<H>  03-21    LFTs LIVER FUNCTIONS - ( 21 Mar 2020 09:21 )  Alb: 3.4 g/dL / Pro: 7.9 g/dL / ALK PHOS: 612 U/L / ALT: 139 U/L / AST: 179 U/L / GGT: x           Coagulopathy   Lipid Panel 03-16 Chol 150 LDL 58 HDL 61 Trig 157<H>  A1c 03-15 [id number] >15.5    Cardiac enzymes     U/A   CSF  Immunological  Other    STUDIES & IMAGING:  Studies (EKG, EEG, EMG, etc):     Radiology (XR, CT, MR, U/S, TTE/LUC):  < from: MR Head w/ IV Cont (03.20.20 @ 16:22) >  IMPRESSION:    Leptomeningeal enhancement most prominent within the left temporo-occipital region. Infectious/inflammatory or neoplastic etiologies should be considered such as meningitis or leptomeningeal carcinomatosis.    < end of copied text > SUBJECTIVE:    Patient is a 62y old Male who presents with a chief complaint of intertrochanteric fracture of right hip (03 Feb 2020 06:03)    Currently admitted to medicine with the primary diagnosis of Intertrochanteric fracture of right hip     Today is hospital day 3d. This morning he is resting comfortably in bed and reports no new issues or overnight events.     ROS:   CONSTITUTIONAL: No weakness, fevers or chills   EYES/ENT: No visual changes; No vertigo or throat pain   NECK: No pain or stiffness   RESPIRATORY: No cough, wheezing, hemoptysis; No shortness of breath   CARDIOVASCULAR: No chest pain or palpitations   GASTROINTESTINAL: No abdominal or epigastric pain. No nausea, vomiting, or hematemesis; No diarrhea or constipation. No melena or hematochezia.  GENITOURINARY: No dysuria, frequency or hematuria  NEUROLOGICAL: No numbness or weakness  SKIN: No itching, rashes      PAST MEDICAL & SURGICAL HISTORY  Diabetic neuropathy  STEMI (ST elevation myocardial infarction)  Diverticulitis  MRSA bacteremia  History of celiac disease  CHF (congestive heart failure): EF ~ 25%  HTN (hypertension)  Diabetes: on insulin pump  Blood clot due to device, implant, or graft: was on blood thinners  HLD (hyperlipidemia)  Osteoarthritis  Atherosclerosis of coronary artery: CAD (coronary artery disease)  Status post percutaneous transluminal coronary angioplasty: in 2012  Surgery, elective: Right shoulder  Surgery, elective: right knee wound debridement  S/P TKR (total knee replacement), right: with infection Mrsa   per pt he was cleared from MRSA infection  S/P CABG x 1: 2018  Stented coronary artery: 10/18 heart attack  INFERIOR WALL MI  Other postprocedural status: Fixation hardware in foot LEFT    SOCIAL HISTORY:    ALLERGIES:  ACE inhibitors (Hives)  enalapril (Hives)  rifampin (Angioedema)  vancomycin (Angioedema)    MEDICATIONS:  STANDING MEDICATIONS  acetaminophen   Tablet .. 650 milliGRAM(s) Oral every 6 hours  amoxicillin  875 milliGRAM(s)/clavulanate 1 Tablet(s) Oral two times a day  aspirin enteric coated 81 milliGRAM(s) Oral daily  atorvastatin Oral Tab/Cap - Peds 40 milliGRAM(s) Oral daily  collagenase Ointment 1 Application(s) Topical two times a day  dextrose 5%. 1000 milliLiter(s) IV Continuous <Continuous>  dextrose 50% Injectable 12.5 Gram(s) IV Push once  dextrose 50% Injectable 25 Gram(s) IV Push once  dextrose 50% Injectable 25 Gram(s) IV Push once  digoxin     Tablet 0.125 milliGRAM(s) Oral daily  DULoxetine 90 milliGRAM(s) Oral daily  enoxaparin Injectable 40 milliGRAM(s) SubCutaneous daily  metolazone 2.5 milliGRAM(s) Oral daily  metoprolol succinate  milliGRAM(s) Oral daily  pantoprazole    Tablet 40 milliGRAM(s) Oral before breakfast  sodium chloride 0.9%. 1000 milliLiter(s) IV Continuous <Continuous>  sodium chloride 0.9%. 500 milliLiter(s) IV Continuous <Continuous>    PRN MEDICATIONS  dextrose 40% Gel 15 Gram(s) Oral once PRN  glucagon  Injectable 1 milliGRAM(s) IntraMuscular once PRN  morphine  - Injectable 2 milliGRAM(s) IV Push every 4 hours PRN  oxyCODONE    IR 5 milliGRAM(s) Oral every 4 hours PRN    VITALS:   T(F): 97  HR: 73  BP: 101/59  RR: 18  SpO2: --    LABS:  Negative for smoking/alcohol/drug use.                         9.4    8.58  )-----------( 233      ( 03 Feb 2020 04:47 )             30.8     02-03    135  |  99  |  42<H>  ----------------------------<  121<H>  4.4   |  22  |  1.3    Ca    8.5      03 Feb 2020 04:47  Phos  3.4     02-03  Mg     2.0     02-03      RADIOLOGY:  no new today  PHYSICAL EXAM:  GEN: No acute distress  HEENT: normocephalic, atraumatic, aniceteric  LUNGS: Clear to auscultation bilaterally, no rales/wheezing/ rhonchi  HEART: S1/S2 present. RRR, no murmurs  ABD: Soft, non-tender, non-distended. Bowel sounds present  EXT: no edema  NEURO: AAOX3, normal affect      ASSESSMENT AND PLAN:  The patient is a 62 year-old male with a PMH of CAD s/p CABG, HFrEF (25%), DM (on insulin pump), pulmonary hypertension, Celiac disease, diverticulitis, s/p right total knee replacement October 2019 complicated by infection s/p abx course w/ PICC, presenting following a mechanical fall, admitted for right hip intertrochanteric fracture.    # Mechanical fall s/p right hip intertrochanteric fracture  -Xray hip showed an intertrochanteric fracture of right hip w/ varus angulation of distal fragment; subtrochanteric extension w/ reverse obliquity and avulsion of lesser trochanter  -POD 3 s/p IMN fixation   -morphine, oxycodone PRN   -PT/rehab: possible 4a candidate     # S/p right knee total replacement complicated by infection/chronic wound s/p abx w/ PICC and NPWt  -Patient reports he had been on PO doxycycline to which he developed a reaction (edema in hands)  -Switched to Augmentin 875/125 PO twice daily but he does not know the recommended abx course  -s/p IV cefazolin   -c/w Augmentin 875/125 mg PO twice daily for now  - Burn following: possible STSG this week     # HFrEF   -EF 11% in December 2019  -Patient reports he was seen by Dr. Garcia who recommended a biventricular pacemaker; no intervention done as of yet  -C/w digoxin 125 mcg PO qD,  metolazone 2.5 mg PO, metoprolol succinate  mg PO qD  -Vitals per routine  - f/u cardiology Dr. Lopez     # CAD s/p CABG  -C/w aspirin 81 mg PO qD,  metoprolol succinate  mg PO qD,  prasugrel 10 mg PO qD   - atorvastatin 40 mg qhs (on zetia at home)    # DM w/ peripheral neuropathy  -On insulin pump; on Trulicity/Jardiance at home  -FS qAC, qHS; if > 180, adjust insulin pump as needed  -C/w duloxetine 90 mg PO qD    # HLD  -On Zetia at home  -c/w atorvastatin 40 mg PO qHS for now      # DVT ppx- Lovenox 40 mg SQ qHS  # GI ppx- Protonix 40 mg PO qAM  # Diet- carbohydrate consistent   # Activity- ambulate as tolerated   # Dispo- possible 4a  # Full code    Handoff: possible STSG with burn this week; Dr. Lopez to follow up on plan for CHF since patient with chronic knee wound SUBJECTIVE: Patient seen and examined at the bedside by the neurology resident and attending. Patient stated that she would like to go home and initially stated that there is nothing wrong with her. She did later acknowledge that her speech is abnormal.     PAST MEDICAL & SURGICAL HISTORY:  Chronic systolic heart failure  Diabetes  S/P CABG x 1  S/P breast lumpectomy: left  History of tonsillectomy  H/O abdominal hysterectomy: 1991    FAMILY HISTORY:  FH: type 2 diabetes    SOCIAL HISTORY:   T/E/D:   Occupation:   Lives with:     MEDICATIONS (HOME):  Home Medications:  ALPRAZolam 0.25 mg oral tablet: 1 tab(s) orally prior to blood draws (14 Mar 2020 20:39)  aspirin 325 mg oral delayed release tablet: 1 tab(s) orally once a day (14 Mar 2020 20:39)  atorvastatin 40 mg oral tablet: 1 tab(s) orally once a day (at bedtime) (14 Mar 2020 20:39)  Farxiga 10 mg oral tablet: 1 tab(s) orally once a day (14 Mar 2020 20:39)  furosemide 40 mg oral tablet: 1 tab(s) orally once a day (14 Mar 2020 20:39)  metFORMIN 500 mg oral tablet: 1 tab(s) orally 2 times a day (14 Mar 2020 20:39)  metoprolol tartrate 25 mg oral tablet: 1 tab(s) orally once a day (14 Mar 2020 20:39)  pantoprazole 40 mg oral delayed release tablet: 1 tab(s) orally once a day (before a meal) (14 Mar 2020 20:39)    MEDICATIONS  (STANDING):  acyclovir IVPB      acyclovir IVPB 700 milliGRAM(s) IV Intermittent every 8 hours  aspirin  chewable 81 milliGRAM(s) Oral daily  atorvastatin 40 milliGRAM(s) Oral at bedtime  dextrose 5%. 1000 milliLiter(s) (50 mL/Hr) IV Continuous <Continuous>  dextrose 50% Injectable 12.5 Gram(s) IV Push once  dextrose 50% Injectable 25 Gram(s) IV Push once  dextrose 50% Injectable 25 Gram(s) IV Push once  insulin lispro (HumaLOG) corrective regimen sliding scale   SubCutaneous three times a day before meals  insulin lispro (HumaLOG) corrective regimen sliding scale   SubCutaneous at bedtime  levETIRAcetam  IVPB 750 milliGRAM(s) IV Intermittent every 12 hours  metoprolol tartrate 25 milliGRAM(s) Oral daily  pantoprazole    Tablet 40 milliGRAM(s) Oral before breakfast  polyethylene glycol 3350 17 Gram(s) Oral two times a day  sodium chloride 0.9%. 1000 milliLiter(s) (100 mL/Hr) IV Continuous <Continuous>    MEDICATIONS  (PRN):  dextrose 40% Gel 15 Gram(s) Oral once PRN Blood Glucose LESS THAN 70 milliGRAM(s)/deciliter  glucagon  Injectable 1 milliGRAM(s) IntraMuscular once PRN Glucose LESS THAN 70 milligrams/deciliter    ALLERGIES/INTOLERANCES:  Allergies  clams - itching (Other)  No Known Drug Allergies  Peroxide (Blisters)    Intolerances    VITALS & EXAMINATION:  Vital Signs Last 24 Hrs  T(C): 36.4 (21 Mar 2020 08:20), Max: 37.1 (21 Mar 2020 00:14)  T(F): 97.5 (21 Mar 2020 08:20), Max: 98.8 (21 Mar 2020 00:14)  HR: 63 (21 Mar 2020 08:20) (63 - 84)  BP: 152/72 (21 Mar 2020 08:20) (139/67 - 160/73)  BP(mean): --  RR: 18 (21 Mar 2020 08:20) (18 - 18)  SpO2: 93% (21 Mar 2020 08:20) (93% - 97%)    Exam limited as patient declined physical exam  General: Female, appears stated age, in no apparent distress including pain  Neurological (>12):  MS: Eyes closed initially, arousable with verbal stimuli, maintains alertness.  Language: Speech is mildly dysarthric, dysfluent, with preserved comprehension, difficulty repeating, word finding, sematic errors  CNs: EOMI. No gross facial asymmetry b/l. Hearing grossly normal b/l. Head turning intact b/l.   Fundoscopic: deferred  Motor: Normal muscle bulk & tone. No noticeable tremor or seizure. Moves all extremities spontaneously.	   Sensation: deferred    Cortical: Extinction on DSS (neglect): deferred    Reflexes: deferred            Coordination: deferred    Gait: deferred.     LABORATORY:  CBC                       9.5    6.67  )-----------( 339      ( 21 Mar 2020 09:21 )             30.5     Chem 03-21    137  |  104  |  12  ----------------------------<  263<H>  4.1   |  20<L>  |  0.65    Ca    9.1      21 Mar 2020 09:21    TPro  7.9  /  Alb  3.4  /  TBili  0.5  /  DBili  x   /  AST  179<H>  /  ALT  139<H>  /  AlkPhos  612<H>  03-21    LFTs LIVER FUNCTIONS - ( 21 Mar 2020 09:21 )  Alb: 3.4 g/dL / Pro: 7.9 g/dL / ALK PHOS: 612 U/L / ALT: 139 U/L / AST: 179 U/L / GGT: x           Coagulopathy   Lipid Panel 03-16 Chol 150 LDL 58 HDL 61 Trig 157<H>  A1c 03-15 [id number] >15.5    Cardiac enzymes     U/A   CSF  Immunological  Other    STUDIES & IMAGING:  Studies (EKG, EEG, EMG, etc):     Radiology (XR, CT, MR, U/S, TTE/LUC):  < from: MR Head w/ IV Cont (03.20.20 @ 16:22) >  IMPRESSION:    Leptomeningeal enhancement most prominent within the left temporo-occipital region. Infectious/inflammatory or neoplastic etiologies should be considered such as meningitis or leptomeningeal carcinomatosis.

## 2020-03-21 NOTE — PROGRESS NOTE ADULT - PROBLEM SELECTOR PLAN 9
ISTOP: Reference #: [id number]; last alprazolam filled in 2014  DVT ppx: lovenox qdaily  Diet: soft diet  Dispo: pending PT and speech therapy consult    1.  Name of PCP: Felipe Manuel   2.  PCP Contacted on Admission: [ ] Y    [ ] N    3.  PCP contacted at Discharge: [ ] Y    [ ] N    [ ] N/A  4.  Post-Discharge Appointment Date and Location:  5.  Summary of Handoff given to PCP:

## 2020-03-21 NOTE — PROGRESS NOTE ADULT - PROBLEM SELECTOR PLAN 3
possibly ERAZO given hx of DM, CAD. Worsening today.  cont to monitor  GGT elevated  Ceruloplasmin 48  RUQ US demonstrates hepatomegaly  -FU hep studies and reccs  -pending MRCP, aseptic

## 2020-03-21 NOTE — PROVIDER CONTACT NOTE (OTHER) - ACTION/TREATMENT ORDERED:
Notified MD Kamara about pt ankle redness, dryness, and itchiness. Ordered Aquaphor PRN. Will apply to pt. Will continue to monitor.

## 2020-03-21 NOTE — PROGRESS NOTE ADULT - PROBLEM SELECTOR PLAN 4
No RESOLVED  Complicated. Possibly contributory to mental status  - Completed 3 day course of ceftriaxone.

## 2020-03-21 NOTE — PROVIDER CONTACT NOTE (OTHER) - ACTION/TREATMENT ORDERED:
Notified Team 4 MD Torrez that pt is refusing finger stick to be taken. Sliding scale insulin scheduled before bedtime, marked as not done bc of pt refusal. Will continue to monitor pt.

## 2020-03-21 NOTE — PROGRESS NOTE ADULT - ASSESSMENT
Assessment:  74y R-handed F with h/o DM, CAD s/p stent and CABG p/w subacute onset mixed aphasia with motor predominance likely due to left hemispheric dysfunction secondary to subacute infarct vs. mass vs. less likely but possibly focal seizures w/o impaired awareness.     Impression: Subacute aphasia, headache, new onset focal w/ preserved awareness seizures lateralizing to the L. hemisphere, perhaps localizing to the temporal lobe, of unclear etiology ----Ddx includes: Temporal lobe encephalitis (HSV vs. Autoimmune vs. paraneoplastic (given stereotyped seizures lateralizing to the L. hemisphere)    Plan  [] Recommend CT CAP with contrast to evaluate for potential malignancy  [] Recommend hematology/oncology consult considering patient's abnormal SPEP   [x] Repeat MRI brain w/ contrast from 3/20: leptomeningeal enhancement most prominent within the left temporo-occipital region.   [x] MRA head w/o contrast: negative  [x] MRA neck w contrast: negative  [x] 24 hr vEEG: refused by patient  [x] Continue Keppra 750 mg BID  [x] Appreciate ID consult, agree with continuing acyclovir and hematology/oncology consult   [x] LP - protein, glucose, Cell count, gram stain, culture, PCR, HSV, crypto, west nile virus, Myelin Basic Protein, IgG index, Oligoclonal bands, [place] autoimmune panel (ENC-1)  [x] SERUM /CRP 1.21/T3 8.3/T4 932/TSH 2.42/Lyme titers: negative/Ds-DNA negative/REECE speckled 1:80/SPEP: positive M spike/UPEP/Immunofixation: kappa 5.28, lambda 7.34/IgG index: IgG1: 932, IgG2: 443, IgG3: 103, IgG4: 12/RF 13/Sjogren's Syndrome Ab negative/Ammonia 37  [x] Hepatology/GI evaluation for elevated LFT's/GGT/Alk phos  [] If patient continues to seize through the Keppra, please obtain MICU consult to upgrade to ICU level of care for status epilepticus.     Case discussed with neurology attending, Dr. Hebert. Assessment:  74y R-handed F with h/o DM, CAD s/p stent and CABG p/w subacute onset mixed aphasia with motor predominance likely due to left hemispheric dysfunction secondary to subacute infarct vs. mass vs. less likely but possibly focal seizures w/o impaired awareness.     Impression: Subacute aphasia, headache, new onset focal w/ preserved awareness seizures lateralizing to the L. hemisphere, perhaps localizing to the temporal lobe, of unclear etiology ----Ddx includes: Temporal lobe encephalitis (HSV vs. Autoimmune vs. paraneoplastic (given stereotyped seizures lateralizing to the L. hemisphere)    Plan  [] Recommend CT CAP with contrast to evaluate for potential malignancy  [] Recommend hematology/oncology consult considering patient's abnormal SPEP   [x] Repeat MRI brain w/ contrast from 3/20: leptomeningeal enhancement most prominent within the left temporo-occipital region.   [x] MRA head w/o contrast: negative  [x] MRA neck w contrast: negative  [x] 24 hr vEEG: refused by patient  [x] Continue Keppra 750 mg BID  [x] Appreciate ID consult, agree with continuing acyclovir and hematology/oncology consult   [x] LP - protein 59, glucose, Cell count, gram stain, culture, PCR, HSV, crypto, west nile virus, Myelin Basic Protein, IgG index, Oligoclonal bands, [place] autoimmune panel (ENC-1)  [x] SERUM /CRP 1.21/T3 8.3/T4 932/TSH 2.42/Lyme titers: negative/Ds-DNA negative/REECE speckled 1:80/SPEP: positive M spike/UPEP/Immunofixation: kappa 5.28, lambda 7.34/IgG index: IgG1: 932, IgG2: 443, IgG3: 103, IgG4: 12/RF 13/Sjogren's Syndrome Ab negative/Ammonia 37  [x] Hepatology/GI evaluation for elevated LFT's/GGT/Alk phos  [] If patient continues to seize through the Keppra, please obtain MICU consult to upgrade to ICU level of care for status epilepticus.     Case discussed with neurology attending, Dr. Hebert. Assessment:  74y R-handed F with h/o DM, CAD s/p stent and CABG p/w subacute onset mixed aphasia with motor predominance likely due to left hemispheric dysfunction secondary to subacute infarct vs. mass vs. less likely but possibly focal seizures w/o impaired awareness.  Pt with positive active EBV serologies, abnormal IgG, kappa and Lambda, with M-spike. Underlying hematologic malignancy would cause immunosuppression.    Impression: Subacute aphasia, headache, new onset focal w/ preserved awareness seizures lateralizing to the L. hemisphere, perhaps localizing to the temporal lobe, of unclear etiology ----Ddx includes: Temporal lobe encephalitis (EBV vs other infection vs. neoplastic)     Plan  - Patient currently refusing workup and states that she wants to go home. Will have to determine competence to decline medical treatment which may be difficult given her aphasia.    [] Recommend CT C/A/P with contrast to evaluate for potential malignancy  [] Recommend hematology/oncology consult considering patient's abnormal SPEP  [x] Repeat MRI brain w/ contrast from 3/20: leptomeningeal enhancement most prominent within the left temporo-occipital region.   [x] MRA head w/o contrast: negative  [x] MRA neck w contrast: negative  [x] 24 hr vEEG: refused by patient  [x] Continue Keppra 750 mg BID  [x] Appreciate ID consult, agree with continuing acyclovir and hematology/oncology consult   [x] LP - protein 59, glucose, Cell count, gram stain, culture, PCR, HSV, crypto, west nile virus, Myelin Basic Protein, IgG index, Oligoclonal bands,   [x] SERUM /CRP 1.21/T3 8.3/T4 932/TSH 2.42/Lyme titers: negative/Ds-DNA negative/REECE speckled 1:80/SPEP: positive M spike/UPEP/Immunofixation: kappa 5.28, lambda 7.34/IgG index: IgG1: 932, IgG2: 443, IgG3: 103, IgG4: 12/RF 13/Sjogren's Syndrome Ab negative/Ammonia 37  [x] Hepatology/GI evaluation for elevated LFT's/GGT/Alk phos      Case discussed with neurology attending, Dr. Hebert.

## 2020-03-21 NOTE — PROGRESS NOTE ADULT - SUBJECTIVE AND OBJECTIVE BOX
Patient is a 75y old  Female who presents with a chief complaint of headache (20 Mar 2020 17:17)      SUBJECTIVE / OVERNIGHT EVENTS: No overnight events. Patient seen and examined at bedside. Patient AOX3, but has word finding difficulties and is occasionally slow to respond. No complaints this AM. Patient denies CP, SOB.    MEDICATIONS  (STANDING):  acyclovir IVPB      acyclovir IVPB 700 milliGRAM(s) IV Intermittent every 8 hours  aspirin  chewable 81 milliGRAM(s) Oral daily  atorvastatin 40 milliGRAM(s) Oral at bedtime  dextrose 5%. 1000 milliLiter(s) (50 mL/Hr) IV Continuous <Continuous>  dextrose 50% Injectable 12.5 Gram(s) IV Push once  dextrose 50% Injectable 25 Gram(s) IV Push once  dextrose 50% Injectable 25 Gram(s) IV Push once  insulin lispro (HumaLOG) corrective regimen sliding scale   SubCutaneous three times a day before meals  insulin lispro (HumaLOG) corrective regimen sliding scale   SubCutaneous at bedtime  levETIRAcetam  IVPB 750 milliGRAM(s) IV Intermittent every 12 hours  metoprolol tartrate 25 milliGRAM(s) Oral daily  pantoprazole    Tablet 40 milliGRAM(s) Oral before breakfast  polyethylene glycol 3350 17 Gram(s) Oral two times a day  sodium chloride 0.9%. 1000 milliLiter(s) (100 mL/Hr) IV Continuous <Continuous>    MEDICATIONS  (PRN):  dextrose 40% Gel 15 Gram(s) Oral once PRN Blood Glucose LESS THAN 70 milliGRAM(s)/deciliter  glucagon  Injectable 1 milliGRAM(s) IntraMuscular once PRN Glucose LESS THAN 70 milligrams/deciliter      T(C): 36.4 (03-21-20 @ 08:20), Max: 37.1 (03-21-20 @ 00:14)  HR: 63 (03-21-20 @ 08:20) (63 - 84)  BP: 152/72 (03-21-20 @ 08:20) (139/67 - 160/73)  RR: 18 (03-21-20 @ 08:20) (18 - 18)  SpO2: 93% (03-21-20 @ 08:20) (93% - 97%)    PHYSICAL EXAM  GENERAL: elderly female, pleasant, NAD  NEURO: AO x3, nonfocal, has word finding difficulties and is slow to respond   HEAD:  Atraumatic, Normocephalic  EYES: conjunctiva and sclera clear  NECK: Supple  CHEST/LUNG: Clear to auscultation bilaterally; No wheezes, rales or rhonchi  HEART: Regular rate and rhythm; No murmurs, rubs, or gallops  ABDOMEN: Soft, Nontender, Nondistended; Bowel sounds present, no masses.  EXTREMITIES:  2+ Peripheral Pulses, No clubbing, cyanosis, or edema  SKIN: Warm, dry, in tact, no rashes or lesions  PSYCH: affect appropriate    LABS:                        9.5    6.67  )-----------( 339      ( 21 Mar 2020 09:21 )             30.5     03-21    137  |  104  |  12  ----------------------------<  263<H>  4.1   |  20<L>  |  0.65    Ca    9.1      21 Mar 2020 09:21    TPro  7.9  /  Alb  3.4  /  TBili  0.5  /  DBili  x   /  AST  179<H>  /  ALT  139<H>  /  AlkPhos  612<H>  03-21            I&O's Summary    20 Mar 2020 07:01  -  21 Mar 2020 07:00  --------------------------------------------------------  IN: 1780 mL / OUT: 50 mL / NET: 1730 mL      < from: MR Head w/ IV Cont (03.20.20 @ 16:22) >  IMPRESSION:    Leptomeningeal enhancement most prominent within the left temporo-occipital region. Infectious/inflammatory or neoplastic etiologies should be considered such as meningitis or leptomeningeal carcinomatosis.    Findings were discussed with Dr. Villavicencio on 3/20/2020 5:08 PM with read back.    < end of copied text >        Tatiana Gustafson MD  Internal Medicine Resident, PGY1  Pager: [phone] / [phone]

## 2020-03-21 NOTE — PROGRESS NOTE ADULT - PROBLEM SELECTOR PLAN 1
Unclear etiology.  -CT head negative for acute/subacute infarct  -MRI Brain consistent with temporal occipital inflammatory/infectious vs malignancy   -pt not complaint with EEG, called again to see if it can happen   -c/w atorvastatin  -LP performed on 3/16, pending CSF and serum studies. So far, serum HSV IgG positive, EBV antigen positive, will f/u EBV DNA by PCR. Hep panel, HIV negative, ESR and CRP elevated. Thyroid panel wnl.    - b12, folate, tsh, cortsiol   - ID consulted, will send studies as outlined within their note Unclear etiology.  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 So far, serum HSV IgG positive, EBV antigen positive, will f/u EBV DNA by PCR. Hep panel, HIV negative, ESR and CRP elevated. Thyroid panel wnl.    - b12, folate, tsh, cortsiol   - ID consulted, will send studies as outlined within their note

## 2020-03-21 NOTE — PROGRESS NOTE ADULT - ASSESSMENT
74F with T2DM, systolic HF, CABG p/w subacute/ acute? encephalopathy and aphasia c/b seizure, LP negative for infectious work up, MRI suggestive of inflammatory/infectious etiology vs. malignancy, in temporal occipital region, however CSF PCR negative including HSV, pending EEG to see if HSV meningitis, less likely malignancy at this time

## 2020-03-22 LAB
ANTI-RIBONUCLEAR PROTEIN: 7.2 AI — HIGH
ANTI-RIBONUCLEAR PROTEIN: 7.2 AI — HIGH
CULTURE RESULTS: SIGNIFICANT CHANGE UP
CULTURE RESULTS: SIGNIFICANT CHANGE UP
ENA SM AB FLD QL: <0.2 AI — SIGNIFICANT CHANGE UP
SPECIMEN SOURCE: SIGNIFICANT CHANGE UP
SPECIMEN SOURCE: SIGNIFICANT CHANGE UP

## 2020-03-22 PROCEDURE — 99232 SBSQ HOSP IP/OBS MODERATE 35: CPT

## 2020-03-22 RX ADMIN — SODIUM CHLORIDE 100 MILLILITER(S): 9 INJECTION INTRAMUSCULAR; INTRAVENOUS; SUBCUTANEOUS at 16:05

## 2020-03-22 RX ADMIN — POLYETHYLENE GLYCOL 3350 17 GRAM(S): 17 POWDER, FOR SOLUTION ORAL at 19:16

## 2020-03-22 RX ADMIN — Medication 264 MILLIGRAM(S): at 21:53

## 2020-03-22 RX ADMIN — ATORVASTATIN CALCIUM 40 MILLIGRAM(S): 80 TABLET, FILM COATED ORAL at 21:52

## 2020-03-22 RX ADMIN — Medication 25 MILLIGRAM(S): at 05:56

## 2020-03-22 RX ADMIN — LEVETIRACETAM 400 MILLIGRAM(S): 250 TABLET, FILM COATED ORAL at 19:16

## 2020-03-22 RX ADMIN — LEVETIRACETAM 400 MILLIGRAM(S): 250 TABLET, FILM COATED ORAL at 05:56

## 2020-03-22 RX ADMIN — Medication 1 APPLICATION(S): at 16:04

## 2020-03-22 RX ADMIN — Medication 264 MILLIGRAM(S): at 05:56

## 2020-03-22 RX ADMIN — Medication 264 MILLIGRAM(S): at 16:04

## 2020-03-22 RX ADMIN — PANTOPRAZOLE SODIUM 40 MILLIGRAM(S): 20 TABLET, DELAYED RELEASE ORAL at 05:56

## 2020-03-22 NOTE — PROGRESS NOTE ADULT - SUBJECTIVE AND OBJECTIVE BOX
INTERVAL HPI/OVERNIGHT EVENTS:    SUBJECTIVE: Patient seen and examined at bedside. Overnight patient complains of bl leg dryness, redness, given aquaphor, No complaints with persistent word find difficulty.     ROS neg except as above    OBJECTIVE:    VITAL SIGNS:  ICU Vital Signs Last 24 Hrs  T(C): 36.4 (22 Mar 2020 07:45), Max: 36.5 (22 Mar 2020 01:08)  T(F): 97.6 (22 Mar 2020 07:45), Max: 97.7 (22 Mar 2020 01:08)  HR: 83 (22 Mar 2020 07:45) (63 - 83)  BP: 139/62 (22 Mar 2020 07:45) (139/62 - 182/85)  BP(mean): --  ABP: --  ABP(mean): --  RR: 18 (22 Mar 2020 07:45) (18 - 18)  SpO2: 98% (22 Mar 2020 07:45) (93% - 100%)        03-21 @ 07:01  -  03-22 @ 07:00  --------------------------------------------------------  IN: 2040 mL / OUT: 900 mL / NET: 1140 mL      CAPILLARY BLOOD GLUCOSE      POCT Blood Glucose.: 186 mg/dL (21 Mar 2020 12:19)      PHYSICAL EXAM:    GENERAL: elderly female, pleasant, NAD  NEURO: AO x3, nonfocal, has word finding difficulties and is slow to respond   HEAD:  Atraumatic, Normocephalic  EYES: conjunctiva and sclera clear  NECK: Supple  CHEST/LUNG: Clear to auscultation bilaterally; No wheezes, rales or rhonchi  HEART: Regular rate and rhythm; No murmurs, rubs, or gallops  ABDOMEN: Soft, Nontender, Nondistended; Bowel sounds present, no masses.  EXTREMITIES:  2+ Peripheral Pulses, No clubbing, cyanosis, or edema  SKIN: Warm, dry, in tact, no rashes or lesions  PSYCH: affect appropriate    MEDICATIONS:  MEDICATIONS  (STANDING):  acyclovir IVPB      acyclovir IVPB 700 milliGRAM(s) IV Intermittent every 8 hours  aspirin  chewable 81 milliGRAM(s) Oral daily  atorvastatin 40 milliGRAM(s) Oral at bedtime  dextrose 5%. 1000 milliLiter(s) (50 mL/Hr) IV Continuous <Continuous>  dextrose 50% Injectable 12.5 Gram(s) IV Push once  dextrose 50% Injectable 25 Gram(s) IV Push once  dextrose 50% Injectable 25 Gram(s) IV Push once  insulin glargine Injectable (LANTUS) 24 Unit(s) SubCutaneous at bedtime  insulin lispro (HumaLOG) corrective regimen sliding scale   SubCutaneous three times a day before meals  insulin lispro (HumaLOG) corrective regimen sliding scale   SubCutaneous at bedtime  insulin lispro Injectable (HumaLOG) 8 Unit(s) SubCutaneous three times a day before meals  levETIRAcetam  IVPB 750 milliGRAM(s) IV Intermittent every 12 hours  metoprolol tartrate 25 milliGRAM(s) Oral daily  pantoprazole    Tablet 40 milliGRAM(s) Oral before breakfast  polyethylene glycol 3350 17 Gram(s) Oral two times a day  sodium chloride 0.9%. 1000 milliLiter(s) (100 mL/Hr) IV Continuous <Continuous>    MEDICATIONS  (PRN):  AQUAPHOR (petrolatum Ointment) 1 Application(s) Topical two times a day PRN Dry skin  dextrose 40% Gel 15 Gram(s) Oral once PRN Blood Glucose LESS THAN 70 milliGRAM(s)/deciliter  glucagon  Injectable 1 milliGRAM(s) IntraMuscular once PRN Glucose LESS THAN 70 milligrams/deciliter      ALLERGIES:  Allergies    clams - itching (Other)  No Known Drug Allergies  Peroxide (Blisters)    Intolerances        LABS:                        9.5    6.67  )-----------( 339      ( 21 Mar 2020 09:21 )             30.5     03-21    137  |  104  |  12  ----------------------------<  263<H>  4.1   |  20<L>  |  0.65    Ca    9.1      21 Mar 2020 09:21    TPro  7.9  /  Alb  3.4  /  TBili  0.5  /  DBili  x   /  AST  179<H>  /  ALT  139<H>  /  AlkPhos  612<H>  03-21          RADIOLOGY & ADDITIONAL TESTS: Reviewed.

## 2020-03-22 NOTE — PROGRESS NOTE ADULT - ASSESSMENT
74 year old female with a history of uncontrolled diabetes, CAD s/p stent and CABG presenting with subacute/ acute encephalopathy and severe headache, with hospital course complicated by new seizure.   Clinically improved on my exam, more interactive. Answering questions.     1. Encephalopathy, headache, seizure: ? encephalitis  MRI unrevealing  - Per Neuro, s/p repeat MRI  - LP completed: 1 WBC, inconsistent with infectious process. PCR panel is negative (which includes many viral etiologies). AI pending. Continue to empirically cover with acyclovir   AS patient has improved , will continue . Await EEG to see if has characteristic spikes  - Follow up serum/ CSF studies- negative to date  - Neuro following      2. Transaminitis: ? If related to systemic viral syndrome/ERAZO. Workup has been nondiagnostic so far  We will also send ceruloplasmin .     await NMDA  await multiple studies     await 14-3-3      Pt with a m spike-  Hematology/oncology workup pending    Unclear what caused acute altered mental status but patient seems to have improved and the only intervention was the acyclovir, so will complete a 14 day course  please hydrate  monitor the creatine- stable  day # 8    ID is available [phone]  Erasto Simmons MD  pager [phone]  office [phone] 74 year old female with a history of uncontrolled diabetes, CAD s/p stent and CABG presenting with subacute/ acute encephalopathy and severe headache, with hospital course complicated by new seizure.   Clinically improved on my exam, more interactive. Answering questions.     1. Encephalopathy, headache, seizure: ? encephalitis  MRI unrevealing  - Per Neuro, s/p repeat MRI  - LP completed: 1 WBC, inconsistent with infectious process. PCR panel is negative (which includes many viral etiologies). AI pending. Continue to empirically cover with acyclovir   AS patient has improved , will continue . Await EEG to see if has characteristic spikes  - Follow up serum/ CSF studies- negative to date  - Neuro following      2. Transaminitis: ? If related to systemic viral syndrome/ERAZO. Workup has been nondiagnostic so far  We will also send ceruloplasmin- slightly elevated .     await NMDA  await multiple studies     await 14-3-3      Pt with a m spike-  Hematology/oncology workup pending    Unclear what caused acute altered mental status but patient seems to have improved and the only intervention was the acyclovir, so will complete a 14 day course  please hydrate  monitor the creatine- stable  day # 8    ID is available [phone]  Erasto Simmons MD  pager [phone]  office [phone]

## 2020-03-22 NOTE — PROGRESS NOTE ADULT - ATTENDING COMMENTS
Patient seen and examined.  Agree with resident note.  ID input appreciated.   Patient remains on Acyclovir.

## 2020-03-22 NOTE — PROGRESS NOTE ADULT - PROBLEM SELECTOR PLAN 1
Unclear etiology.  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 So far, serum HSV IgG positive, EBV antigen positive, will f/u EBV DNA by PCR. Hep panel, HIV negative, ESR and CRP elevated. Thyroid panel wnl.    - b12, folate, tsh, cortsiol   - ID consulted, will send studies as outlined within their note

## 2020-03-22 NOTE — PROGRESS NOTE ADULT - SUBJECTIVE AND OBJECTIVE BOX
Patient is a 75y old  Female who presents with a chief complaint of headache (21 Mar 2020 11:33)    Being followed by ID for encephalitis    Interval history:  Creatinine stable on IV acyclovir  Afebrile  No acute events      ROS:  No cough, SOB, CP  No N/V/D./abd pain  No other complaints      Antimicrobials:    acyclovir IVPB      acyclovir IVPB 700 milliGRAM(s) IV Intermittent every 8 hours      Vital Signs Last 24 Hrs  T(C): 36.4 (03-22-20 @ 07:45), Max: 36.5 (03-22-20 @ 01:08)  T(F): 97.6 (03-22-20 @ 07:45), Max: 97.7 (03-22-20 @ 01:08)  HR: 83 (03-22-20 @ 07:45) (63 - 83)  BP: 139/62 (03-22-20 @ 07:45) (139/62 - 182/85)  BP(mean): --  RR: 18 (03-22-20 @ 07:45) (18 - 18)  SpO2: 98% (03-22-20 @ 07:45) (93% - 100%)    Physical Exam:    Constitutional well preserved, NAD    HEENT EOMI, No pallor or icterus    No oral exudate or erythema    Neck supple no LN    Chest Clear to auscultation    CVS S1 S2 WNl No murmur or rub or gallop    Abd soft BS normal No tenderness no masses    Ext No cyanosis clubbing or edema    IV site no erythema tenderness or discharge    Joints no swelling or LOM    CNS AAO X  non focal    Lab Data:                          9.5    6.67  )-----------( 339      ( 21 Mar 2020 09:21 )             30.5       03-21    137  |  104  |  12  ----------------------------<  263<H>  4.1   |  20<L>  |  0.65    Ca    9.1      21 Mar 2020 09:21    TPro  7.9  /  Alb  3.4  /  TBili  0.5  /  DBili  x   /  AST  179<H>  /  ALT  139<H>  /  AlkPhos  612<H>  03-21        .Blood Blood-Peripheral  03-17-20   No growth to date.  --  --      .Blood Blood-Venous  03-17-20   No growth to date.  --  --      .CSF CSF  03-17-20   No growth  --    polymorphonuclear leukocytes seen  No organisms seen  by cytocentrifuge      < from: MR Head w/ IV Cont (03.20.20 @ 16:22) >  EXAM:  MR BRAIN IC                            PROCEDURE DATE:  03/20/2020            INTERPRETATION:  INDICATIONS:  Aphasia, altered mental status, congestive heart failure status post CABG.    TECHNIQUE: Following intravenous gadolinium sagittal and axial and coronal postcontrast T1-weighted images were performed. Axial T2 FLAIR and diffusion-weighted images were also performed. 10 cc Gadavist were administered. 0 cc were discarded.      COMPARISON EXAMINATION:  3/17/2020      FINDINGS:    VENTRICLES AND SULCI:  Prominent in size compatible with age-appropriate volume loss  INTRA-AXIAL:  Patchy areas of white matter T2 hyperintensity are seen compatible with mild to moderate microvascular-type changes. No mass effect or diffusion restriction is seen.  EXTRA-AXIAL:  Leptomeningeal enhancement is seen most prominent within the left lateral temporo-occipital region. No focal extra-axial collection is seen.  VISUALIZED SINUSES:  Mild to moderate mucosal thickening  VISUALIZED MASTOIDS:  Clear.  CALVARIUM:  Normal.  CAROTID FLOW VOIDS:  Present.  MISCELLANEOUS:  None.      IMPRESSION:    Leptomeningeal enhancement most prominent within the left temporo-occipital region. Infectious/inflammatory or neoplastic etiologies should be considered such as meningitis or leptomeningeal carcinomatosis.    < end of copied text > Patient is a 75y old  Female who presents with a chief complaint of headache (21 Mar 2020 11:33)    Being followed by ID for encephalitis    Interval history:  Creatinine stable on IV acyclovir  Afebrile  No acute events      ROS:  "Feels fine", "Leave me alone"  No other complaints      Antimicrobials:    acyclovir IVPB      acyclovir IVPB 700 milliGRAM(s) IV Intermittent every 8 hours      Vital Signs Last 24 Hrs  T(C): 36.4 (03-22-20 @ 07:45), Max: 36.5 (03-22-20 @ 01:08)  T(F): 97.6 (03-22-20 @ 07:45), Max: 97.7 (03-22-20 @ 01:08)  HR: 83 (03-22-20 @ 07:45) (63 - 83)  BP: 139/62 (03-22-20 @ 07:45) (139/62 - 182/85)  BP(mean): --  RR: 18 (03-22-20 @ 07:45) (18 - 18)  SpO2: 98% (03-22-20 @ 07:45) (93% - 100%)    Physical Exam: Limited by patient request    Constitutional well preserved, NAD, sitting in bed eating breakfast    HEENT EOMI    Ext No edema    IV site no erythema tenderness or discharge    Joints no swelling or LOM    CNS AAO X 2 non focal, moving all extermities    Lab Data:                          9.5    6.67  )-----------( 339      ( 21 Mar 2020 09:21 )             30.5       03-21    137  |  104  |  12  ----------------------------<  263<H>  4.1   |  20<L>  |  0.65    Ca    9.1      21 Mar 2020 09:21    TPro  7.9  /  Alb  3.4  /  TBili  0.5  /  DBili  x   /  AST  179<H>  /  ALT  139<H>  /  AlkPhos  612<H>  03-21        .Blood Blood-Peripheral  03-17-20   No growth to date.  --  --      .Blood Blood-Venous  03-17-20   No growth to date.  --  --      .CSF CSF  03-17-20   No growth  --    polymorphonuclear leukocytes seen  No organisms seen  by cytocentrifuge      < from: MR Head w/ IV Cont (03.20.20 @ 16:22) >  EXAM:  MR BRAIN IC                            PROCEDURE DATE:  03/20/2020            INTERPRETATION:  INDICATIONS:  Aphasia, altered mental status, congestive heart failure status post CABG.    TECHNIQUE: Following intravenous gadolinium sagittal and axial and coronal postcontrast T1-weighted images were performed. Axial T2 FLAIR and diffusion-weighted images were also performed. 10 cc Gadavist were administered. 0 cc were discarded.      COMPARISON EXAMINATION:  3/17/2020      FINDINGS:    VENTRICLES AND SULCI:  Prominent in size compatible with age-appropriate volume loss  INTRA-AXIAL:  Patchy areas of white matter T2 hyperintensity are seen compatible with mild to moderate microvascular-type changes. No mass effect or diffusion restriction is seen.  EXTRA-AXIAL:  Leptomeningeal enhancement is seen most prominent within the left lateral temporo-occipital region. No focal extra-axial collection is seen.  VISUALIZED SINUSES:  Mild to moderate mucosal thickening  VISUALIZED MASTOIDS:  Clear.  CALVARIUM:  Normal.  CAROTID FLOW VOIDS:  Present.  MISCELLANEOUS:  None.      IMPRESSION:    Leptomeningeal enhancement most prominent within the left temporo-occipital region. Infectious/inflammatory or neoplastic etiologies should be considered such as meningitis or leptomeningeal carcinomatosis.    < end of copied text >

## 2020-03-23 LAB
ALBUMIN CSF-MCNC: 30.3 MG/DL — HIGH (ref 14–25)
ALBUMIN SERPL ELPH-MCNC: 2896 MG/DL — LOW (ref 3500–5200)
CREATININE, URINE RESULT: 32 MG/DL — SIGNIFICANT CHANGE UP
HSV1 AB FLD QL: SIGNIFICANT CHANGE UP TITER
HSV1 AB FLD QL: SIGNIFICANT CHANGE UP TITER
HSV2 AB FLD-ACNC: SIGNIFICANT CHANGE UP TITER
HSV2 AB FLD-ACNC: SIGNIFICANT CHANGE UP TITER
IGG CSF-MCNC: 10.2 MG/DL — HIGH
IGG FLD-MCNC: 2189 MG/DL — HIGH (ref 610–1660)
IGG SYNTH RATE SER+CSF CALC-MRATE: -7.4 MG/DAY — SIGNIFICANT CHANGE UP
IGG/ALB CLEAR SER+CSF-RTO: 0.4 — SIGNIFICANT CHANGE UP
IGG/ALB CSF: 0.34 RATIO — HIGH
IGG/ALB SER: 0.76 RATIO — SIGNIFICANT CHANGE UP
M PNEUMO IGG SER IA-ACNC: 0.91 INDEX — HIGH
M PNEUMO IGG SER IA-ACNC: ABNORMAL
M PNEUMO IGM SER-ACNC: 37 UNITS/ML — SIGNIFICANT CHANGE UP
MYCOPLASMA AG SPEC QL: NEGATIVE — SIGNIFICANT CHANGE UP
PROT ?TM UR-MCNC: 13 MG/DL — HIGH (ref 0–12)

## 2020-03-23 PROCEDURE — 99233 SBSQ HOSP IP/OBS HIGH 50: CPT | Mod: GC

## 2020-03-23 PROCEDURE — 99232 SBSQ HOSP IP/OBS MODERATE 35: CPT

## 2020-03-23 RX ORDER — LEVETIRACETAM 250 MG/1
750 TABLET, FILM COATED ORAL
Refills: 0 | Status: DISCONTINUED | OUTPATIENT
Start: 2020-03-23 | End: 2020-03-31

## 2020-03-23 RX ADMIN — ATORVASTATIN CALCIUM 40 MILLIGRAM(S): 80 TABLET, FILM COATED ORAL at 21:38

## 2020-03-23 RX ADMIN — Medication 25 MILLIGRAM(S): at 05:57

## 2020-03-23 RX ADMIN — Medication 264 MILLIGRAM(S): at 17:31

## 2020-03-23 RX ADMIN — LEVETIRACETAM 400 MILLIGRAM(S): 250 TABLET, FILM COATED ORAL at 05:57

## 2020-03-23 RX ADMIN — LEVETIRACETAM 750 MILLIGRAM(S): 250 TABLET, FILM COATED ORAL at 17:21

## 2020-03-23 RX ADMIN — Medication 81 MILLIGRAM(S): at 13:35

## 2020-03-23 RX ADMIN — Medication 264 MILLIGRAM(S): at 05:57

## 2020-03-23 RX ADMIN — POLYETHYLENE GLYCOL 3350 17 GRAM(S): 17 POWDER, FOR SOLUTION ORAL at 17:22

## 2020-03-23 RX ADMIN — SODIUM CHLORIDE 100 MILLILITER(S): 9 INJECTION INTRAMUSCULAR; INTRAVENOUS; SUBCUTANEOUS at 17:33

## 2020-03-23 RX ADMIN — PANTOPRAZOLE SODIUM 40 MILLIGRAM(S): 20 TABLET, DELAYED RELEASE ORAL at 05:56

## 2020-03-23 NOTE — CHART NOTE - NSCHARTNOTEFT_GEN_A_CORE
74F with T2DM, systolic HF, CABG p/w subacute/ acute? encephalopathy and aphasia c/b seizure. MRI brain on admission showed leptomeningeal enhancement most prominent within the left temporo-occipital region. Infectious/inflammatory or neoplastic etiologies should be considered such as meningitis or leptomeningeal carcinomatosis. Patient underwent LP with CSF unremarkable, making malignancy unlikely. Hematology today consulted for M spike 0.3    - AUGUSTINE with: IgG kappa band identified  - FLC normal and M spike only minimally elevated  - findings of myeloma labs more consistent with MGUS - no intervention needed at the time  - will likely need outpatient follow up MRI brain to ensure findings resolve  - if LP is repeated, please ensure that flow cytometry is sent  - pending CT a/p  - outpatient referral to [facility] at discharge    Coral Olazagasti  Hematology-Oncology PGY-5  [phone] 74F with T2DM, systolic HF, CABG p/w subacute/ acute? encephalopathy and aphasia c/b seizure. MRI brain on admission showed leptomeningeal enhancement most prominent within the left temporo-occipital region. Infectious/inflammatory or neoplastic etiologies should be considered such as meningitis or leptomeningeal carcinomatosis. Patient underwent LP with CSF unremarkable, making malignancy unlikely. Hematology today consulted for M spike 0.3    - AUGUSTINE with: IgG kappa band identified  - FLC normal and M spike only minimally elevated  - findings of myeloma labs more consistent with MGUS - no intervention needed at the time  - will likely need outpatient follow up MRI brain to ensure findings resolve  - if LP is repeated, please ensure that flow cytometry is sent  - pending CT a/p  - outpatient referral to [facility] at discharge    Coral Olazagasti  Hematology-Oncology PGY-5  [phone]    Agree with fellow note. Patient not seen and examined due to COVID19.   73 yo female wit history of new encephalopathy found to have left-temporo-occipital region, CSF studies thus far non-diagnostic (lymphocyte predominant in CSF, viral studies and cultures negative). Low level M spike detected with normal free light chains.   -suspect this is MGUS  -agree with CT A/P to rule out occult malignancy   -appreciate neurology, neuro-onc and ID recs  -trend LFTS   -will need outpatient follow up 74F with T2DM, systolic HF, CABG p/w subacute/ acute? encephalopathy and aphasia c/b seizure. MRI brain on admission showed leptomeningeal enhancement most prominent within the left temporo-occipital region. Infectious/inflammatory or neoplastic etiologies should be considered such as meningitis or leptomeningeal carcinomatosis. Patient underwent LP with CSF unremarkable, making malignancy unlikely. Hematology today consulted for M spike 0.3    - AUGUSTINE with: IgG kappa band identified  - FLC normal and M spike only minimally elevated  - findings of myeloma labs more consistent with MGUS - no intervention needed at the time  - will likely need outpatient follow up MRI brain to ensure findings resolve  - if LP is repeated, please ensure that flow cytometry is sent  - pending CT a/p  - outpatient referral to [facility] at discharge    Coral Olazagasti  Hematology-Oncology PGY-5  [phone]    Agree with fellow note. Patient not seen and examined due to COVID19.   75 yo female wit history of new encephalopathy found to have left-temporo-occipital region, CSF studies thus far non-diagnostic (lymphocyte predominant in CSF, viral studies and cultures negative). Low level M spike detected with normal free light chains.   -suspect this is MGUS  -agree with CT A/P to rule out occult malignancy   -appreciate neurology, neuro-onc and ID recs  -trend LFTS   -will need outpatient follow up    Katherin Aguillon MD   Hematology/Oncology

## 2020-03-23 NOTE — CHART NOTE - NSCHARTNOTEFT_GEN_A_CORE
NEUROLOGY ATTENDING NOTE: MAURICIO   CC: APHASIA, ABNORMAL BRAIN MRI  PATIENT SEEN & EXAMINED WITH HOUSESTAFF ON 3/23/2020  SUNRISE NOTES REVIEWED  ALLSCRIPTS NOTES REVIEWED  IMAGING PEROSNALLY REVIEWED AND INTERPRETED BY ME  LABS REVIEWED    76 yo RH woman with PMHx DM, CAD (s/p stent & CABG), lives with her sister, who noticed subacute (over 1-2 weeks) speech difficulty. On admission, found to have global aphasia, with both mild receptive and more significant word-finding difficulties. Imaging with contrast demonstrated subtle leptomeningeal enhancement over the left frontal lobe, while CSF was largely unremarkable. She was found to have MGUS on lab evaluation as well as elevated inflammatory markers (PHS=454, CRP=1.2). She resists examination and EEG.    She was last seen by Dr Manuel (medicine) on 3/13/20, where he noted the communication issues, singultus and otherwise remarkably high performing (walks and drives). He noted her “EKG shows sinus rhythm at 72 BPM with occasional PAC and old anteroseptal infarct” and was concerned for a recent CVA despite her ASA 81mg.  She was admitted to [facility] on 3/14/2020, where an MRI did not disclose a CVA.    EXAMINATION  She is alert and oriented, with fluent speech, but multiple dysnomic errors and an inability to comprehend all that it is said. There is a partial Gerstmann's, with L-->R confusion, although language deficits complicate full evaluation.  No CN deficits  No drift.    LABS  WBC=6.7; HGB=9.5; GOHAQPZHD=474  137|104|12/263  4.1|20|0.65\  HGA1C>15.5 (ON 3/15/20)  AST/ALT=179/139  WMP=975  ALK PHOS=612  AMMONIA=37 (NORMAL)  ALBUMIN=3.4  CTY=149  CRP=1.2 (ELEVATED) and RWV=829 (HIGH)  HIV NEGATIVE, HHV-6 NEGATIVE, CMV NEGATIVE, EBV IgG HIGHLY POSITIVE AND EBV IgM MILDLY POSITIVE. CSF HSV NEGATIVE. & CSF PCR NEGATIVE  NORMAL THYROID STUDIES  MGUS MILDLY POSITIVE  3/16/2020 CSF: PROTEIN=59, GLUCOSE=178, WBC=1, RBC=0; HSV NEGATIVE.  3/14/2020 UA: LARGE LEUKOCYTE ESTERASE  IMAGING  I personally reviewed and interpreted neuroimaging dated  3/20/2020			    In reviewing these images, I find left posterior temporal and – to a lesser degree right posterior temporal HYPERINTENSITY on the T2 weighted images, with perhaps some subtle corresponding signal change on DWI.   On the contrast enhanced images, there is minimal, but real leptomeningeal enhancement involving the same region, best seen on the Coronal imaging.  DWI imaging shows no acute CVA.  Overall I find the images to be consistent with neoplastic meningitis, infectious meningitis, and even prion disease.    IMPRESSION  76 yo RH woman with multiple risk factors for CVA, now with aphasia, poorly controlled hyperglycemia and abnormal leptomeninges on neuroimaging affecting brain regions consistent with her aphasia.    ENCEPHALITIS – There is no HSV found on CSF analysis, and the absent WBC count similarly makes this diagnosis unlikely. Rarely paraneoplastic syndromes may present this way, but there is no neoplasm. Recommend performing CT-c/a/p to look for an occult neoplasm, such as lymphoma, given the MGUS. I see no CSF flow cytometry or cytology.    MGUS – This is mild and may not be real. However, MGUS occasionally progresses to jen neoplasm, like a lymphoma.    APHASIA – Appreciate early initiation of anti-viral medications, as well as ID input regarding continued administration of these drugs. Plan to complete 14 days total as patient has improved on this drug.    HYPERGLYCEMIA – The markedly elevated HgA1C suggests this remains a major obstacle to good health. While known to cause an encephalopathy, I am unsure the MR findings can be explained by the persistently elevated serum glucose. Agree with control of levels with insulin.    HEPATITIS – Seen by hepatology team on 3/17/20,     DISPO – If CT-c/a/p unrevealing, 14-3-3 unavailable, and no other explanation, then may repeat LP for RT-QUIC analysis and flow cytometry/cytological analysis.    I spent 120 minutes working on this case, of which 18 minutes in direct coordination of care.

## 2020-03-23 NOTE — PROGRESS NOTE ADULT - SUBJECTIVE AND OBJECTIVE BOX
Neurology Progress Note    SUBJECTIVE: Patient seen and examined at the bedside. No acute events overnight.     MEDICATIONS (HOME):  Home Medications:  ALPRAZolam 0.25 mg oral tablet: 1 tab(s) orally prior to blood draws (14 Mar 2020 20:39)  aspirin 325 mg oral delayed release tablet: 1 tab(s) orally once a day (14 Mar 2020 20:39)  atorvastatin 40 mg oral tablet: 1 tab(s) orally once a day (at bedtime) (14 Mar 2020 20:39)  Farxiga 10 mg oral tablet: 1 tab(s) orally once a day (14 Mar 2020 20:39)  furosemide 40 mg oral tablet: 1 tab(s) orally once a day (14 Mar 2020 20:39)  metFORMIN 500 mg oral tablet: 1 tab(s) orally 2 times a day (14 Mar 2020 20:39)  metoprolol tartrate 25 mg oral tablet: 1 tab(s) orally once a day (14 Mar 2020 20:39)  pantoprazole 40 mg oral delayed release tablet: 1 tab(s) orally once a day (before a meal) (14 Mar 2020 20:39)    MEDICATIONS  (STANDING):  acyclovir IVPB      acyclovir IVPB 700 milliGRAM(s) IV Intermittent every 8 hours  aspirin  chewable 81 milliGRAM(s) Oral daily  atorvastatin 40 milliGRAM(s) Oral at bedtime  dextrose 5%. 1000 milliLiter(s) (50 mL/Hr) IV Continuous <Continuous>  dextrose 50% Injectable 12.5 Gram(s) IV Push once  dextrose 50% Injectable 25 Gram(s) IV Push once  dextrose 50% Injectable 25 Gram(s) IV Push once  insulin glargine Injectable (LANTUS) 24 Unit(s) SubCutaneous at bedtime  insulin lispro (HumaLOG) corrective regimen sliding scale   SubCutaneous three times a day before meals  insulin lispro (HumaLOG) corrective regimen sliding scale   SubCutaneous at bedtime  insulin lispro Injectable (HumaLOG) 8 Unit(s) SubCutaneous three times a day before meals  levETIRAcetam  IVPB 750 milliGRAM(s) IV Intermittent every 12 hours  metoprolol tartrate 25 milliGRAM(s) Oral daily  pantoprazole    Tablet 40 milliGRAM(s) Oral before breakfast  polyethylene glycol 3350 17 Gram(s) Oral two times a day  sodium chloride 0.9%. 1000 milliLiter(s) (100 mL/Hr) IV Continuous <Continuous>    MEDICATIONS  (PRN):  AQUAPHOR (petrolatum Ointment) 1 Application(s) Topical two times a day PRN Dry skin  dextrose 40% Gel 15 Gram(s) Oral once PRN Blood Glucose LESS THAN 70 milliGRAM(s)/deciliter  glucagon  Injectable 1 milliGRAM(s) IntraMuscular once PRN Glucose LESS THAN 70 milligrams/deciliter      ALLERGIES/INTOLERANCES:  Allergies  clams - itching (Other)  No Known Drug Allergies  Peroxide (Blisters)    Intolerances    VITALS & EXAMINATION:  Vital Signs Last 24 Hrs  T(C): 36.5 (23 Mar 2020 08:33), Max: 36.8 (23 Mar 2020 00:40)  T(F): 97.7 (23 Mar 2020 08:33), Max: 98.2 (23 Mar 2020 00:40)  HR: 67 (23 Mar 2020 08:33) (67 - 70)  BP: 158/74 (23 Mar 2020 08:33) (136/93 - 166/70)  BP(mean): --  RR: 18 (23 Mar 2020 08:33) (17 - 18)  SpO2: 99% (23 Mar 2020 08:33) (89% - 99%)    Exam limited as patient declined physical exam  General: Female, appears stated age, in no apparent distress including pain  Neurological (>12):  MS: Awake, alert, oriented to person, time (knows current president), place, maintains alertness.  Language: Speech is clear, fluent, +paraphasic errors with preserved comprehension, difficulty repeating, word finding, semantic errors  CNs: EOMI. No gross facial asymmetry b/l. Hearing grossly normal b/l. Head turning intact b/l.   Fundoscopic: deferred  Motor: Normal muscle bulk & tone. No noticeable tremor or seizure. Moves all extremities spontaneously.	   Sensation: deferred    Cortical: Extinction on DSS (neglect): deferred    Reflexes: deferred            Coordination: deferred    Gait: deferred.     LABORATORY:                     9.5    6.67  )-----------( 339      ( 21 Mar 2020 09:21 )             30.5     03-21    137  |  104  |  12  ----------------------------<  263<H>  4.1   |  20<L>  |  0.65    Ca    9.1      21 Mar 2020 09:21    TPro  7.9  /  Alb  3.4  /  TBili  0.5  /  DBili  x   /  AST  179<H>  /  ALT  139<H>  /  AlkPhos  612<H>  03-21    LIVER FUNCTIONS - ( 21 Mar 2020 09:21 )  Alb: 3.4 g/dL / Pro: 7.9 g/dL / ALK PHOS: 612 U/L / ALT: 139 U/L / AST: 179 U/L / GGT: x             Coagulopathy   Lipid Panel 03-16 Chol 150 LDL 58 HDL 61 Trig 157<H>  A1c 03-15 [id number] >15.5    Cardiac enzymes     U/A   CSF  Immunological  Other    STUDIES & IMAGING:  Studies (EKG, EEG, EMG, etc):     Radiology (XR, CT, MR, U/S, TTE/LUC):  < from: MR Head w/ IV Cont (03.20.20 @ 16:22) >  IMPRESSION:    Leptomeningeal enhancement most prominent within the left temporo-occipital region. Infectious/inflammatory or neoplastic etiologies should be considered such as meningitis or leptomeningeal carcinomatosis.

## 2020-03-23 NOTE — PROGRESS NOTE ADULT - ASSESSMENT
74 year old female with a history of uncontrolled diabetes, CAD s/p stent and CABG presenting with subacute/ acute encephalopathy and severe headache, with hospital course complicated by new seizure.   Clinically improved on my exam, more interactive. Answering questions.     1. Encephalopathy, headache, seizure: ? encephalitis  MRI unrevealing  - Per Neuro, s/p repeat MRI  - LP completed: 1 WBC, inconsistent with infectious process. PCR panel is negative (which includes many viral etiologies). AI pending. Continue to empirically cover with acyclovir   AS patient has improved , will continue . Await EEG to see if has characteristic spikes  - Follow up serum/ CSF studies- negative to date  - Neuro following  -await 14-3-3      2. Transaminitis: ? If related to systemic viral syndrome/ERAZO. Workup has been nondiagnostic so far  We will also send ceruloplasmin- slightly elevated .       await multiple studies           Pt with a m spike-  Hematology/oncology workup     Unclear what caused acute altered mental status but patient seems to have improved and the only intervention was the acyclovir, so will complete a 14 day course  please hydrate  stared March 15th- day #9/14  monitor the creatine- stable  Pt has refised labs

## 2020-03-23 NOTE — PROGRESS NOTE ADULT - SUBJECTIVE AND OBJECTIVE BOX
Patient is a 75y old  Female who presents with a chief complaint of headache (23 Mar 2020 09:36)    Being followed by ID for        Interval history:  pt notes constipation  no cough  No  acute events        PAST MEDICAL & SURGICAL HISTORY:  Chronic systolic heart failure  Diabetes  S/P CABG x 1  S/P breast lumpectomy: left  History of tonsillectomy  H/O abdominal hysterectomy: 1991    Allergies    clams - itching (Other)  No Known Drug Allergies  Peroxide (Blisters)    Intolerances      Antimicrobials:    acyclovir IVPB      acyclovir IVPB 700 milliGRAM(s) IV Intermittent every 8 hours    MEDICATIONS  (STANDING):  acyclovir IVPB      acyclovir IVPB 700 milliGRAM(s) IV Intermittent every 8 hours  aspirin  chewable 81 milliGRAM(s) Oral daily  atorvastatin 40 milliGRAM(s) Oral at bedtime  dextrose 5%. 1000 milliLiter(s) (50 mL/Hr) IV Continuous <Continuous>  dextrose 50% Injectable 12.5 Gram(s) IV Push once  dextrose 50% Injectable 25 Gram(s) IV Push once  dextrose 50% Injectable 25 Gram(s) IV Push once  insulin glargine Injectable (LANTUS) 24 Unit(s) SubCutaneous at bedtime  insulin lispro (HumaLOG) corrective regimen sliding scale   SubCutaneous three times a day before meals  insulin lispro (HumaLOG) corrective regimen sliding scale   SubCutaneous at bedtime  insulin lispro Injectable (HumaLOG) 8 Unit(s) SubCutaneous three times a day before meals  levETIRAcetam 750 milliGRAM(s) Oral two times a day  metoprolol tartrate 25 milliGRAM(s) Oral daily  pantoprazole    Tablet 40 milliGRAM(s) Oral before breakfast  polyethylene glycol 3350 17 Gram(s) Oral two times a day  sodium chloride 0.9%. 1000 milliLiter(s) (100 mL/Hr) IV Continuous <Continuous>      Vital Signs Last 24 Hrs  T(C): 36.5 (03-23-20 @ 15:23), Max: 36.8 (03-23-20 @ 00:40)  T(F): 97.7 (03-23-20 @ 15:23), Max: 98.2 (03-23-20 @ 00:40)  HR: 62 (03-23-20 @ 15:23) (62 - 70)  BP: 145/83 (03-23-20 @ 15:23) (141/66 - 166/70)  BP(mean): --  RR: 18 (03-23-20 @ 15:23) (17 - 18)  SpO2: 94% (03-23-20 @ 15:23) (94% - 99%)    Physical Exam:    Constitutional still confused but speech is mor fluent    HEENT PERRLA EOMI,No pallor or icterus    No oral exudate or erythema    Neck supple no JVD or LN    Chest Good AE,CTA    CVS RRR S1 S2 WNl    Abd soft BS normal No tenderness no masses    Ext No cyanosis clubbing or edema    IV site no erythema tenderness or discharge    Joints no swelling or LOM    CNS AAO X 3 no focal    Lab Data:    Comprehensive Metabolic Panel in AM (03.21.20 @ 09:21)  < from: MR Head w/ IV Cont (03.20.20 @ 16:22) >  EXAM:  MR BRAIN IC                            PROCEDURE DATE:  03/20/2020            INTERPRETATION:  INDICATIONS:  Aphasia, altered mental status, congestive heart failure status post CABG.    TECHNIQUE: Following intravenous gadolinium sagittal and axial and coronal postcontrast T1-weighted images were performed. Axial T2 FLAIR and diffusion-weighted images were also performed. 10 cc Gadavist were administered. 0 cc were discarded.      COMPARISON EXAMINATION:  3/17/2020      FINDINGS:    VENTRICLES AND SULCI:  Prominent in size compatible with age-appropriate volume loss  INTRA-AXIAL:  Patchy areas of white matter T2 hyperintensity are seen compatible with mild to moderate microvascular-type changes. No mass effect or diffusion restriction is seen.  EXTRA-AXIAL:  Leptomeningeal enhancement is seen most prominent within the left lateral temporo-occipital region. No focal extra-axial collection is seen.  VISUALIZED SINUSES:  Mild to moderate mucosal thickening  VISUALIZED MASTOIDS:  Clear.  CALVARIUM:  Normal.  CAROTID FLOW VOIDS:  Present.  MISCELLANEOUS:  None.      IMPRESSION:    Leptomeningeal enhancement most prominent within the left temporo-occipital region. Infectious/inflammatory or neoplastic etiologies should be considered such as meningitis or leptomeningeal carcinomatosis.    Findings were discussed with Dr. Villavicencio on 3/20/2020 5:08 PM with read back.    < end of copied text >    Creatinine, Serum: 0.65 mg/dL                    .Blood Blood-Peripheral  03-17-20   No growth at 5 days.  --  --      .Blood Blood-Venous  03-17-20   No growth at 5 days.  --  --      .CSF CSF  03-17-20   No growth  --    polymorphonuclear leukocytes seen  No organisms seen  by cytocentrifuge                    WBC Count: 6.67 (03-21-20 @ 09:21)  WBC Count: 6.15 (03-19-20 @ 09:34)  WBC Count: 5.41 (03-18-20 @ 07:11)  WBC Count: 6.08 (03-17-20 @ 06:23)

## 2020-03-23 NOTE — PROVIDER CONTACT NOTE (OTHER) - ACTION/TREATMENT ORDERED:
Notified MD Landin that pt is refusing fingerstick to be done, MD aware, no further orders given. Will continue to monitor pt.

## 2020-03-23 NOTE — PROGRESS NOTE ADULT - ATTENDING COMMENTS
complex case  improved mental status since admission  continue Acyclovir  patient refusing blood draws and iv acces despite multiple attempts to explain rationale.  wants to go home but not medically clear  spoke to daughter Mackenzie regarding lack of patient cooperation and the importance of complying with medically necessary imaging.  daughter will speak to the patient

## 2020-03-23 NOTE — PROGRESS NOTE ADULT - SUBJECTIVE AND OBJECTIVE BOX
INTERVAL HPI/OVERNIGHT EVENTS:    SUBJECTIVE: Patient seen and examined at bedside. Patient refusing multiple studies and tests. Patient inquiring about discharge date, no compplaints.     ROS neg except as above    OBJECTIVE:    VITAL SIGNS:  ICU Vital Signs Last 24 Hrs  T(C): 36.5 (23 Mar 2020 08:33), Max: 36.8 (23 Mar 2020 00:40)  T(F): 97.7 (23 Mar 2020 08:33), Max: 98.2 (23 Mar 2020 00:40)  HR: 67 (23 Mar 2020 08:33) (67 - 70)  BP: 158/74 (23 Mar 2020 08:33) (136/93 - 166/70)  BP(mean): --  ABP: --  ABP(mean): --  RR: 18 (23 Mar 2020 08:33) (17 - 18)  SpO2: 99% (23 Mar 2020 08:33) (89% - 99%)        03-22 @ 07:01  -  03-23 @ 07:00  --------------------------------------------------------  IN: 240 mL / OUT: 0 mL / NET: 240 mL      CAPILLARY BLOOD GLUCOSE          PHYSICAL EXAM:    GENERAL: elderly female, pleasant, NAD  NEURO: AO x3, nonfocal, has word finding difficulties and is slow to respond   HEAD:  Atraumatic, Normocephalic  EYES: conjunctiva and sclera clear  NECK: Supple  CHEST/LUNG: Clear to auscultation bilaterally; No wheezes, rales or rhonchi  HEART: Regular rate and rhythm; No murmurs, rubs, or gallops  ABDOMEN: Soft, Nontender, Nondistended; Bowel sounds present, no masses.  EXTREMITIES:  2+ Peripheral Pulses, No clubbing, cyanosis, or edema  SKIN: Warm, dry, in tact, no rashes or lesions  PSYCH: affect appropriate    MEDICATIONS:  MEDICATIONS  (STANDING):  acyclovir IVPB      acyclovir IVPB 700 milliGRAM(s) IV Intermittent every 8 hours  aspirin  chewable 81 milliGRAM(s) Oral daily  atorvastatin 40 milliGRAM(s) Oral at bedtime  dextrose 5%. 1000 milliLiter(s) (50 mL/Hr) IV Continuous <Continuous>  dextrose 50% Injectable 12.5 Gram(s) IV Push once  dextrose 50% Injectable 25 Gram(s) IV Push once  dextrose 50% Injectable 25 Gram(s) IV Push once  insulin glargine Injectable (LANTUS) 24 Unit(s) SubCutaneous at bedtime  insulin lispro (HumaLOG) corrective regimen sliding scale   SubCutaneous three times a day before meals  insulin lispro (HumaLOG) corrective regimen sliding scale   SubCutaneous at bedtime  insulin lispro Injectable (HumaLOG) 8 Unit(s) SubCutaneous three times a day before meals  levETIRAcetam 750 milliGRAM(s) Oral two times a day  metoprolol tartrate 25 milliGRAM(s) Oral daily  pantoprazole    Tablet 40 milliGRAM(s) Oral before breakfast  polyethylene glycol 3350 17 Gram(s) Oral two times a day  sodium chloride 0.9%. 1000 milliLiter(s) (100 mL/Hr) IV Continuous <Continuous>    MEDICATIONS  (PRN):  AQUAPHOR (petrolatum Ointment) 1 Application(s) Topical two times a day PRN Dry skin  dextrose 40% Gel 15 Gram(s) Oral once PRN Blood Glucose LESS THAN 70 milliGRAM(s)/deciliter  glucagon  Injectable 1 milliGRAM(s) IntraMuscular once PRN Glucose LESS THAN 70 milligrams/deciliter      ALLERGIES:  Allergies    clams - itching (Other)  No Known Drug Allergies  Peroxide (Blisters)    Intolerances        LABS:                RADIOLOGY & ADDITIONAL TESTS: Reviewed.

## 2020-03-23 NOTE — PROGRESS NOTE ADULT - PROBLEM SELECTOR PLAN 9
ISTOP: Reference #: [id number]; last alprazolam filled in 2014  DVT ppx: lovenox qdaily  Diet: soft diet  Dispo: pending PT and speech therapy consult    1.  Name of PCP: Felipe Manuel   2.  PCP Contacted on Admission: [ ] Y    [ ] N    3.  PCP contacted at Discharge: [ ] Y    [ ] N    [ ] N/A  4.  Post-Discharge Appointment Date and Location:  5.  Summary of Handoff given to PCP:

## 2020-03-23 NOTE — PROGRESS NOTE ADULT - ATTENDING COMMENTS
NEUROLOGY ATTENDING NOTE: MAURICIO   CC: APHASIA, ABNORMAL BRAIN MRI  PATIENT SEEN & EXAMINED WITH HOUSESTAFF ON 3/23/2020  SUNRISE NOTES REVIEWED  ALLSCRIPTS NOTES REVIEWED  IMAGING PEROSNALLY REVIEWED AND INTERPRETED BY ME  LABS REVIEWED    74 yo RH woman with PMHx DM, CAD (s/p stent & CABG), lives with her sister, who noticed subacute (over 1-2 weeks) speech difficulty. On admission, found to have global aphasia, with both mild receptive and more significant word-finding difficulties. Imaging with contrast demonstrated subtle leptomeningeal enhancement over the left frontal lobe, while CSF was largely unremarkable. She was found to have MGUS on lab evaluation as well as elevated inflammatory markers (PVL=813, CRP=1.2). She resists examination and EEG.    She was last seen by Dr Manuel (medicine) on 3/13/20, where he noted the communication issues, singultus and otherwise remarkably high performing (walks and drives). He noted her “EKG shows sinus rhythm at 72 BPM with occasional PAC and old anteroseptal infarct” and was concerned for a recent CVA despite her ASA 81mg.  She was admitted to [facility] on 3/14/2020, where an MRI did not disclose a CVA.    EXAMINATION    LABS  WBC=6.7; HGB=9.5; PXYVBAWDM=980  137|104|12/263  4.1|20|0.65\  HGA1C>15.5 (ON 3/15/20)  AST/ALT=179/139  XPS=659  ALK PHOS=612  AMMONIA=37 (NORMAL)  ALBUMIN=3.4  KKU=700  CRP=1.2 (ELEVATED) and LLC=653 (HIGH)  HIV NEGATIVE, HHV-6 NEGATIVE, CMV NEGATIVE, EBV IgG HIGHLY POSITIVE AND EBV IgM MILDLY POSITIVE. CSF HSV NEGATIVE. & CSF PCR NEGATIVE  NORMAL THYROID STUDIES  MGUS MILDLY POSITIVE  3/16/2020 CSF: PROTEIN=59, GLUCOSE=178, WBC=1, RBC=0; HSV NEGATIVE.  3/14/2020 UA: LARGE LEUKOCYTE ESTERASE  IMAGING  I personally reviewed and interpreted neuroimaging dated  3/20/2020			    In reviewing these images, I find left posterior temporal and – to a lesser degree right posterior temporal HYPERINTENSITY on the T2 weighted images, with perhaps some subtle corresponding signal change on DWI.   On the contrast enhanced images, there is minimal, but real leptomeningeal enhancement involving the same region, best seen on the Coronal imaging.  DWI imaging shows no acute CVA.  Overall I find the images to be consistent with neoplastic meningitis, infectious meningitis, and even prion disease.    IMPRESSION  74 yo RH woman with multiple risk factors for CVA, now with aphasia, poorly controlled hyperglycemia and abnormal leptomeninges on neuroimaging affecting brain regions consistent with her aphasia.  ENCEPHALITIS – There is no HSV found on CSF analysis, and the absent WBC count similarly makes this diagnosis unlikely. Rarely paraneoplastic syndromes may present this way, but there is no neoplasm. Recommend performing CT-c/a/p to look for an occult neoplasm, such as lymphoma, given the MGUS. I see no CSF flow cytometry or cytology.  MGUS – This is mild and may not be real. However, MGUS occasionally progresses to jen neoplasm, like a lymphoma.  APHASIA – Appreciate early initiation of anti-viral medications, as well as ID input regarding continued administration of these drugs. Plan to complete 14 days total as patient has improved on this drug.  HYPERGLYCEMIA – The markedly elevated HgA1C suggests this remains a major obstacle to good health. While known to cause an encephalopathy, I am unsure the MR findings can be explained by the persistently elevated serum glucose. Agree with control of levels with insulin.  HEPATITIS – Seen by hepatology team on 3/17/20,   DISPO – If CT-c/a/p unrevealing, 14-3-3 unavailable, and no other explanation, then may repeat LP for RT-QUIC analysis and flow cytometry/cytological analysis.

## 2020-03-23 NOTE — PROGRESS NOTE ADULT - ASSESSMENT
Assessment:  74y R-handed F with h/o DM, CAD s/p stent and CABG p/w subacute onset mixed aphasia with motor predominance likely due to left hemispheric dysfunction   Patient with positive active EBV serologies, abnormal IgG, kappa and Lambda, with M-spike. Underlying hematologic malignancy would cause immunosuppression.      Impression: Subacute mixed aphasia -- improving but with leptomeningeal enhancement on MRI  Ddx includes: Underlying malignancy with possible CSF infiltration vs possible encephalitis (currently being treated on Acyclovir) vs encephalopathy induced by systemic infection (EBV)    Plan:   [] Recommend CT C/A/P with contrast to evaluate for potential malignancy  [] Recommend hematology/oncology consult considering patient's abnormal SPEP  [] Trend Ammonia  [x] Repeat MRI brain w/ contrast from 3/20: leptomeningeal enhancement most prominent within the left temporo-occipital region.   [x] MRA head w/o contrast: negative  [x] MRA neck w contrast: negative  [x] 24 hr vEEG: refused by patient  [x] Continue Keppra 750 mg BID  [x] Appreciate ID consult, agree with continuing acyclovir and hematology/oncology consult   [x] LP - protein 59, glucose, Cell count, gram stain, culture, PCR, HSV, crypto, [place] virus, Myelin Basic Protein, IgG index, Oligoclonal bands   [] No plan for repeat LP at this time  [] No further inpatient Neurologic work-up at this time; rest management per primary team/ hematology/oncology/ GI   [] Patient can follow-up with outpatient Neurology on discharge  Address: [address] [place] [address]  Phone: [phone]    Case discussed with neurology attending, Dr. Kurtz

## 2020-03-24 LAB
% GAMMA, URINE: 16.4 % — SIGNIFICANT CHANGE UP
ALBUMIN 24H MFR UR ELPH: 16.1 % — SIGNIFICANT CHANGE UP
ALBUMIN SERPL ELPH-MCNC: 3.2 G/DL — LOW (ref 3.3–5)
ALP SERPL-CCNC: 691 U/L — HIGH (ref 40–120)
ALPHA1 GLOB 24H MFR UR ELPH: 32.6 % — SIGNIFICANT CHANGE UP
ALPHA2 GLOB 24H MFR UR ELPH: 15.3 % — SIGNIFICANT CHANGE UP
ALT FLD-CCNC: 131 U/L — HIGH (ref 10–45)
ANION GAP SERPL CALC-SCNC: 12 MMOL/L — SIGNIFICANT CHANGE UP (ref 5–17)
AST SERPL-CCNC: 133 U/L — HIGH (ref 10–40)
B-GLOBULIN 24H MFR UR ELPH: 19.6 % — SIGNIFICANT CHANGE UP
BILIRUB SERPL-MCNC: 0.5 MG/DL — SIGNIFICANT CHANGE UP (ref 0.2–1.2)
BUN SERPL-MCNC: 14 MG/DL — SIGNIFICANT CHANGE UP (ref 7–23)
CALCIUM SERPL-MCNC: 8.8 MG/DL — SIGNIFICANT CHANGE UP (ref 8.4–10.5)
CHLORIDE SERPL-SCNC: 102 MMOL/L — SIGNIFICANT CHANGE UP (ref 96–108)
CO2 SERPL-SCNC: 20 MMOL/L — LOW (ref 22–31)
COLLECT DURATION TIME UR: 24 HR — SIGNIFICANT CHANGE UP
CREAT SERPL-MCNC: 0.7 MG/DL — SIGNIFICANT CHANGE UP (ref 0.5–1.3)
GLUCOSE SERPL-MCNC: 277 MG/DL — HIGH (ref 70–99)
HCT VFR BLD CALC: 30.1 % — LOW (ref 34.5–45)
HGB BLD-MCNC: 9.5 G/DL — LOW (ref 11.5–15.5)
INTERPRETATION 24H UR IFE-IMP: SIGNIFICANT CHANGE UP
INTERPRETATION 24H UR IFE-IMP: SIGNIFICANT CHANGE UP
M PROTEIN 24H UR ELPH-MRATE: 0 MG/24HR — SIGNIFICANT CHANGE UP (ref 0–0)
M PROTEIN 24H UR ELPH-MRATE: 0 MG/DL — SIGNIFICANT CHANGE UP
MCHC RBC-ENTMCNC: 26 PG — LOW (ref 27–34)
MCHC RBC-ENTMCNC: 31.6 GM/DL — LOW (ref 32–36)
MCV RBC AUTO: 82.5 FL — SIGNIFICANT CHANGE UP (ref 80–100)
NRBC # BLD: 0 /100 WBCS — SIGNIFICANT CHANGE UP (ref 0–0)
OLIGOCLONAL BANDS CSF ELPH-IMP: SIGNIFICANT CHANGE UP
OLIGOCLONAL BANDS CSF ELPH-IMP: SIGNIFICANT CHANGE UP
PLATELET # BLD AUTO: 303 K/UL — SIGNIFICANT CHANGE UP (ref 150–400)
POTASSIUM SERPL-MCNC: 3.7 MMOL/L — SIGNIFICANT CHANGE UP (ref 3.5–5.3)
POTASSIUM SERPL-SCNC: 3.7 MMOL/L — SIGNIFICANT CHANGE UP (ref 3.5–5.3)
PROT ?TM UR-MCNC: 13 MG/DL — HIGH (ref 0–12)
PROT PATTERN 24H UR ELPH-IMP: SIGNIFICANT CHANGE UP
PROT SERPL-MCNC: 7.9 G/DL — SIGNIFICANT CHANGE UP (ref 6–8.3)
PROTEIN QUANT CALC, URINE: 176 MG/24 H — HIGH (ref 50–100)
RBC # BLD: 3.65 M/UL — LOW (ref 3.8–5.2)
RBC # FLD: 15.2 % — HIGH (ref 10.3–14.5)
SODIUM SERPL-SCNC: 134 MMOL/L — LOW (ref 135–145)
TOTAL VOLUME - 24 HOUR: 1350 ML — SIGNIFICANT CHANGE UP
URINE CREATININE CALCULATION: 0.4 G/24 H — LOW (ref 0.8–1.8)
WBC # BLD: 6.2 K/UL — SIGNIFICANT CHANGE UP (ref 3.8–10.5)
WBC # FLD AUTO: 6.2 K/UL — SIGNIFICANT CHANGE UP (ref 3.8–10.5)

## 2020-03-24 PROCEDURE — 99232 SBSQ HOSP IP/OBS MODERATE 35: CPT | Mod: GC

## 2020-03-24 PROCEDURE — 99233 SBSQ HOSP IP/OBS HIGH 50: CPT | Mod: GC

## 2020-03-24 RX ORDER — ALPRAZOLAM 0.25 MG
0.25 TABLET ORAL ONCE
Refills: 0 | Status: DISCONTINUED | OUTPATIENT
Start: 2020-03-24 | End: 2020-03-24

## 2020-03-24 RX ADMIN — Medication 0.25 MILLIGRAM(S): at 20:23

## 2020-03-24 RX ADMIN — Medication 264 MILLIGRAM(S): at 10:04

## 2020-03-24 RX ADMIN — SODIUM CHLORIDE 100 MILLILITER(S): 9 INJECTION INTRAMUSCULAR; INTRAVENOUS; SUBCUTANEOUS at 11:20

## 2020-03-24 RX ADMIN — Medication 25 MILLIGRAM(S): at 05:56

## 2020-03-24 RX ADMIN — Medication 264 MILLIGRAM(S): at 00:03

## 2020-03-24 RX ADMIN — LEVETIRACETAM 750 MILLIGRAM(S): 250 TABLET, FILM COATED ORAL at 18:08

## 2020-03-24 RX ADMIN — Medication 81 MILLIGRAM(S): at 11:20

## 2020-03-24 RX ADMIN — Medication 264 MILLIGRAM(S): at 18:05

## 2020-03-24 RX ADMIN — LEVETIRACETAM 750 MILLIGRAM(S): 250 TABLET, FILM COATED ORAL at 05:56

## 2020-03-24 RX ADMIN — PANTOPRAZOLE SODIUM 40 MILLIGRAM(S): 20 TABLET, DELAYED RELEASE ORAL at 05:56

## 2020-03-24 RX ADMIN — Medication 5 MILLIGRAM(S): at 18:10

## 2020-03-24 RX ADMIN — POLYETHYLENE GLYCOL 3350 17 GRAM(S): 17 POWDER, FOR SOLUTION ORAL at 05:56

## 2020-03-24 NOTE — PROGRESS NOTE ADULT - SUBJECTIVE AND OBJECTIVE BOX
INTERVAL HPI/OVERNIGHT EVENTS:    SUBJECTIVE: Patient seen and examined at bedside. No overnight events. Patient more compliant with treatment this AM. Endorses constipation.       ROS neg except as above    OBJECTIVE:    VITAL SIGNS:  ICU Vital Signs Last 24 Hrs  T(C): 36.8 (24 Mar 2020 07:21), Max: 36.9 (24 Mar 2020 00:07)  T(F): 98.3 (24 Mar 2020 07:21), Max: 98.4 (24 Mar 2020 00:07)  HR: 62 (24 Mar 2020 07:21) (62 - 69)  BP: 100/61 (24 Mar 2020 07:21) (100/61 - 162/98)  BP(mean): --  ABP: --  ABP(mean): --  RR: 18 (24 Mar 2020 07:21) (18 - 18)  SpO2: 96% (24 Mar 2020 07:21) (93% - 96%)        03-23 @ 07:01  -  03-24 @ 07:00  --------------------------------------------------------  IN: 1185 mL / OUT: 0 mL / NET: 1185 mL    03-24 @ 07:01  -  03-24 @ 15:38  --------------------------------------------------------  IN: 120 mL / OUT: 0 mL / NET: 120 mL      CAPILLARY BLOOD GLUCOSE          PHYSICAL EXAM:    GENERAL: elderly female, pleasant, NAD  NEURO: AO x3, nonfocal, has word finding difficulties and is slow to respond   HEAD:  Atraumatic, Normocephalic  EYES: conjunctiva and sclera clear  NECK: Supple  CHEST/LUNG: Clear to auscultation bilaterally; No wheezes, rales or rhonchi  HEART: Regular rate and rhythm; No murmurs, rubs, or gallops  ABDOMEN: Soft, Nontender, Nondistended; Bowel sounds present, no masses.  EXTREMITIES:  2+ Peripheral Pulses, No clubbing, cyanosis, or edema  SKIN: Warm, dry, in tact, no rashes or lesions  PSYCH: affect appropriate    MEDICATIONS:  MEDICATIONS  (STANDING):  acyclovir IVPB      acyclovir IVPB 700 milliGRAM(s) IV Intermittent every 8 hours  aspirin  chewable 81 milliGRAM(s) Oral daily  atorvastatin 40 milliGRAM(s) Oral at bedtime  dextrose 5%. 1000 milliLiter(s) (50 mL/Hr) IV Continuous <Continuous>  dextrose 50% Injectable 12.5 Gram(s) IV Push once  dextrose 50% Injectable 25 Gram(s) IV Push once  dextrose 50% Injectable 25 Gram(s) IV Push once  insulin glargine Injectable (LANTUS) 24 Unit(s) SubCutaneous at bedtime  insulin lispro (HumaLOG) corrective regimen sliding scale   SubCutaneous three times a day before meals  insulin lispro (HumaLOG) corrective regimen sliding scale   SubCutaneous at bedtime  insulin lispro Injectable (HumaLOG) 8 Unit(s) SubCutaneous three times a day before meals  levETIRAcetam 750 milliGRAM(s) Oral two times a day  metoprolol tartrate 25 milliGRAM(s) Oral daily  pantoprazole    Tablet 40 milliGRAM(s) Oral before breakfast  polyethylene glycol 3350 17 Gram(s) Oral two times a day  sodium chloride 0.9%. 1000 milliLiter(s) (100 mL/Hr) IV Continuous <Continuous>    MEDICATIONS  (PRN):  AQUAPHOR (petrolatum Ointment) 1 Application(s) Topical two times a day PRN Dry skin  bisacodyl 5 milliGRAM(s) Oral every 12 hours PRN Constipation  dextrose 40% Gel 15 Gram(s) Oral once PRN Blood Glucose LESS THAN 70 milliGRAM(s)/deciliter  glucagon  Injectable 1 milliGRAM(s) IntraMuscular once PRN Glucose LESS THAN 70 milligrams/deciliter      ALLERGIES:  Allergies    clams - itching (Other)  No Known Drug Allergies  Peroxide (Blisters)    Intolerances        LABS:                        9.5    6.20  )-----------( 303      ( 24 Mar 2020 13:14 )             30.1     03-24    134<L>  |  102  |  14  ----------------------------<  277<H>  3.7   |  20<L>  |  0.70    Ca    8.8      24 Mar 2020 13:14    TPro  7.9  /  Alb  3.2<L>  /  TBili  0.5  /  DBili  x   /  AST  133<H>  /  ALT  131<H>  /  AlkPhos  691<H>  03-24          RADIOLOGY & ADDITIONAL TESTS: Reviewed.

## 2020-03-24 NOTE — PROGRESS NOTE ADULT - SUBJECTIVE AND OBJECTIVE BOX
Chief Complaint:  Patient is a 75y old  Female who presents with a chief complaint of headache (23 Mar 2020 16:49)      Interval Events:     Allergies:  clams - itching (Other)  No Known Drug Allergies  Peroxide (Blisters)      Home Medications:    Hospital Medications:  acyclovir IVPB      acyclovir IVPB 700 milliGRAM(s) IV Intermittent every 8 hours  AQUAPHOR (petrolatum Ointment) 1 Application(s) Topical two times a day PRN  aspirin  chewable 81 milliGRAM(s) Oral daily  atorvastatin 40 milliGRAM(s) Oral at bedtime  dextrose 40% Gel 15 Gram(s) Oral once PRN  dextrose 5%. 1000 milliLiter(s) IV Continuous <Continuous>  dextrose 50% Injectable 12.5 Gram(s) IV Push once  dextrose 50% Injectable 25 Gram(s) IV Push once  dextrose 50% Injectable 25 Gram(s) IV Push once  glucagon  Injectable 1 milliGRAM(s) IntraMuscular once PRN  insulin glargine Injectable (LANTUS) 24 Unit(s) SubCutaneous at bedtime  insulin lispro (HumaLOG) corrective regimen sliding scale   SubCutaneous three times a day before meals  insulin lispro (HumaLOG) corrective regimen sliding scale   SubCutaneous at bedtime  insulin lispro Injectable (HumaLOG) 8 Unit(s) SubCutaneous three times a day before meals  levETIRAcetam 750 milliGRAM(s) Oral two times a day  metoprolol tartrate 25 milliGRAM(s) Oral daily  pantoprazole    Tablet 40 milliGRAM(s) Oral before breakfast  polyethylene glycol 3350 17 Gram(s) Oral two times a day  sodium chloride 0.9%. 1000 milliLiter(s) IV Continuous <Continuous>      PMHX/PSHX:  Chronic systolic heart failure  Diabetes  S/P CABG x 1  S/P breast lumpectomy  History of tonsillectomy  H/O abdominal hysterectomy      Family history:  FH: type 2 diabetes      ROS:     General:  No wt loss, fevers, chills, night sweats, fatigue,   Eyes:  Good vision, no reported pain  ENT:  No sore throat, pain, runny nose, dysphagia  CV:  No pain, palpitations, hypo/hypertension  Resp:  No dyspnea, cough, tachypnea, wheezing  GI:  No pain, No nausea, No vomiting, No diarrhea, No constipation, No weight loss, No fever, No pruritis, No rectal bleeding, No tarry stools, No dysphagia,  :  No pain, bleeding, incontinence, nocturia  Muscle:  No pain, weakness  Neuro:  No weakness, tingling, memory problems  Psych:  No fatigue, insomnia, mood problems, depression  Endocrine:  No polyuria, polydipsia, cold/heat intolerance  Heme:  No petechiae, ecchymosis, easy bruisability  Skin:  No rash, tattoos, scars, edema      PHYSICAL EXAM:   Vital Signs:  Vital Signs Last 24 Hrs  T(C): 36.8 (24 Mar 2020 07:21), Max: 36.9 (24 Mar 2020 00:07)  T(F): 98.3 (24 Mar 2020 07:21), Max: 98.4 (24 Mar 2020 00:07)  HR: 62 (24 Mar 2020 07:21) (62 - 69)  BP: 100/61 (24 Mar 2020 07:21) (100/61 - 162/98)  BP(mean): --  RR: 18 (24 Mar 2020 07:21) (18 - 18)  SpO2: 96% (24 Mar 2020 07:21) (93% - 96%)  Daily     Daily       LABS:                    Imaging: Chief Complaint:  Patient is a 75y old  Female who presents with a chief complaint of headache (23 Mar 2020 16:49)      Interval Events:   No events overnight    Allergies:  clams - itching (Other)  No Known Drug Allergies  Peroxide (Blisters)      Home Medications:    Hospital Medications:  acyclovir IVPB      acyclovir IVPB 700 milliGRAM(s) IV Intermittent every 8 hours  AQUAPHOR (petrolatum Ointment) 1 Application(s) Topical two times a day PRN  aspirin  chewable 81 milliGRAM(s) Oral daily  atorvastatin 40 milliGRAM(s) Oral at bedtime  dextrose 40% Gel 15 Gram(s) Oral once PRN  dextrose 5%. 1000 milliLiter(s) IV Continuous <Continuous>  dextrose 50% Injectable 12.5 Gram(s) IV Push once  dextrose 50% Injectable 25 Gram(s) IV Push once  dextrose 50% Injectable 25 Gram(s) IV Push once  glucagon  Injectable 1 milliGRAM(s) IntraMuscular once PRN  insulin glargine Injectable (LANTUS) 24 Unit(s) SubCutaneous at bedtime  insulin lispro (HumaLOG) corrective regimen sliding scale   SubCutaneous three times a day before meals  insulin lispro (HumaLOG) corrective regimen sliding scale   SubCutaneous at bedtime  insulin lispro Injectable (HumaLOG) 8 Unit(s) SubCutaneous three times a day before meals  levETIRAcetam 750 milliGRAM(s) Oral two times a day  metoprolol tartrate 25 milliGRAM(s) Oral daily  pantoprazole    Tablet 40 milliGRAM(s) Oral before breakfast  polyethylene glycol 3350 17 Gram(s) Oral two times a day  sodium chloride 0.9%. 1000 milliLiter(s) IV Continuous <Continuous>      PMHX/PSHX:  Chronic systolic heart failure  Diabetes  S/P CABG x 1  S/P breast lumpectomy  History of tonsillectomy  H/O abdominal hysterectomy      Family history:  FH: type 2 diabetes      ROS:     General:  No wt loss, fevers, chills, night sweats, fatigue,   Eyes:  Good vision, no reported pain  ENT:  No sore throat, pain, runny nose, dysphagia  CV:  No pain, palpitations, hypo/hypertension  Resp:  No dyspnea, cough, tachypnea, wheezing  GI:  No pain, No nausea, No vomiting, No diarrhea, No constipation, No weight loss, No fever, No pruritis, No rectal bleeding, No tarry stools, No dysphagia,  :  No pain, bleeding, incontinence, nocturia  Muscle:  No pain, weakness  Neuro:  No weakness, tingling, memory problems  Psych:  No fatigue, insomnia, mood problems, depression  Endocrine:  No polyuria, polydipsia, cold/heat intolerance  Heme:  No petechiae, ecchymosis, easy bruisability  Skin:  No rash, tattoos, scars, edema      PHYSICAL EXAM:   Vital Signs:  Vital Signs Last 24 Hrs  T(C): 36.8 (24 Mar 2020 07:21), Max: 36.9 (24 Mar 2020 00:07)  T(F): 98.3 (24 Mar 2020 07:21), Max: 98.4 (24 Mar 2020 00:07)  HR: 62 (24 Mar 2020 07:21) (62 - 69)  BP: 100/61 (24 Mar 2020 07:21) (100/61 - 162/98)  BP(mean): --  RR: 18 (24 Mar 2020 07:21) (18 - 18)  SpO2: 96% (24 Mar 2020 07:21) (93% - 96%)  Daily     Daily       LABS:                    Imaging:

## 2020-03-24 NOTE — PROGRESS NOTE ADULT - ASSESSMENT
74F with T2DM, systolic HF, CABG p/w subacute/ acute? encephalopathiy and aphasia c/b seizure, currently undergoing work up. Hepatology consulted for evaluation of abnormal liver chemistries. She has a mild increase in ALT/AST with a rise in alkaline phosphatase with a normal bilirubin and INR. The R factor of 0.4 consistent with cholestatic liver injury. US abdomen showing hepatomegaly but no other abnormalities. Hep serologies negative to date. REECE 1:80. Ceruloplasmin of 49, EBV IgM + and HSV IgG +. The differential diagnosis includes DDx: Autoimmune (PBC or autoimmune hepatitis) vs Infectious hepatitis (EBV) vs. ERAZO vs drug induced liver injury (less likely) vs possible hypothyroidism. She does have mental changes c/w aphasia. this is unlikely secondary to liver disease.    Recommendations  -	 f/u AMA, SMA, serum IgG, IgM  -	MRI/MRCP of abdomen with contrast--please do not obtain CTAP  -	f/u  HSV IgM and PCR and EBV PCR  -	Avoid hepatotoxic medications. If possible would consider switching keppra to alternative anti seizure mediation if liver enzymes do not improve  - f/u ID recommendations 74F with T2DM, systolic HF, CABG p/w subacute/ acute? encephalopathiy and aphasia c/b seizure, currently undergoing work up. Hepatology consulted for evaluation of abnormal liver chemistries. She has a mild increase in ALT/AST with a rise in alkaline phosphatase with a normal bilirubin and INR. The R factor of 0.4 consistent with cholestatic liver injury. US abdomen showing hepatomegaly but no other abnormalities. Hep serologies negative to date. REECE 1:80. Ceruloplasmin of 49, EBV IgM + and HSV IgG +. Patient has a sister who has autoimmune hepatitis as well as PBC. She was taking metoprolol and statin at home for the last 2 years. The differential diagnosis includes DDx: Autoimmune (PBC or autoimmune hepatitis) vs Infectious hepatitis (EBV) vs. REAZO vs drug induced liver injury (less likely) vs possible hypothyroidism. She does have mental changes c/w aphasia. this is unlikely secondary to liver disease.    Recommendations  -	 f/u AMA, SMA, serum IgG, IgM  -	patient refusing MRI abdomen  -	f/u  HSV IgM and PCR and EBV PCR  -	Avoid hepatotoxic medications. If possible would consider switching keppra to alternative anti seizure mediation if liver enzymes do not improve  - f/u ID recommendations 74F with T2DM, systolic HF, CABG p/w subacute/ acute? encephalopathiy and aphasia c/b seizure, currently undergoing work up. Hepatology consulted for evaluation of abnormal liver chemistries. She has a mild increase in ALT/AST with a rise in alkaline phosphatase with a normal bilirubin and INR. The R factor of 0.4 consistent with cholestatic liver injury. US abdomen showing hepatomegaly but no other abnormalities. Hep serologies negative to date. REECE 1:80. Ceruloplasmin of 49, EBV IgM + and HSV IgG +. Patient has a sister who has autoimmune hepatitis as well as PBC. She was taking metoprolol and statin at home for the last 2 years. The differential diagnosis includes DDx: Autoimmune (PBC or autoimmune hepatitis) vs Infectious hepatitis (EBV) vs. ERAZO vs drug induced liver injury (less likely) vs possible hypothyroidism. She does have mental changes c/w aphasia. this is unlikely secondary to liver disease.   EBV IgM positive, but PCR negative.  patient refusing MRI abdomen  Recommendations  -	 f/u AMA, SMA, serum IgG, IgM

## 2020-03-24 NOTE — PROGRESS NOTE ADULT - PROBLEM SELECTOR PLAN 9
ISTOP: Reference #: [id number]; last alprazolam filled in 2014  DVT ppx: lovenox qdaily  Diet: soft diet  Dispo: pending PT and speech therapy consult    1.  Name of PCP: Felipe Manuel   2.  PCP Contacted on Admission: [ ] Y    [ ] N    3.  PCP contacted at Discharge: [ ] Y    [ ] N    [ ] N/A  4.  Post-Discharge Appointment Date and Location:  5.  Summary of Handoff given to PCP:

## 2020-03-24 NOTE — CHART NOTE - NSCHARTNOTEFT_GEN_A_CORE
Nutrition Follow Up Note  Patient seen for: Nutrition follow up. Pt with "T2DM, systolic HF, CABG p/w subacute/ acute? encephalopathy and aphasia c/b seizure, LP negative for infectious work up, MRI suggestive of inflammatory/infectious etiology vs. malignancy, in temporal occipital region, however CSF PCR negative including HSV, pending EEG to see if HSV meningitis, less likely malignancy at this time" noted very poorly controlled type 2 DM-HgbA1C >15.5%.    Source: Pt    Diet : Consistent carbohydrate diet with evening snack, soft    Patient reports: no N+V, reports no BM for 1 week, feeling she may have a BM today, last BM noted 3/19 per flowsheets.      PO intake : Pt reports eating well with good appetite consuming 50% of meals      Daily Weight in k.8 (-18), Weight in k.5 (-18), no new wt to trend      Pertinent Medications: MEDICATIONS  (STANDING):  acyclovir IVPB      acyclovir IVPB 700 milliGRAM(s) IV Intermittent every 8 hours  aspirin  chewable 81 milliGRAM(s) Oral daily  atorvastatin 40 milliGRAM(s) Oral at bedtime  dextrose 5%. 1000 milliLiter(s) (50 mL/Hr) IV Continuous <Continuous>  dextrose 50% Injectable 12.5 Gram(s) IV Push once  dextrose 50% Injectable 25 Gram(s) IV Push once  dextrose 50% Injectable 25 Gram(s) IV Push once  insulin glargine Injectable (LANTUS) 24 Unit(s) SubCutaneous at bedtime  insulin lispro (HumaLOG) corrective regimen sliding scale   SubCutaneous three times a day before meals  insulin lispro (HumaLOG) corrective regimen sliding scale   SubCutaneous at bedtime  insulin lispro Injectable (HumaLOG) 8 Unit(s) SubCutaneous three times a day before meals  levETIRAcetam 750 milliGRAM(s) Oral two times a day  metoprolol tartrate 25 milliGRAM(s) Oral daily  pantoprazole    Tablet 40 milliGRAM(s) Oral before breakfast  polyethylene glycol 3350 17 Gram(s) Oral two times a day  sodium chloride 0.9%. 1000 milliLiter(s) (100 mL/Hr) IV Continuous <Continuous>    MEDICATIONS  (PRN):  AQUAPHOR (petrolatum Ointment) 1 Application(s) Topical two times a day PRN Dry skin  dextrose 40% Gel 15 Gram(s) Oral once PRN Blood Glucose LESS THAN 70 milliGRAM(s)/deciliter  glucagon  Injectable 1 milliGRAM(s) IntraMuscular once PRN Glucose LESS THAN 70 milligrams/deciliter    Pertinent Labs: none since 3/21  Finger Sticks: none since 3/21-pt refusing fingersticks at this time because they hurt, RD explained importance of monitoring fingetsticks however pt continues to refuse-MD and RN already aware.       Skin per nursing documentation: free of pressure injury   Edema: 1+ R leg edema     Estimated Needs:   [x ] no change since previous assessment  [ ] recalculated:     Previous Nutrition Diagnosis: Limited adherence to nutrition related recommendations.  Nutrition Diagnosis is: ongoing, pt continues to refuse education, did not accept written materials at this time.     New Nutrition Diagnosis:  Related to:    As evidenced by:      Interventions:     Recommend  1) Continue soft, consistent carbohydrate diet with evening snack, consider adding low sodium restriction given history of systolic HF.  2) Continue to provide diet education as pt is amenable-RD explained type 2 DM is poorly controlled at this time, pt does not check fingersticks and stated "I am fine", pt did not go into detail regarding diet history PTA again stated "I was fine".  3) Consider endocrine consult If feasible for further management of blood glucose given HgbA1C >15.5%-discussed with medical team.     Monitoring and Evaluation:     Continue to monitor Nutritional intake, Tolerance to diet prescription, weights, labs, skin integrity    RD remains available upon request and will follow up per protocol

## 2020-03-24 NOTE — PROGRESS NOTE ADULT - ATTENDING COMMENTS
a little more compliant with treatment plan after d/w daughter yesterday.  hopefully will agree to CT C/A/P and MRCP  continue Acyclovir.  appreciate consultant recs

## 2020-03-24 NOTE — PROVIDER CONTACT NOTE (OTHER) - SITUATION
as above - pt scheduled for CT scan, sent down but Sirisha from CT called and said IV was not usable for power injection contrast

## 2020-03-25 LAB
ALBUMIN SERPL ELPH-MCNC: 3.5 G/DL — SIGNIFICANT CHANGE UP (ref 3.3–5)
ALP SERPL-CCNC: 728 U/L — HIGH (ref 40–120)
ALT FLD-CCNC: 137 U/L — HIGH (ref 10–45)
ANION GAP SERPL CALC-SCNC: 11 MMOL/L — SIGNIFICANT CHANGE UP (ref 5–17)
AST SERPL-CCNC: 140 U/L — HIGH (ref 10–40)
B BURGDOR DNA SPEC QL NAA+PROBE: SIGNIFICANT CHANGE UP
BILIRUB SERPL-MCNC: 0.5 MG/DL — SIGNIFICANT CHANGE UP (ref 0.2–1.2)
BUN SERPL-MCNC: 13 MG/DL — SIGNIFICANT CHANGE UP (ref 7–23)
CALCIUM SERPL-MCNC: 9.3 MG/DL — SIGNIFICANT CHANGE UP (ref 8.4–10.5)
CHLORIDE SERPL-SCNC: 103 MMOL/L — SIGNIFICANT CHANGE UP (ref 96–108)
CO2 SERPL-SCNC: 24 MMOL/L — SIGNIFICANT CHANGE UP (ref 22–31)
CREAT SERPL-MCNC: 0.71 MG/DL — SIGNIFICANT CHANGE UP (ref 0.5–1.3)
GLUCOSE SERPL-MCNC: 258 MG/DL — HIGH (ref 70–99)
HCT VFR BLD CALC: 30.6 % — LOW (ref 34.5–45)
HGB BLD-MCNC: 9.6 G/DL — LOW (ref 11.5–15.5)
LEVETIRACETAM SERPL-MCNC: 22.1 MCG/ML — SIGNIFICANT CHANGE UP (ref 12–46)
MAGNESIUM SERPL-MCNC: 1.6 MG/DL — SIGNIFICANT CHANGE UP (ref 1.6–2.6)
MCHC RBC-ENTMCNC: 25.8 PG — LOW (ref 27–34)
MCHC RBC-ENTMCNC: 31.4 GM/DL — LOW (ref 32–36)
MCV RBC AUTO: 82.3 FL — SIGNIFICANT CHANGE UP (ref 80–100)
MITOCHONDRIA AB SER-ACNC: ABNORMAL
NRBC # BLD: 0 /100 WBCS — SIGNIFICANT CHANGE UP (ref 0–0)
PHOSPHATE SERPL-MCNC: 3.2 MG/DL — SIGNIFICANT CHANGE UP (ref 2.5–4.5)
PLATELET # BLD AUTO: 318 K/UL — SIGNIFICANT CHANGE UP (ref 150–400)
POTASSIUM SERPL-MCNC: 3.9 MMOL/L — SIGNIFICANT CHANGE UP (ref 3.5–5.3)
POTASSIUM SERPL-SCNC: 3.9 MMOL/L — SIGNIFICANT CHANGE UP (ref 3.5–5.3)
PROT SERPL-MCNC: 8.2 G/DL — SIGNIFICANT CHANGE UP (ref 6–8.3)
RBC # BLD: 3.72 M/UL — LOW (ref 3.8–5.2)
RBC # FLD: 15.2 % — HIGH (ref 10.3–14.5)
SMOOTH MUSCLE AB SER-ACNC: ABNORMAL
SODIUM SERPL-SCNC: 138 MMOL/L — SIGNIFICANT CHANGE UP (ref 135–145)
WBC # BLD: 6 K/UL — SIGNIFICANT CHANGE UP (ref 3.8–10.5)
WBC # FLD AUTO: 6 K/UL — SIGNIFICANT CHANGE UP (ref 3.8–10.5)

## 2020-03-25 PROCEDURE — 99232 SBSQ HOSP IP/OBS MODERATE 35: CPT

## 2020-03-25 PROCEDURE — 99233 SBSQ HOSP IP/OBS HIGH 50: CPT | Mod: GC

## 2020-03-25 PROCEDURE — 99232 SBSQ HOSP IP/OBS MODERATE 35: CPT | Mod: GC

## 2020-03-25 RX ORDER — GLYCERIN 1 %
1 DROPS OPHTHALMIC (EYE) EVERY 6 HOURS
Refills: 0 | Status: DISCONTINUED | OUTPATIENT
Start: 2020-03-25 | End: 2020-03-31

## 2020-03-25 RX ORDER — SENNA PLUS 8.6 MG/1
2 TABLET ORAL AT BEDTIME
Refills: 0 | Status: DISCONTINUED | OUTPATIENT
Start: 2020-03-25 | End: 2020-03-31

## 2020-03-25 RX ORDER — ENOXAPARIN SODIUM 100 MG/ML
40 INJECTION SUBCUTANEOUS DAILY
Refills: 0 | Status: DISCONTINUED | OUTPATIENT
Start: 2020-03-25 | End: 2020-03-31

## 2020-03-25 RX ADMIN — Medication 1 DROP(S): at 16:02

## 2020-03-25 RX ADMIN — LEVETIRACETAM 750 MILLIGRAM(S): 250 TABLET, FILM COATED ORAL at 05:31

## 2020-03-25 RX ADMIN — Medication 25 MILLIGRAM(S): at 05:32

## 2020-03-25 RX ADMIN — POLYETHYLENE GLYCOL 3350 17 GRAM(S): 17 POWDER, FOR SOLUTION ORAL at 05:32

## 2020-03-25 RX ADMIN — PANTOPRAZOLE SODIUM 40 MILLIGRAM(S): 20 TABLET, DELAYED RELEASE ORAL at 05:32

## 2020-03-25 RX ADMIN — ENOXAPARIN SODIUM 40 MILLIGRAM(S): 100 INJECTION SUBCUTANEOUS at 16:02

## 2020-03-25 RX ADMIN — Medication 81 MILLIGRAM(S): at 13:44

## 2020-03-25 RX ADMIN — LEVETIRACETAM 750 MILLIGRAM(S): 250 TABLET, FILM COATED ORAL at 17:55

## 2020-03-25 RX ADMIN — POLYETHYLENE GLYCOL 3350 17 GRAM(S): 17 POWDER, FOR SOLUTION ORAL at 17:55

## 2020-03-25 RX ADMIN — ATORVASTATIN CALCIUM 40 MILLIGRAM(S): 80 TABLET, FILM COATED ORAL at 00:13

## 2020-03-25 RX ADMIN — SENNA PLUS 2 TABLET(S): 8.6 TABLET ORAL at 21:46

## 2020-03-25 NOTE — CHART NOTE - NSCHARTNOTEFT_GEN_A_CORE
Strongly doubt that Keppra is causing or worsening her liver disease, there are some case reports of this but that is true of all AEDs and she needs to be on one. Also pt was having liver disease on admission, before initiation of Keppra. Would not change at this point.

## 2020-03-25 NOTE — CHART NOTE - NSCHARTNOTEFT_GEN_A_CORE
Still awaiting AMA and SMA. Called lab to expedite laboratory result.  Please discontinue Atorvastatin for now given worsening liver function.

## 2020-03-25 NOTE — CHART NOTE - NSCHARTNOTEFT_GEN_A_CORE
Shantell Landin PGY-1   Resident Physician  [phone]/ [phone]                  Patient did not have IV access throughout tonight. Night team attempt to establish new IV access, patient firmly refused. Patient also refused FS for blood sugar.

## 2020-03-25 NOTE — PROGRESS NOTE ADULT - ASSESSMENT
74F with T2DM, systolic HF, CABG p/w subacute/ acute? encephalopathiy and aphasia c/b seizure, currently undergoing work up. Hepatology consulted for evaluation of abnormal liver chemistries. She has a mild increase in ALT/AST with a rise in alkaline phosphatase with a normal bilirubin and INR. The R factor of 0.4 consistent with cholestatic liver injury. US abdomen showing hepatomegaly but no other abnormalities. Hep serologies negative to date. REECE 1:80. Ceruloplasmin of 49, EBV IgM + and HSV IgG +. Patient has a sister who has autoimmune hepatitis as well as PBC. She was taking metoprolol and statin at home for the last 2 years. The differential diagnosis includes DDx: Autoimmune (PBC or autoimmune hepatitis) vs Infectious hepatitis (EBV) vs. ERAZO vs drug induced liver injury (less likely) vs possible hypothyroidism. She does have mental changes c/w aphasia. this is unlikely secondary to liver disease.   EBV IgM positive, but PCR negative.  patient refusing MRI abdomen    Recommendations  -	 f/u AMA, SMA, serum IgG, IgM        -     trend LFTs daily        - rest of plan as per primary team

## 2020-03-25 NOTE — PROGRESS NOTE ADULT - PROBLEM SELECTOR PLAN 9
ISTOP: Reference #: [id number]; last alprazolam filled in 2014  DVT ppx: lovenox qdaily  Diet: soft diet  Dispo: pending PT and speech therapy consult    1.  Name of PCP: Felipe Manuel   2.  PCP Contacted on Admission: [ ] Y    [ ] N    3.  PCP contacted at Discharge: [ ] Y    [ ] N    [ ] N/A  4.  Post-Discharge Appointment Date and Location:  5.  Summary of Handoff given to PCP:

## 2020-03-25 NOTE — PROGRESS NOTE ADULT - ATTENDING COMMENTS
74 yoF with CAD s/p CABG, CHF, DM2, normal BMI 24, adm. on 3/14 with AMS, aphasia, seizure in hospital, and rising liver enzymes with cholestatic pattern, , AST//140.   MRI brain suggestive of inflammatory etiolgy vs. malignancy, LP negative for infectious workup, including HSV PCR.      - elevated liver enzymes: US abdomen shows gallstones without obstruction and was not suggestive of PSC, serologic workup pos. EBV IgM but negative PCR making EBV unlikely; possible DD includes drug induced liver injury (DILI), PBC. Of note, her sister has PBC. Most likely cause of DILI would be her statin. Metoprolol and pantoprazole are rare causes of elevated liver enzymes. Acyclovir and Keppra were started during this hospitalization - therefore not the cause.    -- awaiting AMA  -- meanwhile, consider holding statin

## 2020-03-25 NOTE — PROGRESS NOTE ADULT - SUBJECTIVE AND OBJECTIVE BOX
Patient is a 75y old  Female who presents with a chief complaint of headache (25 Mar 2020 10:23)    Being followed by ID for        Interval history:  pt seen  much more alert and appropriate  No other acute events      PAST MEDICAL & SURGICAL HISTORY:  Chronic systolic heart failure  Diabetes  S/P CABG x 1  S/P breast lumpectomy: left  History of tonsillectomy  H/O abdominal hysterectomy: 1991    Allergies    clams - itching (Other)  No Known Drug Allergies  Peroxide (Blisters)    Intolerances      Antimicrobials:    acyclovir IVPB      acyclovir IVPB 700 milliGRAM(s) IV Intermittent every 8 hours    MEDICATIONS  (STANDING):  acyclovir IVPB      acyclovir IVPB 700 milliGRAM(s) IV Intermittent every 8 hours  aspirin  chewable 81 milliGRAM(s) Oral daily  dextrose 5%. 1000 milliLiter(s) (50 mL/Hr) IV Continuous <Continuous>  dextrose 50% Injectable 12.5 Gram(s) IV Push once  dextrose 50% Injectable 25 Gram(s) IV Push once  dextrose 50% Injectable 25 Gram(s) IV Push once  enoxaparin Injectable 40 milliGRAM(s) SubCutaneous daily  insulin glargine Injectable (LANTUS) 24 Unit(s) SubCutaneous at bedtime  insulin lispro (HumaLOG) corrective regimen sliding scale   SubCutaneous three times a day before meals  insulin lispro (HumaLOG) corrective regimen sliding scale   SubCutaneous at bedtime  insulin lispro Injectable (HumaLOG) 8 Unit(s) SubCutaneous three times a day before meals  levETIRAcetam 750 milliGRAM(s) Oral two times a day  metoprolol tartrate 25 milliGRAM(s) Oral daily  pantoprazole    Tablet 40 milliGRAM(s) Oral before breakfast  polyethylene glycol 3350 17 Gram(s) Oral two times a day  senna 2 Tablet(s) Oral at bedtime  sodium chloride 0.9%. 1000 milliLiter(s) (100 mL/Hr) IV Continuous <Continuous>      Vital Signs Last 24 Hrs  T(C): 36.7 (03-25-20 @ 17:11), Max: 36.8 (03-25-20 @ 00:13)  T(F): 98.1 (03-25-20 @ 17:11), Max: 98.3 (03-25-20 @ 00:13)  HR: 59 (03-25-20 @ 17:11) (59 - 70)  BP: 158/90 (03-25-20 @ 18:04) (153/68 - 179/82)  BP(mean): --  RR: 18 (03-25-20 @ 17:11) (18 - 18)  SpO2: 96% (03-25-20 @ 17:11) (94% - 96%)    Physical Exam:    Constitutional well preserved,comfortable,  HEENT PERRLA EOMI,No pallor or icterus    missing teeth    Neck supple no JVD or LN    Chest Good AE,CTA    CVS RRR S1 S2 WNl    Abd soft BS normal No tenderness no masses    Ext No cyanosis clubbing or edema    IV site no erythema tenderness or discharge    Joints no swelling or LOM        Lab Data:                          9.6    6.00  )-----------( 318      ( 25 Mar 2020 09:13 )             30.6       03-25    138  |  103  |  13  ----------------------------<  258<H>  3.9   |  24  |  0.71    Ca    9.3      25 Mar 2020 09:13  Phos  3.2     03-25  Mg     1.6     03-25    TPro  8.2  /  Alb  3.5  /  TBili  0.5  /  DBili  x   /  AST  140<H>  /  ALT  137<H>  /  AlkPhos  728<H>  03-25        Anti-Ribonuclear Protein (03.20.20 @ 08:17)    Anti-Ribonuclear Protein: 7.2: Fluorescent Bead Immunoassy                      Reference Ranges for RNP and SM:                      <1.0 AI (negative)                      > or =1.0 AI (positive)                      Reference values apply to all ages AI        Protein Electrophoresis, Serum (03.20.20 @ 08:19)    Protein Total, Serum: 7.8 g/dL    Total Protein, Serum: 7.8 g/dL    Albumin, Serum: 3.2 g/dL    Alpha 1: 0.4 g/dL    Alpha 2: 1.2 g/dL    Beta Globulin: 1.0 g/dL    Gamma Globulin: 2.0 g/dL    M-Abdirizak: 0.3 g/dL    % Albumin: 40.9 %    % Alpha 1: 5.3 %    % Alpha 2: 15.4 %    % Beta: 12.6 %    % Gamma: 25.8 %    % M Abdirizak: 4.2 %    Albumin/Globulin Ratio: 0.7 Ratio    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    TECHNIQUE: Following intravenous gadolinium sagittal and axial and coronal postcontrast T1-weighted images were performed. Axial T2 FLAIR and diffusion-weighted images were also performed. 10 cc Gadavist were administered. 0 cc were discarded.      COMPARISON EXAMINATION:  3/17/2020      FINDINGS:    VENTRICLES AND SULCI:  Prominent in size compatible with age-appropriate volume loss  INTRA-AXIAL:  Patchy areas of white matter T2 hyperintensity are seen compatible with mild to moderate microvascular-type changes. No mass effect or diffusion restriction is seen.  EXTRA-AXIAL:  Leptomeningeal enhancement is seen most prominent within the left lateral temporo-occipital region. No focal extra-axial collection is seen.  VISUALIZED SINUSES:  Mild to moderate mucosal thickening  VISUALIZED MASTOIDS:  Clear.  CALVARIUM:  Normal.  CAROTID FLOW VOIDS:  Present.  MISCELLANEOUS:  None.      IMPRESSION:    Leptomeningeal enhancement most prominent within the left temporo-occipital region. Infectious/inflammatory or neoplastic etiologies should be considered such as meningitis or leptomeningeal carcinomatosis.    Findings were discussed with Dr. Villavicencio on 3/20/2020 5:08 PM with read back.    < end of copied text >            WBC Count: 6.00 (03-25-20 @ 09:13)  WBC Count: 6.20 (03-24-20 @ 13:14)  WBC Count: 6.67 (03-21-20 @ 09:21)  WBC Count: 6.15 (03-19-20 @ 09:34)

## 2020-03-25 NOTE — PROGRESS NOTE ADULT - SUBJECTIVE AND OBJECTIVE BOX
INTERVAL HPI/OVERNIGHT EVENTS:    SUBJECTIVE: Patient seen and examined at bedside. Patient refusing IV placement as arrow is out overnight, now agreeable. No n/v/c/d.     ROS neg except as above    OBJECTIVE:    VITAL SIGNS:  ICU Vital Signs Last 24 Hrs  T(C): 36.5 (25 Mar 2020 09:09), Max: 36.8 (25 Mar 2020 00:13)  T(F): 97.7 (25 Mar 2020 09:09), Max: 98.3 (25 Mar 2020 00:13)  HR: 59 (25 Mar 2020 09:09) (59 - 70)  BP: 153/68 (25 Mar 2020 09:09) (105/66 - 179/82)  BP(mean): --  ABP: --  ABP(mean): --  RR: 18 (25 Mar 2020 09:09) (18 - 18)  SpO2: 94% (25 Mar 2020 09:09) (94% - 99%)        03-24 @ 07:01  -  03-25 @ 07:00  --------------------------------------------------------  IN: 1220 mL / OUT: 1000 mL / NET: 220 mL      CAPILLARY BLOOD GLUCOSE          PHYSICAL EXAM:    GENERAL: elderly female, pleasant, NAD  NEURO: AO x3, nonfocal, has word finding difficulties and is slow to respond   HEAD:  Atraumatic, Normocephalic  EYES: conjunctiva and sclera clear  NECK: Supple  CHEST/LUNG: Clear to auscultation bilaterally; No wheezes, rales or rhonchi  HEART: Regular rate and rhythm; No murmurs, rubs, or gallops  ABDOMEN: Soft, Nontender, Nondistended; Bowel sounds present, no masses.  EXTREMITIES:  2+ Peripheral Pulses, No clubbing, cyanosis, or edema  SKIN: Warm, dry, in tact, no rashes or lesions  PSYCH: affect appropriate      MEDICATIONS:  MEDICATIONS  (STANDING):  acyclovir IVPB      acyclovir IVPB 700 milliGRAM(s) IV Intermittent every 8 hours  aspirin  chewable 81 milliGRAM(s) Oral daily  atorvastatin 40 milliGRAM(s) Oral at bedtime  dextrose 5%. 1000 milliLiter(s) (50 mL/Hr) IV Continuous <Continuous>  dextrose 50% Injectable 12.5 Gram(s) IV Push once  dextrose 50% Injectable 25 Gram(s) IV Push once  dextrose 50% Injectable 25 Gram(s) IV Push once  insulin glargine Injectable (LANTUS) 24 Unit(s) SubCutaneous at bedtime  insulin lispro (HumaLOG) corrective regimen sliding scale   SubCutaneous three times a day before meals  insulin lispro (HumaLOG) corrective regimen sliding scale   SubCutaneous at bedtime  insulin lispro Injectable (HumaLOG) 8 Unit(s) SubCutaneous three times a day before meals  levETIRAcetam 750 milliGRAM(s) Oral two times a day  metoprolol tartrate 25 milliGRAM(s) Oral daily  pantoprazole    Tablet 40 milliGRAM(s) Oral before breakfast  polyethylene glycol 3350 17 Gram(s) Oral two times a day  sodium chloride 0.9%. 1000 milliLiter(s) (100 mL/Hr) IV Continuous <Continuous>    MEDICATIONS  (PRN):  AQUAPHOR (petrolatum Ointment) 1 Application(s) Topical two times a day PRN Dry skin  bisacodyl 5 milliGRAM(s) Oral every 12 hours PRN Constipation  dextrose 40% Gel 15 Gram(s) Oral once PRN Blood Glucose LESS THAN 70 milliGRAM(s)/deciliter  glucagon  Injectable 1 milliGRAM(s) IntraMuscular once PRN Glucose LESS THAN 70 milligrams/deciliter      ALLERGIES:  Allergies    clams - itching (Other)  No Known Drug Allergies  Peroxide (Blisters)    Intolerances        LABS:                        9.6    6.00  )-----------( 318      ( 25 Mar 2020 09:13 )             30.6     03-25    138  |  103  |  13  ----------------------------<  258<H>  3.9   |  24  |  0.71    Ca    9.3      25 Mar 2020 09:13  Phos  3.2     03-25  Mg     1.6     03-25    TPro  8.2  /  Alb  3.5  /  TBili  0.5  /  DBili  x   /  AST  140<H>  /  ALT  137<H>  /  AlkPhos  728<H>  03-25          RADIOLOGY & ADDITIONAL TESTS: Reviewed.

## 2020-03-25 NOTE — PROGRESS NOTE ADULT - SUBJECTIVE AND OBJECTIVE BOX
Chief Complaint:  Patient is a 75y old  Female who presents with a chief complaint of headache (25 Mar 2020 09:58)      Interval Events:   No events overnight, refusing new IV this AM  Alk phos uptrending today    Allergies:  clams - itching (Other)  No Known Drug Allergies  Peroxide (Blisters)      Home Medications:    Hospital Medications:  acyclovir IVPB      acyclovir IVPB 700 milliGRAM(s) IV Intermittent every 8 hours  AQUAPHOR (petrolatum Ointment) 1 Application(s) Topical two times a day PRN  aspirin  chewable 81 milliGRAM(s) Oral daily  atorvastatin 40 milliGRAM(s) Oral at bedtime  bisacodyl 5 milliGRAM(s) Oral every 12 hours PRN  dextrose 40% Gel 15 Gram(s) Oral once PRN  dextrose 5%. 1000 milliLiter(s) IV Continuous <Continuous>  dextrose 50% Injectable 12.5 Gram(s) IV Push once  dextrose 50% Injectable 25 Gram(s) IV Push once  dextrose 50% Injectable 25 Gram(s) IV Push once  glucagon  Injectable 1 milliGRAM(s) IntraMuscular once PRN  insulin glargine Injectable (LANTUS) 24 Unit(s) SubCutaneous at bedtime  insulin lispro (HumaLOG) corrective regimen sliding scale   SubCutaneous three times a day before meals  insulin lispro (HumaLOG) corrective regimen sliding scale   SubCutaneous at bedtime  insulin lispro Injectable (HumaLOG) 8 Unit(s) SubCutaneous three times a day before meals  levETIRAcetam 750 milliGRAM(s) Oral two times a day  metoprolol tartrate 25 milliGRAM(s) Oral daily  pantoprazole    Tablet 40 milliGRAM(s) Oral before breakfast  polyethylene glycol 3350 17 Gram(s) Oral two times a day  sodium chloride 0.9%. 1000 milliLiter(s) IV Continuous <Continuous>      PMHX/PSHX:  Chronic systolic heart failure  Diabetes  S/P CABG x 1  S/P breast lumpectomy  History of tonsillectomy  H/O abdominal hysterectomy      Family history:  FH: type 2 diabetes      ROS:     General:  No wt loss, fevers, chills, night sweats, fatigue,   Eyes:  Good vision, no reported pain  ENT:  No sore throat, pain, runny nose, dysphagia  CV:  No pain, palpitations, hypo/hypertension  Resp:  No dyspnea, cough, tachypnea, wheezing  GI:  No pain, No nausea, No vomiting, No diarrhea, No constipation, No weight loss, No fever, No pruritis, No rectal bleeding, No tarry stools, No dysphagia,  :  No pain, bleeding, incontinence, nocturia  Muscle:  No pain, weakness  Neuro:  No weakness, tingling, memory problems  Psych:  No fatigue, insomnia, mood problems, depression  Endocrine:  No polyuria, polydipsia, cold/heat intolerance  Heme:  No petechiae, ecchymosis, easy bruisability  Skin:  No rash, tattoos, scars, edema      PHYSICAL EXAM:   Vital Signs:  Vital Signs Last 24 Hrs  T(C): 36.5 (25 Mar 2020 09:09), Max: 36.8 (25 Mar 2020 00:13)  T(F): 97.7 (25 Mar 2020 09:09), Max: 98.3 (25 Mar 2020 00:13)  HR: 59 (25 Mar 2020 09:09) (59 - 70)  BP: 153/68 (25 Mar 2020 09:09) (105/66 - 179/82)  BP(mean): --  RR: 18 (25 Mar 2020 09:09) (18 - 18)  SpO2: 94% (25 Mar 2020 09:09) (94% - 99%)  Daily     Daily     GENERAL:  Appears stated age, well-groomed, well-nourished, no distress  HEENT:  NC/AT,  conjunctivae clear and pink, no thyromegaly, nodules, adenopathy, no JVD, sclera -anicteric  CHEST:  Full & symmetric excursion, no increased effort, breath sounds clear  HEART:  Regular rhythm, S1, S2, no murmur/rub/S3/S4, no abdominal bruit, no edema  ABDOMEN:  Soft, non-tender, non-distended, normoactive bowel sounds,  no masses ,no hepato-splenomegaly, no signs of chronic liver disease  EXTEREMITIES:  no cyanosis,clubbing or edema  SKIN:  No rash/erythema/ecchymoses/petechiae/wounds/abscess/warm/dry  NEURO:  Alert, oriented, no asterixis, no tremor, no encephalopathy    LABS:                        9.6    6.00  )-----------( 318      ( 25 Mar 2020 09:13 )             30.6     03-25    138  |  103  |  13  ----------------------------<  258<H>  3.9   |  24  |  0.71    Ca    9.3      25 Mar 2020 09:13  Phos  3.2     03-25  Mg     1.6     03-25    TPro  8.2  /  Alb  3.5  /  TBili  0.5  /  DBili  x   /  AST  140<H>  /  ALT  137<H>  /  AlkPhos  728<H>  03-25    LIVER FUNCTIONS - ( 25 Mar 2020 09:13 )  Alb: 3.5 g/dL / Pro: 8.2 g/dL / ALK PHOS: 728 U/L / ALT: 137 U/L / AST: 140 U/L / GGT: x                   Imaging:

## 2020-03-25 NOTE — PROGRESS NOTE ADULT - ASSESSMENT
74 year old female with a history of uncontrolled diabetes, CAD s/p stent and CABG presenting with subacute/ acute encephalopathy and severe headache, with hospital course complicated by new seizure.   Clinically improved on my exam, more interactive. Answering questions.     1. Encephalopathy, headache, seizure: ? encephalitis  MRI now reveals   - Per Neuro, s/p repeat MRI  - LP completed: 1 WBC, inconsistent with infectious process. PCR panel is negative (which includes many viral etiologies). AI pending. Continue to empirically cover with acyclovir   AS patient has improved , will continue .   - Follow up serum/ CSF studies- negative to date  - Neuro following  -await 14-3-3  MRI now with temporooccipital changes      2. Transaminitis: ? If related to systemic viral syndrome/ERAZO. Workup has been nondiagnostic so far  statin held  hepatology is addressing  ? liver biopsy            await multiple studies           Pt with a m spike-  Hematology/oncology workup     Unclear what caused acute altered mental status but patient seems to have improved and the only intervention was the acyclovir, so will complete a 14 day course  please hydrate  stared March 15th- day #11/14  monitor the creatine- stable      Team to address the significance of the antiribonuclear protein    ID will follow the patient PRN. Please recontact ID if we can be of further assistance . [phone]

## 2020-03-25 NOTE — PROGRESS NOTE ADULT - ATTENDING COMMENTS
patient has allowed blood draws since last evening  will make attempt at imaging once iv established  impatient with treatment plan  encouraged to continue to comply with plan of care

## 2020-03-26 DIAGNOSIS — F05 DELIRIUM DUE TO KNOWN PHYSIOLOGICAL CONDITION: ICD-10-CM

## 2020-03-26 PROCEDURE — 99233 SBSQ HOSP IP/OBS HIGH 50: CPT | Mod: GC

## 2020-03-26 PROCEDURE — 99222 1ST HOSP IP/OBS MODERATE 55: CPT

## 2020-03-26 PROCEDURE — 99232 SBSQ HOSP IP/OBS MODERATE 35: CPT | Mod: GC

## 2020-03-26 PROCEDURE — 99232 SBSQ HOSP IP/OBS MODERATE 35: CPT

## 2020-03-26 RX ORDER — URSODIOL 250 MG/1
500 TABLET, FILM COATED ORAL EVERY 12 HOURS
Refills: 0 | Status: DISCONTINUED | OUTPATIENT
Start: 2020-03-26 | End: 2020-03-31

## 2020-03-26 RX ORDER — ALPRAZOLAM 0.25 MG
0.25 TABLET ORAL THREE TIMES A DAY
Refills: 0 | Status: DISCONTINUED | OUTPATIENT
Start: 2020-03-26 | End: 2020-03-31

## 2020-03-26 RX ORDER — METOPROLOL TARTRATE 50 MG
12.5 TABLET ORAL DAILY
Refills: 0 | Status: DISCONTINUED | OUTPATIENT
Start: 2020-03-26 | End: 2020-03-31

## 2020-03-26 RX ORDER — AMLODIPINE BESYLATE 2.5 MG/1
5 TABLET ORAL DAILY
Refills: 0 | Status: DISCONTINUED | OUTPATIENT
Start: 2020-03-26 | End: 2020-03-31

## 2020-03-26 RX ORDER — ACYCLOVIR SODIUM 500 MG
800 VIAL (EA) INTRAVENOUS
Refills: 0 | Status: DISCONTINUED | OUTPATIENT
Start: 2020-03-26 | End: 2020-03-27

## 2020-03-26 RX ADMIN — Medication 12.5 MILLIGRAM(S): at 11:15

## 2020-03-26 RX ADMIN — Medication 81 MILLIGRAM(S): at 11:11

## 2020-03-26 RX ADMIN — SENNA PLUS 2 TABLET(S): 8.6 TABLET ORAL at 21:24

## 2020-03-26 RX ADMIN — POLYETHYLENE GLYCOL 3350 17 GRAM(S): 17 POWDER, FOR SOLUTION ORAL at 06:14

## 2020-03-26 RX ADMIN — URSODIOL 500 MILLIGRAM(S): 250 TABLET, FILM COATED ORAL at 17:17

## 2020-03-26 RX ADMIN — LEVETIRACETAM 750 MILLIGRAM(S): 250 TABLET, FILM COATED ORAL at 17:17

## 2020-03-26 RX ADMIN — Medication 1 MILLIGRAM(S): at 16:45

## 2020-03-26 RX ADMIN — AMLODIPINE BESYLATE 5 MILLIGRAM(S): 2.5 TABLET ORAL at 11:15

## 2020-03-26 RX ADMIN — LEVETIRACETAM 750 MILLIGRAM(S): 250 TABLET, FILM COATED ORAL at 06:14

## 2020-03-26 RX ADMIN — Medication 25 MILLIGRAM(S): at 06:14

## 2020-03-26 RX ADMIN — PANTOPRAZOLE SODIUM 40 MILLIGRAM(S): 20 TABLET, DELAYED RELEASE ORAL at 06:14

## 2020-03-26 NOTE — PROGRESS NOTE ADULT - ASSESSMENT
74F with T2DM, systolic HF, CABG p/w subacute/ acute? encephalopathiy and aphasia c/b seizure, currently undergoing work up. Hepatology consulted for evaluation of abnormal liver chemistries. She has a mild increase in ALT/AST with a rise in alkaline phosphatase with a normal bilirubin and INR. The R factor of 0.4 consistent with cholestatic liver injury. US abdomen showing hepatomegaly but no other abnormalities. Hep serologies negative to date. Ceruloplasmin of 49. Patient has a sister who has autoimmune hepatitis as well as PBC. She was taking metoprolol and statin at home for the last 2 years. EBV IgM positive, but PCR negative.  patient refusing MRI abdomen  REECE 1:80. AMA of 1:320 and SMA of 1:160.  Concerning for PBC and autoimmune hepatitis.    Recommendations  - trend LFTs daily  - please start ursodiol 500mg BID for treatment of PBC  - 74F with T2DM, systolic HF, CABG p/w subacute/ acute? encephalopathiy and aphasia c/b seizure, currently undergoing work up. Hepatology consulted for evaluation of abnormal liver chemistries. She has a mild increase in ALT/AST with a rise in alkaline phosphatase with a normal bilirubin and INR. The R factor of 0.4 consistent with cholestatic liver injury. US abdomen showing hepatomegaly but no other abnormalities. Hep serologies negative to date. Ceruloplasmin of 49. Patient has a sister who has autoimmune hepatitis as well as PBC. She was taking metoprolol and statin at home for the last 2 years. EBV IgM positive, but PCR negative.  patient refusing MRI abdomen  REECE 1:80. AMA of 1:320 and SMA of 1:160.  Concerning for PBC and autoimmune hepatitis.    Recommendations  - trend LFTs daily  - please start ursodiol 500mg BID for treatment of PBC  - please obtain liver biopsy to evaluate for possible autoimmune hepatitis  - please check IgG total serum level  - can resume statin  - rest of plan as per primary team

## 2020-03-26 NOTE — BEHAVIORAL HEALTH ASSESSMENT NOTE - NSBHCHARTREVIEWINVESTIGATE_PSY_A_CORE FT
< from: 12 Lead ECG (03.15.20 @ 21:49) >      Ventricular Rate 85 BPM    Atrial Rate 85 BPM    P-R Interval 184 ms    QRS Duration 92 ms    Q-T Interval 390 ms    QTC Calculation(Bezet) 464 ms    P Axis 62 degrees    R Axis -66 degrees    T Axis 90 degrees    Diagnosis Line NORMAL SINUS RHYTHM  POSSIBLE LEFT ATRIAL ENLARGEMENT  LEFT AXIS DEVIATION  SEPTAL INFARCT , AGE UNDETERMINED  INFERIOR INFARCT , AGE UNDETERMINED  ABNORMAL ECG    Confirmed by ANA MARIE ADAM ([id number]) on 3/16/2020 8:28:25 AM    < end of copied text >

## 2020-03-26 NOTE — PROGRESS NOTE ADULT - ASSESSMENT
74 year old female with a history of uncontrolled diabetes, CAD s/p stent and CABG presenting with subacute/ acute encephalopathy and severe headache, with hospital course complicated by new seizure.   Clinically improved on my exam, more interactive. Answering questions.     1. Encephalopathy, headache, seizure: ? encephalitis  MRI now reveals   - Per Neuro, s/p repeat MRI  - LP completed: 1 WBC, inconsistent with infectious process. PCR panel is negative (which includes many viral etiologies). AI pending. Continue to empirically cover with acyclovir   AS patient has improved , will continue .   - Follow up serum/ CSF studies- negative to date  - Neuro following  -await 14-3-3  MRI now with temporooccipital changes      2. Transaminitis: ? If related to systemic viral syndrome/ERAZO. Workup has been nondiagnostic so far  statin held  hepatology is addressing  ? liver biopsy            await multiple studies           Pt with a m spike-  Hematology/oncology workup     Unclear what caused acute altered mental status but patient seems to have improved and the only intervention was the acyclovir, so will complete a 14 day course  please hydrate  stared March 15th- day #12/14  monitor the creatine- stable      Team to address the significance of the antiribonuclear protein and the mitochondrial ab    ID will follow the patient PRN. Please recontact ID if we can be of further assistance . [phone]

## 2020-03-26 NOTE — BEHAVIORAL HEALTH ASSESSMENT NOTE - NSBHCHARTREVIEWLAB_PSY_A_CORE FT
9.6    6.00  )-----------( 318      ( 25 Mar 2020 09:13 )             30.6     03-25    138  |  103  |  13  ----------------------------<  258<H>  3.9   |  24  |  0.71    Ca    9.3      25 Mar 2020 09:13  Phos  3.2     03-25  Mg     1.6     03-25    TPro  8.2  /  Alb  3.5  /  TBili  0.5  /  DBili  x   /  AST  140<H>  /  ALT  137<H>  /  AlkPhos  728<H>  03-25

## 2020-03-26 NOTE — PROGRESS NOTE ADULT - PROBLEM SELECTOR PLAN 9
ISTOP: Reference #: [id number]; last alprazolam filled in 2014  DVT ppx: lovenox qdaily  Diet: soft diet  Dispo: pending PT and speech therapy consult    1.  Name of PCP: Felipe Manuel   2.  PCP Contacted on Admission: [ ] Y    [ ] N    3.  PCP contacted at Discharge: [ ] Y    [ ] N    [ ] N/A  4.  Post-Discharge Appointment Date and Location:  5.  Summary of Handoff given to PCP:

## 2020-03-26 NOTE — BEHAVIORAL HEALTH ASSESSMENT NOTE - NSBHCHARTREVIEWIMAGING_PSY_A_CORE FT
EXAM:  MR BRAIN IC                          PROCEDURE DATE:  03/20/2020      INTERPRETATION:  INDICATIONS:  Aphasia, altered mental status, congestive heart failure status post CABG.    TECHNIQUE: Following intravenous gadolinium sagittal and axial and coronal postcontrast T1-weighted images were performed. Axial T2 FLAIR and diffusion-weighted images were also performed. 10 cc Gadavist were administered. 0 cc were discarded.      COMPARISON EXAMINATION:  3/17/2020      FINDINGS:    VENTRICLES AND SULCI:  Prominent in size compatible with age-appropriate volume loss  INTRA-AXIAL:  Patchy areas of white matter T2 hyperintensity are seen compatible with mild to moderate microvascular-type changes. No mass effect or diffusion restriction is seen.  EXTRA-AXIAL:  Leptomeningeal enhancement is seen most prominent within the left lateral temporo-occipital region. No focal extra-axial collection is seen.  VISUALIZED SINUSES:  Mild to moderate mucosal thickening  VISUALIZED MASTOIDS:  Clear.  CALVARIUM:  Normal.  CAROTID FLOW VOIDS:  Present.  MISCELLANEOUS:  None.  IMPRESSION:    Leptomeningeal enhancement most prominent within the left temporo-occipital region. Infectious/inflammatory or neoplastic etiologies should be considered such as meningitis or leptomeningeal carcinomatosis.    Findings were discussed with Dr. Villavicencio on 3/20/2020 5:08 PM with read back.

## 2020-03-26 NOTE — PROGRESS NOTE ADULT - SUBJECTIVE AND OBJECTIVE BOX
INTERVAL HPI/OVERNIGHT EVENTS:    SUBJECTIVE: Patient seen and examined at bedside. No overnight events. Patient refused IV yesterday.     ROS neg except as above    OBJECTIVE:    VITAL SIGNS:  ICU Vital Signs Last 24 Hrs  T(C): 36.7 (26 Mar 2020 15:49), Max: 36.7 (25 Mar 2020 17:11)  T(F): 98.1 (26 Mar 2020 15:49), Max: 98.1 (25 Mar 2020 17:11)  HR: 58 (26 Mar 2020 15:49) (58 - 68)  BP: 137/78 (26 Mar 2020 15:49) (137/78 - 199/91)  BP(mean): --  ABP: --  ABP(mean): --  RR: 18 (26 Mar 2020 15:49) (18 - 18)  SpO2: 94% (26 Mar 2020 15:49) (93% - 96%)        03-25 @ 07:01 - 03-26 @ 07:00  --------------------------------------------------------  IN: 800 mL / OUT: 0 mL / NET: 800 mL    03-26 @ 07:01  -  03-26 @ 16:30  --------------------------------------------------------  IN: 400 mL / OUT: 0 mL / NET: 400 mL      CAPILLARY BLOOD GLUCOSE          PHYSICAL EXAM:    GENERAL: elderly female, pleasant, NAD  NEURO: AO x3, nonfocal, has word finding difficulties and is slow to respond   HEAD:  Atraumatic, Normocephalic  EYES: conjunctiva and sclera clear  NECK: Supple  CHEST/LUNG: Clear to auscultation bilaterally; No wheezes, rales or rhonchi  HEART: Regular rate and rhythm; No murmurs, rubs, or gallops  ABDOMEN: Soft, Nontender, Nondistended; Bowel sounds present, no masses.  EXTREMITIES:  2+ Peripheral Pulses, No clubbing, cyanosis, or edema  SKIN: Warm, dry, in tact, no rashes or lesions  PSYCH: affect appropriate    MEDICATIONS:  MEDICATIONS  (STANDING):  acyclovir IVPB      acyclovir IVPB 700 milliGRAM(s) IV Intermittent every 8 hours  amLODIPine   Tablet 5 milliGRAM(s) Oral daily  aspirin  chewable 81 milliGRAM(s) Oral daily  dextrose 5%. 1000 milliLiter(s) (50 mL/Hr) IV Continuous <Continuous>  dextrose 50% Injectable 12.5 Gram(s) IV Push once  dextrose 50% Injectable 25 Gram(s) IV Push once  dextrose 50% Injectable 25 Gram(s) IV Push once  enoxaparin Injectable 40 milliGRAM(s) SubCutaneous daily  insulin glargine Injectable (LANTUS) 24 Unit(s) SubCutaneous at bedtime  insulin lispro (HumaLOG) corrective regimen sliding scale   SubCutaneous three times a day before meals  insulin lispro (HumaLOG) corrective regimen sliding scale   SubCutaneous at bedtime  insulin lispro Injectable (HumaLOG) 8 Unit(s) SubCutaneous three times a day before meals  levETIRAcetam 750 milliGRAM(s) Oral two times a day  LORazepam   Injectable 1 milliGRAM(s) IntraMuscular once  metoprolol tartrate 12.5 milliGRAM(s) Oral daily  pantoprazole    Tablet 40 milliGRAM(s) Oral before breakfast  polyethylene glycol 3350 17 Gram(s) Oral two times a day  senna 2 Tablet(s) Oral at bedtime  sodium chloride 0.9%. 1000 milliLiter(s) (100 mL/Hr) IV Continuous <Continuous>  ursodiol Tablet 500 milliGRAM(s) Oral every 12 hours    MEDICATIONS  (PRN):  ALPRAZolam 0.25 milliGRAM(s) Oral three times a day PRN anxiety  AQUAPHOR (petrolatum Ointment) 1 Application(s) Topical two times a day PRN Dry skin  bisacodyl 5 milliGRAM(s) Oral every 12 hours PRN Constipation  dextrose 40% Gel 15 Gram(s) Oral once PRN Blood Glucose LESS THAN 70 milliGRAM(s)/deciliter  glucagon  Injectable 1 milliGRAM(s) IntraMuscular once PRN Glucose LESS THAN 70 milligrams/deciliter  naphazoline/pheniramine Solution 1 Drop(s) Both EYES every 6 hours PRN eye irritation      ALLERGIES:  Allergies    clams - itching (Other)  No Known Drug Allergies  Peroxide (Blisters)    Intolerances        LABS:                        9.6    6.00  )-----------( 318      ( 25 Mar 2020 09:13 )             30.6     03-25    138  |  103  |  13  ----------------------------<  258<H>  3.9   |  24  |  0.71    Ca    9.3      25 Mar 2020 09:13  Phos  3.2     03-25  Mg     1.6     03-25    TPro  8.2  /  Alb  3.5  /  TBili  0.5  /  DBili  x   /  AST  140<H>  /  ALT  137<H>  /  AlkPhos  728<H>  03-25          RADIOLOGY & ADDITIONAL TESTS: Reviewed.

## 2020-03-26 NOTE — PROVIDER CONTACT NOTE (MEDICATION) - ASSESSMENT
Pt A&Ox2-3 and forgetful, becomes angry when asked if we could check her blood sugar or give her any type of injection

## 2020-03-26 NOTE — BEHAVIORAL HEALTH ASSESSMENT NOTE - CASE SUMMARY
76 y/o single woman, domiciled in [facility] with sister, with no formal PPHx, no SA/SIB, no h/o violence, no substance abuse, with PMHx DM, CAD (s/p stent & CABG), p/w subacute/ acute? encephalopathy and aphasia c/b seizure, LP negative for infectious work up, MRI suggestive of inflammatory/infectious etiology vs. malignancy, in temporal occipital region.  Psychiatry consulted to assess for capacity to refuse IV placement, CT with contrast of head, pelvis, abdomen to r/o malignancy and IV Abx. pt poor historian, confused at times, unable to explain risks/benefits of treatment, iv placement, ct scans. pt does not have capacity to refuse treatment at this time, defer to hcp. can give haldol 1mg po/iv q6hr prn severe agitation

## 2020-03-26 NOTE — PROGRESS NOTE ADULT - ATTENDING COMMENTS
spoke to daughter and updated about detailed plan of care including need for liver biopsy, benzos for anxiety, iv access etc  await CT C/A/P and MRCP as well  appreciate consultant recs  complex case with multi-organ disease

## 2020-03-26 NOTE — BEHAVIORAL HEALTH ASSESSMENT NOTE - SUMMARY
76 y/o single woman, domiciled in [facility] with sister, with no formal PPHx, no SA/SIB, no h/o violence, no substance abuse, with PMHx DM, CAD (s/p stent & CABG), p/w subacute/ acute? encephalopathy and aphasia c/b seizure, LP negative for infectious work up, MRI suggestive of inflammatory/infectious etiology vs. malignancy, in temporal occipital region.  Psychiatry consulted to assess for capacity to refuse IV placement, CT with contrast of head, pelvis, abdomen to r/o malignancy and IV Abx.  Patient seen and evaluated, awake and alert oriented to person, partially to place and time.  Patient reports she is angry, reports she is refusing IV placement as well and abdominal/pelvic/chest imaging, states she is tired of her doctors coming in and out to discuss various testings.  She is unable to state benefits of the recommended treatments, unable to elaborate on the risks if she were to comply/not comply with treatment.  The patient is unable to verbalize the relevant indication/risks/benefits about the procedures, unable to have appropriate reasoning for primary team's recommendations.  Patient does not have capacity to refuse IV placement, chest/abdominal/pelvic CT, and IV Abx.  Would defer decision to next of kin.

## 2020-03-26 NOTE — PROGRESS NOTE ADULT - SUBJECTIVE AND OBJECTIVE BOX
Chief Complaint:  Patient is a 75y old  Female who presents with a chief complaint of headache (26 Mar 2020 08:05)      Interval Events:   No events overnight. Patient refusing IV.    Allergies:  clams - itching (Other)  No Known Drug Allergies  Peroxide (Blisters)      Home Medications:    Hospital Medications:  acyclovir IVPB      acyclovir IVPB 700 milliGRAM(s) IV Intermittent every 8 hours  AQUAPHOR (petrolatum Ointment) 1 Application(s) Topical two times a day PRN  aspirin  chewable 81 milliGRAM(s) Oral daily  bisacodyl 5 milliGRAM(s) Oral every 12 hours PRN  dextrose 40% Gel 15 Gram(s) Oral once PRN  dextrose 5%. 1000 milliLiter(s) IV Continuous <Continuous>  dextrose 50% Injectable 12.5 Gram(s) IV Push once  dextrose 50% Injectable 25 Gram(s) IV Push once  dextrose 50% Injectable 25 Gram(s) IV Push once  enoxaparin Injectable 40 milliGRAM(s) SubCutaneous daily  glucagon  Injectable 1 milliGRAM(s) IntraMuscular once PRN  insulin glargine Injectable (LANTUS) 24 Unit(s) SubCutaneous at bedtime  insulin lispro (HumaLOG) corrective regimen sliding scale   SubCutaneous three times a day before meals  insulin lispro (HumaLOG) corrective regimen sliding scale   SubCutaneous at bedtime  insulin lispro Injectable (HumaLOG) 8 Unit(s) SubCutaneous three times a day before meals  levETIRAcetam 750 milliGRAM(s) Oral two times a day  metoprolol tartrate 25 milliGRAM(s) Oral daily  naphazoline/pheniramine Solution 1 Drop(s) Both EYES every 6 hours PRN  pantoprazole    Tablet 40 milliGRAM(s) Oral before breakfast  polyethylene glycol 3350 17 Gram(s) Oral two times a day  senna 2 Tablet(s) Oral at bedtime  sodium chloride 0.9%. 1000 milliLiter(s) IV Continuous <Continuous>      PMHX/PSHX:  Chronic systolic heart failure  Diabetes  S/P CABG x 1  S/P breast lumpectomy  History of tonsillectomy  H/O abdominal hysterectomy      Family history:  FH: type 2 diabetes      ROS:         PHYSICAL EXAM:   Vital Signs:  Vital Signs Last 24 Hrs  T(C): 36.4 (26 Mar 2020 08:32), Max: 36.7 (25 Mar 2020 17:11)  T(F): 97.6 (26 Mar 2020 08:32), Max: 98.1 (25 Mar 2020 17:11)  HR: 60 (26 Mar 2020 08:32) (59 - 68)  BP: 158/70 (26 Mar 2020 08:32) (153/68 - 199/91)  BP(mean): --  RR: 18 (26 Mar 2020 08:32) (18 - 18)  SpO2: 93% (26 Mar 2020 08:32) (93% - 96%)  Daily     Daily       LABS:                        9.6    6.00  )-----------( 318      ( 25 Mar 2020 09:13 )             30.6     03-25    138  |  103  |  13  ----------------------------<  258<H>  3.9   |  24  |  0.71    Ca    9.3      25 Mar 2020 09:13  Phos  3.2     03-25  Mg     1.6     03-25    TPro  8.2  /  Alb  3.5  /  TBili  0.5  /  DBili  x   /  AST  140<H>  /  ALT  137<H>  /  AlkPhos  728<H>  03-25    LIVER FUNCTIONS - ( 25 Mar 2020 09:13 )  Alb: 3.5 g/dL / Pro: 8.2 g/dL / ALK PHOS: 728 U/L / ALT: 137 U/L / AST: 140 U/L / GGT: x                   Imaging:

## 2020-03-26 NOTE — PROGRESS NOTE ADULT - ASSESSMENT
Pt is a 75 yo R-handed F with h/o DM, CAD s/p stent and CABG p/w subacute onset mixed aphasia with motor predominance due to left hemispheric dysfunction.  Patient with positive active EBV serologies, abnormal IgG, kappa and Lambda, with M-spike. Underlying hematologic malignancy would cause immunosuppression.    Impression: Subacute mixed aphasia -- almost completely improved on IV Acyclovir, but with leptomeningeal enhancement on MRI (predominantly left temporal).  Ddx includes: Underlying malignancy with possible CSF infiltration vs resolving viral encephalitis vs encephalopathy induced by systemic infection (EBV) vs. CJD    Recommendations:  - CT C/A/P with contrast to evaluate for potential malignancy  - Appreciate hem/onc, hepatology and ID evals  - Repeat MRI brain w/ contrast from 3/20: leptomeningeal enhancement most prominent within the left temporo-occipital region.   - MRA head w/o contrast: negative  - MRA neck w contrast: negative  - 24 hr vEEG: refused by patient  - Continue Keppra 750 mg BID, not likely related to transaminitis given it started prior to the initiation of Keppra  - Appreciate ID consult, agree with continuing acyclovir and hematology/oncology consult   - LP: protein: 59, glucose: 178, Cell count: 1, otherwise unremarkable thus far  - No plan for repeat LP at this time, given pt is unwilling to cooperate and low yield  - No further inpatient Neurologic work-up at this time; rest management per primary team and consultants  - Signing off, please recall PRN  - Patient can follow-up with outpatient Neurology on discharge  Address: [address] [place] [address]  Phone: [phone]

## 2020-03-26 NOTE — BEHAVIORAL HEALTH ASSESSMENT NOTE - RISK ASSESSMENT
Low Acute Suicide Risk Risk factors: acute/chronic medical issues, acute/chronic cognitive impairments, chronic pain, not receiving treatment, irritability     Protective factors: no current SIIP/HIIP, no h/o SA/SIB, no h/o psych admissions, no active substance abuse, domiciled, social supports    Overall, pt is a low risk of harm to self/others. Risk factors: acute/chronic medical issues, acute/chronic cognitive impairments, chronic pain, not receiving treatment, irritability     Protective factors: no current SIIP/HIIP, no h/o SA/SIB, no h/o psych admissions, no active substance abuse, domiciled, social supports    Overall, pt is a low risk of harm to self.

## 2020-03-26 NOTE — PROVIDER CONTACT NOTE (MEDICATION) - BACKGROUND
Pt admitted for HA x 1 day and aphasia for a 1 week, ddx include subacute CVA vs less likely focal seizures.

## 2020-03-26 NOTE — PROGRESS NOTE ADULT - SUBJECTIVE AND OBJECTIVE BOX
Patient is a 75y old  Female who presents with a chief complaint of headache (26 Mar 2020 13:16)    Being followed by ID for        Interval history:  pt remains more responsive  feels improved  No other acute events        PAST MEDICAL & SURGICAL HISTORY:  Chronic systolic heart failure  Diabetes  S/P CABG x 1  S/P breast lumpectomy: left  History of tonsillectomy  H/O abdominal hysterectomy: 1991    Allergies    clams - itching (Other)  No Known Drug Allergies  Peroxide (Blisters)    Intolerances      Antimicrobials:    acyclovir IVPB      acyclovir IVPB 700 milliGRAM(s) IV Intermittent every 8 hours    MEDICATIONS  (STANDING):  acyclovir IVPB      acyclovir IVPB 700 milliGRAM(s) IV Intermittent every 8 hours  amLODIPine   Tablet 5 milliGRAM(s) Oral daily  aspirin  chewable 81 milliGRAM(s) Oral daily  dextrose 5%. 1000 milliLiter(s) (50 mL/Hr) IV Continuous <Continuous>  dextrose 50% Injectable 12.5 Gram(s) IV Push once  dextrose 50% Injectable 25 Gram(s) IV Push once  dextrose 50% Injectable 25 Gram(s) IV Push once  enoxaparin Injectable 40 milliGRAM(s) SubCutaneous daily  insulin glargine Injectable (LANTUS) 24 Unit(s) SubCutaneous at bedtime  insulin lispro (HumaLOG) corrective regimen sliding scale   SubCutaneous three times a day before meals  insulin lispro (HumaLOG) corrective regimen sliding scale   SubCutaneous at bedtime  insulin lispro Injectable (HumaLOG) 8 Unit(s) SubCutaneous three times a day before meals  levETIRAcetam 750 milliGRAM(s) Oral two times a day  LORazepam   Injectable 1 milliGRAM(s) IntraMuscular once  metoprolol tartrate 12.5 milliGRAM(s) Oral daily  pantoprazole    Tablet 40 milliGRAM(s) Oral before breakfast  polyethylene glycol 3350 17 Gram(s) Oral two times a day  senna 2 Tablet(s) Oral at bedtime  sodium chloride 0.9%. 1000 milliLiter(s) (100 mL/Hr) IV Continuous <Continuous>  ursodiol Tablet 500 milliGRAM(s) Oral every 12 hours      Vital Signs Last 24 Hrs  T(C): 36.7 (03-26-20 @ 15:49), Max: 36.7 (03-25-20 @ 17:11)  T(F): 98.1 (03-26-20 @ 15:49), Max: 98.1 (03-25-20 @ 17:11)  HR: 58 (03-26-20 @ 15:49) (58 - 68)  BP: 137/78 (03-26-20 @ 15:49) (137/78 - 199/91)  BP(mean): --  RR: 18 (03-26-20 @ 15:49) (18 - 18)  SpO2: 94% (03-26-20 @ 15:49) (93% - 96%)    Physical Exam:    Constitutional well preserved,comfortable,pleasant    HEENT PERRLA EOMI,No pallor or icterus    missing teeth    Neck supple no JVD or LN    Chest Good AE,CTA    CVS RRR S1 S2 WNl     Abd soft BS normal No tenderness no masses    Ext No cyanosis clubbing or edema    IV site no erythema tenderness or discharge    Joints no swelling or LOM        Lab Data:                          9.6    6.00  )-----------( 318      ( 25 Mar 2020 09:13 )             30.6       03-25    138  |  103  |  13  ----------------------------<  258<H>  3.9   |  24  |  0.71    Ca    9.3      25 Mar 2020 09:13  Phos  3.2     03-25  Mg     1.6     03-25    TPro  8.2  /  Alb  3.5  /  TBili  0.5  /  DBili  x   /  AST  140<H>  /  ALT  137<H>  /  AlkPhos  728<H>  03-25    Mitochondrial Antibody (03.21.20 @ 11:39)    Mitochondrial Antibody: 1:320: Method: Indirect fluorescent antibody.    Anti-Ribonuclear Protein (03.20.20 @ 08:17)    Anti-Ribonuclear Protein: 7.2: Fluorescent Bead Immunoassy                      Reference Ranges for RNP and SM:                      <1.0 AI (negative)                      > or =1.0 AI (positive)                      Reference values apply to all ages AI                          WBC Count: 6.00 (03-25-20 @ 09:13)  WBC Count: 6.20 (03-24-20 @ 13:14)  WBC Count: 6.67 (03-21-20 @ 09:21)

## 2020-03-26 NOTE — BEHAVIORAL HEALTH ASSESSMENT NOTE - HPI (INCLUDE ILLNESS QUALITY, SEVERITY, DURATION, TIMING, CONTEXT, MODIFYING FACTORS, ASSOCIATED SIGNS AND SYMPTOMS)
74 y/o single woman, domiciled in [facility] with sister, with no formal PPHx, no SA/SIB, no h/o violence, no substance abuse, with PMHx DM, CAD (s/p stent & CABG), p/w subacute/ acute? encephalopathy and aphasia c/b seizure, LP negative for infectious work up, MRI suggestive of inflammatory/infectious etiology vs. malignancy, in temporal occipital region.  Psychiatry consulted to assess for capacity to refuse IV placement, CT with contrast of head, pelvis, abdomen to r/o malignancy and IV Abx.    Patient seen and evaluated, awake and alert oriented to person, disoriented to place- states she's on [address] at [facility], disoriented to place (states that it's the ), reports frustration, feeling angered about the course of her hospitalization, shows writer arms and bruising, stating multiple attempts to try get IV access.  States is tired of people coming in and out of her room, talking to her about various different tests.  States that the team wants her to get an abdominal CT, however is adamantly refusing, unable to states reason for why team is recommending to have imaging done.  She reports she wants to return home to her sister.  Initially states that her children are , but then later states that her daughter is involved in her care.  Patient denies PPHx, denies SA/SIB, denies h/o violence, denies A/V/H, or thoughts of paranoia.  She denies use of illicit substances or alcohol.  She reports sleeping poorly in the hospital due to interruptions, however she otherwise states she sleeps fine at home, no changes in appetite.  Patient able to communicate her decision to refuse IV placement and abdominal/pelvic/chest imaging, unable to understand the benefits of the current recommended treatment, is able to state some risk of foregoing treatment- states may lead to death.  She does not have appropriate reasoning for her decision, does not appreciate the risks that are involved.  At this time, patient does not have the capacity to refuse IV placement, IV Abx, and CT with contrast of pelvis/abdomen/chest to r/o malignancy.  Would defer decision to next of kin or HCP.

## 2020-03-26 NOTE — BEHAVIORAL HEALTH ASSESSMENT NOTE - NSBHCHARTREVIEWVS_PSY_A_CORE FT
Vital Signs Last 24 Hrs  T(C): 36.4 (26 Mar 2020 08:32), Max: 36.7 (25 Mar 2020 17:11)  T(F): 97.6 (26 Mar 2020 08:32), Max: 98.1 (25 Mar 2020 17:11)  HR: 60 (26 Mar 2020 08:32) (59 - 68)  BP: 158/70 (26 Mar 2020 08:32) (158/70 - 199/91)  BP(mean): --  RR: 18 (26 Mar 2020 08:32) (18 - 18)  SpO2: 93% (26 Mar 2020 08:32) (93% - 96%)

## 2020-03-26 NOTE — PROGRESS NOTE ADULT - SUBJECTIVE AND OBJECTIVE BOX
Neurology Follow up note    Subjective: No acute events overnight, very significant improvement in her speech.    Objective:   Vital Signs Last 24 Hrs  T(C): 36.5 (25 Mar 2020 23:47), Max: 36.7 (25 Mar 2020 17:11)  T(F): 97.7 (25 Mar 2020 23:47), Max: 98.1 (25 Mar 2020 17:11)  HR: 68 (25 Mar 2020 23:47) (59 - 68)  BP: 199/91 (25 Mar 2020 23:47) (153/68 - 199/91)  BP(mean): --  RR: 18 (25 Mar 2020 23:47) (18 - 18)  SpO2: 93% (25 Mar 2020 23:47) (93% - 96%)      General Exam:   General appearance: No acute distress    Neurological Exam:  Mental Status: Orientated to self, date and place.  Attention intact.  No dysarthria, Mild phonemic paraphasic errors and dysprosody, no neglect. Gets very defensive when questioned about mental status and refuses to answer more questions.    Cranial Nerves:  PERRL, EOMI grossly, VFF grossly, no nystagmus. No facial asymmetry.     Motor:   Tone: normal.                  Strength: intact throughout  Drift: none                 Tremor: No resting, postural or action tremor.  No myoclonus.      03-25    138  |  103  |  13  ----------------------------<  258<H>  3.9   |  24  |  0.71    Ca    9.3      25 Mar 2020 09:13  Phos  3.2     03-25  Mg     1.6     03-25    TPro  8.2  /  Alb  3.5  /  TBili  0.5  /  DBili  x   /  AST  140<H>  /  ALT  137<H>  /  AlkPhos  728<H>  03-25 03-25    138  |  103  |  13  ----------------------------<  258<H>  3.9   |  24  |  0.71    Ca    9.3      25 Mar 2020 09:13  Phos  3.2     03-25  Mg     1.6     03-25    TPro  8.2  /  Alb  3.5  /  TBili  0.5  /  DBili  x   /  AST  140<H>  /  ALT  137<H>  /  AlkPhos  728<H>  03-25    LIVER FUNCTIONS - ( 25 Mar 2020 09:13 )  Alb: 3.5 g/dL / Pro: 8.2 g/dL / ALK PHOS: 728 U/L / ALT: 137 U/L / AST: 140 U/L / GGT: x                      9.6    6.00  )-----------( 318      ( 25 Mar 2020 09:13 )             30.6     MEDICATIONS  (STANDING):  acyclovir IVPB      acyclovir IVPB 700 milliGRAM(s) IV Intermittent every 8 hours  aspirin  chewable 81 milliGRAM(s) Oral daily  dextrose 5%. 1000 milliLiter(s) (50 mL/Hr) IV Continuous <Continuous>  dextrose 50% Injectable 12.5 Gram(s) IV Push once  dextrose 50% Injectable 25 Gram(s) IV Push once  dextrose 50% Injectable 25 Gram(s) IV Push once  enoxaparin Injectable 40 milliGRAM(s) SubCutaneous daily  insulin glargine Injectable (LANTUS) 24 Unit(s) SubCutaneous at bedtime  insulin lispro (HumaLOG) corrective regimen sliding scale   SubCutaneous three times a day before meals  insulin lispro (HumaLOG) corrective regimen sliding scale   SubCutaneous at bedtime  insulin lispro Injectable (HumaLOG) 8 Unit(s) SubCutaneous three times a day before meals  levETIRAcetam 750 milliGRAM(s) Oral two times a day  metoprolol tartrate 25 milliGRAM(s) Oral daily  pantoprazole    Tablet 40 milliGRAM(s) Oral before breakfast  polyethylene glycol 3350 17 Gram(s) Oral two times a day  senna 2 Tablet(s) Oral at bedtime  sodium chloride 0.9%. 1000 milliLiter(s) (100 mL/Hr) IV Continuous <Continuous>    MEDICATIONS  (PRN):  AQUAPHOR (petrolatum Ointment) 1 Application(s) Topical two times a day PRN Dry skin  bisacodyl 5 milliGRAM(s) Oral every 12 hours PRN Constipation  dextrose 40% Gel 15 Gram(s) Oral once PRN Blood Glucose LESS THAN 70 milliGRAM(s)/deciliter  glucagon  Injectable 1 milliGRAM(s) IntraMuscular once PRN Glucose LESS THAN 70 milligrams/deciliter  naphazoline/pheniramine Solution 1 Drop(s) Both EYES every 6 hours PRN eye irritation

## 2020-03-26 NOTE — BEHAVIORAL HEALTH ASSESSMENT NOTE - VIOLENCE RISK FACTORS:
Lack of insight into violence risk/need for treatment/Impulsivity/Irritability/Affective dysregulation

## 2020-03-26 NOTE — CHART NOTE - NSCHARTNOTEFT_GEN_A_CORE
NEUROLOGY ATTENDING NOTE: MAURICIO     CC: APHASIA, ABNORMAL BRAIN MRI    PATIENT SEEN & EXAMINED WITH HOUSESTAFF IN AM OF 3/26/2020  SUNRISE NOTES REVIEWED  ALLSCRIPTS NOTES REVIEWED  IMAGING PERSONALLY RE-REVIEWED AND RE-INTERPRETED BY ME, INCLUDING MRIMAGING DATED 3/17/2020 AND 3/20/2020  LABS REVIEWED    74 yo RH woman with PMHx DM, CAD (s/p stent & CABG), lives with her sister, who noticed subacute (over 1-2 weeks) speech difficulty. On admission, found to have global aphasia, with both mild receptive and more significant word-finding difficulties. Imaging with contrast demonstrated subtle leptomeningeal enhancement over the left frontal lobe, while CSF was largely unremarkable. She was found to have mild MGUS on lab evaluation as well as elevated inflammatory markers (RTA=558, CRP=1.2). She has markedly improved while in house and today is day #12/14 days of acyclovir, during which treatment she improved. She wants to go home and is verbally abusive to examiner this AM.    She was admitted to [facility] on 3/14/2020, where an MRI did not disclose a CVA, but there is cortical hyperintensity on T2 FLAIR imaging and patchy leptomeningeal (pial) enhancement in this same region. There is no cortical DWI hyperintensity, as one often sees in Creutzfeld-Cesar disease.    EXAMINATION  Alert and mostly oriented. Speech is fluent, with multiple dysnomic errors and -- although she understands most of what is said -- subtle lack of comprehension.  She is easily upset and inappropriate during examination. She does not cooperate with the entire exam.  no drift, full strength  no cranial nerve deficit, but limited examination.    LABS  THERE IS NO 14-3-3 RESULTED   3/25/20 WBC=6 HgB=9.6 HCT=30.6 BLTOCYPDX=719  138|103|13/258  3.9|24|0.71\  NKG=059  ALT=137  ALK PNTL=771  ALBUMIN=3.5  3/24/20 LEVETIRACETAM LEVEL=22.1 (THERAPEUTIC)    3/21/20 ANTI-MITOCHONDRIAL AB=1:320  SMA AB=1:160    3/21/20 WBC=6.7; HGB=9.5; WHCNRYNGY=464  137|104|12/263  4.1|20|0.65\  HGA1C>15.5 (ON 3/15/20)  AST/ALT=179/139  VEL=528 (3/15/20)  ALK PHOS=612  AMMONIA=37 (NORMAL)  ALBUMIN=3.4  YUL=220  CRP=1.2 (ELEVATED) and SUL=171 (HIGH)    HIV NEGATIVE, HHV-6 NEGATIVE, CMV NEGATIVE, EBV IgG HIGHLY POSITIVE AND EBV IgM MILDLY POSITIVE. CSF HSV NEGATIVE. & CSF PCR NEGATIVE  NORMAL THYROID STUDIES  MGUS MILDLY POSITIVE    3/16/2020 CSF: PROTEIN=59, GLUCOSE=178, WBC=1, RBC=0; HSV NEGATIVE.    3/14/2020 UA: LARGE LEUKOCYTE ESTERASE    IMPRESSION  74 yo RH woman with multiple risk factors for CVA, now with aphasia, poorly controlled hyperglycemia, elevated LFTs, and abnormal leptomeninges on neuroimaging affecting brain regions which would cause an aphasia.    ENCEPHALITIS – There is no HSV found on CSF analysis, and the absent WBC count similarly makes this diagnosis unlikely. Rarely paraneoplastic syndromes may present this way, but there is no neoplasm. Recommend performing CT-c/a/p to look for an occult neoplasm, such as lymphoma, given the MGUS. I see no CSF flow cytometry or cytology. I note she has improved on acyclovir, so it is reasonable to complete the 14-day course.    MGUS – This is mild and may not be relevant. However, MGUS occasionally progresses to jen neoplasm, like a lymphoma.    APHASIA – Appreciate early initiation of anti-viral medications, as well as ID input regarding continued administration of these drugs. Plan to complete 14 days total as patient has improved on this drug.    HYPERGLYCEMIA – The markedly elevated HgA1C suggests this remains a major obstacle to good health. While known to cause an encephalopathy, I am unsure the MR findings can be explained by the persistently elevated serum glucose. Agree with control of levels with insulin.    HEPATITIS – Seen by hepatology team on 3/17/20, I note her anti-mitochondrial AB were 1:320, which is elevated. Such antibodies are seen in primary bilaiary cholangitis, but I am unaware of a relationship between an auto-immune cholangitis and cortical inflammation. I note that Primary biliary cholangitis (PBC; previously referred to as primary biliary cirrhosis) is characterized by a E-epumigbxij-laymlxwx attack on small intralobular bile ducts, but there was NOT an increase in lymphocytes in the CSF. While there is an association with [employer] sites, ([id number]=[id number]), she worked as a  so this is less likely. I recommend repeating GGT as last one was >1 week ago and repeat Ammonia level, too.    DISPO – CT-c/a/p not yet performed and CSF 14-3-3 unavailable, as there is no objectively confirmed explanation for aphasia and MR findings, please repeat LP for RT-QUIC analysis and flow cytometry/cytological analysis.    total time spent was 36 minutes, half in coordination of care.

## 2020-03-27 ENCOUNTER — TRANSCRIPTION ENCOUNTER (OUTPATIENT)
Age: 75
End: 2020-03-27

## 2020-03-27 LAB — GLUCOSE BLDC GLUCOMTR-MCNC: 318 MG/DL — HIGH (ref 70–99)

## 2020-03-27 PROCEDURE — 99232 SBSQ HOSP IP/OBS MODERATE 35: CPT | Mod: GC

## 2020-03-27 PROCEDURE — 99233 SBSQ HOSP IP/OBS HIGH 50: CPT | Mod: GC

## 2020-03-27 RX ORDER — ATORVASTATIN CALCIUM 80 MG/1
10 TABLET, FILM COATED ORAL AT BEDTIME
Refills: 0 | Status: DISCONTINUED | OUTPATIENT
Start: 2020-03-27 | End: 2020-03-31

## 2020-03-27 RX ORDER — ACYCLOVIR SODIUM 500 MG
700 VIAL (EA) INTRAVENOUS EVERY 8 HOURS
Refills: 0 | Status: DISCONTINUED | OUTPATIENT
Start: 2020-03-27 | End: 2020-03-29

## 2020-03-27 RX ADMIN — SENNA PLUS 2 TABLET(S): 8.6 TABLET ORAL at 22:02

## 2020-03-27 RX ADMIN — Medication 2 MILLIGRAM(S): at 16:28

## 2020-03-27 RX ADMIN — POLYETHYLENE GLYCOL 3350 17 GRAM(S): 17 POWDER, FOR SOLUTION ORAL at 17:00

## 2020-03-27 RX ADMIN — Medication 800 MILLIGRAM(S): at 05:32

## 2020-03-27 RX ADMIN — Medication 81 MILLIGRAM(S): at 11:03

## 2020-03-27 RX ADMIN — Medication 12.5 MILLIGRAM(S): at 05:32

## 2020-03-27 RX ADMIN — Medication 4: at 08:47

## 2020-03-27 RX ADMIN — SODIUM CHLORIDE 100 MILLILITER(S): 9 INJECTION INTRAMUSCULAR; INTRAVENOUS; SUBCUTANEOUS at 22:10

## 2020-03-27 RX ADMIN — Medication 5 MILLIGRAM(S): at 05:32

## 2020-03-27 RX ADMIN — LEVETIRACETAM 750 MILLIGRAM(S): 250 TABLET, FILM COATED ORAL at 05:32

## 2020-03-27 RX ADMIN — URSODIOL 500 MILLIGRAM(S): 250 TABLET, FILM COATED ORAL at 05:32

## 2020-03-27 RX ADMIN — PANTOPRAZOLE SODIUM 40 MILLIGRAM(S): 20 TABLET, DELAYED RELEASE ORAL at 05:32

## 2020-03-27 RX ADMIN — URSODIOL 500 MILLIGRAM(S): 250 TABLET, FILM COATED ORAL at 17:00

## 2020-03-27 RX ADMIN — ENOXAPARIN SODIUM 40 MILLIGRAM(S): 100 INJECTION SUBCUTANEOUS at 11:03

## 2020-03-27 RX ADMIN — LEVETIRACETAM 750 MILLIGRAM(S): 250 TABLET, FILM COATED ORAL at 17:00

## 2020-03-27 RX ADMIN — Medication 800 MILLIGRAM(S): at 17:00

## 2020-03-27 RX ADMIN — Medication 264 MILLIGRAM(S): at 22:02

## 2020-03-27 RX ADMIN — AMLODIPINE BESYLATE 5 MILLIGRAM(S): 2.5 TABLET ORAL at 05:32

## 2020-03-27 RX ADMIN — ATORVASTATIN CALCIUM 10 MILLIGRAM(S): 80 TABLET, FILM COATED ORAL at 22:02

## 2020-03-27 NOTE — DISCHARGE NOTE NURSING/CASE MANAGEMENT/SOCIAL WORK - NSDCPEPTSTRK_GEN_ALL_CORE
Stroke warning signs and symptoms/Signs and symptoms of stroke/Call 911 for stroke/Need for follow up after discharge/Prescribed medications/Stroke education booklet/Stroke support groups for patients, families, and friends/Risk factors for stroke

## 2020-03-27 NOTE — PROGRESS NOTE ADULT - PROBLEM SELECTOR PLAN 9
ISTOP: Reference #: [id number]; last alprazolam filled in 2014  DVT ppx: lovenox qdaily  Diet: soft diet  Dispo: pending PT and speech therapy consult    1.  Name of PCP: Felipe Manuel   2.  PCP Contacted on Admission: [ ] Y    [ ] N    3.  PCP contacted at Discharge: [ ] Y    [ ] N    [ ] N/A  4.  Post-Discharge Appointment Date and Location:  5.  Summary of Handoff given to PCP:

## 2020-03-27 NOTE — PROGRESS NOTE ADULT - ASSESSMENT
74F with T2DM, systolic HF, CABG p/w subacute/ acute? encephalopathiy and aphasia c/b seizure, currently undergoing work up. Hepatology consulted for evaluation of abnormal liver chemistries. She has a mild increase in ALT/AST with a rise in alkaline phosphatase with a normal bilirubin and INR. The R factor of 0.4 consistent with cholestatic liver injury. US abdomen showing hepatomegaly but no other abnormalities. Hep serologies negative to date. Ceruloplasmin of 49. Patient has a sister who has autoimmune hepatitis as well as PBC. She was taking metoprolol and statin at home for the last 2 years. EBV IgM positive, but PCR negative.  patient refusing MRI abdomen  REECE 1:80. AMA of 1:320 and SMA of 1:160.  IgG 2173  Concerning for PBC and autoimmune hepatitis.    Recommendations  - trend LFTs daily  - cw ursodiol 500mg BID for treatment of PBC  - f/u liver biopsy results  - can resume statin as per primary team  - rest of plan as per primary team 74F with T2DM, systolic HF, CABG p/w subacute/ acute? encephalopathiy and aphasia c/b seizure, currently undergoing work up. Hepatology consulted for evaluation of abnormal liver chemistries. She has a mild increase in ALT/AST with a rise in alkaline phosphatase with a normal bilirubin and INR. The R factor of 0.4 consistent with cholestatic liver injury. US abdomen showing hepatomegaly but no other abnormalities. Hep serologies negative to date. Ceruloplasmin of 49. Patient has a sister who has autoimmune hepatitis as well as PBC. She was taking metoprolol and statin at home for the last 2 years. EBV IgM positive, but PCR negative.  patient refusing MRI abdomen  REECE 1:80. AMA of 1:320 and SMA of 1:160.  IgG 2173  Concerning for PBC and autoimmune hepatitis.    Recommendations  - trend LFTs daily  - cw ursodiol 500mg BID for treatment of PBC  - f/u liver biopsy results  - can resume statin as per primary team  - rest of plan as per primary team  - can discharge patient as per primary team with ursodiol 500mg BID with phone appointment with Dr. Villegas on Thursday next week (April 3) 74F with T2DM, systolic HF, CABG p/w subacute/ acute? encephalopathiy and aphasia c/b seizure, currently undergoing work up. Hepatology consulted for evaluation of abnormal liver chemistries. She has a mild increase in ALT/AST with a rise in alkaline phosphatase with a normal bilirubin and INR. The R factor of 0.4 consistent with cholestatic liver injury. US abdomen showing hepatomegaly but no other abnormalities. Hep serologies negative to date. Ceruloplasmin of 49. Patient has a sister who has autoimmune hepatitis as well as PBC. She was taking metoprolol and statin at home for the last 2 years. EBV IgM positive, but PCR negative.  patient refusing MRI abdomen  REECE 1:80. AMA of 1:320 and SMA of 1:160.  IgG 2173  Concerning for PBC and autoimmune hepatitis.    Recommendations  - cw ursodiol 500mg BID for treatment of PBC  - f/u liver biopsy results  - can resume statin as per primary team  - rest of plan as per primary team  - can discharge patient as per primary team with ursodiol 500mg BID with phone appointment with Dr. Villegas on Thursday next week (April 3)

## 2020-03-27 NOTE — DISCHARGE NOTE NURSING/CASE MANAGEMENT/SOCIAL WORK - NSDCFUADDAPPT_GEN_ALL_CORE_FT
Schedule appointment with Dr. Villegas for results of Liver biopsy  Schedule appointment at [facility] to follow up pulmonary nodules and MGUS proteins  Schedule appointment with Neurology regarding when to come off of [facility]

## 2020-03-27 NOTE — CHART NOTE - NSCHARTNOTEFT_GEN_A_CORE
Patient Name: Nesha Malone     YOB: 1945     iSTOP ID #[id number]     2019/05/14 2019/05/14 alprazolam 0.5 mg tablet  20 20 Felipe Manuel D O      Team 4   [facility] [id number]

## 2020-03-27 NOTE — DISCHARGE NOTE NURSING/CASE MANAGEMENT/SOCIAL WORK - NSDCPEWEB_GEN_ALL_CORE
[facility] for Tobacco Control website --- [email or web address] website --- [email or web address]

## 2020-03-27 NOTE — PROGRESS NOTE ADULT - SUBJECTIVE AND OBJECTIVE BOX
INTERVAL HPI/OVERNIGHT EVENTS:    SUBJECTIVE: Patient seen and examined at bedside. Patient continue to refuse IV line placement and other treatment options. Will clarify with daughter, HCP, what her preferences are.     ROS neg except as above    OBJECTIVE:    VITAL SIGNS:  ICU Vital Signs Last 24 Hrs  T(C): 36.7 (27 Mar 2020 04:45), Max: 36.7 (26 Mar 2020 15:49)  T(F): 98 (27 Mar 2020 04:45), Max: 98.1 (26 Mar 2020 15:49)  HR: 71 (27 Mar 2020 04:45) (58 - 71)  BP: 127/71 (27 Mar 2020 04:45) (127/71 - 160/91)  BP(mean): --  ABP: --  ABP(mean): --  RR: 18 (27 Mar 2020 04:45) (18 - 18)  SpO2: 94% (27 Mar 2020 04:45) (94% - 94%)        03-26 @ 07:01  -  03-27 @ 07:00  --------------------------------------------------------  IN: 400 mL / OUT: 300 mL / NET: 100 mL      CAPILLARY BLOOD GLUCOSE      POCT Blood Glucose.: 318 mg/dL (27 Mar 2020 08:24)      PHYSICAL EXAM:    GENERAL: elderly female, pleasant, NAD  NEURO: AO x3, nonfocal, has word finding difficulties and is slow to respond   HEAD:  Atraumatic, Normocephalic  EYES: conjunctiva and sclera clear  NECK: Supple  CHEST/LUNG: Clear to auscultation bilaterally; No wheezes, rales or rhonchi  HEART: Regular rate and rhythm; No murmurs, rubs, or gallops  ABDOMEN: Soft, Nontender, Nondistended; Bowel sounds present, no masses.  EXTREMITIES:  2+ Peripheral Pulses, No clubbing, cyanosis, or edema  SKIN: Warm, dry, in tact, no rashes or lesions  PSYCH: affect appropriate    MEDICATIONS:  MEDICATIONS  (STANDING):  acyclovir   Oral Tab/Cap 800 milliGRAM(s) Oral two times a day  amLODIPine   Tablet 5 milliGRAM(s) Oral daily  aspirin  chewable 81 milliGRAM(s) Oral daily  dextrose 5%. 1000 milliLiter(s) (50 mL/Hr) IV Continuous <Continuous>  dextrose 50% Injectable 12.5 Gram(s) IV Push once  dextrose 50% Injectable 25 Gram(s) IV Push once  dextrose 50% Injectable 25 Gram(s) IV Push once  enoxaparin Injectable 40 milliGRAM(s) SubCutaneous daily  insulin glargine Injectable (LANTUS) 24 Unit(s) SubCutaneous at bedtime  insulin lispro (HumaLOG) corrective regimen sliding scale   SubCutaneous three times a day before meals  insulin lispro (HumaLOG) corrective regimen sliding scale   SubCutaneous at bedtime  insulin lispro Injectable (HumaLOG) 8 Unit(s) SubCutaneous three times a day before meals  levETIRAcetam 750 milliGRAM(s) Oral two times a day  metoprolol tartrate 12.5 milliGRAM(s) Oral daily  pantoprazole    Tablet 40 milliGRAM(s) Oral before breakfast  polyethylene glycol 3350 17 Gram(s) Oral two times a day  senna 2 Tablet(s) Oral at bedtime  sodium chloride 0.9%. 1000 milliLiter(s) (100 mL/Hr) IV Continuous <Continuous>  ursodiol Tablet 500 milliGRAM(s) Oral every 12 hours    MEDICATIONS  (PRN):  ALPRAZolam 0.25 milliGRAM(s) Oral three times a day PRN anxiety  AQUAPHOR (petrolatum Ointment) 1 Application(s) Topical two times a day PRN Dry skin  bisacodyl 5 milliGRAM(s) Oral every 12 hours PRN Constipation  dextrose 40% Gel 15 Gram(s) Oral once PRN Blood Glucose LESS THAN 70 milliGRAM(s)/deciliter  glucagon  Injectable 1 milliGRAM(s) IntraMuscular once PRN Glucose LESS THAN 70 milligrams/deciliter  naphazoline/pheniramine Solution 1 Drop(s) Both EYES every 6 hours PRN eye irritation      ALLERGIES:  Allergies    clams - itching (Other)  No Known Drug Allergies  Peroxide (Blisters)    Intolerances        LABS:                RADIOLOGY & ADDITIONAL TESTS: Reviewed.

## 2020-03-27 NOTE — PROGRESS NOTE ADULT - SUBJECTIVE AND OBJECTIVE BOX
Vascular & Interventional Radiology Pre-Procedure Note    Procedure Name: Hepatic Parenchymal Biopsy    HPI: 74F with T2DM, systolic HF, CABG p/w aphasia c/b seizure found to have elevated LFT's with familial history of autoimmune hepatitis.     Allergies: Peroxide (Blisters)    Medications:  acyclovir   Oral Tab/Cap: 800 milliGRAM(s) Oral (03-27 @ 05:32)  amLODIPine   Tablet: 5 milliGRAM(s) Oral (03-27 @ 05:32)  aspirin  chewable: 81 milliGRAM(s) Oral (03-26 @ 11:11)  enoxaparin Injectable: 40 milliGRAM(s) SubCutaneous (03-25 @ 16:02)  metoprolol tartrate: 12.5 milliGRAM(s) Oral (03-27 @ 05:32)  metoprolol tartrate: 25 milliGRAM(s) Oral (03-26 @ 06:14)    Data:    T(C): 36.7  HR: 71  BP: 127/71  RR: 18  SpO2: 94%    -WBC 6.00 / HgB 9.6 / Hct 30.6 / Plt 318  -Na 138 / Cl 103 / BUN 13 / Glucose 258  -K 3.9 / CO2 24 / Cr 0.71  - / Alk Phos 728 / T.Bili 0.5    Imaging: US on 03/20/2020    Plan:   -75y Female presents for hepatic parenchymal biopsy.   -Risks/Benefits/alternatives explained with the healthcare proxy and witnessed informed consent obtained.

## 2020-03-27 NOTE — PROGRESS NOTE ADULT - PROBLEM SELECTOR PLAN 1
Unclear etiology.  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 So far, serum HSV IgG positive, EBV antigen positive, will f/u EBV DNA by PCR. Hep panel, HIV negative, ESR and CRP elevated. Thyroid panel wnl.    - b12, folate, tsh, cortsiol   - ID consulted, will send studies as outlined within their note

## 2020-03-27 NOTE — DISCHARGE NOTE NURSING/CASE MANAGEMENT/SOCIAL WORK - PATIENT PORTAL LINK FT
You can access the FollowMyHealth Patient Portal offered by [facility] by registering at the following website: [email or web address]. By joining [facility]’s FollowMyHealth portal, you will also be able to view your health information using other applications (apps) compatible with our system.

## 2020-03-27 NOTE — PROGRESS NOTE ADULT - SUBJECTIVE AND OBJECTIVE BOX
Chief Complaint:  Patient is a 75y old  Female who presents with a chief complaint of headache (27 Mar 2020 08:19)      Interval Events:   s/p IR liver biopsy.    Allergies:  clams - itching (Other)  No Known Drug Allergies  Peroxide (Blisters)      Home Medications:    Hospital Medications:  acyclovir   Oral Tab/Cap 800 milliGRAM(s) Oral two times a day  ALPRAZolam 0.25 milliGRAM(s) Oral three times a day PRN  amLODIPine   Tablet 5 milliGRAM(s) Oral daily  AQUAPHOR (petrolatum Ointment) 1 Application(s) Topical two times a day PRN  aspirin  chewable 81 milliGRAM(s) Oral daily  bisacodyl 5 milliGRAM(s) Oral every 12 hours PRN  dextrose 40% Gel 15 Gram(s) Oral once PRN  dextrose 5%. 1000 milliLiter(s) IV Continuous <Continuous>  dextrose 50% Injectable 12.5 Gram(s) IV Push once  dextrose 50% Injectable 25 Gram(s) IV Push once  dextrose 50% Injectable 25 Gram(s) IV Push once  enoxaparin Injectable 40 milliGRAM(s) SubCutaneous daily  glucagon  Injectable 1 milliGRAM(s) IntraMuscular once PRN  insulin glargine Injectable (LANTUS) 24 Unit(s) SubCutaneous at bedtime  insulin lispro (HumaLOG) corrective regimen sliding scale   SubCutaneous three times a day before meals  insulin lispro (HumaLOG) corrective regimen sliding scale   SubCutaneous at bedtime  insulin lispro Injectable (HumaLOG) 8 Unit(s) SubCutaneous three times a day before meals  levETIRAcetam 750 milliGRAM(s) Oral two times a day  metoprolol tartrate 12.5 milliGRAM(s) Oral daily  naphazoline/pheniramine Solution 1 Drop(s) Both EYES every 6 hours PRN  pantoprazole    Tablet 40 milliGRAM(s) Oral before breakfast  polyethylene glycol 3350 17 Gram(s) Oral two times a day  senna 2 Tablet(s) Oral at bedtime  sodium chloride 0.9%. 1000 milliLiter(s) IV Continuous <Continuous>  ursodiol Tablet 500 milliGRAM(s) Oral every 12 hours      PMHX/PSHX:  Chronic systolic heart failure  Diabetes  S/P CABG x 1  S/P breast lumpectomy  History of tonsillectomy  H/O abdominal hysterectomy      Family history:  FH: type 2 diabetes      ROS:         PHYSICAL EXAM:   Vital Signs:  Vital Signs Last 24 Hrs  T(C): 36.7 (27 Mar 2020 04:45), Max: 36.7 (26 Mar 2020 15:49)  T(F): 98 (27 Mar 2020 04:45), Max: 98.1 (26 Mar 2020 15:49)  HR: 71 (27 Mar 2020 04:45) (58 - 71)  BP: 127/71 (27 Mar 2020 04:45) (127/71 - 160/91)  BP(mean): --  RR: 18 (27 Mar 2020 04:45) (18 - 18)  SpO2: 94% (27 Mar 2020 04:45) (94% - 94%)  Daily     Daily         LABS:                    Imaging:

## 2020-03-27 NOTE — PROGRESS NOTE ADULT - ATTENDING COMMENTS
poor compliance with treatment plan despite multiple requests on multiple day by multiple providers.  oral acyclovir given no iv access and patient refusal for iv placement after prior failed attempts.  attempted to enlist daughter's help to help patient comply, but both patient/daughter accusatory against nursing staff 3/26.  overall clinically improved but poor long term prognosis due to multi-organ disease and chronic poor compliance WDL

## 2020-03-28 LAB
ALBUMIN SERPL ELPH-MCNC: 3.2 G/DL — LOW (ref 3.3–5)
ALP SERPL-CCNC: 611 U/L — HIGH (ref 40–120)
ALT FLD-CCNC: 102 U/L — HIGH (ref 10–45)
ANION GAP SERPL CALC-SCNC: 13 MMOL/L — SIGNIFICANT CHANGE UP (ref 5–17)
AST SERPL-CCNC: 88 U/L — HIGH (ref 10–40)
BILIRUB SERPL-MCNC: 0.5 MG/DL — SIGNIFICANT CHANGE UP (ref 0.2–1.2)
BUN SERPL-MCNC: 23 MG/DL — SIGNIFICANT CHANGE UP (ref 7–23)
CALCIUM SERPL-MCNC: 9.1 MG/DL — SIGNIFICANT CHANGE UP (ref 8.4–10.5)
CHLORIDE SERPL-SCNC: 101 MMOL/L — SIGNIFICANT CHANGE UP (ref 96–108)
CO2 SERPL-SCNC: 21 MMOL/L — LOW (ref 22–31)
CREAT SERPL-MCNC: 1.02 MG/DL — SIGNIFICANT CHANGE UP (ref 0.5–1.3)
GLUCOSE SERPL-MCNC: 307 MG/DL — HIGH (ref 70–99)
HCT VFR BLD CALC: 31.6 % — LOW (ref 34.5–45)
HGB BLD-MCNC: 9.8 G/DL — LOW (ref 11.5–15.5)
INR BLD: 1.03 RATIO — SIGNIFICANT CHANGE UP (ref 0.88–1.16)
MCHC RBC-ENTMCNC: 25.6 PG — LOW (ref 27–34)
MCHC RBC-ENTMCNC: 31 GM/DL — LOW (ref 32–36)
MCV RBC AUTO: 82.5 FL — SIGNIFICANT CHANGE UP (ref 80–100)
NRBC # BLD: 0 /100 WBCS — SIGNIFICANT CHANGE UP (ref 0–0)
PLATELET # BLD AUTO: 337 K/UL — SIGNIFICANT CHANGE UP (ref 150–400)
POTASSIUM SERPL-MCNC: 3.9 MMOL/L — SIGNIFICANT CHANGE UP (ref 3.5–5.3)
POTASSIUM SERPL-SCNC: 3.9 MMOL/L — SIGNIFICANT CHANGE UP (ref 3.5–5.3)
PROT SERPL-MCNC: 8.2 G/DL — SIGNIFICANT CHANGE UP (ref 6–8.3)
PROTHROM AB SERPL-ACNC: 11.8 SEC — SIGNIFICANT CHANGE UP (ref 10–12.9)
RBC # BLD: 3.83 M/UL — SIGNIFICANT CHANGE UP (ref 3.8–5.2)
RBC # FLD: 15.6 % — HIGH (ref 10.3–14.5)
SODIUM SERPL-SCNC: 135 MMOL/L — SIGNIFICANT CHANGE UP (ref 135–145)
WBC # BLD: 5.42 K/UL — SIGNIFICANT CHANGE UP (ref 3.8–10.5)
WBC # FLD AUTO: 5.42 K/UL — SIGNIFICANT CHANGE UP (ref 3.8–10.5)

## 2020-03-28 PROCEDURE — 99233 SBSQ HOSP IP/OBS HIGH 50: CPT | Mod: GC

## 2020-03-28 RX ORDER — ACYCLOVIR SODIUM 500 MG
400 VIAL (EA) INTRAVENOUS ONCE
Refills: 0 | Status: COMPLETED | OUTPATIENT
Start: 2020-03-28 | End: 2020-03-28

## 2020-03-28 RX ADMIN — AMLODIPINE BESYLATE 5 MILLIGRAM(S): 2.5 TABLET ORAL at 05:08

## 2020-03-28 RX ADMIN — POLYETHYLENE GLYCOL 3350 17 GRAM(S): 17 POWDER, FOR SOLUTION ORAL at 17:15

## 2020-03-28 RX ADMIN — Medication 264 MILLIGRAM(S): at 05:08

## 2020-03-28 RX ADMIN — LEVETIRACETAM 750 MILLIGRAM(S): 250 TABLET, FILM COATED ORAL at 17:15

## 2020-03-28 RX ADMIN — URSODIOL 500 MILLIGRAM(S): 250 TABLET, FILM COATED ORAL at 17:15

## 2020-03-28 RX ADMIN — PANTOPRAZOLE SODIUM 40 MILLIGRAM(S): 20 TABLET, DELAYED RELEASE ORAL at 05:08

## 2020-03-28 RX ADMIN — Medication 264 MILLIGRAM(S): at 13:12

## 2020-03-28 RX ADMIN — ENOXAPARIN SODIUM 40 MILLIGRAM(S): 100 INJECTION SUBCUTANEOUS at 11:14

## 2020-03-28 RX ADMIN — URSODIOL 500 MILLIGRAM(S): 250 TABLET, FILM COATED ORAL at 05:08

## 2020-03-28 RX ADMIN — ATORVASTATIN CALCIUM 10 MILLIGRAM(S): 80 TABLET, FILM COATED ORAL at 21:50

## 2020-03-28 RX ADMIN — LEVETIRACETAM 750 MILLIGRAM(S): 250 TABLET, FILM COATED ORAL at 05:08

## 2020-03-28 RX ADMIN — Medication 81 MILLIGRAM(S): at 11:14

## 2020-03-28 RX ADMIN — Medication 5 MILLIGRAM(S): at 05:10

## 2020-03-28 RX ADMIN — Medication 264 MILLIGRAM(S): at 21:50

## 2020-03-28 RX ADMIN — Medication 12.5 MILLIGRAM(S): at 05:08

## 2020-03-28 RX ADMIN — SENNA PLUS 2 TABLET(S): 8.6 TABLET ORAL at 21:50

## 2020-03-28 NOTE — PROVIDER CONTACT NOTE (OTHER) - SITUATION
75 year old pt refusing finger sticks and new IV- signs of redness of on left 22 g Iv that was placed 3/27

## 2020-03-28 NOTE — PROGRESS NOTE ADULT - ATTENDING COMMENTS
left arm IV  iv acyclovir  await liver biopsy Monday  IVF for mild elevation in creatinine  Is/Os.  MRCP pending  CT C/A/P prior to discharge.  patient with multiple co-morbid conditions; high risk for future complications despite optimal medical therapy   PCP updated

## 2020-03-28 NOTE — PROGRESS NOTE ADULT - SUBJECTIVE AND OBJECTIVE BOX
INTERVAL HPI/OVERNIGHT EVENTS:    SUBJECTIVE: Patient seen and examined at bedside. No overnight events. IV line placed overnight. Plan for IR guided biopsy of liver on mon.     ROS neg except as above    OBJECTIVE:    VITAL SIGNS:  ICU Vital Signs Last 24 Hrs  T(C): 36.4 (28 Mar 2020 04:52), Max: 37.1 (27 Mar 2020 21:43)  T(F): 97.5 (28 Mar 2020 04:52), Max: 98.7 (27 Mar 2020 21:43)  HR: 83 (28 Mar 2020 04:52) (65 - 87)  BP: 145/83 (28 Mar 2020 04:52) (120/67 - 145/84)  BP(mean): --  ABP: --  ABP(mean): --  RR: 18 (28 Mar 2020 04:52) (18 - 18)  SpO2: 96% (28 Mar 2020 04:52) (92% - 96%)        03-27 @ 07:01  -  03-28 @ 07:00  --------------------------------------------------------  IN: 240 mL / OUT: 0 mL / NET: 240 mL      CAPILLARY BLOOD GLUCOSE      POCT Blood Glucose.: 318 mg/dL (27 Mar 2020 08:24)      PHYSICAL EXAM:    GENERAL: elderly female, pleasant, NAD  NEURO: AO x3, nonfocal, has word finding difficulties and is slow to respond   HEAD:  Atraumatic, Normocephalic  EYES: conjunctiva and sclera clear  NECK: Supple  CHEST/LUNG: Clear to auscultation bilaterally; No wheezes, rales or rhonchi  HEART: Regular rate and rhythm; No murmurs, rubs, or gallops  ABDOMEN: Soft, Nontender, Nondistended; Bowel sounds present, no masses.  EXTREMITIES:  2+ Peripheral Pulses, No clubbing, cyanosis, or edema  SKIN: Warm, dry, in tact, no rashes or lesions  PSYCH: affect appropriate    MEDICATIONS:  MEDICATIONS  (STANDING):  acyclovir IVPB 700 milliGRAM(s) IV Intermittent every 8 hours  amLODIPine   Tablet 5 milliGRAM(s) Oral daily  aspirin  chewable 81 milliGRAM(s) Oral daily  atorvastatin 10 milliGRAM(s) Oral at bedtime  dextrose 5%. 1000 milliLiter(s) (50 mL/Hr) IV Continuous <Continuous>  dextrose 50% Injectable 12.5 Gram(s) IV Push once  dextrose 50% Injectable 25 Gram(s) IV Push once  dextrose 50% Injectable 25 Gram(s) IV Push once  enoxaparin Injectable 40 milliGRAM(s) SubCutaneous daily  insulin glargine Injectable (LANTUS) 24 Unit(s) SubCutaneous at bedtime  insulin lispro (HumaLOG) corrective regimen sliding scale   SubCutaneous three times a day before meals  insulin lispro (HumaLOG) corrective regimen sliding scale   SubCutaneous at bedtime  insulin lispro Injectable (HumaLOG) 8 Unit(s) SubCutaneous three times a day before meals  levETIRAcetam 750 milliGRAM(s) Oral two times a day  metoprolol tartrate 12.5 milliGRAM(s) Oral daily  pantoprazole    Tablet 40 milliGRAM(s) Oral before breakfast  polyethylene glycol 3350 17 Gram(s) Oral two times a day  senna 2 Tablet(s) Oral at bedtime  sodium chloride 0.9%. 1000 milliLiter(s) (100 mL/Hr) IV Continuous <Continuous>  ursodiol Tablet 500 milliGRAM(s) Oral every 12 hours    MEDICATIONS  (PRN):  ALPRAZolam 0.25 milliGRAM(s) Oral three times a day PRN anxiety  AQUAPHOR (petrolatum Ointment) 1 Application(s) Topical two times a day PRN Dry skin  bisacodyl 5 milliGRAM(s) Oral every 12 hours PRN Constipation  dextrose 40% Gel 15 Gram(s) Oral once PRN Blood Glucose LESS THAN 70 milliGRAM(s)/deciliter  glucagon  Injectable 1 milliGRAM(s) IntraMuscular once PRN Glucose LESS THAN 70 milligrams/deciliter  naphazoline/pheniramine Solution 1 Drop(s) Both EYES every 6 hours PRN eye irritation      ALLERGIES:  Allergies    clams - itching (Other)  No Known Drug Allergies  Peroxide (Blisters)    Intolerances        LABS:                        9.8    5.42  )-----------( 337      ( 28 Mar 2020 07:10 )             31.6     03-28    135  |  101  |  23  ----------------------------<  307<H>  3.9   |  21<L>  |  1.02    Ca    9.1      28 Mar 2020 07:01    TPro  8.2  /  Alb  3.2<L>  /  TBili  0.5  /  DBili  x   /  AST  88<H>  /  ALT  102<H>  /  AlkPhos  611<H>  03-28    PT/INR - ( 28 Mar 2020 07:14 )   PT: 11.8 sec;   INR: 1.03 ratio               RADIOLOGY & ADDITIONAL TESTS: Reviewed.

## 2020-03-28 NOTE — CHART NOTE - NSCHARTNOTEFT_GEN_A_CORE
Patient seen for nutrition follow up & consult for "a1c 15.5"    Source: Comprehensive chart review, patient    Chart reviewed, events noted. Noted very poorly controlled T2DM - HgbA1C >15.5% (3/15). Plan for IR guided biopsy of liver on Monday. Per discussion with RN, pt refusing finger sticks and is not receiving insulin as ordered. Seen by Behavioral Health, "pt does not have capacity to refuse treatment at this time, defer to hcp."     Diet : Soft, Consistent Carbohydrate    Patient reports fair appetite and intake; states is tolerating current diet order but does endorse dislike of institutional foods. Denies nausea/vomiting, diarrhea. Endorses constipation, stating last BM was 2 weeks ago. Per chart, last documented BM 3/26 (2 days ago). Noted order for Miralax, senna, dulcolax.      PO intake : 50-75%     Source for PO intake: patient     Enteral/Parenteral Nutrition: none    Daily Weight - 69.8 (03-18) - no new weight to address at this time    Pertinent Medications: MEDICATIONS  (STANDING):  acyclovir IVPB 700 milliGRAM(s) IV Intermittent every 8 hours  amLODIPine   Tablet 5 milliGRAM(s) Oral daily  aspirin  chewable 81 milliGRAM(s) Oral daily  atorvastatin 10 milliGRAM(s) Oral at bedtime  dextrose 5%. 1000 milliLiter(s) (50 mL/Hr) IV Continuous <Continuous>  dextrose 50% Injectable 12.5 Gram(s) IV Push once  dextrose 50% Injectable 25 Gram(s) IV Push once  dextrose 50% Injectable 25 Gram(s) IV Push once  enoxaparin Injectable 40 milliGRAM(s) SubCutaneous daily  insulin glargine Injectable (LANTUS) 24 Unit(s) SubCutaneous at bedtime  insulin lispro (HumaLOG) corrective regimen sliding scale   SubCutaneous three times a day before meals  insulin lispro (HumaLOG) corrective regimen sliding scale   SubCutaneous at bedtime  insulin lispro Injectable (HumaLOG) 8 Unit(s) SubCutaneous three times a day before meals  levETIRAcetam 750 milliGRAM(s) Oral two times a day  metoprolol tartrate 12.5 milliGRAM(s) Oral daily  pantoprazole    Tablet 40 milliGRAM(s) Oral before breakfast  polyethylene glycol 3350 17 Gram(s) Oral two times a day  senna 2 Tablet(s) Oral at bedtime  sodium chloride 0.9%. 1000 milliLiter(s) (100 mL/Hr) IV Continuous <Continuous>  ursodiol Tablet 500 milliGRAM(s) Oral every 12 hours    MEDICATIONS  (PRN):  ALPRAZolam 0.25 milliGRAM(s) Oral three times a day PRN anxiety  AQUAPHOR (petrolatum Ointment) 1 Application(s) Topical two times a day PRN Dry skin  bisacodyl 5 milliGRAM(s) Oral every 12 hours PRN Constipation  dextrose 40% Gel 15 Gram(s) Oral once PRN Blood Glucose LESS THAN 70 milliGRAM(s)/deciliter  glucagon  Injectable 1 milliGRAM(s) IntraMuscular once PRN Glucose LESS THAN 70 milligrams/deciliter  naphazoline/pheniramine Solution 1 Drop(s) Both EYES every 6 hours PRN eye irritation    Pertinent Labs: 03-28 @ 07:01: Na 135, BUN 23, Cr 1.02, <H>, K+ 3.9, Phos --, Mg --, Alk Phos 611<H>, ALT/SGPT 102<H>, AST/SGOT 88<H>    Finger Sticks: pt refusing - RN and team aware    Skin per nursing documentation: free of pressure injuries  Edema per nursing documentation: none noted    Estimated Needs:   [ x] no change since previous assessment  [ ] recalculated:     Previous Nutrition Diagnosis: Limited Adherence to Nutrition Related Recommendations  Nutrition Diagnosis is: ongoing, pt continues to refuse verbal and written nutrition education     New Nutrition Diagnosis: none     Interventions: Encouraged PO intake and assisted in menu ordering. RD reinforced importance of managing DM for general well-being and attempted nutrition education; however, pt refused again stating "I'm fine, I don't need you"    Recommend  1) Change diet to Consistent Carbohydrate, Low Sodium in setting of PMH of HF, DM - defer texture/consistency to medical team  2) Encourage PO intake and assist with menu ordering as able  3) Obtain new weight to assess changes, if any     Monitoring and Evaluation:   - Continue to monitor nutritional intake, tolerance to diet prescription, weights, labs, skin integrity  - RD remains available upon request and will follow up per protocol    Deepali Alexis RD CDN    Pager# [phone]

## 2020-03-28 NOTE — PROGRESS NOTE ADULT - PROBLEM SELECTOR PLAN 9
ISTOP: Reference #: [id number]; last alprazolam filled in 2014  DVT ppx: lovenox qdaily  Diet: soft diet  Dispo: pending PT and speech therapy consult    1.  Name of PCP: Felipe Manuel   2.  PCP Contacted on Admission: [ ] Y    [ ] N    3.  PCP contacted at Discharge: [ ] Y    [ ] N    [ ] N/A  4.  Post-Discharge Appointment Date and Location:  5.  Summary of Handoff given to PCP:

## 2020-03-29 PROCEDURE — 99233 SBSQ HOSP IP/OBS HIGH 50: CPT | Mod: GC

## 2020-03-29 RX ORDER — ACYCLOVIR SODIUM 500 MG
800 VIAL (EA) INTRAVENOUS THREE TIMES A DAY
Refills: 0 | Status: DISCONTINUED | OUTPATIENT
Start: 2020-03-29 | End: 2020-03-31

## 2020-03-29 RX ADMIN — AMLODIPINE BESYLATE 5 MILLIGRAM(S): 2.5 TABLET ORAL at 05:42

## 2020-03-29 RX ADMIN — PANTOPRAZOLE SODIUM 40 MILLIGRAM(S): 20 TABLET, DELAYED RELEASE ORAL at 06:02

## 2020-03-29 RX ADMIN — ENOXAPARIN SODIUM 40 MILLIGRAM(S): 100 INJECTION SUBCUTANEOUS at 13:29

## 2020-03-29 RX ADMIN — LEVETIRACETAM 750 MILLIGRAM(S): 250 TABLET, FILM COATED ORAL at 05:43

## 2020-03-29 RX ADMIN — Medication 800 MILLIGRAM(S): at 21:18

## 2020-03-29 RX ADMIN — LEVETIRACETAM 750 MILLIGRAM(S): 250 TABLET, FILM COATED ORAL at 18:04

## 2020-03-29 RX ADMIN — Medication 400 MILLIGRAM(S): at 00:54

## 2020-03-29 RX ADMIN — Medication 12.5 MILLIGRAM(S): at 05:43

## 2020-03-29 RX ADMIN — Medication 81 MILLIGRAM(S): at 13:17

## 2020-03-29 RX ADMIN — Medication 800 MILLIGRAM(S): at 15:15

## 2020-03-29 RX ADMIN — URSODIOL 500 MILLIGRAM(S): 250 TABLET, FILM COATED ORAL at 05:43

## 2020-03-29 RX ADMIN — SENNA PLUS 2 TABLET(S): 8.6 TABLET ORAL at 21:18

## 2020-03-29 RX ADMIN — POLYETHYLENE GLYCOL 3350 17 GRAM(S): 17 POWDER, FOR SOLUTION ORAL at 05:43

## 2020-03-29 RX ADMIN — POLYETHYLENE GLYCOL 3350 17 GRAM(S): 17 POWDER, FOR SOLUTION ORAL at 18:04

## 2020-03-29 RX ADMIN — ATORVASTATIN CALCIUM 10 MILLIGRAM(S): 80 TABLET, FILM COATED ORAL at 21:18

## 2020-03-29 RX ADMIN — URSODIOL 500 MILLIGRAM(S): 250 TABLET, FILM COATED ORAL at 18:05

## 2020-03-29 NOTE — PROGRESS NOTE ADULT - PROBLEM SELECTOR PLAN 1
Unclear etiology.  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 So far, serum HSV IgG positive, EBV antigen positive, will f/u EBV DNA by PCR. Hep panel, HIV negative, ESR and CRP elevated. Thyroid panel wnl.    - b12, folate, tsh, cortsiol   - ID consulted, will send studies as outlined within their note Unclear etiology.  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 So far, serum HSV IgG positive, EBV antigen positive, will f/u EBV DNA by PCR. Hep panel, HIV negative, ESR and CRP elevated. Thyroid panel wnl.    - b12, folate, tsh, cortsiol

## 2020-03-29 NOTE — PROGRESS NOTE ADULT - SUBJECTIVE AND OBJECTIVE BOX
Saima Costa MD  Internal Medicine PGY 3  Team 4 Covering Resident  Pager Number [phone]      Chief Complaint: headache        Subjective: Overnight, patient's IV infiltrated but patient refused replacement. Give PO dose of acyclovir instead.         VITAL SIGNS:  Vital Signs Last 24 Hrs  T(C): 36.4 (29 Mar 2020 05:21), Max: 36.4 (28 Mar 2020 14:48)  T(F): 97.6 (29 Mar 2020 05:21), Max: 97.6 (28 Mar 2020 14:48)  HR: 66 (29 Mar 2020 05:21) (66 - 73)  BP: 156/79 (29 Mar 2020 05:21) (117/66 - 159/70)  BP(mean): --  RR: 18 (29 Mar 2020 05:21) (18 - 18)  SpO2: 96% (29 Mar 2020 05:21) (95% - 97%)      PHYSICAL EXAM:     GENERAL: no acute distress  HEENT: PERRLA, EOMI, moist oropharynx   RESPIRATORY: CTAB, no w/c   CARDIOVASCULAR: RRR, no murmurs, gallops, rubs  ABDOMINAL: soft, non-tender, non-distended, positive bowel sounds   EXTREMITIES: no clubbing, cyanosis, or edema  NEUROLOGICAL: alert and oriented x 3, non-focal  SKIN: no rashes or lesions   MUSCULOSKELETAL: no gross joint deformity                          9.8    5.42  )-----------( 337      ( 28 Mar 2020 07:10 )             31.6     03-28    135  |  101  |  23  ----------------------------<  307<H>  3.9   |  21<L>  |  1.02    Ca    9.1      28 Mar 2020 07:01    TPro  8.2  /  Alb  3.2<L>  /  TBili  0.5  /  DBili  x   /  AST  88<H>  /  ALT  102<H>  /  AlkPhos  611<H>  03-28      CAPILLARY BLOOD GLUCOSE          MEDICATIONS  (STANDING):  acyclovir IVPB 700 milliGRAM(s) IV Intermittent every 8 hours  amLODIPine   Tablet 5 milliGRAM(s) Oral daily  aspirin  chewable 81 milliGRAM(s) Oral daily  atorvastatin 10 milliGRAM(s) Oral at bedtime  dextrose 5%. 1000 milliLiter(s) (50 mL/Hr) IV Continuous <Continuous>  dextrose 50% Injectable 12.5 Gram(s) IV Push once  dextrose 50% Injectable 25 Gram(s) IV Push once  dextrose 50% Injectable 25 Gram(s) IV Push once  enoxaparin Injectable 40 milliGRAM(s) SubCutaneous daily  insulin glargine Injectable (LANTUS) 24 Unit(s) SubCutaneous at bedtime  insulin lispro (HumaLOG) corrective regimen sliding scale   SubCutaneous three times a day before meals  insulin lispro (HumaLOG) corrective regimen sliding scale   SubCutaneous at bedtime  insulin lispro Injectable (HumaLOG) 8 Unit(s) SubCutaneous three times a day before meals  levETIRAcetam 750 milliGRAM(s) Oral two times a day  metoprolol tartrate 12.5 milliGRAM(s) Oral daily  pantoprazole    Tablet 40 milliGRAM(s) Oral before breakfast  polyethylene glycol 3350 17 Gram(s) Oral two times a day  senna 2 Tablet(s) Oral at bedtime  sodium chloride 0.9%. 1000 milliLiter(s) (100 mL/Hr) IV Continuous <Continuous>  ursodiol Tablet 500 milliGRAM(s) Oral every 12 hours    MEDICATIONS  (PRN):  ALPRAZolam 0.25 milliGRAM(s) Oral three times a day PRN anxiety  AQUAPHOR (petrolatum Ointment) 1 Application(s) Topical two times a day PRN Dry skin  bisacodyl 5 milliGRAM(s) Oral every 12 hours PRN Constipation  dextrose 40% Gel 15 Gram(s) Oral once PRN Blood Glucose LESS THAN 70 milliGRAM(s)/deciliter  glucagon  Injectable 1 milliGRAM(s) IntraMuscular once PRN Glucose LESS THAN 70 milligrams/deciliter  naphazoline/pheniramine Solution 1 Drop(s) Both EYES every 6 hours PRN eye irritation Saima Costa MD  Internal Medicine PGY 3  Team 4 Covering Resident  Pager Number [phone]      Chief Complaint: headache        Subjective: Overnight, patient's IV infiltrated but patient refused replacement. Given PO dose of acyclovir instead. When seen, patient states she feels fine and does not want an IV or blood work or FS despite discussing the importance of these things.         VITAL SIGNS:  Vital Signs Last 24 Hrs  T(C): 36.4 (29 Mar 2020 05:21), Max: 36.4 (28 Mar 2020 14:48)  T(F): 97.6 (29 Mar 2020 05:21), Max: 97.6 (28 Mar 2020 14:48)  HR: 66 (29 Mar 2020 05:21) (66 - 73)  BP: 156/79 (29 Mar 2020 05:21) (117/66 - 159/70)  BP(mean): --  RR: 18 (29 Mar 2020 05:21) (18 - 18)  SpO2: 96% (29 Mar 2020 05:21) (95% - 97%)      PHYSICAL EXAM:     GENERAL: no acute distress  HEENT: PERRLA, EOMI, moist oropharynx   RESPIRATORY: CTAB, no wheezes or crackles   CARDIOVASCULAR: RRR, no murmurs  ABDOMINAL: soft, non-tender, non-distended, positive bowel sounds   EXTREMITIES: no clubbing, cyanosis, or edema  NEUROLOGICAL: alert and oriented x 3, non-focal  SKIN: no rashes or lesions   MUSCULOSKELETAL: no gross joint deformity                          9.8    5.42  )-----------( 337      ( 28 Mar 2020 07:10 )             31.6     03-28    135  |  101  |  23  ----------------------------<  307<H>  3.9   |  21<L>  |  1.02    Ca    9.1      28 Mar 2020 07:01    TPro  8.2  /  Alb  3.2<L>  /  TBili  0.5  /  DBili  x   /  AST  88<H>  /  ALT  102<H>  /  AlkPhos  611<H>  03-28      CAPILLARY BLOOD GLUCOSE          MEDICATIONS  (STANDING):  acyclovir IVPB 700 milliGRAM(s) IV Intermittent every 8 hours  amLODIPine   Tablet 5 milliGRAM(s) Oral daily  aspirin  chewable 81 milliGRAM(s) Oral daily  atorvastatin 10 milliGRAM(s) Oral at bedtime  dextrose 5%. 1000 milliLiter(s) (50 mL/Hr) IV Continuous <Continuous>  dextrose 50% Injectable 12.5 Gram(s) IV Push once  dextrose 50% Injectable 25 Gram(s) IV Push once  dextrose 50% Injectable 25 Gram(s) IV Push once  enoxaparin Injectable 40 milliGRAM(s) SubCutaneous daily  insulin glargine Injectable (LANTUS) 24 Unit(s) SubCutaneous at bedtime  insulin lispro (HumaLOG) corrective regimen sliding scale   SubCutaneous three times a day before meals  insulin lispro (HumaLOG) corrective regimen sliding scale   SubCutaneous at bedtime  insulin lispro Injectable (HumaLOG) 8 Unit(s) SubCutaneous three times a day before meals  levETIRAcetam 750 milliGRAM(s) Oral two times a day  metoprolol tartrate 12.5 milliGRAM(s) Oral daily  pantoprazole    Tablet 40 milliGRAM(s) Oral before breakfast  polyethylene glycol 3350 17 Gram(s) Oral two times a day  senna 2 Tablet(s) Oral at bedtime  sodium chloride 0.9%. 1000 milliLiter(s) (100 mL/Hr) IV Continuous <Continuous>  ursodiol Tablet 500 milliGRAM(s) Oral every 12 hours    MEDICATIONS  (PRN):  ALPRAZolam 0.25 milliGRAM(s) Oral three times a day PRN anxiety  AQUAPHOR (petrolatum Ointment) 1 Application(s) Topical two times a day PRN Dry skin  bisacodyl 5 milliGRAM(s) Oral every 12 hours PRN Constipation  dextrose 40% Gel 15 Gram(s) Oral once PRN Blood Glucose LESS THAN 70 milliGRAM(s)/deciliter  glucagon  Injectable 1 milliGRAM(s) IntraMuscular once PRN Glucose LESS THAN 70 milligrams/deciliter  naphazoline/pheniramine Solution 1 Drop(s) Both EYES every 6 hours PRN eye irritation

## 2020-03-29 NOTE — PROGRESS NOTE ADULT - PROBLEM SELECTOR PLAN 8
hold furosemide for now  can resume as needed once improved volume status   TTE shows normal LVSF ISTOP: Reference #: [id number]; last alprazolam filled in 2014  DVT ppx: lovenox qdaily  Diet: soft diet  Dispo: pending PT and speech therapy consult    1.  Name of PCP: Felipe Manuel   2.  PCP Contacted on Admission: [ ] Y    [ ] N    3.  PCP contacted at Discharge: [ ] Y    [ ] N    [ ] N/A  4.  Post-Discharge Appointment Date and Location:  5.  Summary of Handoff given to PCP:

## 2020-03-29 NOTE — PROVIDER CONTACT NOTE (MEDICATION) - ACTION/TREATMENT ORDERED:
Insulin held and provider notified.
Will continue to redirect and encourage pt to comply with care ordered
Md. Notified.   continue to monitor
Monitor patient, wait for orders
education provided.

## 2020-03-29 NOTE — PROGRESS NOTE ADULT - PROBLEM SELECTOR PLAN 9
ISTOP: Reference #: [id number]; last alprazolam filled in 2014  DVT ppx: lovenox qdaily  Diet: soft diet  Dispo: pending PT and speech therapy consult    1.  Name of PCP: Felipe Manuel   2.  PCP Contacted on Admission: [ ] Y    [ ] N    3.  PCP contacted at Discharge: [ ] Y    [ ] N    [ ] N/A  4.  Post-Discharge Appointment Date and Location:  5.  Summary of Handoff given to PCP:

## 2020-03-29 NOTE — PROGRESS NOTE ADULT - PROBLEM SELECTOR PLAN 4
RESOLVED  Complicated. Possibly contributory to mental status  - Completed 3 day course of ceftriaxone. Hgb at baseline  can perform anemia work up outpatient  monitor CBC

## 2020-03-30 ENCOUNTER — RESULT REVIEW (OUTPATIENT)
Age: 75
End: 2020-03-30

## 2020-03-30 LAB — IMMUNOLOGIST REVIEW: SIGNIFICANT CHANGE UP

## 2020-03-30 PROCEDURE — 71260 CT THORAX DX C+: CPT | Mod: 26

## 2020-03-30 PROCEDURE — 88307 TISSUE EXAM BY PATHOLOGIST: CPT | Mod: 26

## 2020-03-30 PROCEDURE — 88313 SPECIAL STAINS GROUP 2: CPT | Mod: 26

## 2020-03-30 PROCEDURE — 74177 CT ABD & PELVIS W/CONTRAST: CPT | Mod: 26

## 2020-03-30 PROCEDURE — 75741 ARTERY X-RAYS LUNG: CPT | Mod: 26,LT

## 2020-03-30 PROCEDURE — 99233 SBSQ HOSP IP/OBS HIGH 50: CPT

## 2020-03-30 PROCEDURE — 47000 NEEDLE BIOPSY OF LIVER PERQ: CPT

## 2020-03-30 RX ADMIN — LEVETIRACETAM 750 MILLIGRAM(S): 250 TABLET, FILM COATED ORAL at 06:32

## 2020-03-30 RX ADMIN — Medication 81 MILLIGRAM(S): at 12:22

## 2020-03-30 RX ADMIN — Medication 800 MILLIGRAM(S): at 12:23

## 2020-03-30 RX ADMIN — ATORVASTATIN CALCIUM 10 MILLIGRAM(S): 80 TABLET, FILM COATED ORAL at 21:11

## 2020-03-30 RX ADMIN — URSODIOL 500 MILLIGRAM(S): 250 TABLET, FILM COATED ORAL at 18:34

## 2020-03-30 RX ADMIN — PANTOPRAZOLE SODIUM 40 MILLIGRAM(S): 20 TABLET, DELAYED RELEASE ORAL at 06:32

## 2020-03-30 RX ADMIN — AMLODIPINE BESYLATE 5 MILLIGRAM(S): 2.5 TABLET ORAL at 06:32

## 2020-03-30 RX ADMIN — Medication 800 MILLIGRAM(S): at 06:32

## 2020-03-30 RX ADMIN — Medication 800 MILLIGRAM(S): at 21:10

## 2020-03-30 RX ADMIN — URSODIOL 500 MILLIGRAM(S): 250 TABLET, FILM COATED ORAL at 06:33

## 2020-03-30 RX ADMIN — POLYETHYLENE GLYCOL 3350 17 GRAM(S): 17 POWDER, FOR SOLUTION ORAL at 18:34

## 2020-03-30 RX ADMIN — Medication 12.5 MILLIGRAM(S): at 06:32

## 2020-03-30 RX ADMIN — SENNA PLUS 2 TABLET(S): 8.6 TABLET ORAL at 21:10

## 2020-03-30 RX ADMIN — Medication 0.25 MILLIGRAM(S): at 12:21

## 2020-03-30 RX ADMIN — LEVETIRACETAM 750 MILLIGRAM(S): 250 TABLET, FILM COATED ORAL at 18:34

## 2020-03-30 NOTE — PROVIDER CONTACT NOTE (OTHER) - DATE AND TIME:
17-Mar-2020 22:30
18-Mar-2020 18:01
15-Mar-2020 20:44
16-Mar-2020 04:45
21-Mar-2020 20:30
21-Mar-2020 21:40
23-Mar-2020 07:47
23-Mar-2020 12:20
23-Mar-2020 22:25
24-Mar-2020 19:58
24-Mar-2020 22:15
24-Mar-2020 22:19
25-Mar-2020 17:30
26-Mar-2020 21:00
28-Mar-2020 22:39
30-Mar-2020 01:07
30-Mar-2020 05:14

## 2020-03-30 NOTE — PROGRESS NOTE ADULT - SUBJECTIVE AND OBJECTIVE BOX
PROGRESS NOTE:     Patient is a 75y old  Female who presents with a chief complaint of headache (30 Mar 2020 16:03)      SUBJECTIVE / OVERNIGHT EVENTS:  PT agreed to put in IV with extra dose of Ativan.     ADDITIONAL REVIEW OF SYSTEMS:  No fevers or chills  no n/v/d    MEDICATIONS  (STANDING):  acyclovir   Oral Tab/Cap 800 milliGRAM(s) Oral three times a day  amLODIPine   Tablet 5 milliGRAM(s) Oral daily  aspirin  chewable 81 milliGRAM(s) Oral daily  atorvastatin 10 milliGRAM(s) Oral at bedtime  dextrose 5%. 1000 milliLiter(s) (50 mL/Hr) IV Continuous <Continuous>  dextrose 50% Injectable 12.5 Gram(s) IV Push once  dextrose 50% Injectable 25 Gram(s) IV Push once  dextrose 50% Injectable 25 Gram(s) IV Push once  enoxaparin Injectable 40 milliGRAM(s) SubCutaneous daily  insulin glargine Injectable (LANTUS) 24 Unit(s) SubCutaneous at bedtime  insulin lispro (HumaLOG) corrective regimen sliding scale   SubCutaneous three times a day before meals  insulin lispro (HumaLOG) corrective regimen sliding scale   SubCutaneous at bedtime  insulin lispro Injectable (HumaLOG) 8 Unit(s) SubCutaneous three times a day before meals  levETIRAcetam 750 milliGRAM(s) Oral two times a day  metoprolol tartrate 12.5 milliGRAM(s) Oral daily  pantoprazole    Tablet 40 milliGRAM(s) Oral before breakfast  polyethylene glycol 3350 17 Gram(s) Oral two times a day  senna 2 Tablet(s) Oral at bedtime  ursodiol Tablet 500 milliGRAM(s) Oral every 12 hours    MEDICATIONS  (PRN):  ALPRAZolam 0.25 milliGRAM(s) Oral three times a day PRN anxiety  AQUAPHOR (petrolatum Ointment) 1 Application(s) Topical two times a day PRN Dry skin  bisacodyl 5 milliGRAM(s) Oral every 12 hours PRN Constipation  dextrose 40% Gel 15 Gram(s) Oral once PRN Blood Glucose LESS THAN 70 milliGRAM(s)/deciliter  glucagon  Injectable 1 milliGRAM(s) IntraMuscular once PRN Glucose LESS THAN 70 milligrams/deciliter  naphazoline/pheniramine Solution 1 Drop(s) Both EYES every 6 hours PRN eye irritation      CAPILLARY BLOOD GLUCOSE        I&O's Summary    29 Mar 2020 07:01  -  30 Mar 2020 07:00  --------------------------------------------------------  IN: 240 mL / OUT: 0 mL / NET: 240 mL        PHYSICAL EXAM:  Vital Signs Last 24 Hrs  T(C): 36.8 (30 Mar 2020 15:15), Max: 36.8 (30 Mar 2020 13:59)  T(F): 98.3 (30 Mar 2020 13:59), Max: 98.3 (30 Mar 2020 13:59)  HR: 60 (30 Mar 2020 15:15) (60 - 78)  BP: 127/79 (30 Mar 2020 15:15) (127/79 - 170/81)  BP(mean): --  RR: 18 (30 Mar 2020 15:15) (18 - 18)  SpO2: 96% (30 Mar 2020 15:15) (95% - 97%)    CONSTITUTIONAL: NAD, well-developed, non toxic  RESPIRATORY: Normal respiratory effort; lungs are clear to auscultation bilaterally  CARDIOVASCULAR: Regular rate and rhythm, normal S1 and S2, no murmur/rub/gallop; No lower extremity edema; Peripheral pulses are 2+ bilaterally  ABDOMEN: Nontender to palpation, normoactive bowel sounds, no rebound/guarding; No hepatosplenomegaly  MUSCLOSKELETAL: no clubbing or cyanosis of digits; no joint swelling or tenderness to palpation  PSYCH: A+O to person, place, and time; affect angry     LABS:                      RADIOLOGY & ADDITIONAL TESTS:  Results Reviewed:   Imaging Personally Reviewed:  Electrocardiogram Personally Reviewed:    COORDINATION OF CARE:  Care Discussed with Consultants/Other Providers [Y/N]:  Prior or Outpatient Records Reviewed [Y/N]:

## 2020-03-30 NOTE — PROGRESS NOTE ADULT - PROBLEM SELECTOR PLAN 8
ISTOP: Reference #: [id number]; last alprazolam filled in 2014  DVT ppx: lovenox qdaily  Diet: soft diet  Dispo: home pending CT C/A/P results    1.  Name of PCP: Felipe Manuel   2.  PCP Contacted on Admission: [ ] Y    [ ] N    3.  PCP contacted at Discharge: [ ] Y    [ ] N    [ ] N/A  4.  Post-Discharge Appointment Date and Location:  5.  Summary of Handoff given to PCP:

## 2020-03-30 NOTE — PROGRESS NOTE ADULT - NSHPATTENDINGPLANDISCUSS_GEN_ALL_CORE
DR Bermeo
Dr Bermeo
[facility]
housestaff
DR Leon
neuro team
miya NP and daughter Mackenzie. Pt will DC home to her family house to stay until COVID crisis over.  Will not go to appts until COVID crisis over so will try for C/A/P here.
Team B
ID attending, Medicine Team B

## 2020-03-30 NOTE — PROGRESS NOTE ADULT - SUBJECTIVE AND OBJECTIVE BOX
Vascular & Interventional Radiology Post-Procedure Note    Pre-Procedure Diagnosis: transaminitis  Post-Procedure Diagnosis: Same as pre.  Indications for Procedure: elevated liver enzymes of unclear etiology    Attending: Royal  Resident: Shannon    Procedure Details/Findings: a right liver biopsy was performed. two core biopsy samples were obtained. D-stat was applied to the biopsy tract.    Complications: none  Estimated Blood Loss: Minimal  Specimen: 2 core biopsy samples  Contrast: none  Sedation: administered by the anesthesiologist  Patient Condition/Disposition: stable. recovery for two hours then back to floors    Plan:   - strict bedrest for 4 hours  - keep site c/d/i  - rest of care as per primary team    Call IR at [phone] with any questions or concerns.    Andrew Ortega  ESIR/PGY5 Resident

## 2020-03-30 NOTE — PROVIDER CONTACT NOTE (OTHER) - ACTION/TREATMENT ORDERED:
Wait for D team to come and wait for their recommendation As per Dr Coffey will wait for day  team to assess pt

## 2020-03-30 NOTE — PROVIDER CONTACT NOTE (OTHER) - ASSESSMENT
Upon assessment, pt MICAH ankles are slightly red and dry. No edema noted.
Dr. Bermeo at bedside, offered pt pain med, or anti-anxiety med but pt refused. Showed Dr. Bermeo arrow insertion site, which appears slightly swollen, advised to continue to monitor site, and remove if worsens.
Pt A&O2-3 and forgetful, angry and agitated when asked about doing blood work or putting in an IVL
Pt denies pain
Pt has no IV access
Pt. is calm and A&O x4
no signs of distress noted. non-focal symptoms
pt IV site is swollen but still flushes, iv abx running properly. a/p day shift RN, medical team 4 had assessed site yesterday, deemed ok to use. pt needs new IV but states she "needs to be sedated" before new IV placement.
pt due to rcv acyclovir iv  and needs access for pending CT A/P    pt diabetic, states she is "fine" and does not need insulin  vss
pt is anxious even after receiving xanax 0.25mg,. pt states that she is "scared" and that the fingersticks "hurt" and she does not need them and she is "fine".
pt states she is "not ready yet" and is "scared" to have new IV placed.
yelling. no other signs of distress
pt rubbing eyes and grimacing.

## 2020-03-30 NOTE — PROVIDER CONTACT NOTE (OTHER) - BACKGROUND
Pt admitted for aphasia with Hx DM, chronic systolic heart failure
Pt admitted for aphasia, Hx DM, chronic systolic heart failure
Pt has a history of DM- type 2
Pt has a history of refusing blood work and IV
Pt. has a history of diabetes and elevated AST and ALT
acute encephalopathy, UTI, aphasia
aphasia, acute encephalopathy
pt adm for aphasia  pending various imaging test  arrow iv was removed due to malfunction over night  3 rns including iv nurse attempting to insert, but patient is refusing to allow attempt
pt admitted with HA x 1 day and aphasia for a 1 week, ddx include subacute CVA vs less likely focal seizures.
pt given 0.25mg xanax to ease anxiety re: new IV placement.
pt has been consistently refusing fingersticks, blood work, etc. for days
pt has been refusing fingersticks consistently.
dx: aphasia

## 2020-03-30 NOTE — PROGRESS NOTE ADULT - PROBLEM SELECTOR PLAN 1
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 So far, serum HSV IgG positive, EBV antigen positive, will f/u EBV DNA by PCR. Hep panel, HIV negative, ESR and CRP elevated. Thyroid panel wnl.    - b12, folate, tsh, cortsiol  -Discussed with daughter- improved mental status back to baseline.

## 2020-03-30 NOTE — PROGRESS NOTE ADULT - REASON FOR ADMISSION
headache

## 2020-03-30 NOTE — PROVIDER CONTACT NOTE (OTHER) - RECOMMENDATIONS
I recommend oral antibiotics
I recommend to make this patient NPO after midnight with oral medications due to upcoming procedure. Pt has no IV access
I recommend to monitor this patient and stand by for orders
MD to perform neuro assessment
X ray
education provided regarding importance of monitoring blood sugar  but pt keeps refusing
pt educated on risks of being hospitalized with regard to age and comorbidities. also verbalized understanding that the longer she does not have an IV, the longer she might be hospitalized.
pt states she has taken xanax before blood draws in the past.
reinforce importance of blood glucose checks
will order tylenol for pt headache, repeat pt bp. and monitor

## 2020-03-30 NOTE — PROGRESS NOTE ADULT - PROBLEM SELECTOR PLAN 3
AMA pos  hep following  IR biopsy completed- f/u in office for results  Attempt CMP draw- pt frequently refusing

## 2020-03-30 NOTE — PROGRESS NOTE ADULT - PROBLEM SELECTOR PLAN 5
A1c 15.5%  - will adjust insulin dosing  Refusing JOCELYNE and monitor FS ac and hs   hold home metformin and dapagliflozin

## 2020-03-31 VITALS
DIASTOLIC BLOOD PRESSURE: 78 MMHG | SYSTOLIC BLOOD PRESSURE: 131 MMHG | HEART RATE: 74 BPM | OXYGEN SATURATION: 94 % | TEMPERATURE: 98 F | RESPIRATION RATE: 18 BRPM

## 2020-03-31 LAB
14-3-3 AG CSF-MCNC: <2 NG/ML — SIGNIFICANT CHANGE UP
IGG SERPL-MCNC: 2232 MG/DL — HIGH (ref 700–1600)
IGG1 SER-MCNC: 1071 MG/DL — HIGH (ref 248–810)
IGG2 SER-MCNC: 471 MG/DL — SIGNIFICANT CHANGE UP (ref 130–555)
IGG3 SER-MCNC: 89 MG/DL — SIGNIFICANT CHANGE UP (ref 15–102)
IGG4 SER-MCNC: 14 MG/DL — SIGNIFICANT CHANGE UP (ref 2–96)
VDRL CSF-TITR: NEGATIVE — SIGNIFICANT CHANGE UP

## 2020-03-31 PROCEDURE — 83873 ASSAY OF CSF PROTEIN: CPT

## 2020-03-31 PROCEDURE — 76705 ECHO EXAM OF ABDOMEN: CPT

## 2020-03-31 PROCEDURE — 92507 TX SP LANG VOICE COMM INDIV: CPT

## 2020-03-31 PROCEDURE — C1729: CPT

## 2020-03-31 PROCEDURE — 86431 RHEUMATOID FACTOR QUANT: CPT

## 2020-03-31 PROCEDURE — 86664 EPSTEIN-BARR NUCLEAR ANTIGEN: CPT

## 2020-03-31 PROCEDURE — 83520 IMMUNOASSAY QUANT NOS NONAB: CPT

## 2020-03-31 PROCEDURE — 71260 CT THORAX DX C+: CPT

## 2020-03-31 PROCEDURE — 93005 ELECTROCARDIOGRAM TRACING: CPT

## 2020-03-31 PROCEDURE — 84436 ASSAY OF TOTAL THYROXINE: CPT

## 2020-03-31 PROCEDURE — 86720 LEPTOSPIRA ANTIBODY: CPT

## 2020-03-31 PROCEDURE — 84146 ASSAY OF PROLACTIN: CPT

## 2020-03-31 PROCEDURE — 73110 X-RAY EXAM OF WRIST: CPT

## 2020-03-31 PROCEDURE — 82977 ASSAY OF GGT: CPT

## 2020-03-31 PROCEDURE — 93306 TTE W/DOPPLER COMPLETE: CPT

## 2020-03-31 PROCEDURE — 87102 FUNGUS ISOLATION CULTURE: CPT

## 2020-03-31 PROCEDURE — 86255 FLUORESCENT ANTIBODY SCREEN: CPT

## 2020-03-31 PROCEDURE — 84132 ASSAY OF SERUM POTASSIUM: CPT

## 2020-03-31 PROCEDURE — 82607 VITAMIN B-12: CPT

## 2020-03-31 PROCEDURE — 88307 TISSUE EXAM BY PATHOLOGIST: CPT

## 2020-03-31 PROCEDURE — 97530 THERAPEUTIC ACTIVITIES: CPT

## 2020-03-31 PROCEDURE — 83521 IG LIGHT CHAINS FREE EACH: CPT

## 2020-03-31 PROCEDURE — 86696 HERPES SIMPLEX TYPE 2 TEST: CPT

## 2020-03-31 PROCEDURE — 47000 NEEDLE BIOPSY OF LIVER PERQ: CPT

## 2020-03-31 PROCEDURE — 97116 GAIT TRAINING THERAPY: CPT

## 2020-03-31 PROCEDURE — 85014 HEMATOCRIT: CPT

## 2020-03-31 PROCEDURE — 86780 TREPONEMA PALLIDUM: CPT

## 2020-03-31 PROCEDURE — 84100 ASSAY OF PHOSPHORUS: CPT

## 2020-03-31 PROCEDURE — 80177 DRUG SCRN QUAN LEVETIRACETAM: CPT

## 2020-03-31 PROCEDURE — 84466 ASSAY OF TRANSFERRIN: CPT

## 2020-03-31 PROCEDURE — 86381 MITOCHONDRIAL ANTIBODY EACH: CPT

## 2020-03-31 PROCEDURE — 75741 ARTERY X-RAYS LUNG: CPT

## 2020-03-31 PROCEDURE — 80053 COMPREHEN METABOLIC PANEL: CPT

## 2020-03-31 PROCEDURE — 82787 IGG 1 2 3 OR 4 EACH: CPT

## 2020-03-31 PROCEDURE — 87086 URINE CULTURE/COLONY COUNT: CPT

## 2020-03-31 PROCEDURE — 86225 DNA ANTIBODY NATIVE: CPT

## 2020-03-31 PROCEDURE — 83735 ASSAY OF MAGNESIUM: CPT

## 2020-03-31 PROCEDURE — 86235 NUCLEAR ANTIGEN ANTIBODY: CPT

## 2020-03-31 PROCEDURE — 80074 ACUTE HEPATITIS PANEL: CPT

## 2020-03-31 PROCEDURE — 84480 ASSAY TRIIODOTHYRONINE (T3): CPT

## 2020-03-31 PROCEDURE — 70551 MRI BRAIN STEM W/O DYE: CPT

## 2020-03-31 PROCEDURE — 86706 HEP B SURFACE ANTIBODY: CPT

## 2020-03-31 PROCEDURE — 86341 ISLET CELL ANTIBODY: CPT

## 2020-03-31 PROCEDURE — 81001 URINALYSIS AUTO W/SCOPE: CPT

## 2020-03-31 PROCEDURE — 97162 PT EVAL MOD COMPLEX 30 MIN: CPT

## 2020-03-31 PROCEDURE — 85652 RBC SED RATE AUTOMATED: CPT

## 2020-03-31 PROCEDURE — 84484 ASSAY OF TROPONIN QUANT: CPT

## 2020-03-31 PROCEDURE — 86140 C-REACTIVE PROTEIN: CPT

## 2020-03-31 PROCEDURE — 82728 ASSAY OF FERRITIN: CPT

## 2020-03-31 PROCEDURE — 86663 EPSTEIN-BARR ANTIBODY: CPT

## 2020-03-31 PROCEDURE — 87389 HIV-1 AG W/HIV-1&-2 AB AG IA: CPT

## 2020-03-31 PROCEDURE — 70547 MR ANGIOGRAPHY NECK W/O DYE: CPT

## 2020-03-31 PROCEDURE — 82232 ASSAY OF BETA-2 PROTEIN: CPT

## 2020-03-31 PROCEDURE — 82962 GLUCOSE BLOOD TEST: CPT

## 2020-03-31 PROCEDURE — 86403 PARTICLE AGGLUT ANTBDY SCRN: CPT

## 2020-03-31 PROCEDURE — 84157 ASSAY OF PROTEIN OTHER: CPT

## 2020-03-31 PROCEDURE — 82140 ASSAY OF AMMONIA: CPT

## 2020-03-31 PROCEDURE — 86618 LYME DISEASE ANTIBODY: CPT

## 2020-03-31 PROCEDURE — 70552 MRI BRAIN STEM W/DYE: CPT

## 2020-03-31 PROCEDURE — 84165 PROTEIN E-PHORESIS SERUM: CPT

## 2020-03-31 PROCEDURE — 83550 IRON BINDING TEST: CPT

## 2020-03-31 PROCEDURE — 82746 ASSAY OF FOLIC ACID SERUM: CPT

## 2020-03-31 PROCEDURE — 87070 CULTURE OTHR SPECIMN AEROBIC: CPT

## 2020-03-31 PROCEDURE — 82945 GLUCOSE OTHER FLUID: CPT

## 2020-03-31 PROCEDURE — 80307 DRUG TEST PRSMV CHEM ANLYZR: CPT

## 2020-03-31 PROCEDURE — 99285 EMERGENCY DEPT VISIT HI MDM: CPT

## 2020-03-31 PROCEDURE — 86803 HEPATITIS C AB TEST: CPT

## 2020-03-31 PROCEDURE — 82330 ASSAY OF CALCIUM: CPT

## 2020-03-31 PROCEDURE — 89051 BODY FLUID CELL COUNT: CPT

## 2020-03-31 PROCEDURE — 83605 ASSAY OF LACTIC ACID: CPT

## 2020-03-31 PROCEDURE — 99239 HOSP IP/OBS DSCHRG MGMT >30: CPT

## 2020-03-31 PROCEDURE — 84443 ASSAY THYROID STIM HORMONE: CPT

## 2020-03-31 PROCEDURE — 87205 SMEAR GRAM STAIN: CPT

## 2020-03-31 PROCEDURE — 82390 ASSAY OF CERULOPLASMIN: CPT

## 2020-03-31 PROCEDURE — 82803 BLOOD GASES ANY COMBINATION: CPT

## 2020-03-31 PROCEDURE — 80061 LIPID PANEL: CPT

## 2020-03-31 PROCEDURE — 92610 EVALUATE SWALLOWING FUNCTION: CPT

## 2020-03-31 PROCEDURE — 82947 ASSAY GLUCOSE BLOOD QUANT: CPT

## 2020-03-31 PROCEDURE — 83010 ASSAY OF HAPTOGLOBIN QUANT: CPT

## 2020-03-31 PROCEDURE — 82784 ASSAY IGA/IGD/IGG/IGM EACH: CPT

## 2020-03-31 PROCEDURE — 87040 BLOOD CULTURE FOR BACTERIA: CPT

## 2020-03-31 PROCEDURE — 86592 SYPHILIS TEST NON-TREP QUAL: CPT

## 2020-03-31 PROCEDURE — 84166 PROTEIN E-PHORESIS/URINE/CSF: CPT

## 2020-03-31 PROCEDURE — 86900 BLOOD TYPING SEROLOGIC ABO: CPT

## 2020-03-31 PROCEDURE — 87532 HHV-6 DNA AMP PROBE: CPT

## 2020-03-31 PROCEDURE — 86850 RBC ANTIBODY SCREEN: CPT

## 2020-03-31 PROCEDURE — 81332 SERPINA1 GENE: CPT

## 2020-03-31 PROCEDURE — 87799 DETECT AGENT NOS DNA QUANT: CPT

## 2020-03-31 PROCEDURE — 84155 ASSAY OF PROTEIN SERUM: CPT

## 2020-03-31 PROCEDURE — 83540 ASSAY OF IRON: CPT

## 2020-03-31 PROCEDURE — 86038 ANTINUCLEAR ANTIBODIES: CPT

## 2020-03-31 PROCEDURE — 85027 COMPLETE CBC AUTOMATED: CPT

## 2020-03-31 PROCEDURE — 86665 EPSTEIN-BARR CAPSID VCA: CPT

## 2020-03-31 PROCEDURE — 70544 MR ANGIOGRAPHY HEAD W/O DYE: CPT

## 2020-03-31 PROCEDURE — 87476 LYME DIS DNA AMP PROBE: CPT

## 2020-03-31 PROCEDURE — 73502 X-RAY EXAM HIP UNI 2-3 VIEWS: CPT

## 2020-03-31 PROCEDURE — 85610 PROTHROMBIN TIME: CPT

## 2020-03-31 PROCEDURE — 92523 SPEECH SOUND LANG COMPREHEN: CPT

## 2020-03-31 PROCEDURE — A9585: CPT

## 2020-03-31 PROCEDURE — 86789 WEST NILE VIRUS ANTIBODY: CPT

## 2020-03-31 PROCEDURE — 76700 US EXAM ABDOM COMPLETE: CPT

## 2020-03-31 PROCEDURE — 83615 LACTATE (LD) (LDH) ENZYME: CPT

## 2020-03-31 PROCEDURE — 86738 MYCOPLASMA ANTIBODY: CPT

## 2020-03-31 PROCEDURE — 86901 BLOOD TYPING SEROLOGIC RH(D): CPT

## 2020-03-31 PROCEDURE — 82565 ASSAY OF CREATININE: CPT

## 2020-03-31 PROCEDURE — 87483 CNS DNA AMP PROBE TYPE 12-25: CPT

## 2020-03-31 PROCEDURE — 70450 CT HEAD/BRAIN W/O DYE: CPT

## 2020-03-31 PROCEDURE — 87529 HSV DNA AMP PROBE: CPT

## 2020-03-31 PROCEDURE — 86334 IMMUNOFIX E-PHORESIS SERUM: CPT

## 2020-03-31 PROCEDURE — 85730 THROMBOPLASTIN TIME PARTIAL: CPT

## 2020-03-31 PROCEDURE — 86695 HERPES SIMPLEX TYPE 1 TEST: CPT

## 2020-03-31 PROCEDURE — 74177 CT ABD & PELVIS W/CONTRAST: CPT

## 2020-03-31 PROCEDURE — 84295 ASSAY OF SERUM SODIUM: CPT

## 2020-03-31 PROCEDURE — 97110 THERAPEUTIC EXERCISES: CPT

## 2020-03-31 PROCEDURE — 83036 HEMOGLOBIN GLYCOSYLATED A1C: CPT

## 2020-03-31 PROCEDURE — 86788 WEST NILE VIRUS AB IGM: CPT

## 2020-03-31 PROCEDURE — 88313 SPECIAL STAINS GROUP 2: CPT

## 2020-03-31 PROCEDURE — 82435 ASSAY OF BLOOD CHLORIDE: CPT

## 2020-03-31 RX ORDER — AMLODIPINE BESYLATE 2.5 MG/1
1 TABLET ORAL
Qty: 30 | Refills: 0
Start: 2020-03-31 | End: 2020-04-29

## 2020-03-31 RX ORDER — ATORVASTATIN CALCIUM 80 MG/1
1 TABLET, FILM COATED ORAL
Qty: 0 | Refills: 0 | DISCHARGE
Start: 2020-03-31

## 2020-03-31 RX ORDER — LEVETIRACETAM 250 MG/1
1 TABLET, FILM COATED ORAL
Qty: 60 | Refills: 0
Start: 2020-03-31 | End: 2020-04-29

## 2020-03-31 RX ORDER — URSODIOL 250 MG/1
1 TABLET, FILM COATED ORAL
Qty: 60 | Refills: 0
Start: 2020-03-31 | End: 2020-04-29

## 2020-03-31 RX ADMIN — ENOXAPARIN SODIUM 40 MILLIGRAM(S): 100 INJECTION SUBCUTANEOUS at 11:27

## 2020-03-31 RX ADMIN — Medication 800 MILLIGRAM(S): at 05:17

## 2020-03-31 RX ADMIN — Medication 81 MILLIGRAM(S): at 11:27

## 2020-03-31 RX ADMIN — Medication 800 MILLIGRAM(S): at 13:50

## 2020-03-31 RX ADMIN — AMLODIPINE BESYLATE 5 MILLIGRAM(S): 2.5 TABLET ORAL at 05:18

## 2020-03-31 RX ADMIN — PANTOPRAZOLE SODIUM 40 MILLIGRAM(S): 20 TABLET, DELAYED RELEASE ORAL at 05:25

## 2020-03-31 RX ADMIN — Medication 5 MILLIGRAM(S): at 11:31

## 2020-03-31 RX ADMIN — LEVETIRACETAM 750 MILLIGRAM(S): 250 TABLET, FILM COATED ORAL at 05:18

## 2020-03-31 RX ADMIN — URSODIOL 500 MILLIGRAM(S): 250 TABLET, FILM COATED ORAL at 05:21

## 2020-03-31 RX ADMIN — POLYETHYLENE GLYCOL 3350 17 GRAM(S): 17 POWDER, FOR SOLUTION ORAL at 05:18

## 2020-03-31 RX ADMIN — Medication 12.5 MILLIGRAM(S): at 05:18

## 2020-04-07 DIAGNOSIS — K74.3 PRIMARY BILIARY CIRRHOSIS: ICD-10-CM

## 2020-04-07 DIAGNOSIS — R56.9 UNSPECIFIED CONVULSIONS: ICD-10-CM

## 2020-04-15 LAB
CULTURE RESULTS: SIGNIFICANT CHANGE UP
SPECIMEN SOURCE: SIGNIFICANT CHANGE UP

## 2020-06-08 NOTE — PROCEDURE NOTE - NSDIAGNOSTIC_RESP_A_CORE
Labs look stable compared to prior. Would recommend continuing Torsemide at his current dose. Thanks. scto  
[facility]    BMP results received, see below:      Pt had virtual visit with Amalia Cr PA-C on 6/1:      Routing to Amalia for review.    GARRETT WoodN, RN, PHN, HNB-BC   6/8/2020 at 2:57 PM      
[facility]    Yumi nurse at [facility], updated.     Katja Matos, BSN, RN, PHN, HNB-BC   6/8/2020 at 3:27 PM      
Diagnostic

## 2020-06-23 NOTE — DISCHARGE NOTE ADULT - USE THE 5 A'S (ASK, ADVISE, ASSESS, ASSIST, ARRANGE)
CAPILLARY BLOOD GLUCOSE      POCT Blood Glucose.: 211 mg/dL (22 Jun 2020 20:58)  POCT Blood Glucose.: 159 mg/dL (22 Jun 2020 17:43)  POCT Blood Glucose.: 156 mg/dL (22 Jun 2020 13:21)  POCT Blood Glucose.: 166 mg/dL (22 Jun 2020 12:07)  POCT Blood Glucose.: 157 mg/dL (22 Jun 2020 08:21)      Vital Signs Last 24 Hrs  T(C): 36.2 (23 Jun 2020 04:54), Max: 36.7 (22 Jun 2020 20:28)  T(F): 97.2 (23 Jun 2020 04:54), Max: 98.1 (22 Jun 2020 20:28)  HR: 51 (23 Jun 2020 04:54) (51 - 71)  BP: 112/71 (23 Jun 2020 04:54) (112/71 - 131/84)  BP(mean): --  RR: 18 (23 Jun 2020 04:54) (17 - 18)  SpO2: 98% (23 Jun 2020 04:54) (98% - 98%)    General: WN/WD NAD  Respiratory: CTA B/L  CV: RRR, S1S2, no murmurs, rubs or gallops  Abdominal: Soft, NT, ND +BS, Last BM  Extremities: le foot dsg intact     06-22    141  |  107  |  10  ----------------------------<  139<H>  3.6   |  27  |  0.83    Ca    8.6      22 Jun 2020 09:13        atorvastatin 40 milliGRAM(s) Oral at bedtime  dextrose 40% Gel 15 Gram(s) Oral once PRN  dextrose 50% Injectable 12.5 Gram(s) IV Push once  dextrose 50% Injectable 25 Gram(s) IV Push once  dextrose 50% Injectable 25 Gram(s) IV Push once  glucagon  Injectable 1 milliGRAM(s) IntraMuscular once PRN  insulin glargine Injectable (LANTUS) 15 Unit(s) SubCutaneous at bedtime  insulin lispro (HumaLOG) corrective regimen sliding scale   SubCutaneous three times a day before meals  insulin lispro (HumaLOG) corrective regimen sliding scale   SubCutaneous at bedtime  insulin lispro Injectable (HumaLOG) 5 Unit(s) SubCutaneous three times a day with meals  methimazole 10 milliGRAM(s) Oral daily Statement Selected

## 2020-07-09 ENCOUNTER — RX RENEWAL (OUTPATIENT)
Age: 75
End: 2020-07-09

## 2020-07-29 PROBLEM — I50.22 CHRONIC SYSTOLIC (CONGESTIVE) HEART FAILURE: Chronic | Status: ACTIVE | Noted: 2020-03-14

## 2020-09-03 ENCOUNTER — APPOINTMENT (OUTPATIENT)
Dept: INTERNAL MEDICINE | Facility: CLINIC | Age: 75
End: 2020-09-03
Payer: MEDICARE

## 2020-09-03 VITALS
BODY MASS INDEX: 26.74 KG/M2 | OXYGEN SATURATION: 97 % | HEART RATE: 77 BPM | TEMPERATURE: 97.7 F | SYSTOLIC BLOOD PRESSURE: 110 MMHG | WEIGHT: 170.38 LBS | DIASTOLIC BLOOD PRESSURE: 62 MMHG | RESPIRATION RATE: 16 BRPM | HEIGHT: 67 IN

## 2020-09-03 PROCEDURE — 99214 OFFICE O/P EST MOD 30 MIN: CPT | Mod: 25

## 2020-09-03 PROCEDURE — 36415 COLL VENOUS BLD VENIPUNCTURE: CPT

## 2020-09-03 NOTE — PLAN
[FreeTextEntry1] : Cardiovascular\par benign essential hypertension - discussed the importance of taking Metoprolol Tartrate 25mg and Amlodipine Besylate 5mg daily as prescribed, refused - advised low sodium diet\par continue Aspirin 325mg p.o.q.d. and Atorvastatin Calcium 10mg p.o.q.d. given history of 3-vessel CAD\par hyperlipidemia - continue Atorvastatin Calcium 10mg p.o.q.d. - continue low cholesterol/low fat diet - check FLP and LFTs\par CHF - discussed importance of taking Furosemide 40mg daily as prescribed, refused \par Endocrinology\par diabetes - continue Farxiga 10mg p.o.q.d. and Metformin HCl 500mg QID p.o.q.d. as directed - continue low carbohydrate/low sugar diet - check hemoglobin A1C \par Gastroenterology\par PBC - continue Ursodiol 500mg BID p.o.q.d. as directed - check LFTs - continue to follow up with liver specialist, Dr. Cerda\par GERD - continue Pantoprazole Sodium 40mg p.o.q.d. - continue antireflux measures\par Psychiatry\par anxiety - continue Alprazolam 0.5mg p.o. p.r.n. as directed \par Neurology\par seizures - advised to take Levetiracetam 750mg BID daily as prescribed\par issues with balance/speech - likely secondary to CVA  - previously referred to neurologist, Dr. Nir Gonzalez for further evaluation/testing; advised to follow up given current complaints \par \par check female panel

## 2020-09-03 NOTE — ADDENDUM
[FreeTextEntry1] : I, Erasto Ruiz, acted solely as scribe for Dr. Felipe Manuel DO on this date 09/03/2020 11:30AM .\par \par All medical record entries made by the Scribe were at my, Dr. Felipe Manuel DO direction and personally dictated by me on 09/03/2020 11:30AM. I have reviewed the chart and agree that the record accurately reflects my personal performance of the history, physical exam, assessment and plan. I have also personally directed, reviewed and agreed with the chart.\par

## 2020-09-03 NOTE — HISTORY OF PRESENT ILLNESS
[FreeTextEntry1] : fasting blood work and general follow-up\par  [de-identified] : Patient is a 75 year old female with a past medical history as below who presents for fasting blood work and general follow-up. Patient states she has not been taking all medications as prescribed. She states she has not been taking Furosemide or Amlodipine Besylate daily. She states she intermittently takes Levetiracetam. She states she has been taking Metformin and Farxiga daily as prescribed. Patient notes feeling weak and fatigued. She notes issues with balance and speech likely secondary to what was believed to be a CVA in March. She has not followed up with neurology. Patient states she quit smoking. She notes weight gain since her last visit.

## 2020-09-03 NOTE — REVIEW OF SYSTEMS
[Recent Change In Weight] : ~T recent weight change [Fatigue] : fatigue [Negative] : Eyes [FreeTextEntry2] : feeling weak; weight gain  [de-identified] : issues with balance; issues with speech

## 2020-09-03 NOTE — PHYSICAL EXAM
[No Acute Distress] : no acute distress [Well Nourished] : well nourished [Well Developed] : well developed [Well-Appearing] : well-appearing [Normal Sclera/Conjunctiva] : normal sclera/conjunctiva [PERRL] : pupils equal round and reactive to light [EOMI] : extraocular movements intact [Normal Outer Ear/Nose] : the outer ears and nose were normal in appearance [Normal Oropharynx] : the oropharynx was normal [No JVD] : no jugular venous distention [No Lymphadenopathy] : no lymphadenopathy [Supple] : supple [Thyroid Normal, No Nodules] : the thyroid was normal and there were no nodules present [No Respiratory Distress] : no respiratory distress  [No Accessory Muscle Use] : no accessory muscle use [Clear to Auscultation] : lungs were clear to auscultation bilaterally [Normal Rate] : normal rate  [Regular Rhythm] : with a regular rhythm [Normal S1, S2] : normal S1 and S2 [No Murmur] : no murmur heard [No Carotid Bruits] : no carotid bruits [No Abdominal Bruit] : a ~M bruit was not heard ~T in the abdomen [No Varicosities] : no varicosities [Pedal Pulses Present] : the pedal pulses are present [No Edema] : there was no peripheral edema [No Palpable Aorta] : no palpable aorta [No Extremity Clubbing/Cyanosis] : no extremity clubbing/cyanosis [Soft] : abdomen soft [Non Tender] : non-tender [Non-distended] : non-distended [No Masses] : no abdominal mass palpated [No HSM] : no HSM [Normal Bowel Sounds] : normal bowel sounds [Normal Posterior Cervical Nodes] : no posterior cervical lymphadenopathy [Normal Anterior Cervical Nodes] : no anterior cervical lymphadenopathy [No CVA Tenderness] : no CVA  tenderness [No Spinal Tenderness] : no spinal tenderness [No Joint Swelling] : no joint swelling [Grossly Normal Strength/Tone] : grossly normal strength/tone [No Rash] : no rash [Coordination Grossly Intact] : coordination grossly intact [No Focal Deficits] : no focal deficits [Normal Gait] : normal gait [Normal Affect] : the affect was normal [Normal Insight/Judgement] : insight and judgment were intact [de-identified] : overweight

## 2020-09-10 LAB
25(OH)D3 SERPL-MCNC: 25.5 NG/ML
ALBUMIN SERPL ELPH-MCNC: 4.2 G/DL
ALP BLD-CCNC: 244 U/L
ALT SERPL-CCNC: 41 U/L
ANION GAP SERPL CALC-SCNC: 12 MMOL/L
AST SERPL-CCNC: 37 U/L
BASOPHILS # BLD AUTO: 0.05 K/UL
BASOPHILS NFR BLD AUTO: 0.5 %
BILIRUB SERPL-MCNC: 0.4 MG/DL
BUN SERPL-MCNC: 27 MG/DL
CALCIUM SERPL-MCNC: 9.7 MG/DL
CHLORIDE SERPL-SCNC: 101 MMOL/L
CHOLEST SERPL-MCNC: 189 MG/DL
CHOLEST/HDLC SERPL: 3.1 RATIO
CO2 SERPL-SCNC: 24 MMOL/L
CREAT SERPL-MCNC: 1.17 MG/DL
EOSINOPHIL # BLD AUTO: 0.19 K/UL
EOSINOPHIL NFR BLD AUTO: 2 %
ESTIMATED AVERAGE GLUCOSE: 240 MG/DL
GLUCOSE SERPL-MCNC: 121 MG/DL
HBA1C MFR BLD HPLC: 10 %
HCT VFR BLD CALC: 38.6 %
HDLC SERPL-MCNC: 61 MG/DL
HGB BLD-MCNC: 11.9 G/DL
IMM GRANULOCYTES NFR BLD AUTO: 0.5 %
LDLC SERPL CALC-MCNC: 92 MG/DL
LYMPHOCYTES # BLD AUTO: 3.21 K/UL
LYMPHOCYTES NFR BLD AUTO: 33.5 %
MAN DIFF?: NORMAL
MCHC RBC-ENTMCNC: 26.6 PG
MCHC RBC-ENTMCNC: 30.8 GM/DL
MCV RBC AUTO: 86.2 FL
MONOCYTES # BLD AUTO: 0.85 K/UL
MONOCYTES NFR BLD AUTO: 8.9 %
NEUTROPHILS # BLD AUTO: 5.22 K/UL
NEUTROPHILS NFR BLD AUTO: 54.6 %
PLATELET # BLD AUTO: 413 K/UL
POTASSIUM SERPL-SCNC: 5.8 MMOL/L
PROT SERPL-MCNC: 9.4 G/DL
RBC # BLD: 4.48 M/UL
RBC # FLD: 17.6 %
SODIUM SERPL-SCNC: 137 MMOL/L
TRIGL SERPL-MCNC: 178 MG/DL
TSH SERPL-ACNC: 2.13 UIU/ML
WBC # FLD AUTO: 9.57 K/UL

## 2020-12-13 ENCOUNTER — RESULT CHARGE (OUTPATIENT)
Age: 75
End: 2020-12-13

## 2020-12-14 ENCOUNTER — APPOINTMENT (OUTPATIENT)
Dept: INTERNAL MEDICINE | Facility: CLINIC | Age: 75
End: 2020-12-14
Payer: MEDICARE

## 2020-12-14 VITALS
HEIGHT: 67 IN | TEMPERATURE: 98 F | DIASTOLIC BLOOD PRESSURE: 68 MMHG | OXYGEN SATURATION: 97 % | SYSTOLIC BLOOD PRESSURE: 138 MMHG | HEART RATE: 72 BPM | RESPIRATION RATE: 16 BRPM | BODY MASS INDEX: 28.72 KG/M2 | WEIGHT: 183 LBS

## 2020-12-14 DIAGNOSIS — Z01.818 ENCOUNTER FOR OTHER PREPROCEDURAL EXAMINATION: ICD-10-CM

## 2020-12-14 DIAGNOSIS — R91.1 SOLITARY PULMONARY NODULE: ICD-10-CM

## 2020-12-14 DIAGNOSIS — H26.9 UNSPECIFIED CATARACT: ICD-10-CM

## 2020-12-14 PROCEDURE — 36415 COLL VENOUS BLD VENIPUNCTURE: CPT

## 2020-12-14 PROCEDURE — 99072 ADDL SUPL MATRL&STAF TM PHE: CPT

## 2020-12-14 PROCEDURE — 99214 OFFICE O/P EST MOD 30 MIN: CPT | Mod: 25

## 2020-12-14 PROCEDURE — 93000 ELECTROCARDIOGRAM COMPLETE: CPT

## 2020-12-14 RX ORDER — LEVETIRACETAM 750 MG/1
750 TABLET, FILM COATED ORAL TWICE DAILY
Qty: 60 | Refills: 6 | Status: DISCONTINUED | COMMUNITY
Start: 2020-04-07 | End: 2020-12-14

## 2020-12-14 NOTE — CURRENT MEDS
[None] : Patient does not have any barriers to medication adherence [Lack of understanding] : lack of understanding [Takes medication as prescribed] : does not take [FreeTextEntry1] : Pt stopped lasix on her own

## 2020-12-14 NOTE — PHYSICAL EXAM
[No Acute Distress] : no acute distress [Well Nourished] : well nourished [Well Developed] : well developed [Well-Appearing] : well-appearing [Normal Sclera/Conjunctiva] : normal sclera/conjunctiva [PERRL] : pupils equal round and reactive to light [EOMI] : extraocular movements intact [Normal Outer Ear/Nose] : the outer ears and nose were normal in appearance [Normal Oropharynx] : the oropharynx was normal [No JVD] : no jugular venous distention [No Lymphadenopathy] : no lymphadenopathy [Supple] : supple [Thyroid Normal, No Nodules] : the thyroid was normal and there were no nodules present [No Respiratory Distress] : no respiratory distress  [No Accessory Muscle Use] : no accessory muscle use [Clear to Auscultation] : lungs were clear to auscultation bilaterally [Normal Rate] : normal rate  [Regular Rhythm] : with a regular rhythm [Normal S1, S2] : normal S1 and S2 [No Murmur] : no murmur heard [No Carotid Bruits] : no carotid bruits [No Abdominal Bruit] : a ~M bruit was not heard ~T in the abdomen [No Varicosities] : no varicosities [Pedal Pulses Present] : the pedal pulses are present [No Edema] : there was no peripheral edema [No Palpable Aorta] : no palpable aorta [No Extremity Clubbing/Cyanosis] : no extremity clubbing/cyanosis [Soft] : abdomen soft [Non Tender] : non-tender [Non-distended] : non-distended [No Masses] : no abdominal mass palpated [No HSM] : no HSM [Normal Bowel Sounds] : normal bowel sounds [Normal Posterior Cervical Nodes] : no posterior cervical lymphadenopathy [Normal Anterior Cervical Nodes] : no anterior cervical lymphadenopathy [No CVA Tenderness] : no CVA  tenderness [No Spinal Tenderness] : no spinal tenderness [No Joint Swelling] : no joint swelling [Grossly Normal Strength/Tone] : grossly normal strength/tone [No Rash] : no rash [Coordination Grossly Intact] : coordination grossly intact [No Focal Deficits] : no focal deficits [Normal Gait] : normal gait [Normal Affect] : the affect was normal [Normal Insight/Judgement] : insight and judgment were intact [Speech Grossly Normal] : speech grossly normal [Alert and Oriented x3] : oriented to person, place, and time [Normal Mood] : the mood was normal [] : both feet [de-identified] : b/l feet ABNORMAL TESTING  [TWNoteComboBox3] : 0 [TWNoteComboBox4] : 0

## 2020-12-14 NOTE — PLAN
[FreeTextEntry1] : Patient history, physical and ancillary testing was reviewed and discussed in detail with  Dr. Felipe Manuel.  Advised Dr. Manuel of EKG findings and  elevated a1c.  Also that pt d/c Lasix on her own.  \par \par LUIGI MELARA  was advised not to take any NSAIDs, ASA, Vitamin E, omega 3, ginkgo biloba or MVI 7 days prior to the surgery. \par Patient was advised to take metoprolol   the morning of her surgery with a sip of water.\par Hold Oral DM medication the morning of surgery \par \par Advised to follow up with Cardiology and ENDO\par Also advised to follow up with podiatrist for evaluation and treatment of neuropathy \par Advised pt she should not be driving or operating a Motor vehicle  \par Patient Sister was in the exam room and was also notified of recommendations \par \par 12-14-20 medical attending-The above was reviewed in detail with Robin Partida [facility],\par This patient is a non compliant tfemale as above, she is an intermediate risk patient (CAD/DM2) with adequate functional capacity going for a low risk procedure (no general anesthesia).  As such, she poses an acceptable risk and is "medically cleared" pending pre surgical testing.\par Felipe Manuel, DO

## 2020-12-14 NOTE — COUNSELING
[Weight management counseling provided] : Weight management [Healthy eating counseling provided] : healthy eating [Fall prevention counseling provided] : fall prevention  [Weigh Self Once Weekly] : Weigh self once weekly [Low Fat Diet] : Low fat diet [Decrease Portions] : Decrease food portions [Use recommended devices] : Use recommended devices [de-identified] : use walker

## 2020-12-14 NOTE — HISTORY OF PRESENT ILLNESS
[Coronary Artery Disease] : coronary artery disease [Diabetes] : diabetes [(Patient denies any chest pain, claudication, dyspnea on exertion, orthopnea, palpitations or syncope)] : Patient denies any chest pain, claudication, dyspnea on exertion, orthopnea, palpitations or syncope [Aortic Stenosis] : no aortic stenosis [Atrial Fibrillation] : no atrial fibrillation [Recent Myocardial Infarction] : no recent myocardial infarction [Implantable Device/Pacemaker] : no implantable device/pacemaker [COPD] : no COPD [Sleep Apnea] : no sleep apnea [Smoker] : not a smoker [Family Member] : no family member with adverse anesthesia reaction/sudden death [Self] : no previous adverse anesthesia reaction [Chronic Anticoagulation] : no chronic anticoagulation [Chronic Kidney Disease] : no chronic kidney disease [FreeTextEntry1] : Cataract surgery  [FreeTextEntry2] : 12/17/20 [FreeTextEntry3] : Dr. Talha Ellison [FreeTextEntry4] : Ms. MELARA is a 75 year  female, who present to the office for medical clearance for cataract surgery.  PAtient schedule for cataract surgery on 12/17 and 01/17 with Dr. Ellison.  Procedure will be done at [facility]. States she blurry vision secondary to the cataracts.  Denies pain \par Pt is having local/regional anesthesia with or without mild sedation.  \par Denies having SOB, Chest pain, RANGEL, or syncope  [FreeTextEntry6] : quit 2018 [FreeTextEntry7] : CT scan 3/30 pulmonary nodules

## 2020-12-14 NOTE — ASSESSMENT
[FreeTextEntry4] : Ms. MELARA is a 75 year  female, who present to the office for Medical clearance for cataract

## 2020-12-21 ENCOUNTER — NON-APPOINTMENT (OUTPATIENT)
Age: 75
End: 2020-12-21

## 2020-12-21 LAB
ALBUMIN SERPL ELPH-MCNC: 4.4 G/DL
ALP BLD-CCNC: 132 U/L
ALT SERPL-CCNC: 39 U/L
ANION GAP SERPL CALC-SCNC: 11 MMOL/L
AST SERPL-CCNC: 34 U/L
BASOPHILS # BLD AUTO: 0.03 K/UL
BASOPHILS NFR BLD AUTO: 0.3 %
BILIRUB SERPL-MCNC: 0.3 MG/DL
BUN SERPL-MCNC: 24 MG/DL
CALCIUM SERPL-MCNC: 9.7 MG/DL
CHLORIDE SERPL-SCNC: 101 MMOL/L
CO2 SERPL-SCNC: 25 MMOL/L
CREAT SERPL-MCNC: 1.11 MG/DL
EOSINOPHIL # BLD AUTO: 0.12 K/UL
EOSINOPHIL NFR BLD AUTO: 1.4 %
ESTIMATED AVERAGE GLUCOSE: 189 MG/DL
GLUCOSE SERPL-MCNC: 104 MG/DL
HBA1C MFR BLD HPLC: 8.2 %
HCT VFR BLD CALC: 39.6 %
HGB BLD-MCNC: 12 G/DL
IMM GRANULOCYTES NFR BLD AUTO: 0.1 %
INR PPP: 1.05 RATIO
LYMPHOCYTES # BLD AUTO: 2.88 K/UL
LYMPHOCYTES NFR BLD AUTO: 33.3 %
MAN DIFF?: NORMAL
MCHC RBC-ENTMCNC: 26.3 PG
MCHC RBC-ENTMCNC: 30.3 GM/DL
MCV RBC AUTO: 86.7 FL
MONOCYTES # BLD AUTO: 0.73 K/UL
MONOCYTES NFR BLD AUTO: 8.4 %
NEUTROPHILS # BLD AUTO: 4.89 K/UL
NEUTROPHILS NFR BLD AUTO: 56.5 %
PLATELET # BLD AUTO: 361 K/UL
POTASSIUM SERPL-SCNC: 4.9 MMOL/L
PROT SERPL-MCNC: 8.8 G/DL
PT BLD: 12.4 SEC
RBC # BLD: 4.57 M/UL
RBC # FLD: 13.5 %
SODIUM SERPL-SCNC: 138 MMOL/L
WBC # FLD AUTO: 8.66 K/UL

## 2021-01-13 ENCOUNTER — RESULT REVIEW (OUTPATIENT)
Age: 76
End: 2021-01-13

## 2021-01-13 ENCOUNTER — OUTPATIENT (OUTPATIENT)
Dept: OUTPATIENT SERVICES | Facility: HOSPITAL | Age: 76
LOS: 1 days | End: 2021-01-13
Payer: COMMERCIAL

## 2021-01-13 ENCOUNTER — APPOINTMENT (OUTPATIENT)
Dept: CT IMAGING | Facility: IMAGING CENTER | Age: 76
End: 2021-01-13
Payer: MEDICARE

## 2021-01-13 DIAGNOSIS — Z98.890 OTHER SPECIFIED POSTPROCEDURAL STATES: Chronic | ICD-10-CM

## 2021-01-13 DIAGNOSIS — Z90.89 ACQUIRED ABSENCE OF OTHER ORGANS: Chronic | ICD-10-CM

## 2021-01-13 DIAGNOSIS — Z95.1 PRESENCE OF AORTOCORONARY BYPASS GRAFT: Chronic | ICD-10-CM

## 2021-01-13 DIAGNOSIS — Z00.8 ENCOUNTER FOR OTHER GENERAL EXAMINATION: ICD-10-CM

## 2021-01-13 DIAGNOSIS — Z90.710 ACQUIRED ABSENCE OF BOTH CERVIX AND UTERUS: Chronic | ICD-10-CM

## 2021-01-13 PROCEDURE — 71250 CT THORAX DX C-: CPT | Mod: 26

## 2021-01-13 PROCEDURE — 71250 CT THORAX DX C-: CPT

## 2021-01-20 ENCOUNTER — NON-APPOINTMENT (OUTPATIENT)
Age: 76
End: 2021-01-20

## 2021-03-15 NOTE — BEHAVIORAL HEALTH ASSESSMENT NOTE - CURRENT ACTIVE IDEATION
Patient tolerated injection well. Therapy plan reviewed with patient. Verbalizes understanding. Reviewed AVS with patient, reviewed medication information, verbalizes good knowledge of current plan, and has no signs or symptoms to report at this time. Declines copy of AVS.  Next appointment made and patient instructed on lab draw and procedure for next injection. None known

## 2021-03-26 NOTE — PROGRESS NOTE ADULT - ASSESSMENT
HERNANDO updated [facility] on anticipated weekend discharge.    HERNANDO Falk  3/26/2021  5:13 PM     72 year old woman with a history of tobacco use, DM, HTN, poor historian presents for dyspnea and NSTEMI. Initially she was found to be in heart failure, and is s/p IV Lasix.     -she seems near euvolemic on exam  -would keep net negative today  -She is s/p Cath with significant two vessel disease.   -cabg has been recommended, but she is hesitant  -await TTE to evaluate LV function and MV disease  -Continue with ASA and Heparin gtt.   -Continue with Statin  -will follow    The patient is at risk of abrupt decompensation.  35 minutes of critical care time was spent with this patient.

## 2021-04-22 NOTE — DISCHARGE NOTE PROVIDER - NSCORESITESY/N_GEN_A_CORE_RD
[FreeTextEntry1] : 53 year old CAMMY VANEGAS with history of rheumatoid arthritis, hypothyroidism, hyperlipidemia, obesity, anxiety, depression; continues to derive benefit from PAP therapy for obstructive sleep apnea since 2012.\par \par SEVERE JENNIFER (AHI 76.5) on APAP 4-20 cm H2O using a full-face mask.\par \par With regular use of APAP, patient notes improved sleep and breathing; prior apnea-related symptoms have resolved.  AHI improved from 76.5 to 11.8.  [place] Sleepiness Scale score improved from 13 to 8 today.\par \par Therapy and Compliance Data was reviewed with patient:  Compliant on 100% of nights averaging 9 hours 2 minutes; residual AHI at 11.8 on APAP pressures of 4-20 cmH2O, and occasional significant mask leak.\par \par Patient attributes mask leak to the need for a replacement mask.\par Minimum Pressure setting was edited to 8 cmH2O via Care .\par [facility] to renew supplies for continuation of therapy.  \par Follow-up in one year or sooner if needed.\par 
[FreeTextEntry1] : 53 year old CAMMY VANEGAS with history of rheumatoid arthritis, hypothyroidism, hyperlipidemia, obesity, anxiety, depression; continues to derive benefit from PAP therapy for obstructive sleep apnea since 2012.\par \par SEVERE JENNIFER (AHI 76.5) on APAP 4-20 cm H2O using a full-face mask.\par \par With regular use of APAP, patient notes improved sleep and breathing; prior apnea-related symptoms have resolved.  AHI improved from 76.5 to 11.8.  [place] Sleepiness Scale score improved from 13 to 8 today.\par \par Therapy and Compliance Data was reviewed with patient:  Compliant on 100% of nights averaging 9 hours 2 minutes; residual AHI at 11.8 on APAP pressures of 4-20 cmH2O, and occasional significant mask leak.\par \par Patient attributes mask leak to the need for a replacement mask.\par Minimum Pressure setting was edited to 8 cmH2O via Care .\par [facility] to renew supplies for continuation of therapy.  \par Follow-up in one year or sooner if needed.\par 
No

## 2021-05-10 ENCOUNTER — INPATIENT (INPATIENT)
Facility: HOSPITAL | Age: 76
LOS: 9 days | Discharge: NURSING, REHAB OR LONG-TERM CARE | DRG: 871 | End: 2021-05-20
Attending: HOSPITALIST | Admitting: STUDENT IN AN ORGANIZED HEALTH CARE EDUCATION/TRAINING PROGRAM
Payer: COMMERCIAL

## 2021-05-10 ENCOUNTER — APPOINTMENT (OUTPATIENT)
Dept: INTERNAL MEDICINE | Facility: CLINIC | Age: 76
End: 2021-05-10
Payer: MEDICARE

## 2021-05-10 VITALS
SYSTOLIC BLOOD PRESSURE: 90 MMHG | DIASTOLIC BLOOD PRESSURE: 62 MMHG | RESPIRATION RATE: 16 BRPM | HEIGHT: 67 IN | TEMPERATURE: 97 F | HEART RATE: 82 BPM | OXYGEN SATURATION: 96 %

## 2021-05-10 VITALS
OXYGEN SATURATION: 94 % | HEART RATE: 78 BPM | TEMPERATURE: 97 F | SYSTOLIC BLOOD PRESSURE: 98 MMHG | RESPIRATION RATE: 18 BRPM | WEIGHT: 169.98 LBS | HEIGHT: 67 IN | DIASTOLIC BLOOD PRESSURE: 64 MMHG

## 2021-05-10 DIAGNOSIS — R53.1 WEAKNESS: ICD-10-CM

## 2021-05-10 DIAGNOSIS — Z95.1 PRESENCE OF AORTOCORONARY BYPASS GRAFT: Chronic | ICD-10-CM

## 2021-05-10 DIAGNOSIS — E11.10 TYPE 2 DIABETES MELLITUS WITH KETOACIDOSIS WITHOUT COMA: ICD-10-CM

## 2021-05-10 DIAGNOSIS — Z90.89 ACQUIRED ABSENCE OF OTHER ORGANS: Chronic | ICD-10-CM

## 2021-05-10 DIAGNOSIS — R41.82 ALTERED MENTAL STATUS, UNSPECIFIED: ICD-10-CM

## 2021-05-10 DIAGNOSIS — E86.0 DEHYDRATION: ICD-10-CM

## 2021-05-10 DIAGNOSIS — Z98.890 OTHER SPECIFIED POSTPROCEDURAL STATES: Chronic | ICD-10-CM

## 2021-05-10 DIAGNOSIS — Z90.710 ACQUIRED ABSENCE OF BOTH CERVIX AND UTERUS: Chronic | ICD-10-CM

## 2021-05-10 LAB
ALBUMIN SERPL ELPH-MCNC: 2.8 G/DL — LOW (ref 3.3–5)
ALBUMIN SERPL ELPH-MCNC: 3.3 G/DL — SIGNIFICANT CHANGE UP (ref 3.3–5)
ALBUMIN SERPL ELPH-MCNC: 3.5 G/DL — SIGNIFICANT CHANGE UP (ref 3.3–5)
ALP SERPL-CCNC: 114 U/L — SIGNIFICANT CHANGE UP (ref 40–120)
ALP SERPL-CCNC: 133 U/L — HIGH (ref 40–120)
ALP SERPL-CCNC: 135 U/L — HIGH (ref 40–120)
ALT FLD-CCNC: 28 U/L — SIGNIFICANT CHANGE UP (ref 10–45)
ALT FLD-CCNC: 29 U/L — SIGNIFICANT CHANGE UP (ref 10–45)
ALT FLD-CCNC: 30 U/L — SIGNIFICANT CHANGE UP (ref 10–45)
ANION GAP SERPL CALC-SCNC: 22 MMOL/L — HIGH (ref 5–17)
ANION GAP SERPL CALC-SCNC: 24 MMOL/L — HIGH (ref 5–17)
ANION GAP SERPL CALC-SCNC: 25 MMOL/L — HIGH (ref 5–17)
ANION GAP SERPL CALC-SCNC: 25 MMOL/L — HIGH (ref 5–17)
APPEARANCE UR: ABNORMAL
AST SERPL-CCNC: 33 U/L — SIGNIFICANT CHANGE UP (ref 10–40)
AST SERPL-CCNC: 34 U/L — SIGNIFICANT CHANGE UP (ref 10–40)
AST SERPL-CCNC: 40 U/L — SIGNIFICANT CHANGE UP (ref 10–40)
BACTERIA # UR AUTO: ABNORMAL
BASOPHILS # BLD AUTO: 0 K/UL — SIGNIFICANT CHANGE UP (ref 0–0.2)
BASOPHILS # BLD AUTO: 0.02 K/UL — SIGNIFICANT CHANGE UP (ref 0–0.2)
BASOPHILS NFR BLD AUTO: 0 % — SIGNIFICANT CHANGE UP (ref 0–2)
BASOPHILS NFR BLD AUTO: 0.2 % — SIGNIFICANT CHANGE UP (ref 0–2)
BILIRUB SERPL-MCNC: 0.4 MG/DL — SIGNIFICANT CHANGE UP (ref 0.2–1.2)
BILIRUB UR-MCNC: NEGATIVE — SIGNIFICANT CHANGE UP
BUN SERPL-MCNC: 55 MG/DL — HIGH (ref 7–23)
BUN SERPL-MCNC: 57 MG/DL — HIGH (ref 7–23)
CALCIUM SERPL-MCNC: 8.2 MG/DL — LOW (ref 8.4–10.5)
CALCIUM SERPL-MCNC: 8.4 MG/DL — SIGNIFICANT CHANGE UP (ref 8.4–10.5)
CALCIUM SERPL-MCNC: 8.8 MG/DL — SIGNIFICANT CHANGE UP (ref 8.4–10.5)
CALCIUM SERPL-MCNC: 9.1 MG/DL — SIGNIFICANT CHANGE UP (ref 8.4–10.5)
CHLORIDE SERPL-SCNC: 88 MMOL/L — LOW (ref 96–108)
CHLORIDE SERPL-SCNC: 90 MMOL/L — LOW (ref 96–108)
CHLORIDE SERPL-SCNC: 91 MMOL/L — LOW (ref 96–108)
CHLORIDE SERPL-SCNC: 92 MMOL/L — LOW (ref 96–108)
CK SERPL-CCNC: 171 U/L — HIGH (ref 25–170)
CO2 SERPL-SCNC: 11 MMOL/L — LOW (ref 22–31)
CO2 SERPL-SCNC: 11 MMOL/L — LOW (ref 22–31)
CO2 SERPL-SCNC: 12 MMOL/L — LOW (ref 22–31)
CO2 SERPL-SCNC: 13 MMOL/L — LOW (ref 22–31)
COLOR SPEC: YELLOW — SIGNIFICANT CHANGE UP
CREAT SERPL-MCNC: 1.94 MG/DL — HIGH (ref 0.5–1.3)
CREAT SERPL-MCNC: 1.98 MG/DL — HIGH (ref 0.5–1.3)
CREAT SERPL-MCNC: 2.04 MG/DL — HIGH (ref 0.5–1.3)
CREAT SERPL-MCNC: 2.23 MG/DL — HIGH (ref 0.5–1.3)
DIFF PNL FLD: ABNORMAL
EOSINOPHIL # BLD AUTO: 0 K/UL — SIGNIFICANT CHANGE UP (ref 0–0.5)
EOSINOPHIL # BLD AUTO: 0.01 K/UL — SIGNIFICANT CHANGE UP (ref 0–0.5)
EOSINOPHIL NFR BLD AUTO: 0 % — SIGNIFICANT CHANGE UP (ref 0–6)
EOSINOPHIL NFR BLD AUTO: 0.1 % — SIGNIFICANT CHANGE UP (ref 0–6)
EPI CELLS # UR: 0 /HPF — SIGNIFICANT CHANGE UP
GAS PNL BLDV: SIGNIFICANT CHANGE UP
GLUCOSE BLDC GLUCOMTR-MCNC: 486 MG/DL — CRITICAL HIGH (ref 70–99)
GLUCOSE BLDC GLUCOMTR-MCNC: 504 MG/DL — CRITICAL HIGH (ref 70–99)
GLUCOSE SERPL-MCNC: 499 MG/DL — CRITICAL HIGH (ref 70–99)
GLUCOSE SERPL-MCNC: 502 MG/DL — CRITICAL HIGH (ref 70–99)
GLUCOSE SERPL-MCNC: 519 MG/DL — CRITICAL HIGH (ref 70–99)
GLUCOSE SERPL-MCNC: 531 MG/DL — CRITICAL HIGH (ref 70–99)
GLUCOSE UR QL: ABNORMAL
HCT VFR BLD CALC: 32.6 % — LOW (ref 34.5–45)
HCT VFR BLD CALC: 35 % — SIGNIFICANT CHANGE UP (ref 34.5–45)
HGB BLD-MCNC: 10.9 G/DL — LOW (ref 11.5–15.5)
HGB BLD-MCNC: 12.1 G/DL — SIGNIFICANT CHANGE UP (ref 11.5–15.5)
IMM GRANULOCYTES NFR BLD AUTO: 0.5 % — SIGNIFICANT CHANGE UP (ref 0–1.5)
KETONES UR-MCNC: ABNORMAL
LEUKOCYTE ESTERASE UR-ACNC: ABNORMAL
LYMPHOCYTES # BLD AUTO: 0.22 K/UL — LOW (ref 1–3.3)
LYMPHOCYTES # BLD AUTO: 0.75 K/UL — LOW (ref 1–3.3)
LYMPHOCYTES # BLD AUTO: 1.8 % — LOW (ref 13–44)
LYMPHOCYTES # BLD AUTO: 6.7 % — LOW (ref 13–44)
MAGNESIUM SERPL-MCNC: 2 MG/DL — SIGNIFICANT CHANGE UP (ref 1.6–2.6)
MCHC RBC-ENTMCNC: 28 PG — SIGNIFICANT CHANGE UP (ref 27–34)
MCHC RBC-ENTMCNC: 28.6 PG — SIGNIFICANT CHANGE UP (ref 27–34)
MCHC RBC-ENTMCNC: 33.4 GM/DL — SIGNIFICANT CHANGE UP (ref 32–36)
MCHC RBC-ENTMCNC: 34.6 GM/DL — SIGNIFICANT CHANGE UP (ref 32–36)
MCV RBC AUTO: 82.7 FL — SIGNIFICANT CHANGE UP (ref 80–100)
MCV RBC AUTO: 83.8 FL — SIGNIFICANT CHANGE UP (ref 80–100)
MONOCYTES # BLD AUTO: 1.1 K/UL — HIGH (ref 0–0.9)
MONOCYTES # BLD AUTO: 1.19 K/UL — HIGH (ref 0–0.9)
MONOCYTES NFR BLD AUTO: 9.7 % — SIGNIFICANT CHANGE UP (ref 2–14)
MONOCYTES NFR BLD AUTO: 9.8 % — SIGNIFICANT CHANGE UP (ref 2–14)
NEUTROPHILS # BLD AUTO: 10.86 K/UL — HIGH (ref 1.8–7.4)
NEUTROPHILS # BLD AUTO: 9.24 K/UL — HIGH (ref 1.8–7.4)
NEUTROPHILS NFR BLD AUTO: 78.9 % — HIGH (ref 43–77)
NEUTROPHILS NFR BLD AUTO: 82.7 % — HIGH (ref 43–77)
NITRITE UR-MCNC: NEGATIVE — SIGNIFICANT CHANGE UP
NRBC # BLD: 0 /100 WBCS — SIGNIFICANT CHANGE UP (ref 0–0)
PH UR: 6 — SIGNIFICANT CHANGE UP (ref 5–8)
PHOSPHATE SERPL-MCNC: 5.4 MG/DL — HIGH (ref 2.5–4.5)
PLATELET # BLD AUTO: 259 K/UL — SIGNIFICANT CHANGE UP (ref 150–400)
PLATELET # BLD AUTO: 279 K/UL — SIGNIFICANT CHANGE UP (ref 150–400)
POTASSIUM SERPL-MCNC: 4.6 MMOL/L — SIGNIFICANT CHANGE UP (ref 3.5–5.3)
POTASSIUM SERPL-MCNC: 4.9 MMOL/L — SIGNIFICANT CHANGE UP (ref 3.5–5.3)
POTASSIUM SERPL-MCNC: 4.9 MMOL/L — SIGNIFICANT CHANGE UP (ref 3.5–5.3)
POTASSIUM SERPL-MCNC: 5.2 MMOL/L — SIGNIFICANT CHANGE UP (ref 3.5–5.3)
POTASSIUM SERPL-SCNC: 4.6 MMOL/L — SIGNIFICANT CHANGE UP (ref 3.5–5.3)
POTASSIUM SERPL-SCNC: 4.9 MMOL/L — SIGNIFICANT CHANGE UP (ref 3.5–5.3)
POTASSIUM SERPL-SCNC: 4.9 MMOL/L — SIGNIFICANT CHANGE UP (ref 3.5–5.3)
POTASSIUM SERPL-SCNC: 5.2 MMOL/L — SIGNIFICANT CHANGE UP (ref 3.5–5.3)
PROT SERPL-MCNC: 7 G/DL — SIGNIFICANT CHANGE UP (ref 6–8.3)
PROT SERPL-MCNC: 7.7 G/DL — SIGNIFICANT CHANGE UP (ref 6–8.3)
PROT SERPL-MCNC: 7.8 G/DL — SIGNIFICANT CHANGE UP (ref 6–8.3)
PROT UR-MCNC: ABNORMAL
RBC # BLD: 3.89 M/UL — SIGNIFICANT CHANGE UP (ref 3.8–5.2)
RBC # BLD: 4.23 M/UL — SIGNIFICANT CHANGE UP (ref 3.8–5.2)
RBC # FLD: 15 % — HIGH (ref 10.3–14.5)
RBC # FLD: 15.2 % — HIGH (ref 10.3–14.5)
RBC CASTS # UR COMP ASSIST: 8 /HPF — HIGH (ref 0–4)
SARS-COV-2 RNA SPEC QL NAA+PROBE: SIGNIFICANT CHANGE UP
SODIUM SERPL-SCNC: 125 MMOL/L — LOW (ref 135–145)
SODIUM SERPL-SCNC: 126 MMOL/L — LOW (ref 135–145)
SODIUM SERPL-SCNC: 126 MMOL/L — LOW (ref 135–145)
SODIUM SERPL-SCNC: 127 MMOL/L — LOW (ref 135–145)
SP GR SPEC: 1.01 — SIGNIFICANT CHANGE UP (ref 1.01–1.02)
TROPONIN T, HIGH SENSITIVITY RESULT: 107 NG/L — HIGH (ref 0–51)
UROBILINOGEN FLD QL: NEGATIVE — SIGNIFICANT CHANGE UP
WBC # BLD: 11.18 K/UL — HIGH (ref 3.8–10.5)
WBC # BLD: 12.27 K/UL — HIGH (ref 3.8–10.5)
WBC # FLD AUTO: 11.18 K/UL — HIGH (ref 3.8–10.5)
WBC # FLD AUTO: 12.27 K/UL — HIGH (ref 3.8–10.5)
WBC UR QL: >50 /HPF — HIGH (ref 0–5)

## 2021-05-10 PROCEDURE — 93010 ELECTROCARDIOGRAM REPORT: CPT | Mod: 59

## 2021-05-10 PROCEDURE — 99291 CRITICAL CARE FIRST HOUR: CPT | Mod: 25

## 2021-05-10 PROCEDURE — 99291 CRITICAL CARE FIRST HOUR: CPT

## 2021-05-10 PROCEDURE — 76775 US EXAM ABDO BACK WALL LIM: CPT | Mod: 26

## 2021-05-10 PROCEDURE — 36556 INSERT NON-TUNNEL CV CATH: CPT

## 2021-05-10 PROCEDURE — 73030 X-RAY EXAM OF SHOULDER: CPT | Mod: 26,50

## 2021-05-10 PROCEDURE — 72125 CT NECK SPINE W/O DYE: CPT | Mod: 26

## 2021-05-10 PROCEDURE — 71045 X-RAY EXAM CHEST 1 VIEW: CPT | Mod: 26,76

## 2021-05-10 PROCEDURE — 74176 CT ABD & PELVIS W/O CONTRAST: CPT | Mod: 26

## 2021-05-10 PROCEDURE — 70450 CT HEAD/BRAIN W/O DYE: CPT | Mod: 26

## 2021-05-10 PROCEDURE — 99215 OFFICE O/P EST HI 40 MIN: CPT

## 2021-05-10 RX ORDER — SODIUM CHLORIDE 9 MG/ML
1000 INJECTION INTRAMUSCULAR; INTRAVENOUS; SUBCUTANEOUS ONCE
Refills: 0 | Status: COMPLETED | OUTPATIENT
Start: 2021-05-10 | End: 2021-05-10

## 2021-05-10 RX ORDER — DEXTROSE 50 % IN WATER 50 %
50 SYRINGE (ML) INTRAVENOUS
Refills: 0 | Status: DISCONTINUED | OUTPATIENT
Start: 2021-05-10 | End: 2021-05-11

## 2021-05-10 RX ORDER — SODIUM CHLORIDE 9 MG/ML
1000 INJECTION, SOLUTION INTRAVENOUS
Refills: 0 | Status: DISCONTINUED | OUTPATIENT
Start: 2021-05-10 | End: 2021-05-11

## 2021-05-10 RX ORDER — NOREPINEPHRINE BITARTRATE/D5W 8 MG/250ML
0.05 PLASTIC BAG, INJECTION (ML) INTRAVENOUS
Qty: 8 | Refills: 0 | Status: DISCONTINUED | OUTPATIENT
Start: 2021-05-10 | End: 2021-05-11

## 2021-05-10 RX ORDER — CHLORHEXIDINE GLUCONATE 213 G/1000ML
1 SOLUTION TOPICAL
Refills: 0 | Status: DISCONTINUED | OUTPATIENT
Start: 2021-05-10 | End: 2021-05-11

## 2021-05-10 RX ORDER — INSULIN HUMAN 100 [IU]/ML
4 INJECTION, SOLUTION SUBCUTANEOUS
Qty: 100 | Refills: 0 | Status: DISCONTINUED | OUTPATIENT
Start: 2021-05-10 | End: 2021-05-11

## 2021-05-10 RX ORDER — PIPERACILLIN AND TAZOBACTAM 4; .5 G/20ML; G/20ML
3.38 INJECTION, POWDER, LYOPHILIZED, FOR SOLUTION INTRAVENOUS ONCE
Refills: 0 | Status: DISCONTINUED | OUTPATIENT
Start: 2021-05-10 | End: 2021-05-10

## 2021-05-10 RX ORDER — ONDANSETRON 8 MG/1
4 TABLET, FILM COATED ORAL ONCE
Refills: 0 | Status: COMPLETED | OUTPATIENT
Start: 2021-05-10 | End: 2021-05-10

## 2021-05-10 RX ORDER — PIPERACILLIN AND TAZOBACTAM 4; .5 G/20ML; G/20ML
3.38 INJECTION, POWDER, LYOPHILIZED, FOR SOLUTION INTRAVENOUS ONCE
Refills: 0 | Status: COMPLETED | OUTPATIENT
Start: 2021-05-10 | End: 2021-05-10

## 2021-05-10 RX ORDER — CEFTRIAXONE 500 MG/1
1000 INJECTION, POWDER, FOR SOLUTION INTRAMUSCULAR; INTRAVENOUS EVERY 24 HOURS
Refills: 0 | Status: DISCONTINUED | OUTPATIENT
Start: 2021-05-10 | End: 2021-05-13

## 2021-05-10 RX ORDER — POTASSIUM CHLORIDE 20 MEQ
10 PACKET (EA) ORAL
Refills: 0 | Status: COMPLETED | OUTPATIENT
Start: 2021-05-10 | End: 2021-05-10

## 2021-05-10 RX ORDER — DEXTROSE 50 % IN WATER 50 %
25 SYRINGE (ML) INTRAVENOUS
Refills: 0 | Status: DISCONTINUED | OUTPATIENT
Start: 2021-05-10 | End: 2021-05-11

## 2021-05-10 RX ORDER — HEPARIN SODIUM 5000 [USP'U]/ML
5000 INJECTION INTRAVENOUS; SUBCUTANEOUS EVERY 8 HOURS
Refills: 0 | Status: DISCONTINUED | OUTPATIENT
Start: 2021-05-10 | End: 2021-05-13

## 2021-05-10 RX ORDER — SODIUM CHLORIDE 9 MG/ML
1000 INJECTION, SOLUTION INTRAVENOUS ONCE
Refills: 0 | Status: COMPLETED | OUTPATIENT
Start: 2021-05-10 | End: 2021-05-10

## 2021-05-10 RX ORDER — VANCOMYCIN HCL 1 G
1000 VIAL (EA) INTRAVENOUS ONCE
Refills: 0 | Status: COMPLETED | OUTPATIENT
Start: 2021-05-10 | End: 2021-05-10

## 2021-05-10 RX ADMIN — Medication 100 MILLIEQUIVALENT(S): at 21:00

## 2021-05-10 RX ADMIN — SODIUM CHLORIDE 1000 MILLILITER(S): 9 INJECTION INTRAMUSCULAR; INTRAVENOUS; SUBCUTANEOUS at 17:34

## 2021-05-10 RX ADMIN — PIPERACILLIN AND TAZOBACTAM 200 GRAM(S): 4; .5 INJECTION, POWDER, LYOPHILIZED, FOR SOLUTION INTRAVENOUS at 19:10

## 2021-05-10 RX ADMIN — Medication 100 MILLIEQUIVALENT(S): at 22:23

## 2021-05-10 RX ADMIN — ONDANSETRON 4 MILLIGRAM(S): 8 TABLET, FILM COATED ORAL at 21:21

## 2021-05-10 RX ADMIN — SODIUM CHLORIDE 1000 MILLILITER(S): 9 INJECTION INTRAMUSCULAR; INTRAVENOUS; SUBCUTANEOUS at 17:50

## 2021-05-10 RX ADMIN — Medication 250 MILLIGRAM(S): at 20:00

## 2021-05-10 RX ADMIN — SODIUM CHLORIDE 1000 MILLILITER(S): 9 INJECTION, SOLUTION INTRAVENOUS at 20:00

## 2021-05-10 RX ADMIN — Medication 7.23 MICROGRAM(S)/KG/MIN: at 18:45

## 2021-05-10 RX ADMIN — CEFTRIAXONE 100 MILLIGRAM(S): 500 INJECTION, POWDER, FOR SOLUTION INTRAMUSCULAR; INTRAVENOUS at 22:38

## 2021-05-10 RX ADMIN — INSULIN HUMAN 4 UNIT(S)/HR: 100 INJECTION, SOLUTION SUBCUTANEOUS at 21:21

## 2021-05-10 RX ADMIN — Medication 100 MILLIEQUIVALENT(S): at 21:42

## 2021-05-10 NOTE — H&P ADULT - ATTENDING COMMENTS
Patient seen and examined.  Agree with resident note as above.  Patient with hx as noted including DM2 on oral meds, UTIs who presented after a week of malaise and fall 1 day ago, after which she could not get up.  Eval/labs in ER c/w DKA and septic shock due to acute cystitis.  Now accepted to MICU for further management.  Will POCUS, continue volume resuscitation prn, insulin gtt and titrate per protocol, follow Ucx.  Empiric  CTX for now.  Rest of plan as above and I have edited as appropriate.

## 2021-05-10 NOTE — ED ADULT NURSE NOTE - NSFALLRSKASSESSDT_ED_ALL_ED
Lesia, please let her know that her TSH her thyroid was normal.  I think she simply not ovulating.  She can use the progesterone as I prescribed.  I will have Sheridan send her PMS diet information and epic printout on PMS/PMDD.  Thank you   10-May-2021 19:02

## 2021-05-10 NOTE — ED PROVIDER NOTE - PHYSICAL EXAMINATION
General: NAD, large body habitus   HENT: Atraumatic, EOMI, no conjunctivae injection, moist mucosa.  Neck: normal ROM and trachea midline   Cardiovascular: RRR, S1&2, no M or R, radial pulses equal and b/l  Respiratory: CTABL, no wheezes or crackles, no decreased breath sounds  Abdominal: soft and non-tender non distended, neg for guarding, no CVA tenderness   Extremities: no edema of the legs/feet, DP/PT equal b/l  Skin: warm, well perfused  Neurologic: nonfocal, AAOx3  Psych: normal mood and affect

## 2021-05-10 NOTE — ED ADULT NURSE NOTE - NSIMPLEMENTINTERV_GEN_ALL_ED
Implemented All Fall Risk Interventions:  [place] to call system. Call bell, personal items and telephone within reach. Instruct patient to call for assistance. Room bathroom lighting operational. Non-slip footwear when patient is off stretcher. Physically safe environment: no spills, clutter or unnecessary equipment. Stretcher in lowest position, wheels locked, appropriate side rails in place. Provide visual cue, wrist band, yellow gown, etc. Monitor gait and stability. Monitor for mental status changes and reorient to person, place, and time. Review medications for side effects contributing to fall risk. Reinforce activity limits and safety measures with patient and family.

## 2021-05-10 NOTE — ED PROVIDER NOTE - NS ED ROS FT
GENERAL: No fever or chills, weight changes, nightsweats  EYES: no change in vision  HEENT: no dysplasia, odynophagia, ear pain, rhinorrhea, epistasis   CARDIAC: no chest pain, palpitation   PULMONARY: no productive cough or SOB  GI: no abdominal pain, no nausea or no vomiting, no diarrhea or constipation  : No changes in urination for pain/freq.   SKIN: no rashes, abnormal bruising or bleeding  NEURO: no headache, numbness/tingling, extremity weakness   MSK: HPI

## 2021-05-10 NOTE — ED ADULT NURSE REASSESSMENT NOTE - NS ED NURSE REASSESS COMMENT FT1
Pt requires more access at this time d/t medication incompatibility. Escalated to MD Marion, central line requested at this time.

## 2021-05-10 NOTE — ASSESSMENT
[FreeTextEntry1] : Patient is a 76 year old female with a past medical history as above who presents with an altered mental status, dehydration, and hyperglycemia, s/p fall.

## 2021-05-10 NOTE — REVIEW OF SYSTEMS
[Negative] : Heme/Lymph [Nausea] : nausea [FreeTextEntry7] : decreased appetite  [de-identified] : altered mental state

## 2021-05-10 NOTE — ED PROVIDER NOTE - CLINICAL SUMMARY MEDICAL DECISION MAKING FREE TEXT BOX
T2DM, systolic HF, CABG, non insulin dependent p.w more confusion and lethargy in the setting of hyperglycemia. AOX3 in ER. non focal and neg neuro exam, bruising on left shoulder but FROM of pain. FS of 500s. concerning for sepsis induce DKA vs HHS. will get ct head and neck for bleed and fx, but no neck pain and FROM of the neck. non tachycardiac on exam, will cover for T2DM, systolic HF, CABG, non insulin dependent p.w more confusion and lethargy in the setting of hyperglycemia. AOX3 in ER. non focal and neg neuro exam, bruising on left shoulder but FROM of pain. FS of 500s. concerning for sepsis induce DKA vs HHS. will get ct head and neck for bleed and fx, but no neck pain and FROM of the neck. non tachycardiac on exam, will cover for    SANTI Beckford MD: Pt is a 75 y/o female with PMH DM2, CHF, CAD s/p CABG (per sister, pt is non-adherent with DM medications) who presents with worsening confusion and falls. Per sister, pt has had a fall 2-3 days ago, refused transport to ED at the time. Pt found to have elevated BG >500 by EMS. Ddx includes, however, is not limited to: DKA, infectious process, uti, traumatic injury, dehydration, other. Plan: sepsis labs, CTH/c-spine, CXR, TBA, IVF, empiric abx

## 2021-05-10 NOTE — PHYSICAL EXAM
[Normal Sclera/Conjunctiva] : normal sclera/conjunctiva [PERRL] : pupils equal round and reactive to light [Normal Outer Ear/Nose] : the outer ears and nose were normal in appearance [No JVD] : no jugular venous distention [No Lymphadenopathy] : no lymphadenopathy [Thyroid Normal, No Nodules] : the thyroid was normal and there were no nodules present [Clear to Auscultation] : lungs were clear to auscultation bilaterally [Normal Rate] : normal rate  [Regular Rhythm] : with a regular rhythm [Normal S1, S2] : normal S1 and S2 [No Murmur] : no murmur heard [No Carotid Bruits] : no carotid bruits [No Abdominal Bruit] : a ~M bruit was not heard ~T in the abdomen [No Edema] : there was no peripheral edema [No Palpable Aorta] : no palpable aorta [Soft] : abdomen soft [Non Tender] : non-tender [Non-distended] : non-distended [No Masses] : no abdominal mass palpated [No HSM] : no HSM [Normal Bowel Sounds] : normal bowel sounds [Normal Posterior Cervical Nodes] : no posterior cervical lymphadenopathy [Normal Anterior Cervical Nodes] : no anterior cervical lymphadenopathy [No CVA Tenderness] : no CVA  tenderness [No Spinal Tenderness] : no spinal tenderness [No Joint Swelling] : no joint swelling [No Rash] : no rash [No Focal Deficits] : no focal deficits [Normal Gait] : normal gait [Ill-Appearing] : ill-appearing [Normal Supraclavicular Nodes] : no supraclavicular lymphadenopathy [Kyphosis] : kyphosis [No Skin Lesions] : no skin lesions [de-identified] : weak, slumped in wheelchair [de-identified] : dry mucous membranes [de-identified] : NA [FreeTextEntry1] : NA [de-identified] : grunting in pain, slumped in wheelchair, going in and out of consciousness [de-identified] : see neurology-poor insight

## 2021-05-10 NOTE — PLAN
normal... [FreeTextEntry1] : Neurology\par altered mental status - EMS was contacted and patient will be transported to the hospital; medications will be reconciled upon being discharged from the hospital, R/O DKA, UTI, CVA, PNA, MI, electrolyte disturbance,dehydration\par Cardiovascular\par benign essential hypertension - previously discussed the importance of taking Metoprolol Tartrate 25mg and Amlodipine Besylate 5mg daily as prescribed, refused - previously advised low sodium diet\par Previously discussed importance of taking Aspirin 325mg and Atorvastatin Calcium daily as prescribed given history of 3-vessel CAD and CVA\par hyperlipidemia - previously advised to take Atorvastatin Calcium 10mg p.o.q.d. - previously advised low cholesterol/low fat diet\par CHF - previously discussed importance of taking Furosemide 40mg daily as prescribed, refused \par Endocrinology\par diabetes - previously discussed importance of taking Farxiga 10mg and Metformin HCl 500mg QID daily as prescribed - previously advised low carbohydrate/low sugar diet\par Gastroenterology\par PBC - previously advised to take Ursodiol 500mg daily as prescribed - previously advised to follow up with liver specialist, Dr. Cerda\par GERD - previously discussed importance of taking Pantoprazole Sodium 40mg daily as prescribed - previously discussed antireflux measures\par Neurology\par seizures - previously advised to take Levetiracetam 750mg BID daily as prescribed - previously referred to neurologist, Dr. Gonzalez

## 2021-05-10 NOTE — ED PROVIDER NOTE - PROGRESS NOTE DETAILS
Ramana Marion, PGY 3: patient is hypotensive to sbp 60s, started levo and found to be in DKA. ivc collapsable on PCOUS after 1.5 L. no pulmonary edema on cxr, and no LE edema. MICU is consulted. Ramana Marion, PGY 3: central line placed.

## 2021-05-10 NOTE — ADDENDUM
[FreeTextEntry1] : I, Erasto Ruiz, acted solely as scribe for Dr. Felipe Manuel DO on this date 05/10/2021  3:10PM .\par \par All medical record entries made by the Scribe were at my, Dr. Felipe Manuel DO direction and personally dictated by me on 05/10/2021  3:10PM. I have reviewed the chart and agree that the record accurately reflects my personal performance of the history, physical exam, assessment and plan. I have also personally directed, reviewed and agreed with the chart.\par

## 2021-05-10 NOTE — H&P ADULT - HISTORY OF PRESENT ILLNESS
76 F PMH T2DM, HTN, CABG years ago on aspirin 325, systolic HF and recurrent UTIs BIBEMS for being found on the kitchen floor in her home with elevated BG (not from nursing home as ED note states). Patient has a hx of T2DM on metformin 500 BID and dapagliflozin 10 mg qday which she is noncompliant with. Has been feeling increasingly fatigued and tired over the past week. Not really been eating or drinking over the past few days. Culminated in an episode yesterday where she felt weak and dizzy in her kitchen and fell to the ground and felt too weak to get up. Family called and didn't get a response, called EMS whom broke down the door and found patient conscious on the kitchen floor in a puddle of urine. Fingerstick was 500s Lives alone, ambulates independently with a walker. Reportedly does not fall normally. Had a similar presentation last year.     In ED patient was found to be hypotensive requiring low dose levophed during volume resuscitation, fingerstick was 527 with VBG showing 7.26/38/<20/16 and BHB 5.4 worsening to 7.23/37/23/15 after 1.5L NS for fluid resuscitation. Patient's mental status is improving, however, from slight confusion to normal mentation. UA on straight cath was 'milky' and had a grossly + UA.  MICU consulted for concern for patient with urosepsis requiring levophed and DKA requiring insulin gtt.     Collateral obtained from sister Shelby @ [phone]. Patient gets very anxious with blood draws.   PMD Dr Felipe Manuel

## 2021-05-10 NOTE — HISTORY OF PRESENT ILLNESS
[Family Member] : family member [FreeTextEntry8] : Patient is a 76 year old female with a past medical history as below who presents with a change in mental status. Patient is currently utilizing a wheelchair given difficulty ambulating. Patient's family was unable to establish contact with the patient for several hours on 5/8/21. Through police intervention, patient was found in her bed with soiled linens. Patient was seen by EMS where BP was WNL, but blood-glucose was elevated at 553. She refused transport to the hospital. As per patient's sister, patient has not been taking her medications daily as prescribed. Patient has noted a decreased appetite/dehydration. She notes nausea. Patient notes fall in the home on 5/9/21.

## 2021-05-10 NOTE — H&P ADULT - ASSESSMENT
76 F PMH T2DM, HTN, CABG years ago on aspirin 325, systolic HF and recurrent UTIs BIBEMS for being found on the kitchen floor in her home with elevated BG (not from nursing home as ED note states) found to be hyperglycemic and acidotic while on SGLT2 inhibitor c/f DKA and hypotensive despite fluid resuscitation in the setting of UTI likely leading to urosepsis from pyelo requiring ICU admission for insulin gtt and levo gtt.     Neuro  #Initial confusion in ED now improved   -continue to monitor patient's mental status on therapy   - no sedatives at this time     Cards   #Hypotension in setting of distributive shock from urosepsis vs hypovolemic shock from poor PO intake  - on levo gtt   - received 3L IVF  (2L NS + 1L LR) in ED, will continue to volume resuscitate.   - patient's home metoprolol and amlodipine held.     Pulm  #No issues at this time   - CXR normal   - SpO2 > 94% on RA     GI   #Poor PO intake  - diabetic diet     Renal   #Anion gap metabolic acidosis   - likely DKA in the setting of hyperglycemia and SGLT2 inhibitor use.  - insulin gtt 4U/hr at .5U/hr   - fingersticks q1hr, BMP q4hrs   - replete potassium aggressively     #ARYA   - likely prerenal in etiology from poor PO intake   - continue to monitor Cr and BUN on BMP   - LR @ 100/hr     ID  #Urosepsis   - grossly positive UA in setting of hypotension   - narrow vanc and zosyn in ED to ceftriaxone (05/10 - )  - f/u Bcx and UCx (05/10)     Endo  #DKA in setting of SGLTS inhibitor use   - insulin gtt 4U/hr at .5U/hr   - fingersticks q1hr, BMP q4hrs   - send A1c     Heme   #DVT ppx   - heparin for DVT ppx        76 F PMH T2DM, HTN, CABG years ago on aspirin 325, systolic HF and recurrent UTIs BIBEMS for being found on the kitchen floor in her home with elevated BG (not from nursing home as ED note states) found to be hyperglycemic and acidotic while on SGLT2 inhibitor c/f DKA and hypotensive despite fluid resuscitation in the setting of UTI likely leading to urosepsis from pyelo requiring ICU admission for insulin gtt and levo gtt.     Neuro  #Initial confusion in ED now improved   -continue to monitor patient's mental status on therapy   - no sedatives at this time     Cards   #Hypotension in setting of distributive shock from urosepsis vs hypovolemic shock from poor PO intake  - on levo gtt   - received 3L IVF  (2L NS + 1L LR) in ED, will continue to volume resuscitate.   - patient's home metoprolol and amlodipine held.     Pulm  #No issues at this time   - CXR normal   - SpO2 > 94% on RA     GI   #Poor PO intake  - diabetic diet     Renal   #Anion gap metabolic acidosis   - likely DKA in the setting of hyperglycemia and SGLT2 inhibitor use.  - insulin gtt 4U/hr at .5U/hr   - fingersticks q1hr, BMP q4hrs   - replete potassium aggressively     #ARYA   - likely prerenal in etiology from poor PO intake   - continue to monitor Cr and BUN on BMP   - LR @ 100/hr     ID  #Urosepsis   - grossly positive UA in setting of hypotension   - narrow vanc and zosyn in ED to ceftriaxone (05/10 - )  - f/u Bcx and UCx (05/10)     Endo  #DKA in setting of SGLTS inhibitor use   - insulin gtt 4U/hr at .5U/hr   - fingersticks q1hr, BMP q4hrs   - send A1c     Heme   #DVT ppx   - heparin for DVT ppx      #Access  - RIJ placed in ED for access  - 2 PIVs    76 F PMH T2DM, HTN, CABG years ago on aspirin 325, systolic HF and recurrent UTIs BIBEMS for being found on the kitchen floor in her home with elevated BG (not from nursing home as ED note states) found to be hyperglycemic and acidotic while on SGLT2 inhibitor c/w DKA and hypotensive despite fluid resuscitation in the setting of UTI likely leading to septic shock from acute cystitis requiring ICU admission for insulin gtt and levo gtt.     Neuro  #Initial confusion in ED now improved   -continue to monitor patient's mental status on therapy   - no sedatives at this time     Cards   #Hypotension in setting of distributive shock from urosepsis vs hypovolemic shock from poor PO intake  - on levo gtt   - received 3L IVF  (2L NS + 1L LR) in ED, will continue to volume resuscitate.   - patient's home metoprolol and amlodipine held.     Pulm  #No issues at this time   - CXR normal   - SpO2 > 94% on RA     GI   #Poor PO intake  - diabetic diet     Renal   #Anion gap metabolic acidosis   - likely DKA in the setting of hyperglycemia and SGLT2 inhibitor use.  - insulin gtt 4U/hr and titrate per protocol  - fingersticks q1hr, BMP q4hrs   - replete potassium aggressively     #ARYA   - likely prerenal in etiology from poor PO intake   - continue to monitor Cr and BUN on BMP   - LR @ 100/hr     ID  #Septic shock from acute cystitis  - grossly positive UA in setting of hypotension   - narrow vanc and zosyn in ED to ceftriaxone (05/10 - )  - f/u Bcx and UCx (05/10)     Endo  #DKA in setting of SGLTS inhibitor use   - insulin gtt 4U/hr at .5U/hr   - fingersticks q1hr, BMP q4hrs   - send A1c     Heme   #DVT ppx   - heparin for DVT ppx      #Access  - RIJ placed in ED for access  - 2 PIVs

## 2021-05-10 NOTE — ED ADULT NURSE NOTE - OBJECTIVE STATEMENT
77 y/o female with PMH CHF, DM BIBEM presenting to ED for AMS. Per sister at the bedside, recent admission for UTI, pt sustained fall 2-3 days ago, was down for an extended period of time but refused to be transported to hospital at the time, called PCP to be evaluated for b/l shoulder pain in office but directed to come to ED instead. Upon exam pt A&Ox3 gross neuro intact, no difficulty speaking in complete sentences, s1s2 heart sounds heard, pulses x 4, ayers x4, abdomen soft nontender nondistended, rash noted to groin area, pt straight cath'd under sterile technique approx 100 mL purulent urine drained, pt expresses extreme discomfort/ burning from urethra. Pt changed, cleaned, diaper replaced. Pt placed in position of comfort. Unable to obtain IV access, multiple attempts by multiple RNs, pt arrived with 20gauge IV to left wrist, pain and swelling noted with flushing, IV removed. US guided IV placed by MD Marion to right a/c, and left a/c, 18 gauge bilaterally. Pt arrived hypotensive to 98/61, initially treated with fluids, at 1750, pt became more hypotensive and lethargic, MD Beckford notified, levophed infusion initiated at 0.05mcg/kg/min. 75 y/o female with PMH CHF, DM BIBEM presenting to ED for AMS. Per sister at the bedside, recent admission for UTI, non-compliant with medications, pt sustained fall 2-3 days ago, was down for an extended period of time but refused to be transported to hospital at the time, called PCP to be evaluated for b/l shoulder pain in office but directed to come to ED instead. Upon exam pt A&Ox3 gross neuro intact, no difficulty speaking in complete sentences, s1s2 heart sounds heard, pulses x 4, ayers x4, abdomen soft nontender nondistended, rash noted to groin area, pt straight cath'd under sterile technique approx 100 mL purulent urine drained, pt expresses extreme discomfort/ burning from urethra. Pt changed, cleaned, diaper replaced. Pt placed in position of comfort. Unable to obtain IV access, multiple attempts by multiple RNs, pt arrived with 20gauge IV to left wrist, pain and swelling noted with flushing, IV removed. US guided IV placed by MD Marion to right a/c, and left a/c, 18 gauge bilaterally. Pt arrived hypotensive to 98/61, initially treated with fluids, at 1750, pt became more hypotensive and lethargic, MD Beckford notified, levophed infusion initiated at 0.05mcg/kg/min. 77 y/o female with PMH CHF, DM BIBEM presenting to ED for AMS. Per sister at the bedside, recent admission for UTI, non-compliant with medications, pt sustained fall 2-3 days ago, was down for an extended period of time but refused to be transported to hospital at the time, called PCP to be evaluated for b/l shoulder pain in office but directed to come to ED instead. Upon exam pt A&Ox3 gross neuro intact, no difficulty speaking in complete sentences, s1s2 heart sounds heard, pulses x 4, ayers x4, abdomen soft nontender nondistended, rash noted to groin area, pt straight cath'd under sterile technique approx 100 mL purulent urine drained, pt expresses extreme discomfort/ burning from urethra. Pt changed, cleaned, diaper replaced. Pt placed in position of comfort. Unable to obtain IV access, multiple attempts by multiple RNs, pt arrived with 20gauge IV to left wrist, pain and swelling noted with flushing, IV removed. US guided IV placed by MD Marion to right a/c, and left a/c, 18 gauge bilaterally. Pt arrived hypotensive to 98/61, initially treated with fluids, at 1750, pt became more hypotensive and lethargic, MD Beckford notified, levophed infusion initiated at 0.05mcg/kg/min. Pt more somnolent at this time. MD Beckford aware.

## 2021-05-10 NOTE — H&P ADULT - NSHPPHYSICALEXAM_GEN_ALL_CORE
PHYSICAL EXAM:    GENERAL: Slightly lethargic.   HEENT:  Atraumatic, Normocephalic. Dry mucous membranes.   CHEST/LUNG: Chest rise equal bilaterally. CTAB.   HEART: Regular rate and rhythm. No murmurs or rubs.   ABDOMEN: Soft, Nontender, Nondistended  EXTREMITIES:  2+ Peripheral Pulses.  PSYCH: A&Ox3  SKIN: Contusions to bilateral shoulders. Dry skin. PHYSICAL EXAM:  ICU Vital Signs Last 24 Hrs  T(C): 36.6 (10 May 2021 23:30), Max: 37.1 (10 May 2021 19:29)  T(F): 97.8 (10 May 2021 23:30), Max: 98.7 (10 May 2021 19:29)  HR: 86 (11 May 2021 00:30) (77 - 88)  BP: 108/55 (11 May 2021 00:30) (59/38 - 125/46)  BP(mean): 79 (11 May 2021 00:30) (44 - 85)  RR: 24 (11 May 2021 00:30) (16 - 30)  SpO2: 94% (11 May 2021 00:30) (94% - 100%)      GENERAL: Slightly lethargic.   HEENT:  Atraumatic, Normocephalic. Dry mucous membranes.   CHEST/LUNG: Chest rise equal bilaterally. CTAB.   HEART: Regular rate and rhythm. No murmurs or rubs.   ABDOMEN: Soft, Nontender, Nondistended  EXTREMITIES:  2+ Peripheral Pulses.  PSYCH: A&Ox3  SKIN: Contusions to bilateral shoulders. Dry skin.

## 2021-05-10 NOTE — ED PROVIDER NOTE - OBJECTIVE STATEMENT
sister Shelby [phone] h.o T2DM, systolic HF, CABG, p.w a fall yesterday a nursing home and more confuse today per staff. reagan is found to be hyperglycemia to 500s by EMS. patient states she have b/l shoulder pain and a cough. Patient denied headache, vision change, n/v, abdominal pain, chest pain or shortness of breath. sister Shelby [phone]

## 2021-05-10 NOTE — ED PROCEDURE NOTE - ATTENDING CONTRIBUTION TO CARE
I saw and evaluated patient with resident. I discussed H+P and MDM with resident. I agree with the statements made by the resident unless otherwise noted.
I saw and evaluated patient with resident. I discussed H+P and MDM with resident. I agree with the statements made by the resident unless otherwise noted.

## 2021-05-11 LAB
-  STAPHYLOCOCCUS EPIDERMIDIS, METHICILLIN RESISTANT: SIGNIFICANT CHANGE UP
ANION GAP SERPL CALC-SCNC: 11 MMOL/L — SIGNIFICANT CHANGE UP (ref 5–17)
ANION GAP SERPL CALC-SCNC: 12 MMOL/L — SIGNIFICANT CHANGE UP (ref 5–17)
ANION GAP SERPL CALC-SCNC: 13 MMOL/L — SIGNIFICANT CHANGE UP (ref 5–17)
ANION GAP SERPL CALC-SCNC: 14 MMOL/L — SIGNIFICANT CHANGE UP (ref 5–17)
ANION GAP SERPL CALC-SCNC: 15 MMOL/L — SIGNIFICANT CHANGE UP (ref 5–17)
ANION GAP SERPL CALC-SCNC: 18 MMOL/L — HIGH (ref 5–17)
APTT BLD: 25.6 SEC — LOW (ref 27.5–35.5)
BUN SERPL-MCNC: 45 MG/DL — HIGH (ref 7–23)
BUN SERPL-MCNC: 48 MG/DL — HIGH (ref 7–23)
BUN SERPL-MCNC: 50 MG/DL — HIGH (ref 7–23)
BUN SERPL-MCNC: 51 MG/DL — HIGH (ref 7–23)
BUN SERPL-MCNC: 54 MG/DL — HIGH (ref 7–23)
BUN SERPL-MCNC: 54 MG/DL — HIGH (ref 7–23)
CALCIUM SERPL-MCNC: 8.2 MG/DL — LOW (ref 8.4–10.5)
CALCIUM SERPL-MCNC: 8.4 MG/DL — SIGNIFICANT CHANGE UP (ref 8.4–10.5)
CALCIUM SERPL-MCNC: 8.6 MG/DL — SIGNIFICANT CHANGE UP (ref 8.4–10.5)
CALCIUM SERPL-MCNC: 8.7 MG/DL — SIGNIFICANT CHANGE UP (ref 8.4–10.5)
CHLORIDE SERPL-SCNC: 100 MMOL/L — SIGNIFICANT CHANGE UP (ref 96–108)
CHLORIDE SERPL-SCNC: 101 MMOL/L — SIGNIFICANT CHANGE UP (ref 96–108)
CHLORIDE SERPL-SCNC: 95 MMOL/L — LOW (ref 96–108)
CHLORIDE SERPL-SCNC: 98 MMOL/L — SIGNIFICANT CHANGE UP (ref 96–108)
CHLORIDE SERPL-SCNC: 99 MMOL/L — SIGNIFICANT CHANGE UP (ref 96–108)
CHLORIDE SERPL-SCNC: 99 MMOL/L — SIGNIFICANT CHANGE UP (ref 96–108)
CO2 SERPL-SCNC: 15 MMOL/L — LOW (ref 22–31)
CO2 SERPL-SCNC: 17 MMOL/L — LOW (ref 22–31)
CO2 SERPL-SCNC: 17 MMOL/L — LOW (ref 22–31)
CO2 SERPL-SCNC: 18 MMOL/L — LOW (ref 22–31)
CO2 SERPL-SCNC: 19 MMOL/L — LOW (ref 22–31)
CO2 SERPL-SCNC: 19 MMOL/L — LOW (ref 22–31)
CREAT SERPL-MCNC: 1.4 MG/DL — HIGH (ref 0.5–1.3)
CREAT SERPL-MCNC: 1.47 MG/DL — HIGH (ref 0.5–1.3)
CREAT SERPL-MCNC: 1.58 MG/DL — HIGH (ref 0.5–1.3)
CREAT SERPL-MCNC: 1.6 MG/DL — HIGH (ref 0.5–1.3)
CREAT SERPL-MCNC: 1.69 MG/DL — HIGH (ref 0.5–1.3)
CREAT SERPL-MCNC: 1.78 MG/DL — HIGH (ref 0.5–1.3)
GAS PNL BLDV: SIGNIFICANT CHANGE UP
GLUCOSE BLDC GLUCOMTR-MCNC: 163 MG/DL — HIGH (ref 70–99)
GLUCOSE BLDC GLUCOMTR-MCNC: 178 MG/DL — HIGH (ref 70–99)
GLUCOSE BLDC GLUCOMTR-MCNC: 197 MG/DL — HIGH (ref 70–99)
GLUCOSE BLDC GLUCOMTR-MCNC: 214 MG/DL — HIGH (ref 70–99)
GLUCOSE BLDC GLUCOMTR-MCNC: 223 MG/DL — HIGH (ref 70–99)
GLUCOSE BLDC GLUCOMTR-MCNC: 243 MG/DL — HIGH (ref 70–99)
GLUCOSE BLDC GLUCOMTR-MCNC: 249 MG/DL — HIGH (ref 70–99)
GLUCOSE BLDC GLUCOMTR-MCNC: 252 MG/DL — HIGH (ref 70–99)
GLUCOSE BLDC GLUCOMTR-MCNC: 255 MG/DL — HIGH (ref 70–99)
GLUCOSE BLDC GLUCOMTR-MCNC: 256 MG/DL — HIGH (ref 70–99)
GLUCOSE BLDC GLUCOMTR-MCNC: 257 MG/DL — HIGH (ref 70–99)
GLUCOSE BLDC GLUCOMTR-MCNC: 267 MG/DL — HIGH (ref 70–99)
GLUCOSE BLDC GLUCOMTR-MCNC: 298 MG/DL — HIGH (ref 70–99)
GLUCOSE BLDC GLUCOMTR-MCNC: 334 MG/DL — HIGH (ref 70–99)
GLUCOSE BLDC GLUCOMTR-MCNC: 365 MG/DL — HIGH (ref 70–99)
GLUCOSE BLDC GLUCOMTR-MCNC: 369 MG/DL — HIGH (ref 70–99)
GLUCOSE BLDC GLUCOMTR-MCNC: 446 MG/DL — HIGH (ref 70–99)
GLUCOSE SERPL-MCNC: 178 MG/DL — HIGH (ref 70–99)
GLUCOSE SERPL-MCNC: 235 MG/DL — HIGH (ref 70–99)
GLUCOSE SERPL-MCNC: 243 MG/DL — HIGH (ref 70–99)
GLUCOSE SERPL-MCNC: 270 MG/DL — HIGH (ref 70–99)
GLUCOSE SERPL-MCNC: 291 MG/DL — HIGH (ref 70–99)
GLUCOSE SERPL-MCNC: 397 MG/DL — HIGH (ref 70–99)
GRAM STN FLD: SIGNIFICANT CHANGE UP
GRAM STN FLD: SIGNIFICANT CHANGE UP
HCT VFR BLD CALC: 31.4 % — LOW (ref 34.5–45)
HGB BLD-MCNC: 10.6 G/DL — LOW (ref 11.5–15.5)
INR BLD: 1.04 RATIO — SIGNIFICANT CHANGE UP (ref 0.88–1.16)
MAGNESIUM SERPL-MCNC: 1.7 MG/DL — SIGNIFICANT CHANGE UP (ref 1.6–2.6)
MAGNESIUM SERPL-MCNC: 1.8 MG/DL — SIGNIFICANT CHANGE UP (ref 1.6–2.6)
MAGNESIUM SERPL-MCNC: 2 MG/DL — SIGNIFICANT CHANGE UP (ref 1.6–2.6)
MAGNESIUM SERPL-MCNC: 2.2 MG/DL — SIGNIFICANT CHANGE UP (ref 1.6–2.6)
MAGNESIUM SERPL-MCNC: 2.2 MG/DL — SIGNIFICANT CHANGE UP (ref 1.6–2.6)
MCHC RBC-ENTMCNC: 27.8 PG — SIGNIFICANT CHANGE UP (ref 27–34)
MCHC RBC-ENTMCNC: 33.8 GM/DL — SIGNIFICANT CHANGE UP (ref 32–36)
MCV RBC AUTO: 82.4 FL — SIGNIFICANT CHANGE UP (ref 80–100)
METHOD TYPE: SIGNIFICANT CHANGE UP
NRBC # BLD: 0 /100 WBCS — SIGNIFICANT CHANGE UP (ref 0–0)
PHOSPHATE SERPL-MCNC: 1.8 MG/DL — LOW (ref 2.5–4.5)
PHOSPHATE SERPL-MCNC: 2.3 MG/DL — LOW (ref 2.5–4.5)
PHOSPHATE SERPL-MCNC: 2.6 MG/DL — SIGNIFICANT CHANGE UP (ref 2.5–4.5)
PHOSPHATE SERPL-MCNC: 2.8 MG/DL — SIGNIFICANT CHANGE UP (ref 2.5–4.5)
PHOSPHATE SERPL-MCNC: 3.7 MG/DL — SIGNIFICANT CHANGE UP (ref 2.5–4.5)
PLATELET # BLD AUTO: 247 K/UL — SIGNIFICANT CHANGE UP (ref 150–400)
POTASSIUM SERPL-MCNC: 3.8 MMOL/L — SIGNIFICANT CHANGE UP (ref 3.5–5.3)
POTASSIUM SERPL-MCNC: 3.8 MMOL/L — SIGNIFICANT CHANGE UP (ref 3.5–5.3)
POTASSIUM SERPL-MCNC: 4 MMOL/L — SIGNIFICANT CHANGE UP (ref 3.5–5.3)
POTASSIUM SERPL-MCNC: 4.1 MMOL/L — SIGNIFICANT CHANGE UP (ref 3.5–5.3)
POTASSIUM SERPL-MCNC: 4.1 MMOL/L — SIGNIFICANT CHANGE UP (ref 3.5–5.3)
POTASSIUM SERPL-MCNC: 4.3 MMOL/L — SIGNIFICANT CHANGE UP (ref 3.5–5.3)
POTASSIUM SERPL-SCNC: 3.8 MMOL/L — SIGNIFICANT CHANGE UP (ref 3.5–5.3)
POTASSIUM SERPL-SCNC: 3.8 MMOL/L — SIGNIFICANT CHANGE UP (ref 3.5–5.3)
POTASSIUM SERPL-SCNC: 4 MMOL/L — SIGNIFICANT CHANGE UP (ref 3.5–5.3)
POTASSIUM SERPL-SCNC: 4.1 MMOL/L — SIGNIFICANT CHANGE UP (ref 3.5–5.3)
POTASSIUM SERPL-SCNC: 4.1 MMOL/L — SIGNIFICANT CHANGE UP (ref 3.5–5.3)
POTASSIUM SERPL-SCNC: 4.3 MMOL/L — SIGNIFICANT CHANGE UP (ref 3.5–5.3)
PROTHROM AB SERPL-ACNC: 12.5 SEC — SIGNIFICANT CHANGE UP (ref 10.6–13.6)
RBC # BLD: 3.81 M/UL — SIGNIFICANT CHANGE UP (ref 3.8–5.2)
RBC # FLD: 15 % — HIGH (ref 10.3–14.5)
SODIUM SERPL-SCNC: 128 MMOL/L — LOW (ref 135–145)
SODIUM SERPL-SCNC: 129 MMOL/L — LOW (ref 135–145)
SODIUM SERPL-SCNC: 130 MMOL/L — LOW (ref 135–145)
SODIUM SERPL-SCNC: 131 MMOL/L — LOW (ref 135–145)
SPECIMEN SOURCE: SIGNIFICANT CHANGE UP
SPECIMEN SOURCE: SIGNIFICANT CHANGE UP
WBC # BLD: 9.4 K/UL — SIGNIFICANT CHANGE UP (ref 3.8–10.5)
WBC # FLD AUTO: 9.4 K/UL — SIGNIFICANT CHANGE UP (ref 3.8–10.5)

## 2021-05-11 PROCEDURE — 99291 CRITICAL CARE FIRST HOUR: CPT

## 2021-05-11 RX ORDER — INSULIN GLARGINE 100 [IU]/ML
20 INJECTION, SOLUTION SUBCUTANEOUS ONCE
Refills: 0 | Status: DISCONTINUED | OUTPATIENT
Start: 2021-05-11 | End: 2021-05-11

## 2021-05-11 RX ORDER — GLUCAGON INJECTION, SOLUTION 0.5 MG/.1ML
1 INJECTION, SOLUTION SUBCUTANEOUS ONCE
Refills: 0 | Status: DISCONTINUED | OUTPATIENT
Start: 2021-05-11 | End: 2021-05-20

## 2021-05-11 RX ORDER — SODIUM CHLORIDE 9 MG/ML
1000 INJECTION, SOLUTION INTRAVENOUS
Refills: 0 | Status: DISCONTINUED | OUTPATIENT
Start: 2021-05-11 | End: 2021-05-11

## 2021-05-11 RX ORDER — INSULIN LISPRO 100/ML
5 VIAL (ML) SUBCUTANEOUS
Refills: 0 | Status: DISCONTINUED | OUTPATIENT
Start: 2021-05-11 | End: 2021-05-12

## 2021-05-11 RX ORDER — VANCOMYCIN HCL 1 G
1000 VIAL (EA) INTRAVENOUS ONCE
Refills: 0 | Status: DISCONTINUED | OUTPATIENT
Start: 2021-05-11 | End: 2021-05-11

## 2021-05-11 RX ORDER — INSULIN LISPRO 100/ML
VIAL (ML) SUBCUTANEOUS
Refills: 0 | Status: DISCONTINUED | OUTPATIENT
Start: 2021-05-11 | End: 2021-05-20

## 2021-05-11 RX ORDER — INSULIN LISPRO 100/ML
5 VIAL (ML) SUBCUTANEOUS
Refills: 0 | Status: DISCONTINUED | OUTPATIENT
Start: 2021-05-11 | End: 2021-05-11

## 2021-05-11 RX ORDER — DEXTROSE 50 % IN WATER 50 %
25 SYRINGE (ML) INTRAVENOUS ONCE
Refills: 0 | Status: DISCONTINUED | OUTPATIENT
Start: 2021-05-11 | End: 2021-05-20

## 2021-05-11 RX ORDER — MAGNESIUM SULFATE 500 MG/ML
2 VIAL (ML) INJECTION ONCE
Refills: 0 | Status: COMPLETED | OUTPATIENT
Start: 2021-05-11 | End: 2021-05-11

## 2021-05-11 RX ORDER — SODIUM CHLORIDE 9 MG/ML
10 INJECTION INTRAMUSCULAR; INTRAVENOUS; SUBCUTANEOUS
Refills: 0 | Status: DISCONTINUED | OUTPATIENT
Start: 2021-05-11 | End: 2021-05-20

## 2021-05-11 RX ORDER — INSULIN HUMAN 100 [IU]/ML
2 INJECTION, SOLUTION SUBCUTANEOUS
Qty: 100 | Refills: 0 | Status: DISCONTINUED | OUTPATIENT
Start: 2021-05-11 | End: 2021-05-12

## 2021-05-11 RX ORDER — INSULIN GLARGINE 100 [IU]/ML
30 INJECTION, SOLUTION SUBCUTANEOUS ONCE
Refills: 0 | Status: COMPLETED | OUTPATIENT
Start: 2021-05-11 | End: 2021-05-11

## 2021-05-11 RX ORDER — SODIUM CHLORIDE 9 MG/ML
1000 INJECTION, SOLUTION INTRAVENOUS
Refills: 0 | Status: DISCONTINUED | OUTPATIENT
Start: 2021-05-11 | End: 2021-05-20

## 2021-05-11 RX ORDER — DEXTROSE 50 % IN WATER 50 %
15 SYRINGE (ML) INTRAVENOUS ONCE
Refills: 0 | Status: DISCONTINUED | OUTPATIENT
Start: 2021-05-11 | End: 2021-05-20

## 2021-05-11 RX ORDER — DEXTROSE 50 % IN WATER 50 %
12.5 SYRINGE (ML) INTRAVENOUS ONCE
Refills: 0 | Status: DISCONTINUED | OUTPATIENT
Start: 2021-05-11 | End: 2021-05-20

## 2021-05-11 RX ORDER — DEXTROSE 50 % IN WATER 50 %
25 SYRINGE (ML) INTRAVENOUS ONCE
Refills: 0 | Status: DISCONTINUED | OUTPATIENT
Start: 2021-05-11 | End: 2021-05-11

## 2021-05-11 RX ORDER — CHLORHEXIDINE GLUCONATE 213 G/1000ML
1 SOLUTION TOPICAL
Refills: 0 | Status: DISCONTINUED | OUTPATIENT
Start: 2021-05-11 | End: 2021-05-14

## 2021-05-11 RX ORDER — INSULIN GLARGINE 100 [IU]/ML
15 INJECTION, SOLUTION SUBCUTANEOUS ONCE
Refills: 0 | Status: DISCONTINUED | OUTPATIENT
Start: 2021-05-11 | End: 2021-05-11

## 2021-05-11 RX ORDER — VANCOMYCIN HCL 1 G
1000 VIAL (EA) INTRAVENOUS EVERY 24 HOURS
Refills: 0 | Status: DISCONTINUED | OUTPATIENT
Start: 2021-05-11 | End: 2021-05-12

## 2021-05-11 RX ADMIN — HEPARIN SODIUM 5000 UNIT(S): 5000 INJECTION INTRAVENOUS; SUBCUTANEOUS at 01:09

## 2021-05-11 RX ADMIN — Medication 62.5 MILLIMOLE(S): at 17:23

## 2021-05-11 RX ADMIN — HEPARIN SODIUM 5000 UNIT(S): 5000 INJECTION INTRAVENOUS; SUBCUTANEOUS at 13:49

## 2021-05-11 RX ADMIN — INSULIN GLARGINE 30 UNIT(S): 100 INJECTION, SOLUTION SUBCUTANEOUS at 17:11

## 2021-05-11 RX ADMIN — CEFTRIAXONE 100 MILLIGRAM(S): 500 INJECTION, POWDER, FOR SOLUTION INTRAMUSCULAR; INTRAVENOUS at 22:02

## 2021-05-11 RX ADMIN — SODIUM CHLORIDE 100 MILLILITER(S): 9 INJECTION, SOLUTION INTRAVENOUS at 09:45

## 2021-05-11 RX ADMIN — Medication 4: at 20:54

## 2021-05-11 RX ADMIN — Medication 50 GRAM(S): at 09:01

## 2021-05-11 RX ADMIN — CHLORHEXIDINE GLUCONATE 1 APPLICATION(S): 213 SOLUTION TOPICAL at 05:00

## 2021-05-11 RX ADMIN — HEPARIN SODIUM 5000 UNIT(S): 5000 INJECTION INTRAVENOUS; SUBCUTANEOUS at 22:02

## 2021-05-11 RX ADMIN — HEPARIN SODIUM 5000 UNIT(S): 5000 INJECTION INTRAVENOUS; SUBCUTANEOUS at 05:00

## 2021-05-11 RX ADMIN — Medication 250 MILLIGRAM(S): at 20:54

## 2021-05-11 NOTE — PROGRESS NOTE ADULT - SUBJECTIVE AND OBJECTIVE BOX
Interval Events: Overnight, there were no acute events.     REVIEW OF SYSTEMS:  CONSTITUTIONAL: No weakness, fevers or chills  EYES/ENT: No visual changes;  No vertigo or throat pain   NECK: No pain or stiffness  RESPIRATORY: No cough, wheezing, hemoptysis; No shortness of breath  CARDIOVASCULAR: No chest pain or palpitations  GASTROINTESTINAL: No abdominal or epigastric pain. No nausea, vomiting, or hematemesis; No diarrhea or constipation. No melena or hematochezia.  GENITOURINARY: No dysuria, frequency or hematuria  NEUROLOGICAL: No numbness or weakness  SKIN: No itching, rashes  [ ] Unable to assess ROS because ________    OBJECTIVE:  ICU Vital Signs Last 24 Hrs  T(C): 36.7 (11 May 2021 04:00), Max: 37.1 (10 May 2021 19:29)  T(F): 98.1 (11 May 2021 04:00), Max: 98.7 (10 May 2021 19:29)  HR: 95 (11 May 2021 04:00) (77 - 96)  BP: 111/64 (11 May 2021 04:00) (59/38 - 125/46)  BP(mean): 83 (11 May 2021 04:00) (44 - 85)  ABP: --  ABP(mean): --  RR: 29 (11 May 2021 04:00) (16 - 30)  SpO2: 95% (11 May 2021 04:00) (92% - 100%)        05-10 @ 07:01  -  05-11 @ 05:27  --------------------------------------------------------  IN: 734.5 mL / OUT: 225 mL / NET: 509.5 mL      CAPILLARY BLOOD GLUCOSE      POCT Blood Glucose.: 298 mg/dL (11 May 2021 05:03)      PHYSICAL EXAM:  GENERAL: Slightly lethargic.   HEENT:  Atraumatic, Normocephalic. Dry mucous membranes.   CHEST/LUNG: Chest rise equal bilaterally. CTAB.   HEART: Regular rate and rhythm. No murmurs or rubs.   ABDOMEN: Soft, Nontender, Nondistended  EXTREMITIES:  2+ Peripheral Pulses.  PSYCH: A&Ox3  SKIN: Contusions to bilateral shoulders. Dry skin.    LINES:    HOSPITAL MEDICATIONS:  Standing Meds:  cefTRIAXone   IVPB 1000 milliGRAM(s) IV Intermittent every 24 hours  chlorhexidine 4% Liquid 1 Application(s) Topical <User Schedule>  chlorhexidine 4% Liquid 1 Application(s) Topical <User Schedule>  dextrose 50% Injectable 50 milliLiter(s) IV Push every 15 minutes  dextrose 50% Injectable 25 milliLiter(s) IV Push every 15 minutes  heparin   Injectable 5000 Unit(s) SubCutaneous every 8 hours  insulin regular Infusion 2 Unit(s)/Hr IV Continuous <Continuous>  lactated ringers. 1000 milliLiter(s) IV Continuous <Continuous>  norepinephrine Infusion 0.05 MICROgram(s)/kG/Min IV Continuous <Continuous>      PRN Meds:  sodium chloride 0.9% lock flush 10 milliLiter(s) IV Push every 1 hour PRN      LABS:                        10.6   9.40  )-----------( 247      ( 11 May 2021 02:01 )             31.4     Hgb Trend: 10.6<--, 10.9<--, 12.1<--  05-11    128<L>  |  95<L>  |  54<H>  ----------------------------<  397<H>  4.3   |  15<L>  |  1.78<H>    Ca    8.4      11 May 2021 02:01  Phos  3.7     05-11  Mg     1.8     05-11    TPro  7.0  /  Alb  2.8<L>  /  TBili  0.4  /  DBili  x   /  AST  34  /  ALT  29  /  AlkPhos  114  05-10    Creatinine Trend: 1.78<--, 1.94<--, 1.98<--, 2.04<--, 2.23<--  PT/INR - ( 11 May 2021 02:01 )   PT: 12.5 sec;   INR: 1.04 ratio         PTT - ( 11 May 2021 02:01 )  PTT:25.6 sec  Urinalysis Basic - ( 10 May 2021 17:58 )    Color: Yellow / Appearance: Turbid / S.014 / pH: x  Gluc: x / Ketone: Small  / Bili: Negative / Urobili: Negative   Blood: x / Protein: 300 mg/dL / Nitrite: Negative   Leuk Esterase: Large / RBC: 8 /hpf / WBC >50 /HPF   Sq Epi: x / Non Sq Epi: 0 /hpf / Bacteria: Few        Venous Blood Gas:   @ 02:14  7.32/37/35/18/62  VBG Lactate: 1.0  Venous Blood Gas:  05-10 @ 22:01  7.24/39/27/16/44  VBG Lactate: 1.6  Venous Blood Gas:  05-10 @ 19:44  7.23/37/23/15/31  VBG Lactate: 1.8  Venous Blood Gas:  05-10 @ 17:55  7.26/38/<20/16/26  VBG Lactate: 2.2      MICROBIOLOGY:     RADIOLOGY:  [ ] Reviewed and interpreted by me    EKG:

## 2021-05-11 NOTE — PROGRESS NOTE ADULT - ASSESSMENT
76 F PMH T2DM, HTN, CABG years ago on aspirin 325, systolic HF and recurrent UTIs BIBEMS for being found on the kitchen floor in her home with elevated BG (not from nursing home as ED note states) found to be hyperglycemic and acidotic while on SGLT2 inhibitor c/w DKA and hypotensive despite fluid resuscitation in the setting of UTI likely leading to septic shock from acute cystitis requiring ICU admission for insulin gtt and levo gtt.     Neuro  #Initial confusion in ED now improved   -continue to monitor patient's mental status on therapy   - no sedatives at this time     Cards   #Hypotension in setting of distributive shock from urosepsis vs hypovolemic shock from poor PO intake  - on levo gtt   - received 3L IVF  (2L NS + 1L LR) in ED, will continue to volume resuscitate.   - patient's home metoprolol and amlodipine held.     Pulm  #No issues at this time   - CXR normal   - SpO2 > 94% on RA     GI   #Poor PO intake  - diabetic diet     Renal   #Anion gap metabolic acidosis   - likely DKA in the setting of hyperglycemia and SGLT2 inhibitor use.  - insulin gtt 4U/hr and titrate per protocol  - fingersticks q1hr, BMP q4hrs   - replete potassium aggressively     #ARYA   - likely prerenal in etiology from poor PO intake   - continue to monitor Cr and BUN on BMP + Mg Q4H  - LR@ 100CC/hr     ID  #Septic shock from acute cystitis  - grossly positive UA in setting of hypotension   - narrow vanc and zosyn in ED to ceftriaxone (05/10 - )  - f/u Bcx and UCx (05/10)     Endo  #DKA in setting of SGLTS inhibitor use   - insulin gtt at 2U/hr  - fingersticks q1hr, BMP q4hrs   - F/u HgA1c    Heme   #DVT ppx   - heparin for DVT ppx      #Access  - RIJ placed in ED for access  - 2 PIVs

## 2021-05-11 NOTE — CHART NOTE - NSCHARTNOTEFT_GEN_A_CORE
Consult called after 4pm so will be seen tomw. 77 yo female with T2D on metformin and farxiga at home who presents with UTI and DKA likely due to the farxiga. Would transition off with Lantus 30 units sq and stop gtt 1-2 hours after. Also add admelog 5 units sq tid-ac (hold if not eating) and cover with small correctional scale tid-ac/qhs.  Dahlia Cisneros MD  Endocrinology Attending  Mondays and Tuesdays 9am-6pm: [phone] (pager)  Other days, night and weekend: [phone]

## 2021-05-12 DIAGNOSIS — E11.65 TYPE 2 DIABETES MELLITUS WITH HYPERGLYCEMIA: ICD-10-CM

## 2021-05-12 DIAGNOSIS — E11.10 TYPE 2 DIABETES MELLITUS WITH KETOACIDOSIS WITHOUT COMA: ICD-10-CM

## 2021-05-12 DIAGNOSIS — E78.5 HYPERLIPIDEMIA, UNSPECIFIED: ICD-10-CM

## 2021-05-12 DIAGNOSIS — I10 ESSENTIAL (PRIMARY) HYPERTENSION: ICD-10-CM

## 2021-05-12 LAB
-  AMIKACIN: SIGNIFICANT CHANGE UP
-  AMOXICILLIN/CLAVULANIC ACID: SIGNIFICANT CHANGE UP
-  AMPICILLIN/SULBACTAM: SIGNIFICANT CHANGE UP
-  AMPICILLIN: SIGNIFICANT CHANGE UP
-  AZTREONAM: SIGNIFICANT CHANGE UP
-  CEFAZOLIN: SIGNIFICANT CHANGE UP
-  CEFEPIME: SIGNIFICANT CHANGE UP
-  CEFOXITIN: SIGNIFICANT CHANGE UP
-  CEFTRIAXONE: SIGNIFICANT CHANGE UP
-  CIPROFLOXACIN: SIGNIFICANT CHANGE UP
-  ERTAPENEM: SIGNIFICANT CHANGE UP
-  GENTAMICIN: SIGNIFICANT CHANGE UP
-  IMIPENEM: SIGNIFICANT CHANGE UP
-  LEVOFLOXACIN: SIGNIFICANT CHANGE UP
-  MEROPENEM: SIGNIFICANT CHANGE UP
-  NITROFURANTOIN: SIGNIFICANT CHANGE UP
-  PIPERACILLIN/TAZOBACTAM: SIGNIFICANT CHANGE UP
-  TIGECYCLINE: SIGNIFICANT CHANGE UP
-  TOBRAMYCIN: SIGNIFICANT CHANGE UP
-  TRIMETHOPRIM/SULFAMETHOXAZOLE: SIGNIFICANT CHANGE UP
A1C WITH ESTIMATED AVERAGE GLUCOSE RESULT: >15.5 % — HIGH (ref 4–5.6)
ANION GAP SERPL CALC-SCNC: 14 MMOL/L — SIGNIFICANT CHANGE UP (ref 5–17)
APTT BLD: 25.6 SEC — LOW (ref 27.5–35.5)
BUN SERPL-MCNC: 43 MG/DL — HIGH (ref 7–23)
CALCIUM SERPL-MCNC: 8.4 MG/DL — SIGNIFICANT CHANGE UP (ref 8.4–10.5)
CHLORIDE SERPL-SCNC: 99 MMOL/L — SIGNIFICANT CHANGE UP (ref 96–108)
CO2 SERPL-SCNC: 18 MMOL/L — LOW (ref 22–31)
COVID-19 SPIKE DOMAIN AB INTERP: POSITIVE
COVID-19 SPIKE DOMAIN ANTIBODY RESULT: 19.2 U/ML — HIGH
CREAT SERPL-MCNC: 1.39 MG/DL — HIGH (ref 0.5–1.3)
CULTURE RESULTS: SIGNIFICANT CHANGE UP
ESTIMATED AVERAGE GLUCOSE: >398 MG/DL — HIGH (ref 68–114)
GAS PNL BLDV: SIGNIFICANT CHANGE UP
GLUCOSE BLDC GLUCOMTR-MCNC: 193 MG/DL — HIGH (ref 70–99)
GLUCOSE BLDC GLUCOMTR-MCNC: 223 MG/DL — HIGH (ref 70–99)
GLUCOSE BLDC GLUCOMTR-MCNC: 241 MG/DL — HIGH (ref 70–99)
GLUCOSE BLDC GLUCOMTR-MCNC: 241 MG/DL — HIGH (ref 70–99)
GLUCOSE BLDC GLUCOMTR-MCNC: 247 MG/DL — HIGH (ref 70–99)
GLUCOSE SERPL-MCNC: 215 MG/DL — HIGH (ref 70–99)
HCT VFR BLD CALC: 31.1 % — LOW (ref 34.5–45)
HGB BLD-MCNC: 10.4 G/DL — LOW (ref 11.5–15.5)
INR BLD: 1.04 RATIO — SIGNIFICANT CHANGE UP (ref 0.88–1.16)
MAGNESIUM SERPL-MCNC: 2.1 MG/DL — SIGNIFICANT CHANGE UP (ref 1.6–2.6)
MCHC RBC-ENTMCNC: 27.7 PG — SIGNIFICANT CHANGE UP (ref 27–34)
MCHC RBC-ENTMCNC: 33.4 GM/DL — SIGNIFICANT CHANGE UP (ref 32–36)
MCV RBC AUTO: 82.7 FL — SIGNIFICANT CHANGE UP (ref 80–100)
METHOD TYPE: SIGNIFICANT CHANGE UP
NRBC # BLD: 0 /100 WBCS — SIGNIFICANT CHANGE UP (ref 0–0)
ORGANISM # SPEC MICROSCOPIC CNT: SIGNIFICANT CHANGE UP
PHOSPHATE SERPL-MCNC: 2.4 MG/DL — LOW (ref 2.5–4.5)
PLATELET # BLD AUTO: 216 K/UL — SIGNIFICANT CHANGE UP (ref 150–400)
POTASSIUM SERPL-MCNC: 3.7 MMOL/L — SIGNIFICANT CHANGE UP (ref 3.5–5.3)
POTASSIUM SERPL-SCNC: 3.7 MMOL/L — SIGNIFICANT CHANGE UP (ref 3.5–5.3)
PROTHROM AB SERPL-ACNC: 12.5 SEC — SIGNIFICANT CHANGE UP (ref 10.6–13.6)
RBC # BLD: 3.76 M/UL — LOW (ref 3.8–5.2)
RBC # FLD: 14.8 % — HIGH (ref 10.3–14.5)
SARS-COV-2 IGG+IGM SERPL QL IA: 19.2 U/ML — HIGH
SARS-COV-2 IGG+IGM SERPL QL IA: POSITIVE
SODIUM SERPL-SCNC: 131 MMOL/L — LOW (ref 135–145)
SPECIMEN SOURCE: SIGNIFICANT CHANGE UP
SPECIMEN SOURCE: SIGNIFICANT CHANGE UP
VANCOMYCIN TROUGH SERPL-MCNC: 8.9 UG/ML — LOW (ref 10–20)
WBC # BLD: 8.05 K/UL — SIGNIFICANT CHANGE UP (ref 3.8–10.5)
WBC # FLD AUTO: 8.05 K/UL — SIGNIFICANT CHANGE UP (ref 3.8–10.5)

## 2021-05-12 PROCEDURE — 99223 1ST HOSP IP/OBS HIGH 75: CPT | Mod: GC

## 2021-05-12 RX ORDER — INSULIN GLARGINE 100 [IU]/ML
30 INJECTION, SOLUTION SUBCUTANEOUS
Refills: 0 | Status: DISCONTINUED | OUTPATIENT
Start: 2021-05-12 | End: 2021-05-13

## 2021-05-12 RX ORDER — INSULIN LISPRO 100/ML
9 VIAL (ML) SUBCUTANEOUS
Refills: 0 | Status: DISCONTINUED | OUTPATIENT
Start: 2021-05-12 | End: 2021-05-13

## 2021-05-12 RX ORDER — INSULIN LISPRO 100/ML
VIAL (ML) SUBCUTANEOUS AT BEDTIME
Refills: 0 | Status: DISCONTINUED | OUTPATIENT
Start: 2021-05-12 | End: 2021-05-20

## 2021-05-12 RX ORDER — POTASSIUM PHOSPHATE, MONOBASIC POTASSIUM PHOSPHATE, DIBASIC 236; 224 MG/ML; MG/ML
15 INJECTION, SOLUTION INTRAVENOUS ONCE
Refills: 0 | Status: COMPLETED | OUTPATIENT
Start: 2021-05-12 | End: 2021-05-12

## 2021-05-12 RX ORDER — INSULIN LISPRO 100/ML
9 VIAL (ML) SUBCUTANEOUS
Refills: 0 | Status: DISCONTINUED | OUTPATIENT
Start: 2021-05-12 | End: 2021-05-12

## 2021-05-12 RX ORDER — VANCOMYCIN HCL 1 G
1250 VIAL (EA) INTRAVENOUS EVERY 24 HOURS
Refills: 0 | Status: DISCONTINUED | OUTPATIENT
Start: 2021-05-12 | End: 2021-05-13

## 2021-05-12 RX ORDER — ACETAMINOPHEN 500 MG
1000 TABLET ORAL ONCE
Refills: 0 | Status: COMPLETED | OUTPATIENT
Start: 2021-05-12 | End: 2021-05-12

## 2021-05-12 RX ADMIN — Medication 2: at 17:27

## 2021-05-12 RX ADMIN — Medication 1000 MILLIGRAM(S): at 09:00

## 2021-05-12 RX ADMIN — HEPARIN SODIUM 5000 UNIT(S): 5000 INJECTION INTRAVENOUS; SUBCUTANEOUS at 22:32

## 2021-05-12 RX ADMIN — INSULIN GLARGINE 30 UNIT(S): 100 INJECTION, SOLUTION SUBCUTANEOUS at 19:09

## 2021-05-12 RX ADMIN — POTASSIUM PHOSPHATE, MONOBASIC POTASSIUM PHOSPHATE, DIBASIC 62.5 MILLIMOLE(S): 236; 224 INJECTION, SOLUTION INTRAVENOUS at 02:35

## 2021-05-12 RX ADMIN — Medication 4: at 08:30

## 2021-05-12 RX ADMIN — HEPARIN SODIUM 5000 UNIT(S): 5000 INJECTION INTRAVENOUS; SUBCUTANEOUS at 05:23

## 2021-05-12 RX ADMIN — CEFTRIAXONE 100 MILLIGRAM(S): 500 INJECTION, POWDER, FOR SOLUTION INTRAMUSCULAR; INTRAVENOUS at 22:32

## 2021-05-12 RX ADMIN — Medication 5 UNIT(S): at 08:30

## 2021-05-12 RX ADMIN — Medication 400 MILLIGRAM(S): at 08:29

## 2021-05-12 RX ADMIN — Medication 166.67 MILLIGRAM(S): at 23:48

## 2021-05-12 RX ADMIN — CHLORHEXIDINE GLUCONATE 1 APPLICATION(S): 213 SOLUTION TOPICAL at 05:23

## 2021-05-12 RX ADMIN — Medication 9 UNIT(S): at 17:26

## 2021-05-12 RX ADMIN — Medication 5 UNIT(S): at 12:17

## 2021-05-12 RX ADMIN — Medication 4: at 12:18

## 2021-05-12 NOTE — DIETITIAN INITIAL EVALUATION ADULT. - PERTINENT MEDS FT
MEDICATIONS  (STANDING):  cefTRIAXone   IVPB 1000 milliGRAM(s) IV Intermittent every 24 hours  chlorhexidine 4% Liquid 1 Application(s) Topical <User Schedule>  dextrose 40% Gel 15 Gram(s) Oral once  dextrose 5%. 1000 milliLiter(s) (50 mL/Hr) IV Continuous <Continuous>  dextrose 50% Injectable 25 Gram(s) IV Push once  dextrose 50% Injectable 12.5 Gram(s) IV Push once  glucagon  Injectable 1 milliGRAM(s) IntraMuscular once  heparin   Injectable 5000 Unit(s) SubCutaneous every 8 hours  insulin lispro (ADMELOG) corrective regimen sliding scale   SubCutaneous three times a day before meals  insulin lispro Injectable (ADMELOG) 5 Unit(s) SubCutaneous three times a day before meals  vancomycin  IVPB 1000 milliGRAM(s) IV Intermittent every 24 hours

## 2021-05-12 NOTE — CONSULT NOTE ADULT - SUBJECTIVE AND OBJECTIVE BOX
HPI:  76 F PMH T2DM, HTN, CABG years ago on aspirin 325, systolic HF and recurrent UTIs BIBEMS for being found on the kitchen floor in her home with elevated BG (not from nursing home as ED note states). Patient has a hx of T2DM on metformin 500 BID and dapagliflozin 10 mg qday which she is noncompliant with. Has been feeling increasingly fatigued and tired over the past week. Not really been eating or drinking over the past few days. Culminated in an episode yesterday where she felt weak and dizzy in her kitchen and fell to the ground and felt too weak to get up. Family called and didn't get a response, called EMS whom broke down the door and found patient conscious on the kitchen floor in a puddle of urine. Fingerstick was 500s Lives alone, ambulates independently with a walker. Reportedly does not fall normally. Had a similar presentation last year.     In ED patient was found to be hypotensive requiring low dose levophed during volume resuscitation, fingerstick was 527 with VBG showing 7.26/38/<20/16 and BHB 5.4 worsening to 7.23/37/23/15 after 1.5L NS for fluid resuscitation. Patient's mental status is improving, however, from slight confusion to normal mentation. UA on straight cath was 'milky' and had a grossly + UA.  MIC[facility] consulted for concern for patient with urosepsis requiring levophed and DKA requiring insulin gtt.     Endocrine history:  Patient reports she was diagnosed with DM2 over 10 years ago.  Reports adherence to Metformin 500mg BID and Farxiga 10 daily   States she does not check her Fs because she does not like to prick herself  Reports increased thirst and urination prior to admission. Some intentional weight loss, however reports carb heavy diet.  Does not report blurred vision, has h/o cataract, no DM retinopathy.  No neuropathy. Has history of CAD    PAST MEDICAL & SURGICAL HISTORY:  DM2  Chronic systolic heart failure  HTN (hypertension)  H/O abdominal hysterectomy  1991  History of tonsillectomy  S/P breast lumpectomy  left  S/P CABG x 1    FAMILY HISTORY:  FH: type 2 diabetes in father    Social History:  Reports h/o tobacco use   Does not report alcohol use     Outpatient Medications:  ALPRAZolam 0.25 mg oral tablet: 1 tab(s) orally prior to blood draws (14 Mar 2020 20:39)  aspirin 325 mg oral delayed release tablet: 1 tab(s) orally once a day (14 Mar 2020 20:39)  atorvastatin 10 mg oral tablet: 1 tab(s) orally once a day (at bedtime) (31 Mar 2020 14:26)  Farxiga 10 mg oral tablet: 1 tab(s) orally once a day (14 Mar 2020 20:39)  furosemide 40 mg oral tablet: 1 tab(s) orally once a day (14 Mar 2020 20:39)  metFORMIN 500 mg oral tablet: 1 tab(s) orally 2 times a day (14 Mar 2020 20:39)  metoprolol tartrate 25 mg oral tablet: 1 tab(s) orally once a day (14 Mar 2020 20:39)  pantoprazole 40 mg oral delayed release tablet: 1 tab(s) orally once a day (before a meal) (14 Mar 2020 20:39)      MEDICATIONS  (STANDING):  cefTRIAXone   IVPB 1000 milliGRAM(s) IV Intermittent every 24 hours  chlorhexidine 4% Liquid 1 Application(s) Topical <User Schedule>  dextrose 40% Gel 15 Gram(s) Oral once  dextrose 5%. 1000 milliLiter(s) (50 mL/Hr) IV Continuous <Continuous>  dextrose 50% Injectable 25 Gram(s) IV Push once  dextrose 50% Injectable 12.5 Gram(s) IV Push once  glucagon  Injectable 1 milliGRAM(s) IntraMuscular once  heparin   Injectable 5000 Unit(s) SubCutaneous every 8 hours  insulin lispro (ADMELOG) corrective regimen sliding scale   SubCutaneous three times a day before meals  insulin lispro Injectable (ADMELOG) 5 Unit(s) SubCutaneous three times a day before meals  vancomycin  IVPB 1000 milliGRAM(s) IV Intermittent every 24 hours    MEDICATIONS  (PRN):  sodium chloride 0.9% lock flush 10 milliLiter(s) IV Push every 1 hour PRN Pre/post blood products, medications, blood draw, and to maintain line patency    Allergies  clams - itching (Other)  No Known Drug Allergies  Peroxide (Blisters)    Review of Systems:  UNABLE TO OBTAIN    PHYSICAL EXAM:  VITALS: T(C): 36.4 (05-12-21 @ 12:00)  T(F): 97.6 (05-12-21 @ 12:00), Max: 98.8 (05-11-21 @ 16:00)  HR: 87 (05-12-21 @ 13:00) (84 - 101)  BP: 114/67 (05-12-21 @ 13:00) (95/50 - 153/95)  RR:  (23 - 31)  SpO2:  (91% - 99%)  Wt(kg): 77kg   GENERAL: NAD, well-groomed, well-developed  EYES: No proptosis, no lid lag, anicteric  HEENT:  Atraumatic, Normocephalic, moist mucous membranes, poor dentitian   THYROID: Normal size, no palpable nodules, nontender   RESPIRATORY: Clear to auscultation bilaterally; No rales, rhonchi, wheezing  CARDIOVASCULAR: Regular rate and rhythm; No murmurs; no peripheral edema  GI: Soft, nontender, non distended, normal bowel sounds  SKIN: Dry, intact, No rashes or lesions  MUSCULOSKELETAL: Full range of motion, normal strength  NEURO: sensation intact, extraocular movements intact, no tremor  PSYCH: Alert and oriented x 3, reactive affect     POCT Blood Glucose.: 241 mg/dL (05-12-21 @ 12:11)  POCT Blood Glucose.: 241 mg/dL (05-12-21 @ 08:19)  POCT Blood Glucose.: 223 mg/dL (05-11-21 @ 20:53)  POCT Blood Glucose.: 197 mg/dL (05-11-21 @ 19:59)  POCT Blood Glucose.: 163 mg/dL (05-11-21 @ 18:16)  POCT Blood Glucose.: 178 mg/dL (05-11-21 @ 17:21)  POCT Blood Glucose.: 214 mg/dL (05-11-21 @ 15:11)  POCT Blood Glucose.: 249 mg/dL (05-11-21 @ 13:14)  POCT Blood Glucose.: 256 mg/dL (05-11-21 @ 12:16)  POCT Blood Glucose.: 267 mg/dL (05-11-21 @ 11:11)  POCT Blood Glucose.: 243 mg/dL (05-11-21 @ 10:10)  POCT Blood Glucose.: 252 mg/dL (05-11-21 @ 09:05)  POCT Blood Glucose.: 255 mg/dL (05-11-21 @ 08:09)  POCT Blood Glucose.: 257 mg/dL (05-11-21 @ 06:45)  POCT Blood Glucose.: 298 mg/dL (05-11-21 @ 05:03)  POCT Blood Glucose.: 334 mg/dL (05-11-21 @ 04:08)  POCT Blood Glucose.: 369 mg/dL (05-11-21 @ 02:57)  POCT Blood Glucose.: 365 mg/dL (05-11-21 @ 01:06)  POCT Blood Glucose.: 446 mg/dL (05-11-21 @ 00:02)  POCT Blood Glucose.: 486 mg/dL (05-10-21 @ 22:57)  POCT Blood Glucose.: 504 mg/dL (05-10-21 @ 21:52)  POCT Blood Glucose.: 519 mg/dL (05-10-21 @ 20:47)  POCT Blood Glucose.: 527 mg/dL (05-10-21 @ 17:01)                            10.4   8.05  )-----------( 216      ( 12 May 2021 00:47 )             31.1       05-12    131<L>  |  99  |  43<H>  ----------------------------<  215<H>  3.7   |  18<L>  |  1.39<H>    EGFR if : 43<L>  EGFR if non : 37<L>    Ca    8.4      05-12  Mg     2.1     05-12  Phos  2.4     05-12    TPro  7.0  /  Alb  2.8<L>  /  TBili  0.4  /  DBili  x   /  AST  34  /  ALT  29  /  AlkPhos  114  05-10                     HPI:  76 F with history of T2DM, HTN, CABG years ago on aspirin 325, systolic HF and recurrent UTIs BIBEMS for being found on the kitchen floor in her home with elevated BG. Has been feeling increasingly fatigued and tired over the past week. Not really been eating or drinking over the past few days. Culminated in an episode yesterday where she felt weak and dizzy in her kitchen and fell to the ground and felt too weak to get up. Family called and didn't get a response, called EMS whom broke down the door and found patient conscious on the kitchen floor in a puddle of urine. Fingerstick was 500s Lives alone, ambulates independently with a walker. Reportedly does not fall normally. Had a similar presentation last year.     In ED patient was found to be hypotensive requiring low dose levophed during volume resuscitation, AG 25, HCO3 13, BHB 5.4, ph 7.26, serum glucose 531. Patient admitted to ICU for sepsis secondary to UTI requiring levophed and DKA requiring insulin gtt. Endocrine consult requested for management of DKA and uncontrolled DM2.     Endocrine history:  Patient reports she was diagnosed with DM2 over 10 years ago.  Reports adherence to Metformin 500mg BID and Farxiga 10 daily   States she does not check her Fs because she does not like to prick herself  Reports increased thirst and urination prior to admission. Some intentional weight loss, however reports carb heavy diet.  Does not report blurred vision, has h/o cataract, no DM retinopathy.  No neuropathy. Has history of CAD    PAST MEDICAL & SURGICAL HISTORY:  DM2  Chronic systolic heart failure  HTN (hypertension)  H/O abdominal hysterectomy  1991  History of tonsillectomy  S/P breast lumpectomy  left  S/P CABG x 1    FAMILY HISTORY:  FH: type 2 diabetes in father    Social History:  Reports h/o tobacco use   Does not report alcohol use     Outpatient Medications:  ALPRAZolam 0.25 mg oral tablet: 1 tab(s) orally prior to blood draws (14 Mar 2020 20:39)  aspirin 325 mg oral delayed release tablet: 1 tab(s) orally once a day (14 Mar 2020 20:39)  atorvastatin 10 mg oral tablet: 1 tab(s) orally once a day (at bedtime) (31 Mar 2020 14:26)  Farxiga 10 mg oral tablet: 1 tab(s) orally once a day (14 Mar 2020 20:39)  furosemide 40 mg oral tablet: 1 tab(s) orally once a day (14 Mar 2020 20:39)  metFORMIN 500 mg oral tablet: 1 tab(s) orally 2 times a day (14 Mar 2020 20:39)  metoprolol tartrate 25 mg oral tablet: 1 tab(s) orally once a day (14 Mar 2020 20:39)  pantoprazole 40 mg oral delayed release tablet: 1 tab(s) orally once a day (before a meal) (14 Mar 2020 20:39)      MEDICATIONS  (STANDING):  cefTRIAXone   IVPB 1000 milliGRAM(s) IV Intermittent every 24 hours  chlorhexidine 4% Liquid 1 Application(s) Topical <User Schedule>  dextrose 40% Gel 15 Gram(s) Oral once  dextrose 5%. 1000 milliLiter(s) (50 mL/Hr) IV Continuous <Continuous>  dextrose 50% Injectable 25 Gram(s) IV Push once  dextrose 50% Injectable 12.5 Gram(s) IV Push once  glucagon  Injectable 1 milliGRAM(s) IntraMuscular once  heparin   Injectable 5000 Unit(s) SubCutaneous every 8 hours  insulin lispro (ADMELOG) corrective regimen sliding scale   SubCutaneous three times a day before meals  insulin lispro Injectable (ADMELOG) 5 Unit(s) SubCutaneous three times a day before meals  vancomycin  IVPB 1000 milliGRAM(s) IV Intermittent every 24 hours    MEDICATIONS  (PRN):  sodium chloride 0.9% lock flush 10 milliLiter(s) IV Push every 1 hour PRN Pre/post blood products, medications, blood draw, and to maintain line patency    Allergies  clams - itching (Other)  No Known Drug Allergies  Peroxide (Blisters)    Review of Systems:  UNABLE TO OBTAIN    PHYSICAL EXAM:  VITALS: T(C): 36.4 (05-12-21 @ 12:00)  T(F): 97.6 (05-12-21 @ 12:00), Max: 98.8 (05-11-21 @ 16:00)  HR: 87 (05-12-21 @ 13:00) (84 - 101)  BP: 114/67 (05-12-21 @ 13:00) (95/50 - 153/95)  RR:  (23 - 31)  SpO2:  (91% - 99%)  Wt(kg): 77kg   GENERAL: NAD, well-groomed, well-developed  EYES: No proptosis, no lid lag, anicteric  HEENT:  Atraumatic, Normocephalic, moist mucous membranes, poor dentitian   THYROID: Normal size, no palpable nodules, nontender   RESPIRATORY: Clear to auscultation bilaterally; No rales, rhonchi, wheezing  CARDIOVASCULAR: Regular rate and rhythm; No murmurs; no peripheral edema  GI: Soft, nontender, non distended, normal bowel sounds  SKIN: Dry, intact, No rashes or lesions  MUSCULOSKELETAL: Full range of motion, normal strength  NEURO: sensation intact, extraocular movements intact, no tremor  PSYCH: Alert and oriented x 3, reactive affect     POCT Blood Glucose.: 241 mg/dL (05-12-21 @ 12:11)  POCT Blood Glucose.: 241 mg/dL (05-12-21 @ 08:19)  POCT Blood Glucose.: 223 mg/dL (05-11-21 @ 20:53)  POCT Blood Glucose.: 197 mg/dL (05-11-21 @ 19:59)  POCT Blood Glucose.: 163 mg/dL (05-11-21 @ 18:16)  POCT Blood Glucose.: 178 mg/dL (05-11-21 @ 17:21)  POCT Blood Glucose.: 214 mg/dL (05-11-21 @ 15:11)  POCT Blood Glucose.: 249 mg/dL (05-11-21 @ 13:14)  POCT Blood Glucose.: 256 mg/dL (05-11-21 @ 12:16)  POCT Blood Glucose.: 267 mg/dL (05-11-21 @ 11:11)  POCT Blood Glucose.: 243 mg/dL (05-11-21 @ 10:10)  POCT Blood Glucose.: 252 mg/dL (05-11-21 @ 09:05)  POCT Blood Glucose.: 255 mg/dL (05-11-21 @ 08:09)  POCT Blood Glucose.: 257 mg/dL (05-11-21 @ 06:45)  POCT Blood Glucose.: 298 mg/dL (05-11-21 @ 05:03)  POCT Blood Glucose.: 334 mg/dL (05-11-21 @ 04:08)  POCT Blood Glucose.: 369 mg/dL (05-11-21 @ 02:57)  POCT Blood Glucose.: 365 mg/dL (05-11-21 @ 01:06)  POCT Blood Glucose.: 446 mg/dL (05-11-21 @ 00:02)  POCT Blood Glucose.: 486 mg/dL (05-10-21 @ 22:57)  POCT Blood Glucose.: 504 mg/dL (05-10-21 @ 21:52)  POCT Blood Glucose.: 519 mg/dL (05-10-21 @ 20:47)  POCT Blood Glucose.: 527 mg/dL (05-10-21 @ 17:01)                            10.4   8.05  )-----------( 216      ( 12 May 2021 00:47 )             31.1       05-12    131<L>  |  99  |  43<H>  ----------------------------<  215<H>  3.7   |  18<L>  |  1.39<H>    EGFR if : 43<L>  EGFR if non : 37<L>    Ca    8.4      05-12  Mg     2.1     05-12  Phos  2.4     05-12    TPro  7.0  /  Alb  2.8<L>  /  TBili  0.4  /  DBili  x   /  AST  34  /  ALT  29  /  AlkPhos  114  05-10

## 2021-05-12 NOTE — CHART NOTE - NSCHARTNOTEFT_GEN_A_CORE
MICU Transfer Note    Transfer from: MICU    Transfer to: (  ) Medicine    (  ) Telemetry     (   ) RCU        (    ) Palliative         (   ) Stroke Unit          (   ) __________________    Accepting Physician:      MICU COURSE:    76 F PMH T2DM, HTN, CABG years ago on aspirin 325, systolic HF and recurrent UTIs BIBEMS for being found on the kitchen floor in her home with elevated BG. Patient has a hx of T2DM on metformin 500 BID and dapagliflozin 10 mg qday which she is noncompliant with. Admitted to MICU for management of DKA/septic shock in the setting of EColi Uti.  Initially on insulin gtt, transitioned to Lantus/Ademlog/ISS for endocrine.  TLC placed in ED as patient resistant to multiple blood draws abd briefly requiring Levophed.  UTI being treated with Ceftriaxone, awaiting sensitivities. Blood cultures with MRSE--likely contaminant. Vancomycin restarted for now.  Repeat blood cultures sent for verification this AM. Stable off pressors and insulin gtt.      ASSESSMENT & PLAN:     76 F PMH T2DM, HTN, CABG years ago on aspirin 325, systolic HF and recurrent UTIs admitted to MICU for management of DKA in the setting of Ecoli Uti.     Neuro  #Initial confusion in ED now improved   -neuro checks q4h  -no acute issue    Cards   #Hypotension in setting of distributive shock from urosepsis vs hypovolemic shock from poor PO intake  - Initially on pressors, weaned off. BP stable  - patient's home metoprolol and amlodipine held in setting of UTI    Pulm  #No issues at this time   - CXR normal   - SpO2 > 94% on RA     GI   #Poor PO intake  - diabetic diet     Renal   #ARYA - likely prerenal in etiology from dehydration in setting of DKA/septic shock in setting of UTI  --s/p volume resuscitation with downtrending Cr  -electrolytes stable  -trend BMP daily  -intake and output     ID  #Septic shock from acute cystitis  - grossly positive UA in setting of hypotension   - narrow vanc and zosyn in ED to ceftriaxone (05/10 - )  - Blood cultures from 5/10 with MRSE--question contaminant? Vanco restarted for now.  Repeat blood cultures to be sent 5/12 AM    Endo  #DKA in setting of SGLTS inhibitor use   -Resolved after IVF resuscitation and insulin gtt  -Lantus 30 with Ademlog 5u with meals and low ISS premeal and before bed  -Diabetic diet education  -Follow up house endocrine prn    Heme   #DVT ppx   - heparin for DVT ppx        For Followup:  -Endo recommendations  -BMP daily  -Repeat blood cultures       Vital Signs Last 24 Hrs  T(C): 37.1 (12 May 2021 00:00), Max: 37.2 (11 May 2021 08:00)  T(F): 98.8 (12 May 2021 00:00), Max: 99 (11 May 2021 08:00)  HR: 98 (12 May 2021 02:00) (93 - 101)  BP: 136/63 (12 May 2021 02:00) (109/55 - 153/95)  BP(mean): 90 (12 May 2021 02:00) (75 - 113)  RR: 28 (12 May 2021 02:00) (22 - 40)  SpO2: 93% (12 May 2021 02:00) (89% - 100%)  I&O's Summary    10 May 2021 07:01  -  11 May 2021 07:00  --------------------------------------------------------  IN: 843.5 mL / OUT: 225 mL / NET: 618.5 mL    11 May 2021 07:01  -  12 May 2021 03:30  --------------------------------------------------------  IN: 2374 mL / OUT: 500 mL / NET: 1874 mL        MEDICATIONS  (STANDING):  cefTRIAXone   IVPB 1000 milliGRAM(s) IV Intermittent every 24 hours  chlorhexidine 4% Liquid 1 Application(s) Topical <User Schedule>  dextrose 40% Gel 15 Gram(s) Oral once  dextrose 5%. 1000 milliLiter(s) (50 mL/Hr) IV Continuous <Continuous>  dextrose 50% Injectable 25 Gram(s) IV Push once  dextrose 50% Injectable 12.5 Gram(s) IV Push once  glucagon  Injectable 1 milliGRAM(s) IntraMuscular once  heparin   Injectable 5000 Unit(s) SubCutaneous every 8 hours  insulin lispro (ADMELOG) corrective regimen sliding scale   SubCutaneous three times a day before meals  insulin lispro Injectable (ADMELOG) 5 Unit(s) SubCutaneous three times a day before meals  insulin regular Infusion 2 Unit(s)/Hr (2 mL/Hr) IV Continuous <Continuous>  vancomycin  IVPB 1000 milliGRAM(s) IV Intermittent every 24 hours    MEDICATIONS  (PRN):  sodium chloride 0.9% lock flush 10 milliLiter(s) IV Push every 1 hour PRN Pre/post blood products, medications, blood draw, and to maintain line patency        LABS                                            10.4                  Neurophils% (auto):   x      (05-12 @ 00:47):    8.05 )-----------(216          Lymphocytes% (auto):  x                                             31.1                   Eosinphils% (auto):   x        Manual%: Neutrophils x    ; Lymphocytes x    ; Eosinophils x    ; Bands%: x    ; Blasts x                                    131    |  99     |  43                  Calcium: 8.4   / iCa: x      (05-12 @ 00:47)    ----------------------------<  215       Magnesium: 2.1                              3.7     |  18     |  1.39             Phosphorous: 2.4        ( 05-12 @ 00:47 )   PT: 12.5 sec;   INR: 1.04 ratio  aPTT: 25.6 sec MICU Transfer Note    Transfer from: MICU    Transfer to: (x) Medicine    (  ) Telemetry     (   ) RCU        (    ) Palliative         (   ) Stroke Unit          (   ) __________________    Accepting Physician: Dr Chester      MICU COURSE:    76 F PMH T2DM, HTN, CABG years ago on aspirin 325, systolic HF and recurrent UTIs BIBEMS for being found on the kitchen floor in her home with elevated BG. Patient has a hx of T2DM on metformin 500 BID and dapagliflozin 10 mg qday which she is noncompliant with. Admitted to MICU for management of DKA/septic shock in the setting of EColi Uti.  Initially on insulin gtt, transitioned to Lantus/Ademlog/ISS for endocrine.  TLC placed in ED as patient resistant to multiple blood draws abd briefly requiring Levophed.  UTI being treated with Ceftriaxone, awaiting sensitivities. Blood cultures with MRSE--likely contaminant. Vancomycin restarted for now.  Repeat blood cultures sent for verification this AM. Stable off pressors and insulin gtt.      ASSESSMENT & PLAN:     76 F PMH T2DM, HTN, CABG years ago on aspirin 325, systolic HF and recurrent UTIs admitted to MICU for management of DKA in the setting of Ecoli Uti.     Neuro  #Initial confusion in ED now improved   -neuro checks q4h  -no acute issue    Cards   #Hypotension in setting of distributive shock from urosepsis vs hypovolemic shock from poor PO intake  - Initially on pressors, weaned off. BP stable  - patient's home metoprolol and amlodipine held in setting of UTI    Pulm  #No issues at this time   - CXR normal   - SpO2 > 94% on RA     GI   #Poor PO intake  - diabetic diet     Renal   #ARYA - likely prerenal in etiology from dehydration in setting of DKA/septic shock in setting of UTI  --s/p volume resuscitation with downtrending Cr  -electrolytes stable  -trend BMP daily  -intake and output     ID  #Septic shock from acute cystitis  - grossly positive UA in setting of hypotension   - narrow vanc and zosyn in ED to ceftriaxone (05/10 - )  - Blood cultures from 5/10 with MRSE--question contaminant? Vanco restarted for now.  Repeat blood cultures to be sent 5/12 AM    Endo  #DKA in setting of SGLTS inhibitor use   -Resolved after IVF resuscitation and insulin gtt  -Lantus 30 with Ademlog 5u with meals and low ISS premeal and before bed  -Diabetic diet education  -Follow up house endocrine prn    Heme   #DVT ppx   - heparin for DVT ppx        For Followup:  -Endo recommendations  -BMP daily  -Repeat blood cultures       Vital Signs Last 24 Hrs  T(C): 37.1 (12 May 2021 00:00), Max: 37.2 (11 May 2021 08:00)  T(F): 98.8 (12 May 2021 00:00), Max: 99 (11 May 2021 08:00)  HR: 98 (12 May 2021 02:00) (93 - 101)  BP: 136/63 (12 May 2021 02:00) (109/55 - 153/95)  BP(mean): 90 (12 May 2021 02:00) (75 - 113)  RR: 28 (12 May 2021 02:00) (22 - 40)  SpO2: 93% (12 May 2021 02:00) (89% - 100%)  I&O's Summary    10 May 2021 07:01  -  11 May 2021 07:00  --------------------------------------------------------  IN: 843.5 mL / OUT: 225 mL / NET: 618.5 mL    11 May 2021 07:01  -  12 May 2021 03:30  --------------------------------------------------------  IN: 2374 mL / OUT: 500 mL / NET: 1874 mL        MEDICATIONS  (STANDING):  cefTRIAXone   IVPB 1000 milliGRAM(s) IV Intermittent every 24 hours  chlorhexidine 4% Liquid 1 Application(s) Topical <User Schedule>  dextrose 40% Gel 15 Gram(s) Oral once  dextrose 5%. 1000 milliLiter(s) (50 mL/Hr) IV Continuous <Continuous>  dextrose 50% Injectable 25 Gram(s) IV Push once  dextrose 50% Injectable 12.5 Gram(s) IV Push once  glucagon  Injectable 1 milliGRAM(s) IntraMuscular once  heparin   Injectable 5000 Unit(s) SubCutaneous every 8 hours  insulin lispro (ADMELOG) corrective regimen sliding scale   SubCutaneous three times a day before meals  insulin lispro Injectable (ADMELOG) 5 Unit(s) SubCutaneous three times a day before meals  insulin regular Infusion 2 Unit(s)/Hr (2 mL/Hr) IV Continuous <Continuous>  vancomycin  IVPB 1000 milliGRAM(s) IV Intermittent every 24 hours    MEDICATIONS  (PRN):  sodium chloride 0.9% lock flush 10 milliLiter(s) IV Push every 1 hour PRN Pre/post blood products, medications, blood draw, and to maintain line patency        LABS                                            10.4                  Neurophils% (auto):   x      (05-12 @ 00:47):    8.05 )-----------(216          Lymphocytes% (auto):  x                                             31.1                   Eosinphils% (auto):   x        Manual%: Neutrophils x    ; Lymphocytes x    ; Eosinophils x    ; Bands%: x    ; Blasts x                                    131    |  99     |  43                  Calcium: 8.4   / iCa: x      (05-12 @ 00:47)    ----------------------------<  215       Magnesium: 2.1                              3.7     |  18     |  1.39             Phosphorous: 2.4        ( 05-12 @ 00:47 )   PT: 12.5 sec;   INR: 1.04 ratio  aPTT: 25.6 sec MICU Transfer Note    Transfer from: MICU    Transfer to: (x) Medicine   (  ) Telemetry     (   ) RCU        (    ) Palliative         (   ) Stroke Unit          (   ) __________________    Accepting Physician: Dr Chester      MICU COURSE:    76 F PMH T2DM, HTN, CABG years ago on aspirin 325, systolic HF and recurrent UTIs BIBEMS for being found on the kitchen floor in her home with elevated BG. Patient has a hx of T2DM on metformin 500 BID and dapagliflozin 10 mg qday which she is noncompliant with. Admitted to MICU for management of DKA/septic shock in the setting of EColi Uti.  Initially on insulin gtt, transitioned to Lantus/Ademlog/ISS for endocrine.  TLC placed in ED as patient resistant to multiple blood draws abd briefly requiring Levophed.  UTI being treated with Ceftriaxone, awaiting sensitivities. Blood cultures with MRSE--likely contaminant. Vancomycin restarted for now.  Repeat blood cultures sent for verification this AM. Stable off pressors and insulin gtt.      ASSESSMENT & PLAN:     76 F PMH T2DM, HTN, CABG years ago on aspirin 325, systolic HF and recurrent UTIs admitted to MICU for management of DKA in the setting of Ecoli Uti.     Neuro  #Initial confusion in ED now improved   -neuro checks q4h  -no acute issue    Cards   #Hypotension in setting of distributive shock from urosepsis vs hypovolemic shock from poor PO intake  - Initially on pressors, weaned off. BP stable  - patient's home metoprolol and amlodipine held in setting of UTI    Pulm  #No issues at this time   - CXR normal   - SpO2 > 94% on RA     GI   #Poor PO intake  - diabetic diet     Renal   #ARYA - likely prerenal in etiology from dehydration in setting of DKA/septic shock in setting of UTI  --s/p volume resuscitation with downtrending Cr  -electrolytes stable  -trend BMP daily  -intake and output     ID  #Septic shock from acute cystitis  - grossly positive UA in setting of hypotension   - narrow vanc and zosyn in ED to ceftriaxone (05/10 - )  - Blood cultures from 5/10 with MRSE--question contaminant? Vanco restarted for now.  Repeat blood cultures to be sent 5/12 AM    Endo  #DKA in setting of SGLTS inhibitor use   -Resolved after IVF resuscitation and insulin gtt  -Lantus 30 with Ademlog 5u with meals and low ISS premeal and before bed  -Diabetic diet education  -Follow up house endocrine prn    Heme   #DVT ppx   - heparin for DVT ppx        For Followup:  -Endo recommendations  -BMP daily  -Repeat blood cultures       Vital Signs Last 24 Hrs  T(C): 37.1 (12 May 2021 00:00), Max: 37.2 (11 May 2021 08:00)  T(F): 98.8 (12 May 2021 00:00), Max: 99 (11 May 2021 08:00)  HR: 98 (12 May 2021 02:00) (93 - 101)  BP: 136/63 (12 May 2021 02:00) (109/55 - 153/95)  BP(mean): 90 (12 May 2021 02:00) (75 - 113)  RR: 28 (12 May 2021 02:00) (22 - 40)  SpO2: 93% (12 May 2021 02:00) (89% - 100%)  I&O's Summary    10 May 2021 07:01  -  11 May 2021 07:00  --------------------------------------------------------  IN: 843.5 mL / OUT: 225 mL / NET: 618.5 mL    11 May 2021 07:01  -  12 May 2021 03:30  --------------------------------------------------------  IN: 2374 mL / OUT: 500 mL / NET: 1874 mL        MEDICATIONS  (STANDING):  cefTRIAXone   IVPB 1000 milliGRAM(s) IV Intermittent every 24 hours  chlorhexidine 4% Liquid 1 Application(s) Topical <User Schedule>  dextrose 40% Gel 15 Gram(s) Oral once  dextrose 5%. 1000 milliLiter(s) (50 mL/Hr) IV Continuous <Continuous>  dextrose 50% Injectable 25 Gram(s) IV Push once  dextrose 50% Injectable 12.5 Gram(s) IV Push once  glucagon  Injectable 1 milliGRAM(s) IntraMuscular once  heparin   Injectable 5000 Unit(s) SubCutaneous every 8 hours  insulin lispro (ADMELOG) corrective regimen sliding scale   SubCutaneous three times a day before meals  insulin lispro Injectable (ADMELOG) 5 Unit(s) SubCutaneous three times a day before meals  insulin regular Infusion 2 Unit(s)/Hr (2 mL/Hr) IV Continuous <Continuous>  vancomycin  IVPB 1000 milliGRAM(s) IV Intermittent every 24 hours    MEDICATIONS  (PRN):  sodium chloride 0.9% lock flush 10 milliLiter(s) IV Push every 1 hour PRN Pre/post blood products, medications, blood draw, and to maintain line patency        LABS                                            10.4                  Neurophils% (auto):   x      (05-12 @ 00:47):    8.05 )-----------(216          Lymphocytes% (auto):  x                                             31.1                   Eosinphils% (auto):   x        Manual%: Neutrophils x    ; Lymphocytes x    ; Eosinophils x    ; Bands%: x    ; Blasts x                                    131    |  99     |  43                  Calcium: 8.4   / iCa: x      (05-12 @ 00:47)    ----------------------------<  215       Magnesium: 2.1                              3.7     |  18     |  1.39             Phosphorous: 2.4        ( 05-12 @ 00:47 )   PT: 12.5 sec;   INR: 1.04 ratio  aPTT: 25.6 sec

## 2021-05-12 NOTE — CONSULT NOTE ADULT - ATTENDING COMMENTS
Agree with assessment and plan as above by Dr. Youngblood. Reviewed all pertinent labs, glucose values, and imaging studies. Modifications made as indicated above. DKA s/p insulin gtt now on basal bolus. Based on current insulin requirements monitor on Lantus 30 units daily and increase Admelog to 9 units with meals. Will adjust further as needed. Plan to d/c on basal bolus.    Yves Turner D.O  [phone]

## 2021-05-12 NOTE — PROGRESS NOTE ADULT - ASSESSMENT
76 F PMH T2DM, HTN, CABG years ago on aspirin 325, systolic HF and recurrent UTIs admitted to MICU for management of DKA in the setting of Ecoli Uti.     Neuro  #Initial confusion in ED now improved   -neuro checks q4h  -no acute issue    Cards   #Hypotension in setting of distributive shock from urosepsis vs hypovolemic shock from poor PO intake, resolved  - Initially on pressors, weaned off. BP stable  - patient's home metoprolol and amlodipine held in setting of UTI - will need t be re started as pt's condition improves    Pulm  #No issues at this time   - CXR normal   - SpO2 > 94% on RA     GI   #Poor PO intake  - diabetic diet     Renal   #ARYA - likely prerenal in etiology from dehydration in setting of DKA/septic shock in setting of UTI  --s/p volume resuscitation with downtrending Cr  -electrolytes stable  -trend BMP daily  -intake and output     ID  #Septic shock from acute cystitis  - grossly positive UA in setting of hypotension   - narrow vanc and zosyn in ED to ceftriaxone (05/10 - )  - Blood cultures from 5/10 with MRSE--question contaminant? Vanco restarted for now.  Repeat blood cultures to be sent 5/12 AM    Endo  #DKA in setting of SGLTS inhibitor use   -Resolved after IVF resuscitation and insulin gtt  -Lantus 30 with Ademlog 5u with meals and low ISS premeal and before bed  -Diabetic diet education  -Follow up house endocrine prn    Heme   #DVT ppx   - heparin for DVT ppx        For Followup:  -Endo recommendations  -BMP daily  -Repeat blood cultures

## 2021-05-12 NOTE — CONSULT NOTE ADULT - ASSESSMENT
DKA in setting on SGLT2i  Uncontrolled DM2  A1c pending   Transitioned of insulin gtt 5/11/2021, FS remain in 200s.  Recommend continue Lantus 30units @7pm (please use user schedule)  Recommend increase Admelog to 9 units TIDac  Recommend mod correctional scale premeal and qhs     Pending A1c for final discharge recs  patient will need to follow up with Endocrine on discharge     HTN     HLD       to be discussed with Dr Yue Pozo-Daniela Youngblood MD  Endocrine Fellow   Pager  on weekdays 9am- 5pm   Please call  after hours and on weekends 76 F with history of T2DM, HTN, CABG years ago on aspirin 325, systolic HF and recurrent UTIs BIBEMS for being found on the kitchen floor in her home with elevated BG. In ED patient was found to be hypotensive requiring low dose levophed during volume resuscitation, AG 25, HCO3 13, BHB 5.4, ph 7.26, serum glucose 531. Patient admitted to ICU for sepsis secondary to UTI requiring levophed and DKA requiring insulin gtt. Endocrine consult requested for management of DKA and uncontrolled DM2. Questionable adherence to medication as A1c elevated to <15.5.       DKA in setting on SGLT2i  Uncontrolled DM2  A1c >15.5%   Transitioned of insulin gtt 5/11/2021, FS remain in 200s.  Recommend continue Lantus 30units @7pm (please use user schedule)  Recommend increase Admelog to 9 units TIDac  Recommend mod correctional scale premeal and qhs     Patient will need to be discharged on basal/bolus insulin, dose to be determined. will consider metformin on discharge if GFR improves with treatment of UTI. Will discontinue Farxiga given DKA on admission   Patient will need to follow up with Endocrine on discharge     HTN   goal bp <130/80  At goal, continue current management     HLD   Consider fasting lipid panel   Given patient age and uncontrolled DM2, patient would benefit from statin  (home meds state patient was on Atorvastatin 10mg daily)      to be discussed with Dr Yue Youngblood MD  Endocrine Fellow   Pager  on weekdays 9am- 5pm   Please call  after hours and on weekends 76 F with history of T2DM, HTN, CABG years ago on aspirin 325, systolic HF and recurrent UTIs BIBEMS for being found on the kitchen floor in her home with elevated BG. In ED patient was found to be hypotensive requiring low dose levophed during volume resuscitation, AG 25, HCO3 13, BHB 5.4, ph 7.26, serum glucose 531. Patient admitted to ICU for sepsis secondary to UTI requiring levophed and DKA requiring insulin gtt. Endocrine consult requested for management of DKA and uncontrolled DM2. Questionable adherence to medication as A1c elevated to >15.5 (high risk patient with DKA and severely uncontrolled diabetes with A1c >15% at high risk of CAD and CVA with high level decision-making).      DKA in setting on SGLT2i  Uncontrolled DM2  A1c >15.5%   Transitioned of insulin gtt 5/11/2021, FS remain in 200s.  Recommend continue Lantus 30units @7pm (please use user schedule)  Recommend increase Admelog to 9 units TIDac  Recommend mod correctional scale premeal and qhs     Patient will need to be discharged on basal/bolus insulin, dose to be determined. will consider metformin on discharge if GFR improves with treatment of UTI. Will discontinue Farxiga given DKA on admission   Patient will need to follow up with Endocrine on discharge     HTN   goal bp <130/80  At goal, continue current management     HLD   Consider fasting lipid panel   Given patient age and uncontrolled DM2, patient would benefit from statin  (home meds state patient was on Atorvastatin 10mg daily)        Ken Youngblood MD  Endocrine Fellow   Pager  on weekdays 9am- 5pm   Please call  after hours and on weekends

## 2021-05-12 NOTE — DIETITIAN INITIAL EVALUATION ADULT. - OTHER INFO
Pt seen for:  MICU Length Of Stay                                  GI issues: none          Last  BM:  none since adm              Food Allergies/Intolerances: clams                Vitamins/Supplements: multivitamin   Wt hx:   pt reports wt has been around 170 lb, per previous RD note 3/14 dosing wt was 154 lb ? discrepancy                              Skin: no pressure injuries documented     Subjective/Objective: pt has many missing teeth, able to chew regular food, advised pt that consistency can be changed if she has any difficulty w/ foods served. She doesn't check BG at home, last A1c in March was 15.5. Has declined diet education in the past and declined at this time also.    Wt used for nutrition needs: 5/10 dosing wt 77.1 kg

## 2021-05-12 NOTE — PROGRESS NOTE ADULT - SUBJECTIVE AND OBJECTIVE BOX
CHIEF COMPLAINT:  Patient is a 76y old  Female who presents with a chief complaint of Urosepsis and Diabetic Ketoacidosis (11 May 2021 05:26)    HPI:  76 yr old female with PMHx HTN, CABG (remote), HFrEF (30-35%), recurrent UTI's, DM2 on metformin and dapaglifozin (non compliant) who originally presented 5/10/21 with fatigue/weakness, decreased po intake. Found to be in DKA with septic shock due to E.Coli UTI. Pt treated with insulin gtt with eventual conversion to lantus/ademlog/ISS. Pt in addition treated with ceftriaxone. As Blood cultures 5/10/21 demonstrated gm positive cocci in clusters pt started on vanco as well.            Interval Events:      REVIEW OF SYSTEMS:          OBJECTIVE:  ICU Vital Signs Last 24 Hrs  T(C): 36.4 (12 May 2021 04:00), Max: 37.2 (11 May 2021 08:00)  T(F): 97.6 (12 May 2021 04:00), Max: 99 (11 May 2021 08:00)  HR: 94 (12 May 2021 05:00) (93 - 101)  BP: 137/63 (12 May 2021 05:00) (110/56 - 153/95)  BP(mean): 91 (12 May 2021 05:00) (79 - 113)  ABP: --  ABP(mean): --  RR: 27 (12 May 2021 05:00) (22 - 31)  SpO2: 93% (12 May 2021 05:00) (89% - 100%)        05-10 @ :  -   @ 07:00  --------------------------------------------------------  IN: 843.5 mL / OUT: 225 mL / NET: 618.5 mL     @ 07:  -  12 @ 06:07  --------------------------------------------------------  IN: 2374 mL / OUT: 850 mL / NET: 1524 mL      CAPILLARY BLOOD GLUCOSE      POCT Blood Glucose.: 223 mg/dL (11 May 2021 20:53)          PHYSICAL EXAM:          HOSPITAL MEDICATIONS:  MEDICATIONS  (STANDING):  cefTRIAXone   IVPB 1000 milliGRAM(s) IV Intermittent every 24 hours  chlorhexidine 4% Liquid 1 Application(s) Topical <User Schedule>  dextrose 40% Gel 15 Gram(s) Oral once  dextrose 5%. 1000 milliLiter(s) (50 mL/Hr) IV Continuous <Continuous>  dextrose 50% Injectable 25 Gram(s) IV Push once  dextrose 50% Injectable 12.5 Gram(s) IV Push once  glucagon  Injectable 1 milliGRAM(s) IntraMuscular once  heparin   Injectable 5000 Unit(s) SubCutaneous every 8 hours  insulin lispro (ADMELOG) corrective regimen sliding scale   SubCutaneous three times a day before meals  insulin lispro Injectable (ADMELOG) 5 Unit(s) SubCutaneous three times a day before meals  insulin regular Infusion 2 Unit(s)/Hr (2 mL/Hr) IV Continuous <Continuous>  vancomycin  IVPB 1000 milliGRAM(s) IV Intermittent every 24 hours    MEDICATIONS  (PRN):  sodium chloride 0.9% lock flush 10 milliLiter(s) IV Push every 1 hour PRN Pre/post blood products, medications, blood draw, and to maintain line patency      LABS:                        10.4   8.05  )-----------( 216      ( 12 May 2021 00:47 )             31.1     Hgb Trend: 10.4<--, 10.6<--, 10.9<--, 12.1<--  05-12    131<L>  |  99  |  43<H>  ----------------------------<  215<H>  3.7   |  18<L>  |  1.39<H>    Ca    8.4      12 May 2021 00:47  Phos  2.4     05-12  Mg     2.1     05-12    TPro  7.0  /  Alb  2.8<L>  /  TBili  0.4  /  DBili  x   /  AST  34  /  ALT  29  /  AlkPhos  114  05-10    LIVER FUNCTIONS - ( 10 May 2021 22:01 )  Alb: 2.8 g/dL / Pro: 7.0 g/dL / ALK PHOS: 114 U/L / ALT: 29 U/L / AST: 34 U/L / GGT: x           Creatinine Trend: 1.39<--, 1.40<--, 1.47<--, 1.58<--, 1.60<--, 1.69<--  PT/INR - ( 12 May 2021 00:47 )   PT: 12.5 sec;   INR: 1.04 ratio         PTT - ( 12 May 2021 00:47 )  PTT:25.6 sec  Urinalysis Basic - ( 10 May 2021 17:58 )    Color: Yellow / Appearance: Turbid / S.014 / pH: x  Gluc: x / Ketone: Small  / Bili: Negative / Urobili: Negative   Blood: x / Protein: 300 mg/dL / Nitrite: Negative   Leuk Esterase: Large / RBC: 8 /hpf / WBC >50 /HPF   Sq Epi: x / Non Sq Epi: 0 /hpf / Bacteria: Few        Venous Blood Gas:   @ 00:16  7.39/39///60  VBG Lactate: 1.3  Venous Blood Gas:   @ 22:25  7.37/37//59  VBG Lactate: 1.0  Venous Blood Gas:   @ 17:40  7.39/38///59  VBG Lactate: 1.6  Venous Blood Gas:   @ 14:00  7.38/38/29//56  VBG Lactate: 2.1  Venous Blood Gas:   @ 10:15  7.36/38///57  VBG Lactate: 1.2  Venous Blood Gas:   @ 05:59  7.35/38/33//60  VBG Lactate: 1.3  Venous Blood Gas:   @ 02:14  7.32/37/35/18/62  VBG Lactate: 1.0  Venous Blood Gas:  05-10 @ 22:01  7.24/39/27/16/44  VBG Lactate: 1.6  Venous Blood Gas:  05-10 @ 19:44  7.23/37/23/15/31  VBG Lactate: 1.8  Venous Blood Gas:  05-10 @ 17:55  7.26/38/<20/16/26  VBG Lactate: 2.2      MICROBIOLOGY:     RADIOLOGY:  [ ] Reviewed and interpreted by me    EKG:      [facility] ANP-BC (ext [id number]) CHIEF COMPLAINT:  Patient is a 76y old  Female who presents with a chief complaint of Urosepsis and Diabetic Ketoacidosis (11 May 2021 05:26)    HPI:  76 yr old female with PMHx HTN, CABG (remote), HFrEF (30-35%), recurrent UTI's, DM2 on metformin and dapaglifozin (non compliant) who originally presented 5/10/21 with fatigue/weakness, decreased po intake. Found to be in DKA with septic shock due to E.Coli UTI requiring norepi gtt (d/c'ed 21 1630). Pt treated with insulin gtt with eventual conversion to lantus/ademlog/ISS. Pt in addition treated with ceftriaxone. As Blood cultures 5/10/21 demonstrated gm positive cocci in clusters pt started on vanco as well.            Interval Events:      REVIEW OF SYSTEMS:  CONSTITUTIONAL: No weakness, fevers or chills  EYES/ENT: No visual changes;  No vertigo or throat pain   NECK: No pain or stiffness  RESPIRATORY: No cough, wheezing, hemoptysis; No shortness of breath  CARDIOVASCULAR: No chest pain or palpitations  GASTROINTESTINAL: No abdominal or epigastric pain. No nausea, vomiting, or hematemesis; No diarrhea or constipation. No melena or hematochezia.  GENITOURINARY: No dysuria, frequency or hematuria  NEUROLOGICAL: No numbness or weakness  SKIN: No itching, rashes          OBJECTIVE:  ICU Vital Signs Last 24 Hrs  T(C): 36.4 (12 May 2021 04:00), Max: 37.2 (11 May 2021 08:00)  T(F): 97.6 (12 May 2021 04:00), Max: 99 (11 May 2021 08:00)  HR: 94 (12 May 2021 05:00) (93 - 101)  BP: 137/63 (12 May 2021 05:00) (110/56 - 153/95)  BP(mean): 91 (12 May 2021 05:00) (79 - 113)  ABP: --  ABP(mean): --  RR: 27 (12 May 2021 05:00) (22 - 31)  SpO2: 93% (12 May 2021 05:00) (89% - 100%)        05-10 @ 07:01  -  05-11 @ 07:00  --------------------------------------------------------  IN: 843.5 mL / OUT: 225 mL / NET: 618.5 mL     @ 07:01   @ 06:07  --------------------------------------------------------  IN: 2374 mL / OUT: 850 mL / NET: 1524 mL      CAPILLARY BLOOD GLUCOSE      POCT Blood Glucose.: 223 mg/dL (11 May 2021 20:53)          PHYSICAL EXAM:  GENERAL: NAD   HEENT:  Atraumatic, Normocephalic. Dry mucous membranes.   CHEST/LUNG: breath sounds bilaterally with crackles bibasilar to 1/4 up that clear to cough   HEART: NSR without ectopy, without  murmurs, rubs, gallops, clicks   ABDOMEN: Soft, tender in RUQ/RLQ to deep palpation, Nondistended, +bowel sounds  EXTREMITIES:  2+ radial bilat, 1+/1+ dp/pt, 1+ pitting edema in lower extremities, digits warm to touch with good cap refill < 3 sec.  PSYCH: A&Ox3  SKIN: ecchymosis noted to bilateral shoulders. Dry skin intact, Rt IJ TLC, site without redness, infiltration, drainage, swelling, tenderness, pain. dressing dry and intact        HOSPITAL MEDICATIONS:  MEDICATIONS  (STANDING):  cefTRIAXone   IVPB 1000 milliGRAM(s) IV Intermittent every 24 hours  chlorhexidine 4% Liquid 1 Application(s) Topical <User Schedule>  dextrose 40% Gel 15 Gram(s) Oral once  dextrose 5%. 1000 milliLiter(s) (50 mL/Hr) IV Continuous <Continuous>  dextrose 50% Injectable 25 Gram(s) IV Push once  dextrose 50% Injectable 12.5 Gram(s) IV Push once  glucagon  Injectable 1 milliGRAM(s) IntraMuscular once  heparin   Injectable 5000 Unit(s) SubCutaneous every 8 hours  insulin lispro (ADMELOG) corrective regimen sliding scale   SubCutaneous three times a day before meals  insulin lispro Injectable (ADMELOG) 5 Unit(s) SubCutaneous three times a day before meals  insulin regular Infusion 2 Unit(s)/Hr (2 mL/Hr) IV Continuous <Continuous>  vancomycin  IVPB 1000 milliGRAM(s) IV Intermittent every 24 hours    MEDICATIONS  (PRN):  sodium chloride 0.9% lock flush 10 milliLiter(s) IV Push every 1 hour PRN Pre/post blood products, medications, blood draw, and to maintain line patency      LABS:                        10.4   8.05  )-----------( 216      ( 12 May 2021 00:47 )             31.1     Hgb Trend: 10.4<--, 10.6<--, 10.9<--, 12.1<--  05-12    131<L>  |  99  |  43<H>  ----------------------------<  215<H>  3.7   |  18<L>  |  1.39<H>    Ca    8.4      12 May 2021 00:47  Phos  2.4     05-12  Mg     2.1     05-12    TPro  7.0  /  Alb  2.8<L>  /  TBili  0.4  /  DBili  x   /  AST  34  /  ALT  29  /  AlkPhos  114  05-10    LIVER FUNCTIONS - ( 10 May 2021 22:01 )  Alb: 2.8 g/dL / Pro: 7.0 g/dL / ALK PHOS: 114 U/L / ALT: 29 U/L / AST: 34 U/L / GGT: x           Creatinine Trend: 1.39<--, 1.40<--, 1.47<--, 1.58<--, 1.60<--, 1.69<--  PT/INR - ( 12 May 2021 00:47 )   PT: 12.5 sec;   INR: 1.04 ratio         PTT - ( 12 May 2021 00:47 )  PTT:25.6 sec  Urinalysis Basic - ( 10 May 2021 17:58 )    Color: Yellow / Appearance: Turbid / S.014 / pH: x  Gluc: x / Ketone: Small  / Bili: Negative / Urobili: Negative   Blood: x / Protein: 300 mg/dL / Nitrite: Negative   Leuk Esterase: Large / RBC: 8 /hpf / WBC >50 /HPF   Sq Epi: x / Non Sq Epi: 0 /hpf / Bacteria: Few        Venous Blood Gas:   @ 00:16  7.39/39/31/23/60  VBG Lactate: 1.3  Venous Blood Gas:   @ 22:25  7.37/37/31//59  VBG Lactate: 1.0  Venous Blood Gas:   @ 17:40  7.39/38/30//59  VBG Lactate: 1.6  Venous Blood Gas:   @ 14:00  7.38/38/29//56  VBG Lactate: 2.1  Venous Blood Gas:   @ 10:15  7.36/38/30//57  VBG Lactate: 1.2  Venous Blood Gas:   @ 05:59  7.35/38/33//60  VBG Lactate: 1.3  Venous Blood Gas:   @ 02:14  7.32/37/35/18/62  VBG Lactate: 1.0  Venous Blood Gas:  05-10 @ 22:01  7.24/39/27/16/44  VBG Lactate: 1.6  Venous Blood Gas:  05-10 @ 19:44  7.23/37/23/15/31  VBG Lactate: 1.8  Venous Blood Gas:  05-10 @ 17:55  7.26/38/<20/16/26  VBG Lactate: 2.2      MICROBIOLOGY:     RADIOLOGY:  [ ] Reviewed and interpreted by me    EKG:      [facility]-BC (ext [id number])

## 2021-05-12 NOTE — CHART NOTE - NSCHARTNOTEFT_GEN_A_CORE
MAR Accept Note     Transfer from: MICU    Transfer to: (x) Medicine   (  ) Telemetry     (   ) RCU        (    ) Palliative         (   ) Stroke Unit          (   ) __________________    Accepting Physician: Dr Chester      MICU COURSE:    76 F PMH T2DM, HTN, CABG years ago on aspirin 325, systolic HF and recurrent UTIs BIBEMS for being found on the kitchen floor in her home with elevated BG. Patient has a hx of T2DM on metformin 500 BID and dapagliflozin 10 mg qday which she is noncompliant with. Admitted to MICU for management of DKA/septic shock in the setting of EColi Uti.  Initially on insulin gtt, transitioned to Lantus/Ademlog/ISS for endocrine.  TLC placed in ED as patient resistant to multiple blood draws abd briefly requiring Levophed.  UTI being treated with Ceftriaxone, awaiting sensitivities. Blood cultures with MRSE--likely contaminant. Vancomycin restarted for now.  Repeat blood cultures sent for verification this AM. Stable off pressors and insulin gtt.      For Followup:  -Endo recommendations  -BMP daily  -Repeat blood cultures

## 2021-05-13 DIAGNOSIS — I50.22 CHRONIC SYSTOLIC (CONGESTIVE) HEART FAILURE: ICD-10-CM

## 2021-05-13 DIAGNOSIS — Z29.9 ENCOUNTER FOR PROPHYLACTIC MEASURES, UNSPECIFIED: ICD-10-CM

## 2021-05-13 DIAGNOSIS — N39.0 URINARY TRACT INFECTION, SITE NOT SPECIFIED: ICD-10-CM
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
## 2021-05-13 LAB
-  AMPICILLIN/SULBACTAM: SIGNIFICANT CHANGE UP
-  CEFAZOLIN: SIGNIFICANT CHANGE UP
-  CLINDAMYCIN: SIGNIFICANT CHANGE UP
-  ERYTHROMYCIN: SIGNIFICANT CHANGE UP
-  GENTAMICIN: SIGNIFICANT CHANGE UP
-  OXACILLIN: SIGNIFICANT CHANGE UP
-  PENICILLIN: SIGNIFICANT CHANGE UP
-  RIFAMPIN: SIGNIFICANT CHANGE UP
-  TETRACYCLINE: SIGNIFICANT CHANGE UP
-  TRIMETHOPRIM/SULFAMETHOXAZOLE: SIGNIFICANT CHANGE UP
-  VANCOMYCIN: SIGNIFICANT CHANGE UP
ALBUMIN SERPL ELPH-MCNC: 2.8 G/DL — LOW (ref 3.3–5)
ALP SERPL-CCNC: 126 U/L — HIGH (ref 40–120)
ALT FLD-CCNC: 20 U/L — SIGNIFICANT CHANGE UP (ref 10–45)
ANION GAP SERPL CALC-SCNC: 13 MMOL/L — SIGNIFICANT CHANGE UP (ref 5–17)
APTT BLD: 27.4 SEC — LOW (ref 27.5–35.5)
AST SERPL-CCNC: 22 U/L — SIGNIFICANT CHANGE UP (ref 10–40)
BASOPHILS # BLD AUTO: 0.03 K/UL — SIGNIFICANT CHANGE UP (ref 0–0.2)
BASOPHILS NFR BLD AUTO: 0.3 % — SIGNIFICANT CHANGE UP (ref 0–2)
BILIRUB SERPL-MCNC: 0.3 MG/DL — SIGNIFICANT CHANGE UP (ref 0.2–1.2)
BUN SERPL-MCNC: 26 MG/DL — HIGH (ref 7–23)
CALCIUM SERPL-MCNC: 8.3 MG/DL — LOW (ref 8.4–10.5)
CHLORIDE SERPL-SCNC: 102 MMOL/L — SIGNIFICANT CHANGE UP (ref 96–108)
CHOLEST SERPL-MCNC: 161 MG/DL — SIGNIFICANT CHANGE UP
CO2 SERPL-SCNC: 19 MMOL/L — LOW (ref 22–31)
CREAT SERPL-MCNC: 1.22 MG/DL — SIGNIFICANT CHANGE UP (ref 0.5–1.3)
CULTURE RESULTS: SIGNIFICANT CHANGE UP
EOSINOPHIL # BLD AUTO: 0.07 K/UL — SIGNIFICANT CHANGE UP (ref 0–0.5)
EOSINOPHIL NFR BLD AUTO: 0.8 % — SIGNIFICANT CHANGE UP (ref 0–6)
GLUCOSE BLDC GLUCOMTR-MCNC: 144 MG/DL — HIGH (ref 70–99)
GLUCOSE BLDC GLUCOMTR-MCNC: 166 MG/DL — HIGH (ref 70–99)
GLUCOSE BLDC GLUCOMTR-MCNC: 183 MG/DL — HIGH (ref 70–99)
GLUCOSE BLDC GLUCOMTR-MCNC: 222 MG/DL — HIGH (ref 70–99)
GLUCOSE SERPL-MCNC: 213 MG/DL — HIGH (ref 70–99)
HCT VFR BLD CALC: 31.1 % — LOW (ref 34.5–45)
HDLC SERPL-MCNC: 22 MG/DL — LOW
HGB BLD-MCNC: 10.5 G/DL — LOW (ref 11.5–15.5)
IMM GRANULOCYTES NFR BLD AUTO: 1.2 % — SIGNIFICANT CHANGE UP (ref 0–1.5)
INR BLD: 1.12 RATIO — SIGNIFICANT CHANGE UP (ref 0.88–1.16)
LIPID PNL WITH DIRECT LDL SERPL: 89 MG/DL — SIGNIFICANT CHANGE UP
LYMPHOCYTES # BLD AUTO: 1.44 K/UL — SIGNIFICANT CHANGE UP (ref 1–3.3)
LYMPHOCYTES # BLD AUTO: 16.3 % — SIGNIFICANT CHANGE UP (ref 13–44)
MAGNESIUM SERPL-MCNC: 1.9 MG/DL — SIGNIFICANT CHANGE UP (ref 1.6–2.6)
MCHC RBC-ENTMCNC: 28.1 PG — SIGNIFICANT CHANGE UP (ref 27–34)
MCHC RBC-ENTMCNC: 33.8 GM/DL — SIGNIFICANT CHANGE UP (ref 32–36)
MCV RBC AUTO: 83.2 FL — SIGNIFICANT CHANGE UP (ref 80–100)
METHOD TYPE: SIGNIFICANT CHANGE UP
MONOCYTES # BLD AUTO: 0.76 K/UL — SIGNIFICANT CHANGE UP (ref 0–0.9)
MONOCYTES NFR BLD AUTO: 8.6 % — SIGNIFICANT CHANGE UP (ref 2–14)
NEUTROPHILS # BLD AUTO: 6.43 K/UL — SIGNIFICANT CHANGE UP (ref 1.8–7.4)
NEUTROPHILS NFR BLD AUTO: 72.8 % — SIGNIFICANT CHANGE UP (ref 43–77)
NON HDL CHOLESTEROL: 139 MG/DL — HIGH
NRBC # BLD: 0 /100 WBCS — SIGNIFICANT CHANGE UP (ref 0–0)
ORGANISM # SPEC MICROSCOPIC CNT: SIGNIFICANT CHANGE UP
ORGANISM # SPEC MICROSCOPIC CNT: SIGNIFICANT CHANGE UP
PHOSPHATE SERPL-MCNC: 2.1 MG/DL — LOW (ref 2.5–4.5)
PLATELET # BLD AUTO: 208 K/UL — SIGNIFICANT CHANGE UP (ref 150–400)
POTASSIUM SERPL-MCNC: 3.6 MMOL/L — SIGNIFICANT CHANGE UP (ref 3.5–5.3)
POTASSIUM SERPL-SCNC: 3.6 MMOL/L — SIGNIFICANT CHANGE UP (ref 3.5–5.3)
PROT SERPL-MCNC: 7 G/DL — SIGNIFICANT CHANGE UP (ref 6–8.3)
PROTHROM AB SERPL-ACNC: 13.4 SEC — SIGNIFICANT CHANGE UP (ref 10.6–13.6)
RBC # BLD: 3.74 M/UL — LOW (ref 3.8–5.2)
RBC # FLD: 15 % — HIGH (ref 10.3–14.5)
SODIUM SERPL-SCNC: 134 MMOL/L — LOW (ref 135–145)
SPECIMEN SOURCE: SIGNIFICANT CHANGE UP
TRIGL SERPL-MCNC: 251 MG/DL — HIGH
WBC # BLD: 8.84 K/UL — SIGNIFICANT CHANGE UP (ref 3.8–10.5)
WBC # FLD AUTO: 8.84 K/UL — SIGNIFICANT CHANGE UP (ref 3.8–10.5)

## 2021-05-13 PROCEDURE — 99232 SBSQ HOSP IP/OBS MODERATE 35: CPT | Mod: GC

## 2021-05-13 PROCEDURE — 99233 SBSQ HOSP IP/OBS HIGH 50: CPT | Mod: GC

## 2021-05-13 RX ORDER — INSULIN LISPRO 100/ML
11 VIAL (ML) SUBCUTANEOUS
Refills: 0 | Status: DISCONTINUED | OUTPATIENT
Start: 2021-05-13 | End: 2021-05-17

## 2021-05-13 RX ORDER — SODIUM,POTASSIUM PHOSPHATES 278-250MG
1 POWDER IN PACKET (EA) ORAL ONCE
Refills: 0 | Status: COMPLETED | OUTPATIENT
Start: 2021-05-13 | End: 2021-05-13

## 2021-05-13 RX ORDER — INSULIN LISPRO 100/ML
10 VIAL (ML) SUBCUTANEOUS
Refills: 0 | Status: DISCONTINUED | OUTPATIENT
Start: 2021-05-13 | End: 2021-05-13

## 2021-05-13 RX ORDER — MAGNESIUM OXIDE 400 MG ORAL TABLET 241.3 MG
400 TABLET ORAL
Refills: 0 | Status: COMPLETED | OUTPATIENT
Start: 2021-05-13 | End: 2021-05-14

## 2021-05-13 RX ORDER — POLYETHYLENE GLYCOL 3350 17 G/17G
17 POWDER, FOR SOLUTION ORAL
Refills: 0 | Status: DISCONTINUED | OUTPATIENT
Start: 2021-05-13 | End: 2021-05-20

## 2021-05-13 RX ORDER — INSULIN GLARGINE 100 [IU]/ML
35 INJECTION, SOLUTION SUBCUTANEOUS
Refills: 0 | Status: DISCONTINUED | OUTPATIENT
Start: 2021-05-13 | End: 2021-05-13

## 2021-05-13 RX ORDER — ENOXAPARIN SODIUM 100 MG/ML
40 INJECTION SUBCUTANEOUS DAILY
Refills: 0 | Status: DISCONTINUED | OUTPATIENT
Start: 2021-05-13 | End: 2021-05-20

## 2021-05-13 RX ORDER — LIDOCAINE 4 G/100G
1 CREAM TOPICAL DAILY
Refills: 0 | Status: DISCONTINUED | OUTPATIENT
Start: 2021-05-13 | End: 2021-05-20

## 2021-05-13 RX ORDER — CIPROFLOXACIN LACTATE 400MG/40ML
500 VIAL (ML) INTRAVENOUS EVERY 12 HOURS
Refills: 0 | Status: COMPLETED | OUTPATIENT
Start: 2021-05-13 | End: 2021-05-15

## 2021-05-13 RX ORDER — INSULIN LISPRO 100/ML
11 VIAL (ML) SUBCUTANEOUS
Refills: 0 | Status: DISCONTINUED | OUTPATIENT
Start: 2021-05-13 | End: 2021-05-13

## 2021-05-13 RX ORDER — SENNA PLUS 8.6 MG/1
2 TABLET ORAL AT BEDTIME
Refills: 0 | Status: DISCONTINUED | OUTPATIENT
Start: 2021-05-13 | End: 2021-05-20

## 2021-05-13 RX ORDER — INSULIN GLARGINE 100 [IU]/ML
34 INJECTION, SOLUTION SUBCUTANEOUS AT BEDTIME
Refills: 0 | Status: DISCONTINUED | OUTPATIENT
Start: 2021-05-13 | End: 2021-05-17

## 2021-05-13 RX ADMIN — INSULIN GLARGINE 34 UNIT(S): 100 INJECTION, SOLUTION SUBCUTANEOUS at 22:07

## 2021-05-13 RX ADMIN — Medication 10 UNIT(S): at 17:34

## 2021-05-13 RX ADMIN — LIDOCAINE 1 PATCH: 4 CREAM TOPICAL at 17:34

## 2021-05-13 RX ADMIN — Medication 4: at 09:15

## 2021-05-13 RX ADMIN — Medication 2: at 12:58

## 2021-05-13 RX ADMIN — LIDOCAINE 1 PATCH: 4 CREAM TOPICAL at 19:40

## 2021-05-13 RX ADMIN — SENNA PLUS 2 TABLET(S): 8.6 TABLET ORAL at 22:07

## 2021-05-13 RX ADMIN — HEPARIN SODIUM 5000 UNIT(S): 5000 INJECTION INTRAVENOUS; SUBCUTANEOUS at 13:00

## 2021-05-13 RX ADMIN — Medication 2: at 17:33

## 2021-05-13 RX ADMIN — MAGNESIUM OXIDE 400 MG ORAL TABLET 400 MILLIGRAM(S): 241.3 TABLET ORAL at 17:33

## 2021-05-13 RX ADMIN — HEPARIN SODIUM 5000 UNIT(S): 5000 INJECTION INTRAVENOUS; SUBCUTANEOUS at 05:06

## 2021-05-13 RX ADMIN — CHLORHEXIDINE GLUCONATE 1 APPLICATION(S): 213 SOLUTION TOPICAL at 05:06

## 2021-05-13 RX ADMIN — Medication 9 UNIT(S): at 09:15

## 2021-05-13 RX ADMIN — Medication 1 PACKET(S): at 14:48

## 2021-05-13 RX ADMIN — Medication 500 MILLIGRAM(S): at 17:33

## 2021-05-13 RX ADMIN — Medication 11 UNIT(S): at 12:59

## 2021-05-13 RX ADMIN — ENOXAPARIN SODIUM 40 MILLIGRAM(S): 100 INJECTION SUBCUTANEOUS at 17:33

## 2021-05-13 RX ADMIN — POLYETHYLENE GLYCOL 3350 17 GRAM(S): 17 POWDER, FOR SOLUTION ORAL at 17:33

## 2021-05-13 NOTE — PROGRESS NOTE ADULT - SUBJECTIVE AND OBJECTIVE BOX
PROGRESS NOTE:     CONTACT INFO:  Iraida Olivier MD | PGY-1  Pager: [phone] [place], [address] [facility]  After 7pm page night float  On weekends after 1pm check resident assignment tab to see who is covering      Patient is a 76y old  Female who presents with a chief complaint of Urosepsis and Diabetic Ketoacidosis (12 May 2021 13:20)      SUBJECTIVE / OVERNIGHT EVENTS:    - No acute events overnight.   - No fevers/chills.  - Patient seen and evaluated at bedside.  - Denies SOB at rest, chest pain, palpitations, abdominal pain, nausea/vomiting    ADDITIONAL REVIEW OF SYSTEMS:    MEDICATIONS  (STANDING):  cefTRIAXone   IVPB 1000 milliGRAM(s) IV Intermittent every 24 hours  chlorhexidine 4% Liquid 1 Application(s) Topical <User Schedule>  dextrose 40% Gel 15 Gram(s) Oral once  dextrose 5%. 1000 milliLiter(s) (50 mL/Hr) IV Continuous <Continuous>  dextrose 50% Injectable 25 Gram(s) IV Push once  dextrose 50% Injectable 12.5 Gram(s) IV Push once  glucagon  Injectable 1 milliGRAM(s) IntraMuscular once  heparin   Injectable 5000 Unit(s) SubCutaneous every 8 hours  insulin glargine Injectable (LANTUS) 30 Unit(s) SubCutaneous <User Schedule>  insulin lispro (ADMELOG) corrective regimen sliding scale   SubCutaneous at bedtime  insulin lispro (ADMELOG) corrective regimen sliding scale   SubCutaneous three times a day before meals  insulin lispro Injectable (ADMELOG) 9 Unit(s) SubCutaneous three times a day before meals  vancomycin  IVPB 1250 milliGRAM(s) IV Intermittent every 24 hours    MEDICATIONS  (PRN):  sodium chloride 0.9% lock flush 10 milliLiter(s) IV Push every 1 hour PRN Pre/post blood products, medications, blood draw, and to maintain line patency      CAPILLARY BLOOD GLUCOSE      POCT Blood Glucose.: 222 mg/dL (13 May 2021 08:27)  POCT Blood Glucose.: 223 mg/dL (12 May 2021 22:16)  POCT Blood Glucose.: 247 mg/dL (12 May 2021 19:05)  POCT Blood Glucose.: 193 mg/dL (12 May 2021 16:53)  POCT Blood Glucose.: 241 mg/dL (12 May 2021 12:11)    I&O's Summary    12 May 2021 07:01  -  13 May 2021 07:00  --------------------------------------------------------  IN: 460 mL / OUT: 1000 mL / NET: -540 mL        PHYSICAL EXAM:  Vital Signs Last 24 Hrs  T(C): 36.5 (13 May 2021 09:03), Max: 36.8 (13 May 2021 05:05)  T(F): 97.7 (13 May 2021 09:03), Max: 98.2 (13 May 2021 05:05)  HR: 84 (13 May 2021 09:03) (80 - 93)  BP: 127/75 (13 May 2021 09:03) (95/50 - 138/83)  BP(mean): 101 (12 May 2021 17:00) (66 - 101)  RR: 18 (13 May 2021 09:03) (18 - 29)  SpO2: 94% (13 May 2021 09:03) (93% - 98%)    CONSTITUTIONAL: NAD, well-developed  RESPIRATORY: Normal respiratory effort; lungs are clear to auscultation bilaterally  CARDIOVASCULAR: Regular rate and rhythm, normal S1 and S2, no murmur/rub/gallop; No lower extremity edema; Peripheral pulses are 2+ bilaterally  ABDOMEN: Nontender to palpation, normoactive bowel sounds, no rebound/guarding; No hepatosplenomegaly  MUSCLOSKELETAL: no clubbing or cyanosis of digits; no joint swelling or tenderness to palpation  PSYCH: A+O to person, place, and time; affect appropriate    LABS:                        10.4   8.05  )-----------( 216      ( 12 May 2021 00:47 )             31.1     05-12    131<L>  |  99  |  43<H>  ----------------------------<  215<H>  3.7   |  18<L>  |  1.39<H>    Ca    8.4      12 May 2021 00:47  Phos  2.4     05-12  Mg     2.1     05-12      PT/INR - ( 12 May 2021 00:47 )   PT: 12.5 sec;   INR: 1.04 ratio         PTT - ( 12 May 2021 00:47 )  PTT:25.6 sec          Culture - Blood (collected 12 May 2021 04:06)  Source: .Blood Blood-Peripheral  Preliminary Report (13 May 2021 05:01):    No growth to date.    Culture - Urine (collected 11 May 2021 00:36)  Source: .Urine Catheterized  Final Report (12 May 2021 18:17):    >100,000 CFU/ml Escherichia coli  Organism: Escherichia coli (12 May 2021 18:17)  Organism: Escherichia coli (12 May 2021 18:17)    Culture - Blood (collected 10 May 2021 22:57)  Source: .Blood Blood-Peripheral  Gram Stain (11 May 2021 19:37):    Growth in aerobic bottle: Gram Positive Cocci in Clusters  Final Report (12 May 2021 18:05):    Growth in aerobic bottle: Staphylococcus epidermidis    Growth in aerobic bottle: Staphylococcus capitis "Susceptibilities not    performed"    ***Blood Panel PCR results on this specimen are available    approximately 3 hours after the Gram stain result.***    Gram stain, PCR, and/or culture results may not always    correspond due to difference in methodologies.    ************************************************************    This PCR assay was performed by multiplex PCR. This    Assay tests for 66 bacterialand resistance gene targets.    Please refer to the [facility] [employer] test directory    at [email or web address] for details.  Organism: Blood Culture PCR (12 May 2021 17:11)  Organism: Blood Culture PCR (12 May 2021 17:11)    Culture - Blood (collected 10 May 2021 22:57)  Source: .Blood Blood-Peripheral  Gram Stain (11 May 2021 22:40):    Growth in anaerobic bottle: Gram Positive Cocci in Clusters  Preliminary Report (12 May 2021 18:04):    Growth in anaerobic bottle: Staphylococcus epidermidis    Susceptibility to follow.        RADIOLOGY & ADDITIONAL TESTS:  Results Reviewed:   Imaging Personally Reviewed:  Electrocardiogram Personally Reviewed:    COORDINATION OF CARE:  Care Discussed with Consultants/Other Providers [Y/N]: Y  Prior or Outpatient Records Reviewed [Y/N]: Y   PROGRESS NOTE:     CONTACT INFO:  Iraida Olivier MD | PGY-1  Pager: [phone] [place], [address] [facility]  After 7pm page night float  On weekends after 1pm check resident assignment tab to see who is covering      Patient is a 76y old  Female who presents with a chief complaint of Urosepsis and Diabetic Ketoacidosis (12 May 2021 13:20)      SUBJECTIVE / OVERNIGHT EVENTS:    - No acute events overnight.   - No fevers/chills.  - Patient seen and evaluated at bedside.  - Denies SOB at rest, chest pain, palpitations, abdominal pain, nausea/vomiting  - Feels well overall. Patient states would NOT take insulin on outside.    ADDITIONAL REVIEW OF SYSTEMS:    MEDICATIONS  (STANDING):  cefTRIAXone   IVPB 1000 milliGRAM(s) IV Intermittent every 24 hours  chlorhexidine 4% Liquid 1 Application(s) Topical <User Schedule>  dextrose 40% Gel 15 Gram(s) Oral once  dextrose 5%. 1000 milliLiter(s) (50 mL/Hr) IV Continuous <Continuous>  dextrose 50% Injectable 25 Gram(s) IV Push once  dextrose 50% Injectable 12.5 Gram(s) IV Push once  glucagon  Injectable 1 milliGRAM(s) IntraMuscular once  heparin   Injectable 5000 Unit(s) SubCutaneous every 8 hours  insulin glargine Injectable (LANTUS) 30 Unit(s) SubCutaneous <User Schedule>  insulin lispro (ADMELOG) corrective regimen sliding scale   SubCutaneous at bedtime  insulin lispro (ADMELOG) corrective regimen sliding scale   SubCutaneous three times a day before meals  insulin lispro Injectable (ADMELOG) 9 Unit(s) SubCutaneous three times a day before meals  vancomycin  IVPB 1250 milliGRAM(s) IV Intermittent every 24 hours    MEDICATIONS  (PRN):  sodium chloride 0.9% lock flush 10 milliLiter(s) IV Push every 1 hour PRN Pre/post blood products, medications, blood draw, and to maintain line patency      CAPILLARY BLOOD GLUCOSE      POCT Blood Glucose.: 222 mg/dL (13 May 2021 08:27)  POCT Blood Glucose.: 223 mg/dL (12 May 2021 22:16)  POCT Blood Glucose.: 247 mg/dL (12 May 2021 19:05)  POCT Blood Glucose.: 193 mg/dL (12 May 2021 16:53)  POCT Blood Glucose.: 241 mg/dL (12 May 2021 12:11)    I&O's Summary    12 May 2021 07:01  -  13 May 2021 07:00  --------------------------------------------------------  IN: 460 mL / OUT: 1000 mL / NET: -540 mL        PHYSICAL EXAM:  Vital Signs Last 24 Hrs  T(C): 36.5 (13 May 2021 09:03), Max: 36.8 (13 May 2021 05:05)  T(F): 97.7 (13 May 2021 09:03), Max: 98.2 (13 May 2021 05:05)  HR: 84 (13 May 2021 09:03) (80 - 93)  BP: 127/75 (13 May 2021 09:03) (95/50 - 138/83)  BP(mean): 101 (12 May 2021 17:00) (66 - 101)  RR: 18 (13 May 2021 09:03) (18 - 29)  SpO2: 94% (13 May 2021 09:03) (93% - 98%)    CONSTITUTIONAL: NAD, well-developed  RESPIRATORY: Normal respiratory effort; lungs are clear to auscultation bilaterally  CARDIOVASCULAR: Regular rate and rhythm, normal S1 and S2, no murmur/rub/gallop; No lower extremity edema; Peripheral pulses are 2+ bilaterally  ABDOMEN: Nontender to palpation, normoactive bowel sounds, no rebound/guarding; No hepatosplenomegaly  MUSCLOSKELETAL: no clubbing or cyanosis of digits; no joint swelling or tenderness to palpation  PSYCH: A+O to person, place, and time; affect appropriate    LABS:                        10.4   8.05  )-----------( 216      ( 12 May 2021 00:47 )             31.1     05-12    131<L>  |  99  |  43<H>  ----------------------------<  215<H>  3.7   |  18<L>  |  1.39<H>    Ca    8.4      12 May 2021 00:47  Phos  2.4     05-12  Mg     2.1     05-12      PT/INR - ( 12 May 2021 00:47 )   PT: 12.5 sec;   INR: 1.04 ratio         PTT - ( 12 May 2021 00:47 )  PTT:25.6 sec          Culture - Blood (collected 12 May 2021 04:06)  Source: .Blood Blood-Peripheral  Preliminary Report (13 May 2021 05:01):    No growth to date.    Culture - Urine (collected 11 May 2021 00:36)  Source: .Urine Catheterized  Final Report (12 May 2021 18:17):    >100,000 CFU/ml Escherichia coli  Organism: Escherichia coli (12 May 2021 18:17)  Organism: Escherichia coli (12 May 2021 18:17)    Culture - Blood (collected 10 May 2021 22:57)  Source: .Blood Blood-Peripheral  Gram Stain (11 May 2021 19:37):    Growth in aerobic bottle: Gram Positive Cocci in Clusters  Final Report (12 May 2021 18:05):    Growth in aerobic bottle: Staphylococcus epidermidis    Growth in aerobic bottle: Staphylococcus capitis "Susceptibilities not    performed"    ***Blood Panel PCR results on this specimen are available    approximately 3 hours after the Gram stain result.***    Gram stain, PCR, and/or culture results may not always    correspond due to difference in methodologies.    ************************************************************    This PCR assay was performed by multiplex PCR. This    Assay tests for 66 bacterialand resistance gene targets.    Please refer to the [facility] [employer] test directory    at [email or web address] for details.  Organism: Blood Culture PCR (12 May 2021 17:11)  Organism: Blood Culture PCR (12 May 2021 17:11)    Culture - Blood (collected 10 May 2021 22:57)  Source: .Blood Blood-Peripheral  Gram Stain (11 May 2021 22:40):    Growth in anaerobic bottle: Gram Positive Cocci in Clusters  Preliminary Report (12 May 2021 18:04):    Growth in anaerobic bottle: Staphylococcus epidermidis    Susceptibility to follow.        RADIOLOGY & ADDITIONAL TESTS:  Results Reviewed:   Imaging Personally Reviewed:  Electrocardiogram Personally Reviewed:    COORDINATION OF CARE:  Care Discussed with Consultants/Other Providers [Y/N]: Y  Prior or Outpatient Records Reviewed [Y/N]: Y

## 2021-05-13 NOTE — PHYSICAL THERAPY INITIAL EVALUATION ADULT - PERTINENT HX OF CURRENT PROBLEM, REHAB EVAL
76 F PMH T2DM, HTN, CABG years ago on aspirin 325, systolic HF and recurrent UTIs BIBEMS for being found on the kitchen floor in her home with elevated BG (not from nursing home as ED note states). Patient has a hx of T2DM on metformin. a/w DKA. CT head/spine showed no fractures or bleeding.  Shoulder X-ray showed no fractures

## 2021-05-13 NOTE — PHYSICAL THERAPY INITIAL EVALUATION ADULT - ADDITIONAL COMMENTS
Patient lives alone in pvt house 1 step to enter. Patient ambulated without AD independent. occasionally uses rw. pt. owns RW, bed side commode, w/c at home.

## 2021-05-13 NOTE — PROGRESS NOTE ADULT - PROBLEM SELECTOR PLAN 5
presented in septic shock, BPs now stable 130s  will need to do med recc to confirm home meds  pt states for all meds only takes "sometimes"

## 2021-05-13 NOTE — PROGRESS NOTE ADULT - ASSESSMENT
1. DKA - BIBEMS after falling and fingerstick found to be 500s concerning for DKA 2/2 urosepsis  - s/p insulin drip at MICU (d/c on 5/11)  - now on Lantus 30U, admelog 9U, ISS  - Fingersticks ~200-250s    2. Hypotension - Found to be hypotensive in ED s/p 3L NS and levophed drip  - s/p levophed drip at MICU (d/c on 5/11)  - BPs now stable with sBP 130s    3. Sepsis - UC grew >100k E. coli concerning for urosepsis  - UC 5/10 grew E. coli  - BC 5/10 grew Staph. epi  - BC 5/12 NGTD  - on vancomycin IV 1250mg (5/10-) x2 doses  - vanc trough 8.9 on 5/12  - on ceftriaxone IV 1000mg (5/10-) x3 doses    4. h/o fall - s/p fall on 5/10 2/2 AMS from DKA  - Currently A&Ox3  - CT head/spine showed no fractures or bleeding  - Shoulder X-ray showed no fractures  - patient c/o b/l shoulder pain since fall  - Start lidocaine patch for shoulder pain    5. T2DM  - endo following  - insulin plan as above  - holding home metformin and dapagliflozin  - DASH diet    6. Prophylaxis  - subq heparin 5000U q8h for DVT prophylaxis    Disposition  - discharge home pending clinical outcome  - coordinate pickup with family   76 F PMH T2DM, HTN, CABG years ago on aspirin 325, systolic HF and recurrent UTIs presented after being found down in kitchen by sister, admitted to MICU for management of DKA in the setting of Ecoli Uti and medication nonadherence, and septic shock 2/2 E.coli uti, transferred to floors 5/13.

## 2021-05-13 NOTE — PROGRESS NOTE ADULT - SUBJECTIVE AND OBJECTIVE BOX
Chief Complaint:     History:    MEDICATIONS  (STANDING):  chlorhexidine 4% Liquid 1 Application(s) Topical <User Schedule>  ciprofloxacin     Tablet 500 milliGRAM(s) Oral every 12 hours  dextrose 40% Gel 15 Gram(s) Oral once  dextrose 5%. 1000 milliLiter(s) (50 mL/Hr) IV Continuous <Continuous>  dextrose 50% Injectable 25 Gram(s) IV Push once  dextrose 50% Injectable 12.5 Gram(s) IV Push once  glucagon  Injectable 1 milliGRAM(s) IntraMuscular once  heparin   Injectable 5000 Unit(s) SubCutaneous every 8 hours  insulin glargine Injectable (LANTUS) 35 Unit(s) SubCutaneous <User Schedule>  insulin lispro (ADMELOG) corrective regimen sliding scale   SubCutaneous three times a day before meals  insulin lispro (ADMELOG) corrective regimen sliding scale   SubCutaneous at bedtime  insulin lispro Injectable (ADMELOG) 11 Unit(s) SubCutaneous three times a day before meals  magnesium oxide 400 milliGRAM(s) Oral three times a day with meals  polyethylene glycol 3350 17 Gram(s) Oral two times a day  potassium phosphate / sodium phosphate Powder (PHOS-NaK) 1 Packet(s) Oral once  senna 2 Tablet(s) Oral at bedtime    MEDICATIONS  (PRN):  sodium chloride 0.9% lock flush 10 milliLiter(s) IV Push every 1 hour PRN Pre/post blood products, medications, blood draw, and to maintain line patency      Allergies    clams - itching (Other)  No Known Drug Allergies  Peroxide (Blisters)    Intolerances      Review of Systems:  Constitutional: No fever  Eyes: No blurry vision  Neuro: No tremors  HEENT: No pain  Cardiovascular: No chest pain, palpitations  Respiratory: No SOB, no cough  GI: No nausea, vomiting, abdominal pain  : No dysuria  Skin: no rash  Psych: no depression  Endocrine: no polyuria, polydipsia  Hem/lymph: no swelling  Osteoporosis: no fractures    ALL OTHER SYSTEMS REVIEWED AND NEGATIVE    UNABLE TO OBTAIN    PHYSICAL EXAM:  VITALS: T(C): 36.8 (05-13-21 @ 11:47)  T(F): 98.2 (05-13-21 @ 11:47), Max: 98.2 (05-13-21 @ 05:05)  HR: 85 (05-13-21 @ 11:57) (80 - 93)  BP: 111/70 (05-13-21 @ 11:57) (100/62 - 138/83)  RR:  (18 - 29)  SpO2:  (93% - 98%)  Wt(kg): --  GENERAL: NAD, well-groomed, well-developed  EYES: No proptosis, no lid lag, anicteric  HEENT:  Atraumatic, Normocephalic, moist mucous membranes  THYROID: Normal size, no palpable nodules  RESPIRATORY: Clear to auscultation bilaterally; No rales, rhonchi, wheezing, or rubs  CARDIOVASCULAR: Regular rate and rhythm; No murmurs; no peripheral edema  GI: Soft, nontender, non distended, normal bowel sounds  SKIN: Dry, intact, No rashes or lesions  MUSCULOSKELETAL: Full range of motion, normal strength  NEURO: sensation intact, extraocular movements intact, no tremor, normal reflexes  PSYCH: Alert and oriented x 3, normal affect, normal mood  CUSHING'S SIGNS: no striae    POCT Blood Glucose.: 183 mg/dL (05-13-21 @ 12:01)  POCT Blood Glucose.: 222 mg/dL (05-13-21 @ 08:27)  POCT Blood Glucose.: 223 mg/dL (05-12-21 @ 22:16)  POCT Blood Glucose.: 247 mg/dL (05-12-21 @ 19:05)  POCT Blood Glucose.: 193 mg/dL (05-12-21 @ 16:53)  POCT Blood Glucose.: 241 mg/dL (05-12-21 @ 12:11)  POCT Blood Glucose.: 241 mg/dL (05-12-21 @ 08:19)  POCT Blood Glucose.: 223 mg/dL (05-11-21 @ 20:53)  POCT Blood Glucose.: 197 mg/dL (05-11-21 @ 19:59)  POCT Blood Glucose.: 163 mg/dL (05-11-21 @ 18:16)  POCT Blood Glucose.: 178 mg/dL (05-11-21 @ 17:21)  POCT Blood Glucose.: 214 mg/dL (05-11-21 @ 15:11)  POCT Blood Glucose.: 249 mg/dL (05-11-21 @ 13:14)  POCT Blood Glucose.: 256 mg/dL (05-11-21 @ 12:16)  POCT Blood Glucose.: 267 mg/dL (05-11-21 @ 11:11)  POCT Blood Glucose.: 243 mg/dL (05-11-21 @ 10:10)  POCT Blood Glucose.: 252 mg/dL (05-11-21 @ 09:05)  POCT Blood Glucose.: 255 mg/dL (05-11-21 @ 08:09)  POCT Blood Glucose.: 257 mg/dL (05-11-21 @ 06:45)  POCT Blood Glucose.: 298 mg/dL (05-11-21 @ 05:03)  POCT Blood Glucose.: 334 mg/dL (05-11-21 @ 04:08)  POCT Blood Glucose.: 369 mg/dL (05-11-21 @ 02:57)  POCT Blood Glucose.: 365 mg/dL (05-11-21 @ 01:06)  POCT Blood Glucose.: 446 mg/dL (05-11-21 @ 00:02)  POCT Blood Glucose.: 486 mg/dL (05-10-21 @ 22:57)  POCT Blood Glucose.: 504 mg/dL (05-10-21 @ 21:52)  POCT Blood Glucose.: 519 mg/dL (05-10-21 @ 20:47)  POCT Blood Glucose.: 527 mg/dL (05-10-21 @ 17:01)      05-13    134<L>  |  102  |  26<H>  ----------------------------<  213<H>  3.6   |  19<L>  |  1.22    EGFR if : 50<L>  EGFR if non : 43<L>    Ca    8.3<L>      05-13  Mg     1.9     05-13  Phos  2.1     05-13    TPro  7.0  /  Alb  2.8<L>  /  TBili  0.3  /  DBili  x   /  AST  22  /  ALT  20  /  AlkPhos  126<H>  05-13          Thyroid Function Tests:                           Chief Complaint: uncontrolled DM2    History: Patient seen and examined at bedside with sister present.   Patient reports she is tolerating diet. Ate most of breakfast however did not eat much lunch.   Reports feeling a bit better than yesterday. Still reporting right shoulder pain.     MEDICATIONS  (STANDING):  chlorhexidine 4% Liquid 1 Application(s) Topical <User Schedule>  ciprofloxacin     Tablet 500 milliGRAM(s) Oral every 12 hours  dextrose 40% Gel 15 Gram(s) Oral once  dextrose 5%. 1000 milliLiter(s) (50 mL/Hr) IV Continuous <Continuous>  dextrose 50% Injectable 25 Gram(s) IV Push once  dextrose 50% Injectable 12.5 Gram(s) IV Push once  glucagon  Injectable 1 milliGRAM(s) IntraMuscular once  heparin   Injectable 5000 Unit(s) SubCutaneous every 8 hours  insulin glargine Injectable (LANTUS) 35 Unit(s) SubCutaneous <User Schedule>  insulin lispro (ADMELOG) corrective regimen sliding scale   SubCutaneous three times a day before meals  insulin lispro (ADMELOG) corrective regimen sliding scale   SubCutaneous at bedtime  insulin lispro Injectable (ADMELOG) 11 Unit(s) SubCutaneous three times a day before meals  magnesium oxide 400 milliGRAM(s) Oral three times a day with meals  polyethylene glycol 3350 17 Gram(s) Oral two times a day  potassium phosphate / sodium phosphate Powder (PHOS-NaK) 1 Packet(s) Oral once  senna 2 Tablet(s) Oral at bedtime    MEDICATIONS  (PRN):  sodium chloride 0.9% lock flush 10 milliLiter(s) IV Push every 1 hour PRN Pre/post blood products, medications, blood draw, and to maintain line patency    PHYSICAL EXAM:  VITALS: T(C): 36.8 (05-13-21 @ 11:47)  T(F): 98.2 (05-13-21 @ 11:47), Max: 98.2 (05-13-21 @ 05:05)  HR: 85 (05-13-21 @ 11:57) (80 - 93)  BP: 111/70 (05-13-21 @ 11:57) (100/62 - 138/83)  RR:  (18 - 29)  SpO2:  (93% - 98%)  Wt(kg): --  GENERAL: NAD, well-groomed, well-developed  EYES: No proptosis, no lid lag, anicteric  HEENT:  Atraumatic, Normocephalic, moist mucous membranes  THYROID: Normal size, no palpable nodules  RESPIRATORY: Clear to auscultation bilaterally; No rales, rhonchi, wheezing, or rubs  CARDIOVASCULAR: Regular rate and rhythm; No murmurs; no peripheral edema  GI: Soft, nontender, non distended, normal bowel sounds  SKIN: Dry, intact, No rashes or lesions  MUSCULOSKELETAL: Full range of motion, normal strength  PSYCH: Alert and oriented x 3, reactive affect     POCT Blood Glucose.: 183 mg/dL (05-13-21 @ 12:01)  POCT Blood Glucose.: 222 mg/dL (05-13-21 @ 08:27)  POCT Blood Glucose.: 223 mg/dL (05-12-21 @ 22:16)  POCT Blood Glucose.: 247 mg/dL (05-12-21 @ 19:05)  POCT Blood Glucose.: 193 mg/dL (05-12-21 @ 16:53)  POCT Blood Glucose.: 241 mg/dL (05-12-21 @ 12:11)  POCT Blood Glucose.: 241 mg/dL (05-12-21 @ 08:19)  POCT Blood Glucose.: 223 mg/dL (05-11-21 @ 20:53)  POCT Blood Glucose.: 197 mg/dL (05-11-21 @ 19:59)  POCT Blood Glucose.: 163 mg/dL (05-11-21 @ 18:16)  POCT Blood Glucose.: 178 mg/dL (05-11-21 @ 17:21)  POCT Blood Glucose.: 214 mg/dL (05-11-21 @ 15:11)  POCT Blood Glucose.: 249 mg/dL (05-11-21 @ 13:14)  POCT Blood Glucose.: 256 mg/dL (05-11-21 @ 12:16)  POCT Blood Glucose.: 267 mg/dL (05-11-21 @ 11:11)  POCT Blood Glucose.: 243 mg/dL (05-11-21 @ 10:10)  POCT Blood Glucose.: 252 mg/dL (05-11-21 @ 09:05)  POCT Blood Glucose.: 255 mg/dL (05-11-21 @ 08:09)  POCT Blood Glucose.: 257 mg/dL (05-11-21 @ 06:45)  POCT Blood Glucose.: 298 mg/dL (05-11-21 @ 05:03)  POCT Blood Glucose.: 334 mg/dL (05-11-21 @ 04:08)  POCT Blood Glucose.: 369 mg/dL (05-11-21 @ 02:57)  POCT Blood Glucose.: 365 mg/dL (05-11-21 @ 01:06)  POCT Blood Glucose.: 446 mg/dL (05-11-21 @ 00:02)  POCT Blood Glucose.: 486 mg/dL (05-10-21 @ 22:57)  POCT Blood Glucose.: 504 mg/dL (05-10-21 @ 21:52)  POCT Blood Glucose.: 519 mg/dL (05-10-21 @ 20:47)  POCT Blood Glucose.: 527 mg/dL (05-10-21 @ 17:01)      05-13    134<L>  |  102  |  26<H>  ----------------------------<  213<H>  3.6   |  19<L>  |  1.22    EGFR if : 50<L>  EGFR if non : 43<L>    Ca    8.3<L>      05-13  Mg     1.9     05-13  Phos  2.1     05-13    TPro  7.0  /  Alb  2.8<L>  /  TBili  0.3  /  DBili  x   /  AST  22  /  ALT  20  /  AlkPhos  126<H>  05-13          Thyroid Function Tests:                           Chief Complaint: uncontrolled DM2    History: Patient seen and examined at bedside with sister present.   Patient reports she is tolerating diet. Ate most of breakfast however did not eat much lunch.   Reports feeling a bit better than yesterday. Still reporting right shoulder pain.     MEDICATIONS  (STANDING):  chlorhexidine 4% Liquid 1 Application(s) Topical <User Schedule>  ciprofloxacin     Tablet 500 milliGRAM(s) Oral every 12 hours  dextrose 40% Gel 15 Gram(s) Oral once  dextrose 5%. 1000 milliLiter(s) (50 mL/Hr) IV Continuous <Continuous>  dextrose 50% Injectable 25 Gram(s) IV Push once  dextrose 50% Injectable 12.5 Gram(s) IV Push once  glucagon  Injectable 1 milliGRAM(s) IntraMuscular once  heparin   Injectable 5000 Unit(s) SubCutaneous every 8 hours  insulin glargine Injectable (LANTUS) 35 Unit(s) SubCutaneous <User Schedule>  insulin lispro (ADMELOG) corrective regimen sliding scale   SubCutaneous three times a day before meals  insulin lispro (ADMELOG) corrective regimen sliding scale   SubCutaneous at bedtime  insulin lispro Injectable (ADMELOG) 11 Unit(s) SubCutaneous three times a day before meals  magnesium oxide 400 milliGRAM(s) Oral three times a day with meals  polyethylene glycol 3350 17 Gram(s) Oral two times a day  potassium phosphate / sodium phosphate Powder (PHOS-NaK) 1 Packet(s) Oral once  senna 2 Tablet(s) Oral at bedtime    MEDICATIONS  (PRN):  sodium chloride 0.9% lock flush 10 milliLiter(s) IV Push every 1 hour PRN Pre/post blood products, medications, blood draw, and to maintain line patency    PHYSICAL EXAM:  VITALS: T(C): 36.8 (05-13-21 @ 11:47)  T(F): 98.2 (05-13-21 @ 11:47), Max: 98.2 (05-13-21 @ 05:05)  HR: 85 (05-13-21 @ 11:57) (80 - 93)  BP: 111/70 (05-13-21 @ 11:57) (100/62 - 138/83)  RR:  (18 - 29)  SpO2:  (93% - 98%)  Wt(kg): --  GENERAL: NAD, well-groomed, well-developed  RESPIRATORY: Clear to auscultation bilaterally; No rales, rhonchi, wheezing, or rubs  CARDIOVASCULAR: Regular rate and rhythm; No murmurs; no peripheral edema  GI: Soft, nontender, non distended, normal bowel sounds  SKIN: Dry, intact, No rashes or lesions  MUSCULOSKELETAL: Full range of motion, normal strength  PSYCH: Alert and oriented x 3, reactive affect     POCT Blood Glucose.: 183 mg/dL (05-13-21 @ 12:01) 11+2  POCT Blood Glucose.: 222 mg/dL (05-13-21 @ 08:27) 9+4  POCT Blood Glucose.: 223 mg/dL (05-12-21 @ 22:16)   POCT Blood Glucose.: 247 mg/dL (05-12-21 @ 19:05) L30  POCT Blood Glucose.: 193 mg/dL (05-12-21 @ 16:53) 9+2  POCT Blood Glucose.: 241 mg/dL (05-12-21 @ 12:11) 5+4  POCT Blood Glucose.: 241 mg/dL (05-12-21 @ 08:19) 5+4  POCT Blood Glucose.: 223 mg/dL (05-11-21 @ 20:53)  POCT Blood Glucose.: 197 mg/dL (05-11-21 @ 19:59)  POCT Blood Glucose.: 163 mg/dL (05-11-21 @ 18:16)  POCT Blood Glucose.: 178 mg/dL (05-11-21 @ 17:21)  POCT Blood Glucose.: 214 mg/dL (05-11-21 @ 15:11)  POCT Blood Glucose.: 249 mg/dL (05-11-21 @ 13:14)  POCT Blood Glucose.: 256 mg/dL (05-11-21 @ 12:16)  POCT Blood Glucose.: 267 mg/dL (05-11-21 @ 11:11)  POCT Blood Glucose.: 243 mg/dL (05-11-21 @ 10:10)  POCT Blood Glucose.: 252 mg/dL (05-11-21 @ 09:05)  POCT Blood Glucose.: 255 mg/dL (05-11-21 @ 08:09)  POCT Blood Glucose.: 257 mg/dL (05-11-21 @ 06:45)  POCT Blood Glucose.: 298 mg/dL (05-11-21 @ 05:03)  POCT Blood Glucose.: 334 mg/dL (05-11-21 @ 04:08)  POCT Blood Glucose.: 369 mg/dL (05-11-21 @ 02:57)  POCT Blood Glucose.: 365 mg/dL (05-11-21 @ 01:06)  POCT Blood Glucose.: 446 mg/dL (05-11-21 @ 00:02)  POCT Blood Glucose.: 486 mg/dL (05-10-21 @ 22:57)  POCT Blood Glucose.: 504 mg/dL (05-10-21 @ 21:52)  POCT Blood Glucose.: 519 mg/dL (05-10-21 @ 20:47)  POCT Blood Glucose.: 527 mg/dL (05-10-21 @ 17:01)      05-13    134<L>  |  102  |  26<H>  ----------------------------<  213<H>  3.6   |  19<L>  |  1.22    EGFR if : 50<L>  EGFR if non : 43<L>    Ca    8.3<L>      05-13  Mg     1.9     05-13  Phos  2.1     05-13    TPro  7.0  /  Alb  2.8<L>  /  TBili  0.3  /  DBili  x   /  AST  22  /  ALT  20  /  AlkPhos  126<H>  05-13

## 2021-05-13 NOTE — MEDICAL STUDENT PROGRESS NOTE(EDUCATION) - SUBJECTIVE AND OBJECTIVE BOX
Adrien Russell, MS3  -------------------------------------------------------  COLT MELARA is a 75yo woman who presents with a chief complaint of Urosepsis and Diabetic Ketoacidosis (12 May 2021 13:20)      ***IN PROGRESS***  ***IN PROGRESS***    SUBJECTIVE / OVERNIGHT EVENTS:  Patient seen and examined at bedside.    Other Review of Systems Negative.    MEDICATIONS  (STANDING):  cefTRIAXone   IVPB 1000 milliGRAM(s) IV Intermittent every 24 hours  chlorhexidine 4% Liquid 1 Application(s) Topical <User Schedule>  dextrose 40% Gel 15 Gram(s) Oral once  dextrose 5%. 1000 milliLiter(s) (50 mL/Hr) IV Continuous <Continuous>  dextrose 50% Injectable 25 Gram(s) IV Push once  dextrose 50% Injectable 12.5 Gram(s) IV Push once  glucagon  Injectable 1 milliGRAM(s) IntraMuscular once  heparin   Injectable 5000 Unit(s) SubCutaneous every 8 hours  insulin glargine Injectable (LANTUS) 30 Unit(s) SubCutaneous <User Schedule>  insulin lispro (ADMELOG) corrective regimen sliding scale   SubCutaneous at bedtime  insulin lispro (ADMELOG) corrective regimen sliding scale   SubCutaneous three times a day before meals  insulin lispro Injectable (ADMELOG) 9 Unit(s) SubCutaneous three times a day before meals  vancomycin  IVPB 1250 milliGRAM(s) IV Intermittent every 24 hours    MEDICATIONS  (PRN):  sodium chloride 0.9% lock flush 10 milliLiter(s) IV Push every 1 hour PRN Pre/post blood products, medications, blood draw, and to maintain line patency    OBJECTIVE:    PHYSICAL EXAM:  Vital Signs Last 24 Hrs  T(C): 36.8 (13 May 2021 05:05), Max: 36.8 (13 May 2021 05:05)  T(F): 98.2 (13 May 2021 05:05), Max: 98.2 (13 May 2021 05:05)  HR: 83 (13 May 2021 05:05) (80 - 94)  BP: 134/75 (13 May 2021 05:05) (95/50 - 138/83)  BP(mean): 101 (12 May 2021 17:00) (66 - 101)  RR: 18 (13 May 2021 05:05) (18 - 29)  SpO2: 95% (13 May 2021 05:05) (93% - 98%)      GENERAL: NAD, well-developed  HEAD:  Atraumatic, Normocephalic  EYES: EOMI, conjunctiva and sclera clear  NECK: Supple  CHEST/LUNG: Clear to auscultation bilaterally; No wheeze  HEART: Regular rate and rhythm; No murmurs, rubs, or gallops  ABDOMEN: Soft, Nontender, Nondistended; Bowel sounds present  EXTREMITIES:  2+ Peripheral Pulses, No clubbing, cyanosis, or edema  PSYCH: AAOx3  NEUROLOGY: non-focal  SKIN: No rashes or lesions    I&O's Summary    12 May 2021 07:01  -  13 May 2021 07:00  --------------------------------------------------------  IN: 460 mL / OUT: 1000 mL / NET: -540 mL      CAPILLARY BLOOD GLUCOSE      POCT Blood Glucose.: 223 mg/dL (12 May 2021 22:16)  POCT Blood Glucose.: 247 mg/dL (12 May 2021 19:05)  POCT Blood Glucose.: 193 mg/dL (12 May 2021 16:53)  POCT Blood Glucose.: 241 mg/dL (12 May 2021 12:11)  POCT Blood Glucose.: 241 mg/dL (12 May 2021 08:19)    LABS:                         10.4   8.05  )-----------( 216      ( 12 May 2021 00:47 )             31.1     05-12    131<L>  |  99  |  43<H>  ----------------------------<  215<H>  3.7   |  18<L>  |  1.39<H>    Ca    8.4      12 May 2021 00:47  Phos  2.4     05-12  Mg     2.1     05-12        PT/INR - ( 12 May 2021 00:47 )   PT: 12.5 sec;   INR: 1.04 ratio         PTT - ( 12 May 2021 00:47 )  PTT:25.6 sec              ABG:   VBG:     Care Discussed with Consultants/Other Providers:    RADIOLOGY & ADDITIONAL TESTS:  (Imaging Personally Reviewed)     Adrien Russell, MS3  -------------------------------------------------------  SSindhu MELARA is a 77yo woman who presents with a chief complaint of Urosepsis and Diabetic Ketoacidosis (12 May 2021 13:20)    SUBJECTIVE / OVERNIGHT EVENTS:  Patient seen and examined at bedside. No acute events overnight. On primafit, last BM prior to hospitalization. Denies nausea/vomiting, diarrhea, chest pain, abdominal pain. +Pain around shoulders since fall prior to hospitalization. Pending phlebotomy for labs.    Other Review of Systems Negative.    MEDICATIONS  (STANDING):  cefTRIAXone   IVPB 1000 milliGRAM(s) IV Intermittent every 24 hours  chlorhexidine 4% Liquid 1 Application(s) Topical <User Schedule>  dextrose 40% Gel 15 Gram(s) Oral once  dextrose 5%. 1000 milliLiter(s) (50 mL/Hr) IV Continuous <Continuous>  dextrose 50% Injectable 25 Gram(s) IV Push once  dextrose 50% Injectable 12.5 Gram(s) IV Push once  glucagon  Injectable 1 milliGRAM(s) IntraMuscular once  heparin   Injectable 5000 Unit(s) SubCutaneous every 8 hours  insulin glargine Injectable (LANTUS) 30 Unit(s) SubCutaneous <User Schedule>  insulin lispro (ADMELOG) corrective regimen sliding scale   SubCutaneous at bedtime  insulin lispro (ADMELOG) corrective regimen sliding scale   SubCutaneous three times a day before meals  insulin lispro Injectable (ADMELOG) 9 Unit(s) SubCutaneous three times a day before meals  vancomycin  IVPB 1250 milliGRAM(s) IV Intermittent every 24 hours    MEDICATIONS  (PRN):  sodium chloride 0.9% lock flush 10 milliLiter(s) IV Push every 1 hour PRN Pre/post blood products, medications, blood draw, and to maintain line patency    OBJECTIVE:    PHYSICAL EXAM:  Vital Signs Last 24 Hrs  T(C): 36.8 (13 May 2021 05:05), Max: 36.8 (13 May 2021 05:05)  T(F): 98.2 (13 May 2021 05:05), Max: 98.2 (13 May 2021 05:05)  HR: 83 (13 May 2021 05:05) (80 - 94)  BP: 134/75 (13 May 2021 05:05) (95/50 - 138/83)  BP(mean): 101 (12 May 2021 17:00) (66 - 101)  RR: 18 (13 May 2021 05:05) (18 - 29)  SpO2: 95% (13 May 2021 05:05) (93% - 98%)      GENERAL: NAD, well-developed  HEAD:  Atraumatic, Normocephalic  EYES: EOMI, conjunctiva and sclera clear  NECK: Supple  CHEST/LUNG: Clear to auscultation bilaterally; No wheeze  HEART: Regular rate and rhythm; No murmurs, rubs, or gallops  ABDOMEN: Soft, Nontender, Nondistended; Bowel sounds present  EXTREMITIES:  2+ Peripheral Pulses, No clubbing, cyanosis, or edema  PSYCH: AAOx3  NEUROLOGY: non-focal  SKIN: No rashes or lesions    I&O's Summary    12 May 2021 07:01  -  13 May 2021 07:00  --------------------------------------------------------  IN: 460 mL / OUT: 1000 mL / NET: -540 mL      CAPILLARY BLOOD GLUCOSE      POCT Blood Glucose.: 223 mg/dL (12 May 2021 22:16)  POCT Blood Glucose.: 247 mg/dL (12 May 2021 19:05)  POCT Blood Glucose.: 193 mg/dL (12 May 2021 16:53)  POCT Blood Glucose.: 241 mg/dL (12 May 2021 12:11)  POCT Blood Glucose.: 241 mg/dL (12 May 2021 08:19)    LABS:                         10.4   8.05  )-----------( 216      ( 12 May 2021 00:47 )             31.1     05-12    131<L>  |  99  |  43<H>  ----------------------------<  215<H>  3.7   |  18<L>  |  1.39<H>    Ca    8.4      12 May 2021 00:47  Phos  2.4     05-12  Mg     2.1     05-12        PT/INR - ( 12 May 2021 00:47 )   PT: 12.5 sec;   INR: 1.04 ratio         PTT - ( 12 May 2021 00:47 )  PTT:25.6 sec              ABG:   VBG:     Care Discussed with Consultants/Other Providers:    RADIOLOGY & ADDITIONAL TESTS:  (Imaging Personally Reviewed)

## 2021-05-13 NOTE — PROGRESS NOTE ADULT - PROBLEM SELECTOR PLAN 2
UC grew >100k E. coli concerning for urosepsis  - UC 5/10 grew E. coli  - BC 5/10 grew Staph. epi in one bottle only  - BC 5/12 NGTD  - s/p vancomycin IV 1250mg (5/10-5/13) x2 doses  - s/p ceftriaxone IV 1000mg (5/10-5/13) x3 doses  -transition to PO cipro 500 BID 5/13 - xx  -dc vanc as epidermides likely contaminant

## 2021-05-13 NOTE — PROGRESS NOTE ADULT - PROBLEM SELECTOR PLAN 1
Presented with DKA i/s/o medication noncompliance and UTI, required MICU for insulin gtt  Uncontrolled DM a1c > 15.5%  Patient states would NOT take insulin on outside. Refusing insulin on outside.  On Januvia and dapagliflozin, does NOT take meds consistelty  - s/p insulin drip at MICU (d/c on 5/11)  - now on Lantus 30U, admelog 9U, ISS --> increase to 35U basal  - Fingersticks ~200-250s  . D/w endo re oral agents

## 2021-05-14 ENCOUNTER — TRANSCRIPTION ENCOUNTER (OUTPATIENT)
Age: 76
End: 2021-05-14

## 2021-05-14 LAB
ANION GAP SERPL CALC-SCNC: 13 MMOL/L — SIGNIFICANT CHANGE UP (ref 5–17)
BUN SERPL-MCNC: 24 MG/DL — HIGH (ref 7–23)
CALCIUM SERPL-MCNC: 8.3 MG/DL — LOW (ref 8.4–10.5)
CHLORIDE SERPL-SCNC: 101 MMOL/L — SIGNIFICANT CHANGE UP (ref 96–108)
CO2 SERPL-SCNC: 20 MMOL/L — LOW (ref 22–31)
CREAT SERPL-MCNC: 1.14 MG/DL — SIGNIFICANT CHANGE UP (ref 0.5–1.3)
GLUCOSE BLDC GLUCOMTR-MCNC: 106 MG/DL — HIGH (ref 70–99)
GLUCOSE BLDC GLUCOMTR-MCNC: 141 MG/DL — HIGH (ref 70–99)
GLUCOSE BLDC GLUCOMTR-MCNC: 162 MG/DL — HIGH (ref 70–99)
GLUCOSE BLDC GLUCOMTR-MCNC: 168 MG/DL — HIGH (ref 70–99)
GLUCOSE SERPL-MCNC: 174 MG/DL — HIGH (ref 70–99)
MAGNESIUM SERPL-MCNC: 1.7 MG/DL — SIGNIFICANT CHANGE UP (ref 1.6–2.6)
PHOSPHATE SERPL-MCNC: 2.6 MG/DL — SIGNIFICANT CHANGE UP (ref 2.5–4.5)
POTASSIUM SERPL-MCNC: 3.8 MMOL/L — SIGNIFICANT CHANGE UP (ref 3.5–5.3)
POTASSIUM SERPL-SCNC: 3.8 MMOL/L — SIGNIFICANT CHANGE UP (ref 3.5–5.3)
SODIUM SERPL-SCNC: 134 MMOL/L — LOW (ref 135–145)

## 2021-05-14 PROCEDURE — 99233 SBSQ HOSP IP/OBS HIGH 50: CPT

## 2021-05-14 PROCEDURE — 99233 SBSQ HOSP IP/OBS HIGH 50: CPT | Mod: GC

## 2021-05-14 RX ORDER — METOPROLOL TARTRATE 50 MG
25 TABLET ORAL DAILY
Refills: 0 | Status: DISCONTINUED | OUTPATIENT
Start: 2021-05-14 | End: 2021-05-20

## 2021-05-14 RX ORDER — FLUTICASONE PROPIONATE 50 MCG
1 SPRAY, SUSPENSION NASAL
Refills: 0 | Status: DISCONTINUED | OUTPATIENT
Start: 2021-05-14 | End: 2021-05-20

## 2021-05-14 RX ORDER — ATORVASTATIN CALCIUM 80 MG/1
40 TABLET, FILM COATED ORAL AT BEDTIME
Refills: 0 | Status: DISCONTINUED | OUTPATIENT
Start: 2021-05-14 | End: 2021-05-20

## 2021-05-14 RX ORDER — ASPIRIN/CALCIUM CARB/MAGNESIUM 324 MG
81 TABLET ORAL DAILY
Refills: 0 | Status: DISCONTINUED | OUTPATIENT
Start: 2021-05-14 | End: 2021-05-20

## 2021-05-14 RX ORDER — DAPAGLIFLOZIN 10 MG/1
1 TABLET, FILM COATED ORAL
Qty: 0 | Refills: 0 | DISCHARGE

## 2021-05-14 RX ORDER — METFORMIN HYDROCHLORIDE 850 MG/1
1 TABLET ORAL
Qty: 0 | Refills: 0 | DISCHARGE

## 2021-05-14 RX ADMIN — MAGNESIUM OXIDE 400 MG ORAL TABLET 400 MILLIGRAM(S): 241.3 TABLET ORAL at 08:58

## 2021-05-14 RX ADMIN — Medication 2: at 08:55

## 2021-05-14 RX ADMIN — POLYETHYLENE GLYCOL 3350 17 GRAM(S): 17 POWDER, FOR SOLUTION ORAL at 17:59

## 2021-05-14 RX ADMIN — SENNA PLUS 2 TABLET(S): 8.6 TABLET ORAL at 21:01

## 2021-05-14 RX ADMIN — LIDOCAINE 1 PATCH: 4 CREAM TOPICAL at 18:00

## 2021-05-14 RX ADMIN — Medication 2: at 12:27

## 2021-05-14 RX ADMIN — LIDOCAINE 1 PATCH: 4 CREAM TOPICAL at 19:26

## 2021-05-14 RX ADMIN — Medication 500 MILLIGRAM(S): at 17:59

## 2021-05-14 RX ADMIN — INSULIN GLARGINE 34 UNIT(S): 100 INJECTION, SOLUTION SUBCUTANEOUS at 22:16

## 2021-05-14 RX ADMIN — Medication 81 MILLIGRAM(S): at 12:38

## 2021-05-14 RX ADMIN — POLYETHYLENE GLYCOL 3350 17 GRAM(S): 17 POWDER, FOR SOLUTION ORAL at 05:41

## 2021-05-14 RX ADMIN — Medication 11 UNIT(S): at 17:57

## 2021-05-14 RX ADMIN — ENOXAPARIN SODIUM 40 MILLIGRAM(S): 100 INJECTION SUBCUTANEOUS at 17:58

## 2021-05-14 RX ADMIN — LIDOCAINE 1 PATCH: 4 CREAM TOPICAL at 05:41

## 2021-05-14 RX ADMIN — Medication 500 MILLIGRAM(S): at 05:41

## 2021-05-14 RX ADMIN — CHLORHEXIDINE GLUCONATE 1 APPLICATION(S): 213 SOLUTION TOPICAL at 05:41

## 2021-05-14 RX ADMIN — ATORVASTATIN CALCIUM 40 MILLIGRAM(S): 80 TABLET, FILM COATED ORAL at 21:01

## 2021-05-14 RX ADMIN — MAGNESIUM OXIDE 400 MG ORAL TABLET 400 MILLIGRAM(S): 241.3 TABLET ORAL at 12:39

## 2021-05-14 RX ADMIN — Medication 11 UNIT(S): at 08:56

## 2021-05-14 RX ADMIN — Medication 11 UNIT(S): at 12:27

## 2021-05-14 NOTE — PROGRESS NOTE ADULT - PROBLEM SELECTOR PLAN 2
HLD   LDL 89  Atorvastatin 10mg daily. Might need to increase dose to LDL goal <70 but pt with poor adherence to meds so can c/w presern dose and recheck levels in 8-12weeks to assess for further adjustments.    HTN   goal bp <130/80  Mostly at goal, continue current management

## 2021-05-14 NOTE — PROGRESS NOTE ADULT - ASSESSMENT
76 F PMH T2DM, HTN, CABG years ago on aspirin 325, systolic HF and recurrent UTIs presented after being found down in kitchen by sister, admitted to MICU for management of DKA in the setting of Ecoli Uti and medication nonadherence, and septic shock 2/2 E.coli uti, transferred to floors 5/13.    Pt states would NOT take insulin on outside even if just basal.

## 2021-05-14 NOTE — PROGRESS NOTE ADULT - SUBJECTIVE AND OBJECTIVE BOX
PROGRESS NOTE:     CONTACT INFO:  Iraida Olivier MD | PGY-1  Pager: [phone] [place], [id number] [facility]  After 7pm page night float  On weekends after 1pm check resident assignment tab to see who is covering      Patient is a 76y old  Female who presents with a chief complaint of Urosepsis and Diabetic Ketoacidosis (13 May 2021 13:17)      SUBJECTIVE / OVERNIGHT EVENTS:    - No acute events overnight.   - No fevers/chills.  - Patient seen and evaluated at bedside.  - Denies SOB at rest, chest pain, palpitations, abdominal pain, nausea/vomiting  - Feels well. States would NOT take insulin even if just one injection daily.    ADDITIONAL REVIEW OF SYSTEMS:    MEDICATIONS  (STANDING):  chlorhexidine 4% Liquid 1 Application(s) Topical <User Schedule>  ciprofloxacin     Tablet 500 milliGRAM(s) Oral every 12 hours  dextrose 40% Gel 15 Gram(s) Oral once  dextrose 5%. 1000 milliLiter(s) (50 mL/Hr) IV Continuous <Continuous>  dextrose 50% Injectable 25 Gram(s) IV Push once  dextrose 50% Injectable 12.5 Gram(s) IV Push once  enoxaparin Injectable 40 milliGRAM(s) SubCutaneous daily  glucagon  Injectable 1 milliGRAM(s) IntraMuscular once  insulin glargine Injectable (LANTUS) 34 Unit(s) SubCutaneous at bedtime  insulin lispro (ADMELOG) corrective regimen sliding scale   SubCutaneous three times a day before meals  insulin lispro (ADMELOG) corrective regimen sliding scale   SubCutaneous at bedtime  insulin lispro Injectable (ADMELOG) 11 Unit(s) SubCutaneous three times a day before meals  lidocaine   Patch 1 Patch Transdermal daily  magnesium oxide 400 milliGRAM(s) Oral three times a day with meals  polyethylene glycol 3350 17 Gram(s) Oral two times a day  senna 2 Tablet(s) Oral at bedtime    MEDICATIONS  (PRN):  sodium chloride 0.9% lock flush 10 milliLiter(s) IV Push every 1 hour PRN Pre/post blood products, medications, blood draw, and to maintain line patency      CAPILLARY BLOOD GLUCOSE      POCT Blood Glucose.: 144 mg/dL (13 May 2021 21:42)  POCT Blood Glucose.: 166 mg/dL (13 May 2021 17:30)  POCT Blood Glucose.: 183 mg/dL (13 May 2021 12:01)    I&O's Summary    13 May 2021 07:01  -  14 May 2021 07:00  --------------------------------------------------------  IN: 580 mL / OUT: 1300 mL / NET: -720 mL        PHYSICAL EXAM:  Vital Signs Last 24 Hrs  T(C): 37.1 (14 May 2021 04:37), Max: 37.3 (13 May 2021 20:37)  T(F): 98.8 (14 May 2021 04:37), Max: 99.1 (13 May 2021 20:37)  HR: 88 (14 May 2021 04:37) (84 - 93)  BP: 137/71 (14 May 2021 04:37) (100/62 - 137/71)  BP(mean): --  RR: 20 (14 May 2021 04:37) (18 - 20)  SpO2: 95% (14 May 2021 04:37) (94% - 97%)    CONSTITUTIONAL: NAD, well-developed, +obese  HEENT: poor dentition  RESPIRATORY: Normal respiratory effort; lungs are clear to auscultation bilaterally  CARDIOVASCULAR: Regular rate and rhythm, normal S1 and S2, no murmur/rub/gallop; No lower extremity edema; Peripheral pulses are 2+ bilaterally  ABDOMEN: Nontender to palpation, normoactive bowel sounds, no rebound/guarding; No hepatosplenomegaly  MUSCLOSKELETAL: no clubbing or cyanosis of digits; no joint swelling or tenderness to palpation  PSYCH: A+O to person, place, and time; affect appropriate    LABS:                        10.5   8.84  )-----------( 208      ( 13 May 2021 11:19 )             31.1     05-14    134<L>  |  101  |  24<H>  ----------------------------<  174<H>  3.8   |  20<L>  |  1.14    Ca    8.3<L>      14 May 2021 07:03  Phos  2.6     05-14  Mg     1.7     05-14    TPro  7.0  /  Alb  2.8<L>  /  TBili  0.3  /  DBili  x   /  AST  22  /  ALT  20  /  AlkPhos  126<H>  05-13    PT/INR - ( 13 May 2021 11:19 )   PT: 13.4 sec;   INR: 1.12 ratio         PTT - ( 13 May 2021 11:19 )  PTT:27.4 sec          Culture - Blood (collected 12 May 2021 04:06)  Source: .Blood Blood-Peripheral  Preliminary Report (13 May 2021 05:01):    No growth to date.        RADIOLOGY & ADDITIONAL TESTS:  Results Reviewed:   Imaging Personally Reviewed:  Electrocardiogram Personally Reviewed:    COORDINATION OF CARE:  Care Discussed with Consultants/Other Providers [Y/N]: Y  Prior or Outpatient Records Reviewed [Y/N]: Y

## 2021-05-14 NOTE — PROGRESS NOTE ADULT - PROBLEM SELECTOR PLAN 1
-Test BG ac and hs  -C/w Lantus 34units qhs   -C/w Admelog 11 units TIDac  -C/w mod correctional Admelog scales premeal and qhs   Discharge on basal/bolus insulin since pt had DKA at admission.  -Doses as noted above.   -Discontinue Farxiga given DKA on admission  Please write Rxs for: Solo Star Insulin pen/Maria R insulin pen/glucose meter/strips/lancets   Patient will need to follow up with Endocrine on discharge: Can follow up with Dr Ramírez Urbano   -Plan discussed with pt/team. Spoke to team 8 at [phone] about the need to speak to pt and reinforce the present need for insulin.  Contact info: [phone] (24/7). pager [phone]

## 2021-05-14 NOTE — PROGRESS NOTE ADULT - PROBLEM SELECTOR PLAN 5
presented in septic shock, BPs now stable 130s  will need to do med recc to confirm home meds  pt states for all meds only takes "sometimes" presented in septic shock, BPs now stable 130s  med recc completed, was not taking any HTN meds other than metop which was more likley for CAD

## 2021-05-14 NOTE — DISCHARGE NOTE PROVIDER - CARE PROVIDERS DIRECT ADDRESSES
[email or web address]

## 2021-05-14 NOTE — PROGRESS NOTE ADULT - PROBLEM SELECTOR PLAN 1
Presented with DKA i/s/o medication noncompliance and UTI, required MICU for insulin gtt  Uncontrolled DM a1c > 15.5%  Patient states would NOT take insulin on outside. Refusing insulin on outside.  On Januvia and dapagliflozin, does NOT take meds consistelty  - s/p insulin drip at MICU (d/c on 5/11)  - now on Lantus 30U, admelog 9U, ISS --> increase to 35U basal  - Fingersticks ~200-250s  . D/w endo re oral agents Presented with DKA i/s/o medication noncompliance and UTI, required MICU for insulin gtt  Uncontrolled DM a1c > 15.5%  Patient states will NOT take insulin on outside. Refusing insulin on outside.  Was not on insulin prior outpatint. Was taking metformin irregularly on outside, script last filled in September per pharmacy  - s/p insulin drip at MICU (d/c on 5/11)  - now on Lantus 34U, admelog 11U, ISS; FSG in arget  . D/w endo re oral agents  -pt states she takes medicine "when she feels like", admits to noncompliance

## 2021-05-14 NOTE — PROGRESS NOTE ADULT - ASSESSMENT
76 F w/h/o un controlled T2DM (A1C 15.5%) with poor adherence to tx PTA (States taking meds on and off). 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 Also hx of HTN, systolic HF and recurrent UTIs.  BIBEMS for being found on the kitchen floor in her home with elevated BG. In ED patient was found to be hypotensive, with DKA and sepsis secondary to UTI requiring ICU admission for levophed and insulin gtt. Endocrine consult requested for management of DKA and uncontrolled DM2. Pt stable, reports tolerating POs. BG levels improved while on present insulin doses. No hypoglycemia. Pt not participating in DM self care while in hospital as per her RN.     Met with patient and reviewed the following:    -A1c LEVEL: Present and goal  -Blood glucose goals: 100s to 150s as out pt  -Glucose monitoring frequency: ac and hs  -Insulin(s) action, time of administration and side effects and need to learn insulin administration. Pt refusing and states she won't do insulin upon discharge. Explained to pt in depth what DKA is and the fact that her body is not making insulin at this time requiring exogenous insulin for now. Pt states a doctor told her she was going home on tablets and will not do insulin.   Pt verbalized understanding regarding the fact that if she doesn't take insulin she will likely develope DKA again and will need to come back to hospital. Pt still states she won't do insulin.   Noted pt was on Farxiga PTA but was not taking med consistently. Appears that med was only part of the reason pt developed DKA. An A1C of 15.5% increases risk for infections and both are risk factors for pts with T2DM to develope DKA. Regardless, pt can't be on Farxiga  and needs to go home on insulin therapy which she is refusing at this time.  Spoke to primary team to speak to pt about insulin need at this time.    Spent over 35 minutes providing face to face education which was more than 50% of encounter that also included assessing pt/labs/meds and discussing plan of care with pt/RN and primary team.

## 2021-05-14 NOTE — MEDICAL STUDENT PROGRESS NOTE(EDUCATION) - SUBJECTIVE AND OBJECTIVE BOX
Adrien Russell, MS3  -------------------------------------------------------  COLT MELARA is a 75yo woman who presents with a chief complaint of Urosepsis and Diabetic Ketoacidosis (13 May 2021 13:17)      ***IN PROGRESS***  ***IN PROGRESS***    SUBJECTIVE / OVERNIGHT EVENTS:  Patient seen and examined at bedside.    Other Review of Systems Negative.    MEDICATIONS  (STANDING):  chlorhexidine 4% Liquid 1 Application(s) Topical <User Schedule>  ciprofloxacin     Tablet 500 milliGRAM(s) Oral every 12 hours  dextrose 40% Gel 15 Gram(s) Oral once  dextrose 5%. 1000 milliLiter(s) (50 mL/Hr) IV Continuous <Continuous>  dextrose 50% Injectable 25 Gram(s) IV Push once  dextrose 50% Injectable 12.5 Gram(s) IV Push once  enoxaparin Injectable 40 milliGRAM(s) SubCutaneous daily  glucagon  Injectable 1 milliGRAM(s) IntraMuscular once  insulin glargine Injectable (LANTUS) 34 Unit(s) SubCutaneous at bedtime  insulin lispro (ADMELOG) corrective regimen sliding scale   SubCutaneous at bedtime  insulin lispro (ADMELOG) corrective regimen sliding scale   SubCutaneous three times a day before meals  insulin lispro Injectable (ADMELOG) 11 Unit(s) SubCutaneous three times a day before meals  lidocaine   Patch 1 Patch Transdermal daily  magnesium oxide 400 milliGRAM(s) Oral three times a day with meals  polyethylene glycol 3350 17 Gram(s) Oral two times a day  senna 2 Tablet(s) Oral at bedtime    MEDICATIONS  (PRN):  sodium chloride 0.9% lock flush 10 milliLiter(s) IV Push every 1 hour PRN Pre/post blood products, medications, blood draw, and to maintain line patency    OBJECTIVE:    PHYSICAL EXAM:  Vital Signs Last 24 Hrs  T(C): 37.1 (14 May 2021 04:37), Max: 37.3 (13 May 2021 20:37)  T(F): 98.8 (14 May 2021 04:37), Max: 99.1 (13 May 2021 20:37)  HR: 88 (14 May 2021 04:37) (84 - 93)  BP: 137/71 (14 May 2021 04:37) (100/62 - 137/71)  BP(mean): --  RR: 20 (14 May 2021 04:37) (18 - 20)  SpO2: 95% (14 May 2021 04:37) (94% - 97%)      GENERAL: NAD, well-developed  HEAD:  Atraumatic, Normocephalic  EYES: EOMI, conjunctiva and sclera clear  NECK: Supple  CHEST/LUNG: Clear to auscultation bilaterally; No wheeze  HEART: Regular rate and rhythm; No murmurs, rubs, or gallops  ABDOMEN: Soft, Nontender, Nondistended; Bowel sounds present  EXTREMITIES:  2+ Peripheral Pulses, No clubbing, cyanosis, or edema  PSYCH: AAOx3  NEUROLOGY: non-focal  SKIN: No rashes or lesions    I&O's Summary    13 May 2021 07:01  -  14 May 2021 07:00  --------------------------------------------------------  IN: 580 mL / OUT: 1300 mL / NET: -720 mL      CAPILLARY BLOOD GLUCOSE      POCT Blood Glucose.: 144 mg/dL (13 May 2021 21:42)  POCT Blood Glucose.: 166 mg/dL (13 May 2021 17:30)  POCT Blood Glucose.: 183 mg/dL (13 May 2021 12:01)  POCT Blood Glucose.: 222 mg/dL (13 May 2021 08:27)    LABS:                         10.5   8.84  )-----------( 208      ( 13 May 2021 11:19 )             31.1     05-13    134<L>  |  102  |  26<H>  ----------------------------<  213<H>  3.6   |  19<L>  |  1.22    Ca    8.3<L>      13 May 2021 11:19  Phos  2.1     05-13  Mg     1.9     05-13    TPro  7.0  /  Alb  2.8<L>  /  TBili  0.3  /  DBili  x   /  AST  22  /  ALT  20  /  AlkPhos  126<H>  05-13    LIVER FUNCTIONS - ( 13 May 2021 11:19 )  Alb: 2.8 g/dL / Pro: 7.0 g/dL / ALK PHOS: 126 U/L / ALT: 20 U/L / AST: 22 U/L / GGT: x           PT/INR - ( 13 May 2021 11:19 )   PT: 13.4 sec;   INR: 1.12 ratio         PTT - ( 13 May 2021 11:19 )  PTT:27.4 sec              ABG:   VBG:     Care Discussed with Consultants/Other Providers:    RADIOLOGY & ADDITIONAL TESTS:  (Imaging Personally Reviewed)

## 2021-05-14 NOTE — DISCHARGE NOTE PROVIDER - NSDCCAREPROVSEEN_GEN_ALL_CORE_FT
Tejas Cota  [facility] Medicine House Staff team 8 Tejas Cota  [facility] Medicine House Staff team 8  Endocrinology team

## 2021-05-14 NOTE — DISCHARGE NOTE PROVIDER - NSFOLLOWUPCLINICS_GEN_ALL_ED_FT
[facility] Endocrinology  Endocrinology  [address] [place] [address]  Phone: [phone]  Fax:

## 2021-05-14 NOTE — PROGRESS NOTE ADULT - SUBJECTIVE AND OBJECTIVE BOX
DIABETES FOLLOW UP NOTE: Saw pt earlier today  INTERVAL HX: 76 F w/h/o un controlled T2DM (A1C 15.5%) with poor adherence to tx PTA (States taking meds on and off). 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 Also hx of HTN, systolic HF and recurrent UTIs. BIBEMS for being found on the kitchen floor in her home with elevated BG. In ED patient was found to be hypotensive, with DKA and sepsis secondary to UTI requiring ICU admission for levophed and insulin gtt. Endocrine consult requested for management of DKA and uncontrolled DM2. Pt stable, reports tolerating POs. BG levels improved while on present insulin doses. No hypoglycemia. Pt not participating in DM self care while in hospital as per her RN. Pt denies any pain and feels much better. On antibiotic for UTI.      Review of Systems:  General: As above  Cardiovascular: No chest pain, palpitations  Respiratory: No SOB, no cough  GI: No nausea, vomiting, abdominal pain  Endocrine: No polyuria, polydipsia or S&Sx of hypoglycemia    Allergies    clams - itching (Other)  No Known Drug Allergies  Peroxide (Blisters)    Intolerances      MEDICATIONS:  atorvastatin 40 milliGRAM(s) Oral at bedtime  ciprofloxacin     Tablet 500 milliGRAM(s) Oral every 12 hours  insulin glargine Injectable (LANTUS) 34 Unit(s) SubCutaneous at bedtime  insulin lispro (ADMELOG) corrective regimen sliding scale   SubCutaneous at bedtime  insulin lispro (ADMELOG) corrective regimen sliding scale   SubCutaneous three times a day before meals  insulin lispro Injectable (ADMELOG) 11 Unit(s) SubCutaneous three times a day before meals      PHYSICAL EXAM:  VITALS: T(C): 36.9 (05-14-21 @ 12:12)  T(F): 98.5 (05-14-21 @ 12:12), Max: 99.1 (05-13-21 @ 20:37)  HR: 78 (05-14-21 @ 12:12) (72 - 93)  BP: 135/74 (05-14-21 @ 12:12) (126/80 - 137/71)  RR:  (18 - 20)  SpO2:  (92% - 97%)  Wt(kg): --  GENERAL: Female lying in bed in NAD  Abdomen: Soft, nontender, non distended, central adiposity  Extremities: Warm, no edema in all 4 exts  NEURO: A&O X3    LABS:  POCT Blood Glucose.: 162 mg/dL (05-14-21 @ 12:12)  POCT Blood Glucose.: 168 mg/dL (05-14-21 @ 08:35)  POCT Blood Glucose.: 144 mg/dL (05-13-21 @ 21:42)  POCT Blood Glucose.: 166 mg/dL (05-13-21 @ 17:30)  POCT Blood Glucose.: 183 mg/dL (05-13-21 @ 12:01)  POCT Blood Glucose.: 222 mg/dL (05-13-21 @ 08:27)  POCT Blood Glucose.: 223 mg/dL (05-12-21 @ 22:16)  POCT Blood Glucose.: 247 mg/dL (05-12-21 @ 19:05)  POCT Blood Glucose.: 193 mg/dL (05-12-21 @ 16:53)  POCT Blood Glucose.: 241 mg/dL (05-12-21 @ 12:11)  POCT Blood Glucose.: 241 mg/dL (05-12-21 @ 08:19)  POCT Blood Glucose.: 223 mg/dL (05-11-21 @ 20:53)  POCT Blood Glucose.: 197 mg/dL (05-11-21 @ 19:59)  POCT Blood Glucose.: 163 mg/dL (05-11-21 @ 18:16)  POCT Blood Glucose.: 178 mg/dL (05-11-21 @ 17:21)  POCT Blood Glucose.: 214 mg/dL (05-11-21 @ 15:11)  POCT Blood Glucose.: 249 mg/dL (05-11-21 @ 13:14)                            10.5   8.84  )-----------( 208      ( 13 May 2021 11:19 )             31.1       05-14    134<L>  |  101  |  24<H>  ----------------------------<  174<H>  3.8   |  20<L>  |  1.14      EGFR if non : 47<L>    Ca    8.3<L>      05-14  Mg     1.7     05-14  Phos  2.6     05-14    TPro  7.0  /  Alb  2.8<L>  /  TBili  0.3  /  DBili  x   /  AST  22  /  ALT  20  /  AlkPhos  126<H>  05-13      A1C with Estimated Average Glucose Result: >15.5 % (05-12-21 @ 08:41)      Estimated Average Glucose: >398 mg/dL (05-12-21 @ 08:41)        05-13 Chol 161 Direct LDL -- LDL calculated 89 HDL 22<L> Trig 251<H>

## 2021-05-14 NOTE — DISCHARGE NOTE PROVIDER - CARE PROVIDER_API CALL
Felipe Manuel (DO)  Medicine  [facility] Internal [facility]  [address], [address]  [place] [address]  Phone: [phone]  Fax: [phone]  Follow Up Time: 1 week   Felipe Manuel (DO)  Medicine   Internal Med  [address] [facility], [address]  [place] [address]  Phone: [phone]  Fax: [phone]  Follow Up Time: 1 week    Ramírez Urbano (DO)  Internal Medicine  [address] [facility] [address], [address]  [place] [address]  Phone: [phone]  Fax: [phone]  Follow Up Time: 2 weeks   Felipe Manuel (DO)  Medicine   Internal Med  [address] [facility], [address]  [place] [address]  Phone: [phone]  Fax: [phone]  Follow Up Time: 1 week    Ramírez Urbano (DO)  Internal Medicine  [address] [facility] [address], [address]  [place] [address]  Phone: [phone]  Fax: [phone]  Follow Up Time: 2 weeks    Pancho Saez)  Orthopaedic Surgery  [address] [place] [address], [address]  [place] [address]  Phone: [phone]  Fax: [phone]  Follow Up Time: 2 weeks

## 2021-05-14 NOTE — DISCHARGE NOTE PROVIDER - HOSPITAL COURSE
77yo F w/ PMH of T2DM, HTN, CABG years ago (on  qd), systolic HF and recurrent UTIs presented after being found down in kitchen by sister, found to be in septic shock, admitted to MICU for DKA i/s/o E coli UTI and medication nonadherence requiring IV pressors, IV abx, and insulin gtt, transferred to medicine floors 5/13. On medical floors pt seen and examined by endocrinology and recommended to be discharged on basal/bolus insulin i/s/o hyperglycemia and A1c of >14%. Pt however refusing insulin upon discharge relating a fear of needles and no desire to take insulin. Endocrine thus electing to discharge pt on............................. She is to be discharged home/to [facility] w/ outpt follow up for continued medical care and management. 77yo F w/ PMH of T2DM, HTN, CABG years ago (on  qd), systolic HF and recurrent UTIs presented after being found down in kitchen by sister, found to be in septic shock, admitted to MICU for DKA i/s/o E coli UTI and medication nonadherence requiring IV pressors, IV abx, and insulin gtt, transferred to medicine floors 5/13. On medical floors pt seen and examined by endocrinology and recommended to be discharged on basal/bolus insulin i/s/o hyperglycemia and A1c of >14%. Pt however refusing insulin upon discharge relating a fear of needles and no desire to take insulin. Endocrine thus electing to discharge pt on.............................     She is to be discharged home/to [facility] w/ outpt follow up for continued medical care and management. 77yo F w/ PMH of T2DM, HTN, CABG years ago (on  qd), systolic HF and recurrent UTIs presented after being found down in kitchen by sister, found to be in septic shock, admitted to MICU for DKA i/s/o E coli UTI and medication nonadherence requiring IV pressors, IV abx, and insulin gtt, transferred to medicine floors 5/13. On medical floors pt seen and examined by endocrinology and recommended to be discharged on basal/bolus insulin i/s/o hyperglycemia and A1c of >15%.  Patient repeatedly stating she does not want to take insulin on discharge, so due to this patient  will be discharged on basal insulin 30U at bedtime with premeal prandin 1mg  tab(s) orally 3 times a day (before meals). For the prandin, fingerstick glucose must be checked prior to meal. Hold medication if fingerstick glucose <100. Patient's need to take insulin and risk of re-hospitalization and death was discussed extensively with patient, patient's sister, and patient's daughter. Pt's sister and daughter onboard with assisting patient with taking insulin.    She is to be discharged home/to [facility] w/ outpt follow up for continued medical care and management. 75yo F w/ PMH of T2DM, HTN, CABG years ago (on  qd), systolic HF and recurrent UTIs presented after being found down in kitchen by sister, found to be in septic shock, admitted to MICU for DKA i/s/o E coli UTI and medication nonadherence requiring IV pressors, IV abx, and insulin gtt, transferred to medicine floors 5/13. On medical floors pt seen and examined by endocrinology and recommended to be discharged on basal/bolus insulin i/s/o hyperglycemia and A1c of >15%.  Patient repeatedly stating she does not want to take insulin on discharge, so due to this patient  will be discharged on basal insulin 30U at bedtime with premeal prandin 1mg  tab(s) orally 3 times a day (before meals). For the prandin, fingerstick glucose must be checked prior to meal. Hold medication if fingerstick glucose <100. Patient's need to take insulin and risk of re-hospitalization and death was discussed extensively with patient, patient's sister, and patient's daughter. Pt's sister and daughter onboard with assisting patient with taking insulin. She was also noted to have a R humerus fracture and Vitamin D deficiency. Orthopedic surgery saw pt and pt to follow up outpatient. In the meantime, she should continue wearing sling and not bearing weight on the right arm. Started on Vit D supplements.    She is to be discharged home/to [facility] w/ outpt follow up for continued medical care and management.

## 2021-05-14 NOTE — DISCHARGE NOTE PROVIDER - PROVIDER TOKENS
PROVIDER:[TOKEN:[[id number]],FOLLOWUP:[1 week]] PROVIDER:[TOKEN:[[id number]],FOLLOWUP:[1 week]],PROVIDER:[TOKEN:[[id number]],FOLLOWUP:[2 weeks]] PROVIDER:[TOKEN:[[id number]],FOLLOWUP:[1 week]],PROVIDER:[TOKEN:[[id number]],FOLLOWUP:[2 weeks]],PROVIDER:[TOKEN:[[id number]],FOLLOWUP:[2 weeks]]

## 2021-05-14 NOTE — DISCHARGE NOTE PROVIDER - NSDCFUADDAPPT_GEN_ALL_CORE_FT
Call Dr. Urbano's office to make a follow up appointment for your diabetes. Call endocrinologist Dr. Urbano's office to make a follow up appointment for your diabetes.

## 2021-05-14 NOTE — PROGRESS NOTE ADULT - PROBLEM SELECTOR PLAN 6
Prophylaxis: Cr now improved - change to lovenox  CC diet Prophylaxis:  lovenox  CC diet  Dispo: stable for medical discharge, however discharge regimen issue as pt will not take insulin outside  PT states SUSHIL, however pt wants to go home with sister, but sister unable to care for her as sister is elderly herself    Plan for PT to reeval pt  over weekend and if no needs deemed then will go home, otherwise SUSHIL Mon/Tues

## 2021-05-14 NOTE — DISCHARGE NOTE PROVIDER - NSDCMRMEDTOKEN_GEN_ALL_CORE_FT
ALPRAZolam 0.25 mg oral tablet: 1 tab(s) orally prior to blood draws  amLODIPine 5 mg oral tablet: 1 tab(s) orally once a day  aspirin 325 mg oral delayed release tablet: 1 tab(s) orally once a day  atorvastatin 10 mg oral tablet: 1 tab(s) orally once a day (at bedtime)  Farxiga 10 mg oral tablet: 1 tab(s) orally once a day  furosemide 40 mg oral tablet: 1 tab(s) orally once a day  levETIRAcetam 750 mg oral tablet: 1 tab(s) orally 2 times a day  metFORMIN 500 mg oral tablet: 1 tab(s) orally 2 times a day  metoprolol tartrate 25 mg oral tablet: 1 tab(s) orally once a day  pantoprazole 40 mg oral delayed release tablet: 1 tab(s) orally once a day (before a meal)  ursodiol 500 mg oral tablet: 1 tab(s) orally every 12 hours   furosemide 40 mg oral tablet: 1 tab(s) orally once a day    not filled recently  metFORMIN 500 mg oral tablet: 1 tab(s) orally 2 times a day    last filled in September  metoprolol tartrate 25 mg oral tablet: 1 tab(s) orally once a day  pantoprazole 40 mg oral delayed release tablet: 1 tab(s) orally once a day (before a meal)   aspirin 81 mg oral tablet, chewable: 1 tab(s) orally once a day  atorvastatin 40 mg oral tablet: 1 tab(s) orally once a day (at bedtime)  insulin glargine 100 units/mL subcutaneous solution: 30 unit(s) subcutaneous once a day (at bedtime)  lidocaine 5% topical film: Apply topically to affected area once a day  metFORMIN 500 mg oral tablet: 1 tab(s) orally 2 times a day    last filled in September  metoprolol succinate 25 mg oral tablet, extended release: 1 tab(s) orally once a day  nystatin 100,000 units/g topical powder: 1 application topically 2 times a day  pantoprazole 40 mg oral delayed release tablet: 1 tab(s) orally once a day (before a meal)  polyethylene glycol 3350 oral powder for reconstitution: 17 gram(s) orally 2 times a day, As Needed for constipation  Prandin 1 mg oral tablet: 1 tab(s) orally 3 times a day (before meals)   Fingerstick glucose must be checked prior to meal. Hold medication if fingerstick glucose &lt;100.   aspirin 81 mg oral tablet, chewable: 1 tab(s) orally once a day  atorvastatin 40 mg oral tablet: 1 tab(s) orally once a day (at bedtime)  cholecalciferol oral tablet: 2000 unit(s) orally once a day  insulin glargine 100 units/mL subcutaneous solution: 30 unit(s) subcutaneous once a day (at bedtime)  lidocaine 5% topical film: Apply topically to affected area once a day  metFORMIN 500 mg oral tablet: 1 tab(s) orally 2 times a day    last filled in September  metoprolol succinate 25 mg oral tablet, extended release: 1 tab(s) orally once a day  nystatin 100,000 units/g topical powder: 1 application topically 2 times a day  pantoprazole 40 mg oral delayed release tablet: 1 tab(s) orally once a day (before a meal)  polyethylene glycol 3350 oral powder for reconstitution: 17 gram(s) orally 2 times a day, As Needed for constipation  Prandin 1 mg oral tablet: 1 tab(s) orally 3 times a day (before meals)   Fingerstick glucose must be checked prior to meal. Hold medication if fingerstick glucose &lt;100.

## 2021-05-14 NOTE — DISCHARGE NOTE PROVIDER - NSDCCPCAREPLAN_GEN_ALL_CORE_FT
PRINCIPAL DISCHARGE DIAGNOSIS  Diagnosis: DKA (diabetic ketoacidoses)  Assessment and Plan of Treatment:       SECONDARY DISCHARGE DIAGNOSES  Diagnosis: Sepsis  Assessment and Plan of Treatment:      PRINCIPAL DISCHARGE DIAGNOSIS  Diagnosis: DKA (diabetic ketoacidoses)  Assessment and Plan of Treatment: You came into the hospital with uncontrolled blood sugars in a state known as diabetic ketoacidosis. You required a brief stay in the medical ICU to control your blood sugars, and then were transitioned to the medical floors. Your sugars are now well controlled with insulin, which you should continue taking. Please follow up with your primary care doctor within 1 week and the endocrinologists as well. Please return to the hospital if you develop any further fevers, nausea, belly pain, urinating frequently.      SECONDARY DISCHARGE DIAGNOSES  Diagnosis: Sepsis  Assessment and Plan of Treatment: Urinary tract infections, also called "UTIs," are infections that affect either the bladder or the kidneys: Bladder infections are more common than kidney infections. They happen when bacteria get into the urethra and travel up into the bladder. The medical term for bladder infection is "cystitis." Kidney infections happen when the bacteria travel even higher, up into the kidneys. The medical term for kidney infection is "pyelonephritis." Both bladder and kidney infections are more common in women than men. The symptoms include: Pain or a burning feeling when you urinate; The need to urinate often; The need to urinate suddenly or in a hurry; Blood in the urine. The symptoms of a kidney infection can include the symptoms of a bladder infection, but kidney infections can also cause: Fever; Back pain; Nausea or vomiting. Most urinary tract infections are treated with antibiotic pills. These pills work by killing the germs that cause the infection.  You were found to have an infection in your urine and underwent a course of antibiotics. Please continue to take your antibiotics as prescribed  HOME CARE INSTRUCTIONS  If you were prescribed antibiotics, take them exactly as your caregiver instructs you. Finish the medication even if you feel better after you have only taken some of the medication.  Drink enough water and fluids to keep your urine clear or pale yellow.  Avoid caffeine, tea, and carbonated beverages. They tend to irritate your bladder.  Empty your bladder often. Avoid holding urine for long periods of time.  Empty your bladder before and after sexual intercourse.  After a bowel movement, women should cleanse from front to back. Use each tissue only once.  SEEK MEDICAL CARE IF:  You have back pain.  You develop a fever.  Your symptoms do not begin to resolve within 3 days.  SEEK IMMEDIATE MEDICAL CARE IF:  You have severe back pain or lower abdominal pain.  You develop chills.  You have nausea or vomiting.  You have continued burning or discomfort with urination.    Diagnosis: Hypertension  Assessment and Plan of Treatment: You are noted to have high blood pressure which can result in serious injury if not treated appropriately. You were noted to have low blood pressure during this hospitalization and thus you were not started on medications. You will only be discharged on Toprol for cardiac healing. Please continue to take this medication as prescribed and follow up with your primary care provider for continued medical care and management     PRINCIPAL DISCHARGE DIAGNOSIS  Diagnosis: DKA (diabetic ketoacidoses)  Assessment and Plan of Treatment: You came into the hospital with uncontrolled blood sugars in a state known as diabetic ketoacidosis. You required a brief stay in the medical ICU to control your blood sugars, and then were transitioned to the medical floors. Your sugars are now well controlled with insulin, which you should continue taking. You are being discharged on _____  Please follow up with your primary care doctor within 1 week and the endocrinologists as well. Please return to the hospital if you develop any further fevers, nausea, belly pain, urinating frequently.      SECONDARY DISCHARGE DIAGNOSES  Diagnosis: Sepsis  Assessment and Plan of Treatment: Urinary tract infections, also called "UTIs," are infections that affect either the bladder or the kidneys: Bladder infections are more common than kidney infections. They happen when bacteria get into the urethra and travel up into the bladder. The medical term for bladder infection is "cystitis." Kidney infections happen when the bacteria travel even higher, up into the kidneys. The medical term for kidney infection is "pyelonephritis." Both bladder and kidney infections are more common in women than men. The symptoms include: Pain or a burning feeling when you urinate; The need to urinate often; The need to urinate suddenly or in a hurry; Blood in the urine. The symptoms of a kidney infection can include the symptoms of a bladder infection, but kidney infections can also cause: Fever; Back pain; Nausea or vomiting. Most urinary tract infections are treated with antibiotic pills. These pills work by killing the germs that cause the infection.  You were found to have an infection in your urine and completed a course of antibiotics.   HOME CARE INSTRUCTIONS  Drink enough water and fluids to keep your urine clear or pale yellow.  Avoid caffeine, tea, and carbonated beverages. They tend to irritate your bladder.  Empty your bladder often. Avoid holding urine for long periods of time.  Empty your bladder before and after sexual intercourse.  After a bowel movement, women should cleanse from front to back. Use each tissue only once.  SEEK MEDICAL CARE IF:  You have back pain.  You develop a fever.  Your symptoms do not begin to resolve within 3 days.  SEEK IMMEDIATE MEDICAL CARE IF:  You have severe back pain or lower abdominal pain.  You develop chills.  You have nausea or vomiting.  You have continued burning or discomfort with urination.    Diagnosis: Hypertension  Assessment and Plan of Treatment: You are noted to have high blood pressure which can result in serious injury if not treated appropriately. You were noted to have low blood pressure during this hospitalization and thus you were not started on medications. You will only be discharged on Toprol for cardiac healing. Please continue to take this medication as prescribed and follow up with your primary care provider for continued medical care and management     PRINCIPAL DISCHARGE DIAGNOSIS  Diagnosis: DKA (diabetic ketoacidoses)  Assessment and Plan of Treatment: You came into the hospital with uncontrolled blood sugars in a state known as diabetic ketoacidosis. You required a brief stay in the medical ICU to control your blood sugars, and then were transitioned to the medical floors. Your sugars are now well controlled with insulin, which you should continue taking. You are being discharged on basal insulin 30U at bedtime with premeal prandin 1mg  tab(s) orally 3 times a day (before meals). For the prandin, fingerstick glucose must be checked prior to meal. Hold the medication if fingerstick glucose <100.   Please follow up with your primary care doctor within 2 weeks and the endocrinologists as well. Please return to the hospital if you develop any further fevers, nausea, belly pain, urinating frequently.      SECONDARY DISCHARGE DIAGNOSES  Diagnosis: Sepsis  Assessment and Plan of Treatment: Urinary tract infections, also called "UTIs," are infections that affect either the bladder or the kidneys: Bladder infections are more common than kidney infections. They happen when bacteria get into the urethra and travel up into the bladder. The medical term for bladder infection is "cystitis." The symptoms include: Pain or a burning feeling when you urinate; The need to urinate often; The need to urinate suddenly or in a hurry; Blood in the urine. The symptoms of a kidney infection can include the symptoms of a bladder infection, but kidney infections can also cause: Fever; Back pain; Nausea or vomiting.   You were found to have an infection in your urine and completed a course of antibiotics.   HOME CARE INSTRUCTIONS  Drink enough water and fluids to keep your urine clear or pale yellow.  Avoid caffeine, tea, and carbonated beverages. They tend to irritate your bladder.  Empty your bladder often. Avoid holding urine for long periods of time.  Empty your bladder before and after sexual intercourse.  After a bowel movement, women should cleanse from front to back. Use each tissue only once.  SEEK MEDICAL CARE IF:  You have back pain.  You develop a fever.  Your symptoms do not begin to resolve within 3 days.  SEEK IMMEDIATE MEDICAL CARE IF:  You have severe back pain or lower abdominal pain.  You develop chills.  You have nausea or vomiting.  You have continued burning or discomfort with urination.    Diagnosis: Hypertension  Assessment and Plan of Treatment: You are noted to have high blood pressure which can result in serious injury if not treated appropriately. You were noted to have low blood pressure during this hospitalization and thus you were not started on medications. You will only be discharged on Toprol for cardiac healing. Please continue to take this medication as prescribed and follow up with your primary care provider for continued medical care and management    Diagnosis: Humerus fracture  Assessment and Plan of Treatment: You fell down and were found to have a fracture of your right humerus, the bone that goes from our shoulder to the elbow. The orthopedic surgery team came to see you and placed you in a sling. Continue wearing the sling. Keep your right arm non-weight bearing, as in do not exercise the right arm. Follow up with orthopedic surgery  Dr. Saez in 1-2 weeks.  She had a vitamin D deficiency and was started on vitamin D supplement. Follow up with your primary care doctor to see whether you need further testing for your bone density.

## 2021-05-15 LAB
GLUCOSE BLDC GLUCOMTR-MCNC: 110 MG/DL — HIGH (ref 70–99)
GLUCOSE BLDC GLUCOMTR-MCNC: 151 MG/DL — HIGH (ref 70–99)
GLUCOSE BLDC GLUCOMTR-MCNC: 163 MG/DL — HIGH (ref 70–99)
GLUCOSE BLDC GLUCOMTR-MCNC: 175 MG/DL — HIGH (ref 70–99)

## 2021-05-15 PROCEDURE — 99233 SBSQ HOSP IP/OBS HIGH 50: CPT | Mod: GC

## 2021-05-15 PROCEDURE — 73030 X-RAY EXAM OF SHOULDER: CPT | Mod: 26,RT

## 2021-05-15 RX ORDER — ONDANSETRON 8 MG/1
4 TABLET, FILM COATED ORAL ONCE
Refills: 0 | Status: COMPLETED | OUTPATIENT
Start: 2021-05-15 | End: 2021-05-15

## 2021-05-15 RX ORDER — ACETAMINOPHEN 500 MG
1000 TABLET ORAL EVERY 8 HOURS
Refills: 0 | Status: DISCONTINUED | OUTPATIENT
Start: 2021-05-15 | End: 2021-05-20

## 2021-05-15 RX ADMIN — ATORVASTATIN CALCIUM 40 MILLIGRAM(S): 80 TABLET, FILM COATED ORAL at 21:31

## 2021-05-15 RX ADMIN — Medication 81 MILLIGRAM(S): at 12:02

## 2021-05-15 RX ADMIN — POLYETHYLENE GLYCOL 3350 17 GRAM(S): 17 POWDER, FOR SOLUTION ORAL at 17:21

## 2021-05-15 RX ADMIN — Medication 11 UNIT(S): at 17:31

## 2021-05-15 RX ADMIN — Medication 500 MILLIGRAM(S): at 17:21

## 2021-05-15 RX ADMIN — ONDANSETRON 4 MILLIGRAM(S): 8 TABLET, FILM COATED ORAL at 12:22

## 2021-05-15 RX ADMIN — LIDOCAINE 1 PATCH: 4 CREAM TOPICAL at 21:27

## 2021-05-15 RX ADMIN — Medication 500 MILLIGRAM(S): at 05:04

## 2021-05-15 RX ADMIN — Medication 1000 MILLIGRAM(S): at 21:32

## 2021-05-15 RX ADMIN — Medication 1000 MILLIGRAM(S): at 12:22

## 2021-05-15 RX ADMIN — Medication 11 UNIT(S): at 13:10

## 2021-05-15 RX ADMIN — Medication 25 MILLIGRAM(S): at 05:04

## 2021-05-15 RX ADMIN — Medication 1000 MILLIGRAM(S): at 12:38

## 2021-05-15 RX ADMIN — Medication 1000 MILLIGRAM(S): at 22:04

## 2021-05-15 RX ADMIN — INSULIN GLARGINE 34 UNIT(S): 100 INJECTION, SOLUTION SUBCUTANEOUS at 21:32

## 2021-05-15 RX ADMIN — Medication 2: at 09:04

## 2021-05-15 RX ADMIN — SENNA PLUS 2 TABLET(S): 8.6 TABLET ORAL at 21:32

## 2021-05-15 RX ADMIN — POLYETHYLENE GLYCOL 3350 17 GRAM(S): 17 POWDER, FOR SOLUTION ORAL at 05:04

## 2021-05-15 RX ADMIN — LIDOCAINE 1 PATCH: 4 CREAM TOPICAL at 17:20

## 2021-05-15 RX ADMIN — Medication 11 UNIT(S): at 09:05

## 2021-05-15 RX ADMIN — LIDOCAINE 1 PATCH: 4 CREAM TOPICAL at 05:48

## 2021-05-15 RX ADMIN — Medication 2: at 13:10

## 2021-05-15 RX ADMIN — ENOXAPARIN SODIUM 40 MILLIGRAM(S): 100 INJECTION SUBCUTANEOUS at 17:20

## 2021-05-15 NOTE — PROGRESS NOTE ADULT - SUBJECTIVE AND OBJECTIVE BOX
Danielle Pack MD  Internal Medicine PGY-2  Pager [phone]  Pager [phone]      Patient is a 76y old  Female who presents with a chief complaint of Urosepsis and Diabetic Ketoacidosis (14 May 2021 12:37)        SUBJECTIVE / OVERNIGHT EVENTS: Patient had no acute events overnight. Patient seen and examined at bedside this morning.     ROS: [ - ] Fever [ - ] Chills [ - ] Nausea/Vomiting [ - ] Chest Pain [ - ] Shortness of breath     MEDICATIONS  (STANDING):  aspirin  chewable 81 milliGRAM(s) Oral daily  atorvastatin 40 milliGRAM(s) Oral at bedtime  ciprofloxacin     Tablet 500 milliGRAM(s) Oral every 12 hours  dextrose 40% Gel 15 Gram(s) Oral once  dextrose 5%. 1000 milliLiter(s) (50 mL/Hr) IV Continuous <Continuous>  dextrose 50% Injectable 25 Gram(s) IV Push once  dextrose 50% Injectable 12.5 Gram(s) IV Push once  enoxaparin Injectable 40 milliGRAM(s) SubCutaneous daily  glucagon  Injectable 1 milliGRAM(s) IntraMuscular once  insulin glargine Injectable (LANTUS) 34 Unit(s) SubCutaneous at bedtime  insulin lispro (ADMELOG) corrective regimen sliding scale   SubCutaneous three times a day before meals  insulin lispro (ADMELOG) corrective regimen sliding scale   SubCutaneous at bedtime  insulin lispro Injectable (ADMELOG) 11 Unit(s) SubCutaneous three times a day before meals  lidocaine   Patch 1 Patch Transdermal daily  metoprolol succinate ER 25 milliGRAM(s) Oral daily  polyethylene glycol 3350 17 Gram(s) Oral two times a day  senna 2 Tablet(s) Oral at bedtime    MEDICATIONS  (PRN):  fluticasone propionate 50 MICROgram(s)/spray Nasal Spray 1 Spray(s) Both Nostrils two times a day PRN congestion  sodium chloride 0.9% lock flush 10 milliLiter(s) IV Push every 1 hour PRN Pre/post blood products, medications, blood draw, and to maintain line patency      Vital Signs Last 24 Hrs  T(C): 36.4 (15 May 2021 04:42), Max: 37.3 (14 May 2021 21:17)  T(F): 97.5 (15 May 2021 04:42), Max: 99.1 (14 May 2021 21:17)  HR: 87 (15 May 2021 04:42) (72 - 96)  BP: 147/76 (15 May 2021 04:42) (126/80 - 154/78)  BP(mean): --  RR: 16 (15 May 2021 04:42) (16 - 20)  SpO2: 95% (15 May 2021 04:42) (92% - 95%)  CAPILLARY BLOOD GLUCOSE      POCT Blood Glucose.: 141 mg/dL (14 May 2021 22:15)  POCT Blood Glucose.: 106 mg/dL (14 May 2021 17:42)  POCT Blood Glucose.: 162 mg/dL (14 May 2021 12:12)  POCT Blood Glucose.: 168 mg/dL (14 May 2021 08:35)    I&O's Summary    14 May 2021 07:01  -  15 May 2021 07:00  --------------------------------------------------------  IN: 720 mL / OUT: 1000 mL / NET: -280 mL        PHYSICAL EXAM  GENERAL: NAD, lying comfortably in bed   HEENT:  Atraumatic, Normocephalic, EOMI, conjunctiva and sclera clear, no LAD  CHEST/LUNG: Clear to auscultation bilaterally; No wheeze  HEART: RRR, S1 and S2 No murmurs, rubs, or gallops  ABDOMEN: Soft, Nontender, Nondistended; Bowel sounds present  EXTREMITIES:  2+ Peripheral Pulses, No clubbing, cyanosis, or edema  NEURO: AAOx3, non-focal  SKIN: No rashes or lesions    LABS:                        10.5   8.84  )-----------( 208      ( 13 May 2021 11:19 )             31.1     05-14    134<L>  |  101  |  24<H>  ----------------------------<  174<H>  3.8   |  20<L>  |  1.14    Ca    8.3<L>      14 May 2021 07:03  Phos  2.6     05-14  Mg     1.7     05-14    TPro  7.0  /  Alb  2.8<L>  /  TBili  0.3  /  DBili  x   /  AST  22  /  ALT  20  /  AlkPhos  126<H>  05-13    PT/INR - ( 13 May 2021 11:19 )   PT: 13.4 sec;   INR: 1.12 ratio         PTT - ( 13 May 2021 11:19 )  PTT:27.4 sec            RADIOLOGY & ADDITIONAL TESTS:    Imaging Personally Reviewed:  Consultant(s) Notes Reviewed:     Danielle Pack MD  Internal Medicine PGY-2  Pager [phone]  Pager [phone]      Patient is a 76y old  Female who presents with a chief complaint of Urosepsis and Diabetic Ketoacidosis (14 May 2021 12:37)        SUBJECTIVE / OVERNIGHT EVENTS: Patient had no acute events overnight. Patient seen and examined at bedside this morning. Patient is doing well with no acute complaints.     ROS: [ - ] Fever [ - ] Chills [ - ] Nausea/Vomiting [ - ] Chest Pain [ - ] Shortness of breath     MEDICATIONS  (STANDING):  aspirin  chewable 81 milliGRAM(s) Oral daily  atorvastatin 40 milliGRAM(s) Oral at bedtime  ciprofloxacin     Tablet 500 milliGRAM(s) Oral every 12 hours  dextrose 40% Gel 15 Gram(s) Oral once  dextrose 5%. 1000 milliLiter(s) (50 mL/Hr) IV Continuous <Continuous>  dextrose 50% Injectable 25 Gram(s) IV Push once  dextrose 50% Injectable 12.5 Gram(s) IV Push once  enoxaparin Injectable 40 milliGRAM(s) SubCutaneous daily  glucagon  Injectable 1 milliGRAM(s) IntraMuscular once  insulin glargine Injectable (LANTUS) 34 Unit(s) SubCutaneous at bedtime  insulin lispro (ADMELOG) corrective regimen sliding scale   SubCutaneous three times a day before meals  insulin lispro (ADMELOG) corrective regimen sliding scale   SubCutaneous at bedtime  insulin lispro Injectable (ADMELOG) 11 Unit(s) SubCutaneous three times a day before meals  lidocaine   Patch 1 Patch Transdermal daily  metoprolol succinate ER 25 milliGRAM(s) Oral daily  polyethylene glycol 3350 17 Gram(s) Oral two times a day  senna 2 Tablet(s) Oral at bedtime    MEDICATIONS  (PRN):  fluticasone propionate 50 MICROgram(s)/spray Nasal Spray 1 Spray(s) Both Nostrils two times a day PRN congestion  sodium chloride 0.9% lock flush 10 milliLiter(s) IV Push every 1 hour PRN Pre/post blood products, medications, blood draw, and to maintain line patency      Vital Signs Last 24 Hrs  T(C): 36.4 (15 May 2021 04:42), Max: 37.3 (14 May 2021 21:17)  T(F): 97.5 (15 May 2021 04:42), Max: 99.1 (14 May 2021 21:17)  HR: 87 (15 May 2021 04:42) (72 - 96)  BP: 147/76 (15 May 2021 04:42) (126/80 - 154/78)  BP(mean): --  RR: 16 (15 May 2021 04:42) (16 - 20)  SpO2: 95% (15 May 2021 04:42) (92% - 95%)  CAPILLARY BLOOD GLUCOSE      POCT Blood Glucose.: 141 mg/dL (14 May 2021 22:15)  POCT Blood Glucose.: 106 mg/dL (14 May 2021 17:42)  POCT Blood Glucose.: 162 mg/dL (14 May 2021 12:12)  POCT Blood Glucose.: 168 mg/dL (14 May 2021 08:35)    I&O's Summary    14 May 2021 07:01  -  15 May 2021 07:00  --------------------------------------------------------  IN: 720 mL / OUT: 1000 mL / NET: -280 mL        PHYSICAL EXAM  CONSTITUTIONAL: NAD, well-developed, +obese  HEENT: poor dentition  RESPIRATORY: Normal respiratory effort; lungs are clear to auscultation bilaterally  CARDIOVASCULAR: Regular rate and rhythm, normal S1 and S2, no murmur/rub/gallop; No lower extremity edema; Peripheral pulses are 2+ bilaterally  ABDOMEN: Nontender to palpation, normoactive bowel sounds, no rebound/guarding; No hepatosplenomegaly  MUSCLOSKELETAL: no clubbing or cyanosis of digits; no joint swelling or tenderness to palpation  PSYCH: A+O to person, place, and time; affect appropriate      LABS:                        10.5   8.84  )-----------( 208      ( 13 May 2021 11:19 )             31.1     05-14    134<L>  |  101  |  24<H>  ----------------------------<  174<H>  3.8   |  20<L>  |  1.14    Ca    8.3<L>      14 May 2021 07:03  Phos  2.6     05-14  Mg     1.7     05-14    TPro  7.0  /  Alb  2.8<L>  /  TBili  0.3  /  DBili  x   /  AST  22  /  ALT  20  /  AlkPhos  126<H>  05-13    PT/INR - ( 13 May 2021 11:19 )   PT: 13.4 sec;   INR: 1.12 ratio         PTT - ( 13 May 2021 11:19 )  PTT:27.4 sec            RADIOLOGY & ADDITIONAL TESTS:    Imaging Personally Reviewed:  Consultant(s) Notes Reviewed:

## 2021-05-15 NOTE — PROGRESS NOTE ADULT - PROBLEM SELECTOR PLAN 1
Presented with DKA i/s/o medication noncompliance and UTI, required MICU for insulin gtt  Uncontrolled DM a1c > 15.5%  Patient states will NOT take insulin on outside. Refusing insulin on outside.  Was not on insulin prior outpatint. Was taking metformin irregularly on outside, script last filled in September per pharmacy  - s/p insulin drip at MICU (d/c on 5/11)  - now on Lantus 34U, admelog 11U, ISS; FSG in arget  . D/w endo re oral agents  -pt states she takes medicine "when she feels like", admits to noncompliance Presented with DKA i/s/o medication noncompliance and UTI, required MICU for insulin gtt  Uncontrolled DM a1c > 15.5%  Patient states will NOT take insulin on outside. Refusing insulin on outside.  Was not on insulin prior outpatient. Was taking metformin irregularly on outside, script last filled in September per pharmacy  - s/p insulin drip at MICU (d/c on 5/11)  - now on Lantus 34U, admelog 11U, ISS; FSG in arget  . D/w endo re oral agents  -pt states she takes medicine "when she feels like", admits to noncompliance

## 2021-05-15 NOTE — PROGRESS NOTE ADULT - PROBLEM SELECTOR PLAN 6
Prophylaxis:  lovenox  CC diet  Dispo: stable for medical discharge, however discharge regimen issue as pt will not take insulin outside  PT states SUSHIL, however pt wants to go home with sister, but sister unable to care for her as sister is elderly herself    Plan for PT to reeval pt  over weekend and if no needs deemed then will go home, otherwise SUSHIL Mon/Tues

## 2021-05-15 NOTE — PROGRESS NOTE ADULT - PROBLEM SELECTOR PLAN 5
presented in septic shock, BPs now stable 130s  med recc completed, was not taking any HTN meds other than metop which was more likley for CAD

## 2021-05-16 LAB
ANION GAP SERPL CALC-SCNC: 15 MMOL/L — SIGNIFICANT CHANGE UP (ref 5–17)
BUN SERPL-MCNC: 24 MG/DL — HIGH (ref 7–23)
CALCIUM SERPL-MCNC: 8.4 MG/DL — SIGNIFICANT CHANGE UP (ref 8.4–10.5)
CHLORIDE SERPL-SCNC: 102 MMOL/L — SIGNIFICANT CHANGE UP (ref 96–108)
CK SERPL-CCNC: 13 U/L — LOW (ref 25–170)
CO2 SERPL-SCNC: 20 MMOL/L — LOW (ref 22–31)
CREAT SERPL-MCNC: 1.28 MG/DL — SIGNIFICANT CHANGE UP (ref 0.5–1.3)
GLUCOSE BLDC GLUCOMTR-MCNC: 124 MG/DL — HIGH (ref 70–99)
GLUCOSE BLDC GLUCOMTR-MCNC: 148 MG/DL — HIGH (ref 70–99)
GLUCOSE BLDC GLUCOMTR-MCNC: 168 MG/DL — HIGH (ref 70–99)
GLUCOSE BLDC GLUCOMTR-MCNC: 206 MG/DL — HIGH (ref 70–99)
GLUCOSE SERPL-MCNC: 141 MG/DL — HIGH (ref 70–99)
HCT VFR BLD CALC: 31.2 % — LOW (ref 34.5–45)
HGB BLD-MCNC: 10.1 G/DL — LOW (ref 11.5–15.5)
MAGNESIUM SERPL-MCNC: 1.7 MG/DL — SIGNIFICANT CHANGE UP (ref 1.6–2.6)
MCHC RBC-ENTMCNC: 27.7 PG — SIGNIFICANT CHANGE UP (ref 27–34)
MCHC RBC-ENTMCNC: 32.4 GM/DL — SIGNIFICANT CHANGE UP (ref 32–36)
MCV RBC AUTO: 85.5 FL — SIGNIFICANT CHANGE UP (ref 80–100)
NRBC # BLD: 0 /100 WBCS — SIGNIFICANT CHANGE UP (ref 0–0)
PHOSPHATE SERPL-MCNC: 3.5 MG/DL — SIGNIFICANT CHANGE UP (ref 2.5–4.5)
PLATELET # BLD AUTO: 331 K/UL — SIGNIFICANT CHANGE UP (ref 150–400)
POTASSIUM SERPL-MCNC: 4 MMOL/L — SIGNIFICANT CHANGE UP (ref 3.5–5.3)
POTASSIUM SERPL-SCNC: 4 MMOL/L — SIGNIFICANT CHANGE UP (ref 3.5–5.3)
RBC # BLD: 3.65 M/UL — LOW (ref 3.8–5.2)
RBC # FLD: 14.8 % — HIGH (ref 10.3–14.5)
SARS-COV-2 RNA SPEC QL NAA+PROBE: SIGNIFICANT CHANGE UP
SODIUM SERPL-SCNC: 137 MMOL/L — SIGNIFICANT CHANGE UP (ref 135–145)
WBC # BLD: 9.44 K/UL — SIGNIFICANT CHANGE UP (ref 3.8–10.5)
WBC # FLD AUTO: 9.44 K/UL — SIGNIFICANT CHANGE UP (ref 3.8–10.5)

## 2021-05-16 PROCEDURE — 73020 X-RAY EXAM OF SHOULDER: CPT | Mod: 26,RT

## 2021-05-16 PROCEDURE — 99233 SBSQ HOSP IP/OBS HIGH 50: CPT | Mod: GC

## 2021-05-16 RX ORDER — NYSTATIN CREAM 100000 [USP'U]/G
1 CREAM TOPICAL
Refills: 0 | Status: DISCONTINUED | OUTPATIENT
Start: 2021-05-16 | End: 2021-05-20

## 2021-05-16 RX ADMIN — Medication 1000 MILLIGRAM(S): at 07:38

## 2021-05-16 RX ADMIN — SENNA PLUS 2 TABLET(S): 8.6 TABLET ORAL at 22:25

## 2021-05-16 RX ADMIN — ATORVASTATIN CALCIUM 40 MILLIGRAM(S): 80 TABLET, FILM COATED ORAL at 22:26

## 2021-05-16 RX ADMIN — Medication 11 UNIT(S): at 17:21

## 2021-05-16 RX ADMIN — Medication 11 UNIT(S): at 12:59

## 2021-05-16 RX ADMIN — Medication 1000 MILLIGRAM(S): at 22:56

## 2021-05-16 RX ADMIN — LIDOCAINE 1 PATCH: 4 CREAM TOPICAL at 17:21

## 2021-05-16 RX ADMIN — Medication 5 MILLIGRAM(S): at 17:23

## 2021-05-16 RX ADMIN — Medication 81 MILLIGRAM(S): at 12:44

## 2021-05-16 RX ADMIN — ENOXAPARIN SODIUM 40 MILLIGRAM(S): 100 INJECTION SUBCUTANEOUS at 17:22

## 2021-05-16 RX ADMIN — LIDOCAINE 1 PATCH: 4 CREAM TOPICAL at 07:38

## 2021-05-16 RX ADMIN — NYSTATIN CREAM 1 APPLICATION(S): 100000 CREAM TOPICAL at 06:05

## 2021-05-16 RX ADMIN — Medication 1000 MILLIGRAM(S): at 14:12

## 2021-05-16 RX ADMIN — Medication 11 UNIT(S): at 09:20

## 2021-05-16 RX ADMIN — INSULIN GLARGINE 34 UNIT(S): 100 INJECTION, SOLUTION SUBCUTANEOUS at 22:25

## 2021-05-16 RX ADMIN — POLYETHYLENE GLYCOL 3350 17 GRAM(S): 17 POWDER, FOR SOLUTION ORAL at 17:21

## 2021-05-16 RX ADMIN — NYSTATIN CREAM 1 APPLICATION(S): 100000 CREAM TOPICAL at 17:22

## 2021-05-16 RX ADMIN — Medication 1000 MILLIGRAM(S): at 13:01

## 2021-05-16 RX ADMIN — LIDOCAINE 1 PATCH: 4 CREAM TOPICAL at 20:45

## 2021-05-16 RX ADMIN — POLYETHYLENE GLYCOL 3350 17 GRAM(S): 17 POWDER, FOR SOLUTION ORAL at 06:06

## 2021-05-16 RX ADMIN — Medication 2: at 12:59

## 2021-05-16 RX ADMIN — Medication 1000 MILLIGRAM(S): at 06:05

## 2021-05-16 RX ADMIN — Medication 25 MILLIGRAM(S): at 06:05

## 2021-05-16 RX ADMIN — Medication 1000 MILLIGRAM(S): at 22:26

## 2021-05-16 NOTE — PROGRESS NOTE ADULT - PROBLEM SELECTOR PLAN 1
Presented with DKA i/s/o medication noncompliance and UTI, required MICU for insulin gtt  Uncontrolled DM a1c > 15.5%  Patient states will NOT take insulin on outside. Refusing insulin on outside.  Was not on insulin prior outpatient. Was taking metformin irregularly on outside, script last filled in September per pharmacy  - s/p insulin drip at MICU (d/c on 5/11)  - now on Lantus 34U, admelog 11U, ISS; FSG in arget  . D/w endo re oral agents  -pt states she takes medicine "when she feels like", admits to noncompliance

## 2021-05-16 NOTE — OCCUPATIONAL THERAPY INITIAL EVALUATION ADULT - IMPAIRMENTS CONTRIBUTING IMPAIRED BED MOBILITY, REHAB EVAL
impaired balance/pain/decreased strength impaired balance/impaired coordination/pain/decreased ROM/decreased strength

## 2021-05-16 NOTE — PROGRESS NOTE ADULT - PROBLEM SELECTOR PLAN 2
UC grew >100k E. coli concerning for urosepsis  - UC 5/10 grew E. coli  - BC 5/10 grew Staph. epi in one bottle only  - BC 5/12 NGTD  - s/p vancomycin IV 1250mg (5/10-5/13) x2 doses  - s/p ceftriaxone IV 1000mg (5/10-5/13) x3 doses  - s/p PO cipro 500 BID 5/13 - 5/15

## 2021-05-16 NOTE — MEDICAL STUDENT PROGRESS NOTE(EDUCATION) - NS MD HP STUD ASPLAN ASSES FT
76 F PMH T2DM, HTN, CABG years ago on aspirin 325, systolic HF and recurrent UTIs admitted to MICU for management of DKA in the setting of Ecoli UTI. Now downgraded to floors for continued management.

## 2021-05-16 NOTE — MEDICAL STUDENT PROGRESS NOTE(EDUCATION) - SUBJECTIVE AND OBJECTIVE BOX
Adrien Russell, MS3  -------------------------------------------------------  SSindhu MELARA is a 75yo woman who presents with a chief complaint of Urosepsis and Diabetic Ketoacidosis (16 May 2021 09:00)    SUBJECTIVE / OVERNIGHT EVENTS:  Patient seen and examined at bedside. No events overnight. Nurse not convinced that pt had a BM since admission. Pt continues to refuse outpatient insulin or subacute rehab. She continues to have right shoulder pain.    Other Review of Systems Negative.    MEDICATIONS  (STANDING):  acetaminophen   Tablet .. 1000 milliGRAM(s) Oral every 8 hours  aspirin  chewable 81 milliGRAM(s) Oral daily  atorvastatin 40 milliGRAM(s) Oral at bedtime  bisacodyl 5 milliGRAM(s) Oral every 12 hours  dextrose 40% Gel 15 Gram(s) Oral once  dextrose 5%. 1000 milliLiter(s) (50 mL/Hr) IV Continuous <Continuous>  dextrose 50% Injectable 25 Gram(s) IV Push once  dextrose 50% Injectable 12.5 Gram(s) IV Push once  enoxaparin Injectable 40 milliGRAM(s) SubCutaneous daily  glucagon  Injectable 1 milliGRAM(s) IntraMuscular once  insulin glargine Injectable (LANTUS) 34 Unit(s) SubCutaneous at bedtime  insulin lispro (ADMELOG) corrective regimen sliding scale   SubCutaneous at bedtime  insulin lispro (ADMELOG) corrective regimen sliding scale   SubCutaneous three times a day before meals  insulin lispro Injectable (ADMELOG) 11 Unit(s) SubCutaneous three times a day before meals  lidocaine   Patch 1 Patch Transdermal daily  metoprolol succinate ER 25 milliGRAM(s) Oral daily  nystatin Powder 1 Application(s) Topical two times a day  polyethylene glycol 3350 17 Gram(s) Oral two times a day  senna 2 Tablet(s) Oral at bedtime    MEDICATIONS  (PRN):  fluticasone propionate 50 MICROgram(s)/spray Nasal Spray 1 Spray(s) Both Nostrils two times a day PRN congestion  sodium chloride 0.9% lock flush 10 milliLiter(s) IV Push every 1 hour PRN Pre/post blood products, medications, blood draw, and to maintain line patency    OBJECTIVE:    PHYSICAL EXAM:  Vital Signs Last 24 Hrs  T(C): 36.8 (16 May 2021 06:03), Max: 37.3 (15 May 2021 11:32)  T(F): 98.3 (16 May 2021 06:03), Max: 99.2 (15 May 2021 11:32)  HR: 88 (16 May 2021 06:03) (88 - 102)  BP: 156/80 (16 May 2021 06:03) (121/76 - 156/80)  BP(mean): --  RR: 18 (16 May 2021 06:03) (18 - 18)  SpO2: 96% (16 May 2021 06:03) (93% - 96%)      GENERAL: NAD, well-developed  HEAD:  Atraumatic, Normocephalic  EYES: EOMI, conjunctiva and sclera clear  NECK: Supple  CHEST/LUNG: Clear to auscultation bilaterally; No wheeze  HEART: Regular rate and rhythm; No murmurs, rubs, or gallops  ABDOMEN: Soft, Nontender, Nondistended; Bowel sounds present  EXTREMITIES:  2+ Peripheral Pulses, No clubbing, cyanosis, or edema  PSYCH: AAOx3  NEUROLOGY: non-focal  SKIN: No rashes or lesions    I&O's Summary    15 May 2021 07:01  -  16 May 2021 07:00  --------------------------------------------------------  IN: 1200 mL / OUT: 750 mL / NET: 450 mL      CAPILLARY BLOOD GLUCOSE      POCT Blood Glucose.: 148 mg/dL (16 May 2021 08:51)  POCT Blood Glucose.: 163 mg/dL (15 May 2021 21:26)  POCT Blood Glucose.: 110 mg/dL (15 May 2021 17:21)  POCT Blood Glucose.: 151 mg/dL (15 May 2021 12:55)    LABS:                         10.1   9.44  )-----------( 331      ( 16 May 2021 05:23 )             31.2     05-16    137  |  102  |  24<H>  ----------------------------<  141<H>  4.0   |  20<L>  |  1.28    Ca    8.4      16 May 2021 05:23  Phos  3.5     05-16  Mg     1.7     05-16      CARDIAC MARKERS ( 16 May 2021 05:23 )  x     / x     / 13 U/L / x     / x            Care Discussed with Consultants/Other Providers:    RADIOLOGY & ADDITIONAL TESTS:  (Imaging Personally Reviewed)

## 2021-05-16 NOTE — OCCUPATIONAL THERAPY INITIAL EVALUATION ADULT - LEVEL OF INDEPENDENCE: SUPINE/SIT, REHAB EVAL
moderate assist (50% patients effort) cues to follow NWB precautions/minimum assist (75% patients effort)

## 2021-05-16 NOTE — PROGRESS NOTE ADULT - SUBJECTIVE AND OBJECTIVE BOX
PROGRESS NOTE:     CONTACT INFO:  Iraida Olivier MD | PGY-1  Pager: [phone] [place], [address] [facility]  After 7pm page night float  On weekends after 1pm check resident assignment tab to see who is covering      Patient is a 76y old  Female who presents with a chief complaint of Urosepsis and Diabetic Ketoacidosis (15 May 2021 07:35)      SUBJECTIVE / OVERNIGHT EVENTS:    - No acute events overnight.   - No fevers/chills.  - Patient seen and evaluated at bedside.  - Denies SOB at rest, chest pain, palpitations, abdominal pain, nausea/vomiting  -Reiterates she will not take insulin and she will not go to [facility]. Otherwise feeling fine.    ADDITIONAL REVIEW OF SYSTEMS:    MEDICATIONS  (STANDING):  acetaminophen   Tablet .. 1000 milliGRAM(s) Oral every 8 hours  aspirin  chewable 81 milliGRAM(s) Oral daily  atorvastatin 40 milliGRAM(s) Oral at bedtime  dextrose 40% Gel 15 Gram(s) Oral once  dextrose 5%. 1000 milliLiter(s) (50 mL/Hr) IV Continuous <Continuous>  dextrose 50% Injectable 25 Gram(s) IV Push once  dextrose 50% Injectable 12.5 Gram(s) IV Push once  enoxaparin Injectable 40 milliGRAM(s) SubCutaneous daily  glucagon  Injectable 1 milliGRAM(s) IntraMuscular once  insulin glargine Injectable (LANTUS) 34 Unit(s) SubCutaneous at bedtime  insulin lispro (ADMELOG) corrective regimen sliding scale   SubCutaneous at bedtime  insulin lispro (ADMELOG) corrective regimen sliding scale   SubCutaneous three times a day before meals  insulin lispro Injectable (ADMELOG) 11 Unit(s) SubCutaneous three times a day before meals  lidocaine   Patch 1 Patch Transdermal daily  metoprolol succinate ER 25 milliGRAM(s) Oral daily  nystatin Powder 1 Application(s) Topical two times a day  polyethylene glycol 3350 17 Gram(s) Oral two times a day  senna 2 Tablet(s) Oral at bedtime    MEDICATIONS  (PRN):  fluticasone propionate 50 MICROgram(s)/spray Nasal Spray 1 Spray(s) Both Nostrils two times a day PRN congestion  sodium chloride 0.9% lock flush 10 milliLiter(s) IV Push every 1 hour PRN Pre/post blood products, medications, blood draw, and to maintain line patency      CAPILLARY BLOOD GLUCOSE      POCT Blood Glucose.: 148 mg/dL (16 May 2021 08:51)  POCT Blood Glucose.: 163 mg/dL (15 May 2021 21:26)  POCT Blood Glucose.: 110 mg/dL (15 May 2021 17:21)  POCT Blood Glucose.: 151 mg/dL (15 May 2021 12:55)    I&O's Summary    15 May 2021 07:01  -  16 May 2021 07:00  --------------------------------------------------------  IN: 1200 mL / OUT: 750 mL / NET: 450 mL        PHYSICAL EXAM:  Vital Signs Last 24 Hrs  T(C): 36.8 (16 May 2021 06:03), Max: 37.3 (15 May 2021 11:32)  T(F): 98.3 (16 May 2021 06:03), Max: 99.2 (15 May 2021 11:32)  HR: 88 (16 May 2021 06:03) (88 - 102)  BP: 156/80 (16 May 2021 06:03) (121/76 - 156/80)  BP(mean): --  RR: 18 (16 May 2021 06:03) (18 - 18)  SpO2: 96% (16 May 2021 06:03) (93% - 96%)    CONSTITUTIONAL: NAD, well-developed  RESPIRATORY: Normal respiratory effort; lungs are clear to auscultation bilaterally  CARDIOVASCULAR: Regular rate and rhythm, normal S1 and S2, no murmur/rub/gallop; No lower extremity edema; Peripheral pulses are 2+ bilaterally  ABDOMEN: Nontender to palpation, normoactive bowel sounds, no rebound/guarding; No hepatosplenomegaly  MUSCLOSKELETAL: no clubbing or cyanosis of digits; no joint swelling or tenderness to palpation  PSYCH: A+O to person, place, and time; affect appropriate    LABS:                        10.1   9.44  )-----------( 331      ( 16 May 2021 05:23 )             31.2     05-16    137  |  102  |  24<H>  ----------------------------<  141<H>  4.0   |  20<L>  |  1.28    Ca    8.4      16 May 2021 05:23  Phos  3.5     05-16  Mg     1.7     05-16        CARDIAC MARKERS ( 16 May 2021 05:23 )  x     / x     / 13 U/L / x     / x              Culture - Blood (collected 13 May 2021 16:15)  Source: .Blood Blood  Preliminary Report (14 May 2021 17:02):    No growth to date.        RADIOLOGY & ADDITIONAL TESTS:  Results Reviewed:   Imaging Personally Reviewed:  Electrocardiogram Personally Reviewed:    COORDINATION OF CARE:  Care Discussed with Consultants/Other Providers [Y/N]: Y  Prior or Outpatient Records Reviewed [Y/N]: Y

## 2021-05-16 NOTE — OCCUPATIONAL THERAPY INITIAL EVALUATION ADULT - ADL RETRAINING, OT EVAL
Goals: Pt will perform UB/LB dressing with independence within 4 weeks. Pt will perform toileting with independence within 4 weeks.

## 2021-05-16 NOTE — PROGRESS NOTE ADULT - ASSESSMENT
76 F PMH T2DM, HTN, CABG years ago on aspirin 325, systolic HF and recurrent UTIs presented after being found down in kitchen by sister, admitted to MICU for management of DKA in the setting of Ecoli Uti and medication nonadherence, and septic shock 2/2 E.coli uti, transferred to floors 5/13. Pending dispo    States she will not take insulin on outside and she will not go to [facility]. States she understands she could "die" if she does not use insulin, but also states "but I might be fine too".     76 F PMH T2DM, HTN, CABG years ago on aspirin 325, systolic HF and recurrent UTIs presented after being found down in kitchen by sister, admitted to MICU for management of DKA in the setting of Ecoli Uti and medication nonadherence, and septic shock 2/2 E.coli uti, transferred to floors 5/13. Pending dispo    States she will not take insulin on outside and she will not go to [facility]. Discussed with patient she needs to be on insulin based on presentation in DKA, but patient continues to refuse. Risks and benefits were discussed with patient. States she understands she could "die" if she does not use insulin, but also states "but I might be fine too".

## 2021-05-16 NOTE — MEDICAL STUDENT PROGRESS NOTE(EDUCATION) - NS MD HP STUD ASPLAN PLAN FT
1. DKA - BIBEMS after falling and fingerstick found to be 500s concerning for DKA 2/2 urosepsis  - s/p insulin drip at MICU (d/c on 5/11)  - now on Lantus 31U, admelog 11U, ISS  - Fingersticks ~100-200s    2. Hypotension - Found to be hypotensive in ED s/p 3L NS and levophed drip  - s/p levophed drip at MICU (d/c on 5/11)  - BPs now stable with sBP 130s    3. Sepsis - UC grew >100k E. coli concerning for urosepsis  - UC 5/10 grew E. coli  - BC 5/10 grew Staph. epi  - BC 5/12 NGTD  - s/p vancomycin IV 1250mg (5/10-5/13) x2 doses  - s/p ceftriaxone IV 1000mg (5/10-5/13) x3 doses  - s/p PO cipro 500 BID 5/13 - 5/15.     4. h/o fall - s/p fall on 5/10 2/2 AMS from DKA  - Currently A&Ox3  - CT head/spine showed no fractures or bleeding  - Shoulder X-ray showed no fractures  - patient c/o b/l shoulder pain since fall  - Start lidocaine patch for shoulder pain  - f/u repeat shoulder x-ray for continued pain    5. T2DM  - endo following  - insulin plan as above  - holding home metformin and dapagliflozin  - DASH diet    6. Prophylaxis  - subq heparin 5000U q8h for DVT prophylaxis    Disposition  - discharge home pending clinical outcome  - coordinate pickup with sister
1. DKA - BIBEMS after falling and fingerstick found to be 500s concerning for DKA 2/2 urosepsis  - s/p insulin drip at MICU (d/c on 5/11)  - now on Lantus 30U, admelog 9U, ISS  - Fingersticks ~200-250s    2. Hypotension - Found to be hypotensive in ED s/p 3L NS and levophed drip  - s/p levophed drip at MICU (d/c on 5/11)  - BPs now stable with sBP 130s    3. Sepsis - UC grew >100k E. coli concerning for urosepsis  - UC 5/10 grew E. coli  - BC 5/10 grew Staph. epi  - BC 5/12 NGTD  - on vancomycin IV 1250mg (5/10-) x2 doses  - vanc trough 8.9 on 5/12  - on ceftriaxone IV 1000mg (5/10-) x3 doses    4. h/o fall - s/p fall on 5/10 2/2 AMS from DKA  - Currently A&Ox3  - CT head/spine showed no fractures or bleeding  - Shoulder X-ray showed no fractures  - patient c/o b/l shoulder pain since fall  - Start lidocaine patch for shoulder pain    5. T2DM  - endo following  - insulin plan as above  - holding home metformin and dapagliflozin  - DASH diet    6. Prophylaxis  - subq heparin 5000U q8h for DVT prophylaxis    Disposition  - discharge home pending clinical outcome  - coordinate pickup with family
1. DKA - BIBEMS after falling and fingerstick found to be 500s concerning for DKA 2/2 urosepsis  - s/p insulin drip at MICU (d/c on 5/11)  - now on Lantus 30U, admelog 9U, ISS  - Fingersticks ~200-250s    2. Hypotension - Found to be hypotensive in ED s/p 3L NS and levophed drip  - s/p levophed drip at MICU (d/c on 5/11)  - BPs now stable with sBP 130s    3. Sepsis - UC grew >100k E. coli concerning for urosepsis  - UC 5/10 grew E. coli  - BC 5/10 grew Staph. epi  - BC 5/12 NGTD  - on vancomycin IV 1250mg (5/10-) x2 doses  - vanc trough 8.9 on 5/12  - on ceftriaxone IV 1000mg (5/10-) x3 doses    4. h/o fall - s/p fall on 5/10 2/2 AMS from DKA  - Currently A&Ox3  - CT head/spine showed no fractures or bleeding  - Shoulder X-ray showed no fractures  - patient c/o b/l shoulder pain since fall  - Start lidocaine patch for shoulder pain    5. T2DM  - endo following  - insulin plan as above  - holding home metformin and dapagliflozin  - DASH diet    6. Prophylaxis  - subq heparin 5000U q8h for DVT prophylaxis    Disposition  - discharge home pending clinical outcome  - coordinate pickup with family

## 2021-05-16 NOTE — OCCUPATIONAL THERAPY INITIAL EVALUATION ADULT - PLANNED THERAPY INTERVENTIONS, OT EVAL
ADL retraining/bed mobility training/parent/caregiver training.../transfer training ADL retraining/balance training/bed mobility training/parent/caregiver training.../ROM/strengthening/transfer training

## 2021-05-16 NOTE — OCCUPATIONAL THERAPY INITIAL EVALUATION ADULT - ANTICIPATED DISCHARGE DISPOSITION, OT EVAL
Subacute rehab, if patient returns home she will require assist with ALL functional tasks/rehabilitation facility

## 2021-05-16 NOTE — CONSULT NOTE ADULT - SUBJECTIVE AND OBJECTIVE BOX
75yo Female PMH HTN, CABG, HFrEF, recurrent UTIs, T2DM on metformin and dapaglifozin (noncompliant), c/o R shoulder pain s/p mechanical fall prior to her admission. Patient was admitted on 5/10 for urosepsis and DKA. Patient denies head hit or LOC. Patient denies numbness or tingling in the RUE. Has ecchymosis over bilateral shoulders. Patient denies any other injuries.    PMH:  Diabetes    Chronic systolic heart failure    HTN (hypertension)      PSH:  H/O abdominal hysterectomy    History of tonsillectomy    S/P breast lumpectomy    S/P CABG x 1      AH: NKDA    Meds: See med rec    T(C): 37.3 (05-17-21 @ 00:47)  HR: 68 (05-17-21 @ 00:47)  BP: 112/65 (05-17-21 @ 00:47)  RR: 18 (05-17-21 @ 00:47)  SpO2: 97% (05-17-21 @ 00:47)  Wt(kg): --    PE RUE:  Skin intact, eccyhmosis  TTP over the shoulder  no TTP over the wrist or elbow    SILT axillary/med/rad/ulnar  +Motor AIN/PIN/Ulnar/Radial/Musc/Median,   +painless elbow/wrist ROM,   shoulder ROM limited 2/2 pain,   2+radial pulse, soft compartments.    Secondary:  No TTP over bony landmarks, SILT BL, ROM intact BL, distal pulses palpable.    Imaging:  XR demonstrating right proximal humerus fracture        76yFemale with right proximal humerus fracture    pain control  NWB RUE in sling  PT/OT  No acute orthopaedic intervention  Ortho to sign off  Please call if you have further question  Can follow up with Dr. Saez in 1-2 weeks or upon discharge    Ortho [phone]

## 2021-05-17 DIAGNOSIS — S42.309A UNSPECIFIED FRACTURE OF SHAFT OF HUMERUS, UNSPECIFIED ARM, INITIAL ENCOUNTER FOR CLOSED FRACTURE: ICD-10-CM

## 2021-05-17 LAB
24R-OH-CALCIDIOL SERPL-MCNC: 23.5 NG/ML — LOW (ref 30–80)
CULTURE RESULTS: SIGNIFICANT CHANGE UP
GLUCOSE BLDC GLUCOMTR-MCNC: 124 MG/DL — HIGH (ref 70–99)
GLUCOSE BLDC GLUCOMTR-MCNC: 125 MG/DL — HIGH (ref 70–99)
GLUCOSE BLDC GLUCOMTR-MCNC: 157 MG/DL — HIGH (ref 70–99)
GLUCOSE BLDC GLUCOMTR-MCNC: 92 MG/DL — SIGNIFICANT CHANGE UP (ref 70–99)
SPECIMEN SOURCE: SIGNIFICANT CHANGE UP

## 2021-05-17 PROCEDURE — 99233 SBSQ HOSP IP/OBS HIGH 50: CPT | Mod: GC

## 2021-05-17 PROCEDURE — 99232 SBSQ HOSP IP/OBS MODERATE 35: CPT

## 2021-05-17 RX ORDER — INSULIN LISPRO 100/ML
9 VIAL (ML) SUBCUTANEOUS
Refills: 0 | Status: DISCONTINUED | OUTPATIENT
Start: 2021-05-17 | End: 2021-05-20

## 2021-05-17 RX ORDER — INSULIN GLARGINE 100 [IU]/ML
30 INJECTION, SOLUTION SUBCUTANEOUS AT BEDTIME
Refills: 0 | Status: DISCONTINUED | OUTPATIENT
Start: 2021-05-17 | End: 2021-05-20

## 2021-05-17 RX ORDER — CHOLECALCIFEROL (VITAMIN D3) 125 MCG
2000 CAPSULE ORAL DAILY
Refills: 0 | Status: DISCONTINUED | OUTPATIENT
Start: 2021-05-18 | End: 2021-05-20

## 2021-05-17 RX ADMIN — Medication 1000 MILLIGRAM(S): at 21:44

## 2021-05-17 RX ADMIN — Medication 1000 MILLIGRAM(S): at 13:30

## 2021-05-17 RX ADMIN — Medication 9 UNIT(S): at 17:34

## 2021-05-17 RX ADMIN — POLYETHYLENE GLYCOL 3350 17 GRAM(S): 17 POWDER, FOR SOLUTION ORAL at 05:13

## 2021-05-17 RX ADMIN — LIDOCAINE 1 PATCH: 4 CREAM TOPICAL at 05:17

## 2021-05-17 RX ADMIN — Medication 1000 MILLIGRAM(S): at 21:14

## 2021-05-17 RX ADMIN — Medication 81 MILLIGRAM(S): at 11:35

## 2021-05-17 RX ADMIN — Medication 0: at 22:15

## 2021-05-17 RX ADMIN — Medication 1000 MILLIGRAM(S): at 13:12

## 2021-05-17 RX ADMIN — NYSTATIN CREAM 1 APPLICATION(S): 100000 CREAM TOPICAL at 17:34

## 2021-05-17 RX ADMIN — Medication 1000 MILLIGRAM(S): at 05:43

## 2021-05-17 RX ADMIN — SENNA PLUS 2 TABLET(S): 8.6 TABLET ORAL at 21:14

## 2021-05-17 RX ADMIN — LIDOCAINE 1 PATCH: 4 CREAM TOPICAL at 16:24

## 2021-05-17 RX ADMIN — ATORVASTATIN CALCIUM 40 MILLIGRAM(S): 80 TABLET, FILM COATED ORAL at 21:14

## 2021-05-17 RX ADMIN — INSULIN GLARGINE 30 UNIT(S): 100 INJECTION, SOLUTION SUBCUTANEOUS at 22:14

## 2021-05-17 RX ADMIN — Medication 25 MILLIGRAM(S): at 05:13

## 2021-05-17 RX ADMIN — Medication 2: at 08:12

## 2021-05-17 RX ADMIN — Medication 1000 MILLIGRAM(S): at 05:13

## 2021-05-17 RX ADMIN — LIDOCAINE 1 PATCH: 4 CREAM TOPICAL at 19:00

## 2021-05-17 RX ADMIN — ENOXAPARIN SODIUM 40 MILLIGRAM(S): 100 INJECTION SUBCUTANEOUS at 16:24

## 2021-05-17 RX ADMIN — NYSTATIN CREAM 1 APPLICATION(S): 100000 CREAM TOPICAL at 05:13

## 2021-05-17 RX ADMIN — Medication 11 UNIT(S): at 12:27

## 2021-05-17 RX ADMIN — Medication 11 UNIT(S): at 08:13

## 2021-05-17 RX ADMIN — Medication 5 MILLIGRAM(S): at 05:13

## 2021-05-17 NOTE — PROGRESS NOTE ADULT - SUBJECTIVE AND OBJECTIVE BOX
PROGRESS NOTE:     CONTACT INFO:  Iraida Olivier MD | PGY-1  Pager: [phone] [place], [address] [facility]  After 7pm page night float  On weekends after 1pm check resident assignment tab to see who is covering      Patient is a 76y old  Female who presents with a chief complaint of Urosepsis and Diabetic Ketoacidosis (16 May 2021 22:10)      SUBJECTIVE / OVERNIGHT EVENTS:    - No acute events overnight.   - No fevers/chills.  - Patient seen and evaluated at bedside.  - Denies SOB at rest, chest pain, palpitations, abdominal pain, nausea/vomiting    ADDITIONAL REVIEW OF SYSTEMS:    MEDICATIONS  (STANDING):  acetaminophen   Tablet .. 1000 milliGRAM(s) Oral every 8 hours  aspirin  chewable 81 milliGRAM(s) Oral daily  atorvastatin 40 milliGRAM(s) Oral at bedtime  bisacodyl 5 milliGRAM(s) Oral every 12 hours  dextrose 40% Gel 15 Gram(s) Oral once  dextrose 5%. 1000 milliLiter(s) (50 mL/Hr) IV Continuous <Continuous>  dextrose 50% Injectable 25 Gram(s) IV Push once  dextrose 50% Injectable 12.5 Gram(s) IV Push once  enoxaparin Injectable 40 milliGRAM(s) SubCutaneous daily  glucagon  Injectable 1 milliGRAM(s) IntraMuscular once  insulin glargine Injectable (LANTUS) 34 Unit(s) SubCutaneous at bedtime  insulin lispro (ADMELOG) corrective regimen sliding scale   SubCutaneous at bedtime  insulin lispro (ADMELOG) corrective regimen sliding scale   SubCutaneous three times a day before meals  insulin lispro Injectable (ADMELOG) 11 Unit(s) SubCutaneous three times a day before meals  lidocaine   Patch 1 Patch Transdermal daily  metoprolol succinate ER 25 milliGRAM(s) Oral daily  nystatin Powder 1 Application(s) Topical two times a day  polyethylene glycol 3350 17 Gram(s) Oral two times a day  senna 2 Tablet(s) Oral at bedtime    MEDICATIONS  (PRN):  fluticasone propionate 50 MICROgram(s)/spray Nasal Spray 1 Spray(s) Both Nostrils two times a day PRN congestion  sodium chloride 0.9% lock flush 10 milliLiter(s) IV Push every 1 hour PRN Pre/post blood products, medications, blood draw, and to maintain line patency      CAPILLARY BLOOD GLUCOSE      POCT Blood Glucose.: 157 mg/dL (17 May 2021 08:06)  POCT Blood Glucose.: 206 mg/dL (16 May 2021 22:20)  POCT Blood Glucose.: 124 mg/dL (16 May 2021 17:11)  POCT Blood Glucose.: 168 mg/dL (16 May 2021 12:45)    I&O's Summary    16 May 2021 07:01  -  17 May 2021 07:00  --------------------------------------------------------  IN: 480 mL / OUT: 950 mL / NET: -470 mL        PHYSICAL EXAM:  Vital Signs Last 24 Hrs  T(C): 37.2 (17 May 2021 04:12), Max: 37.3 (17 May 2021 00:47)  T(F): 99 (17 May 2021 04:12), Max: 99.1 (17 May 2021 00:47)  HR: 91 (17 May 2021 04:12) (60 - 91)  BP: 144/71 (17 May 2021 04:12) (105/66 - 144/71)  BP(mean): --  RR: 17 (17 May 2021 04:12) (17 - 18)  SpO2: 95% (17 May 2021 04:12) (94% - 97%)    CONSTITUTIONAL: NAD, well-developed  RESPIRATORY: Normal respiratory effort; lungs are clear to auscultation bilaterally  CARDIOVASCULAR: Regular rate and rhythm, normal S1 and S2, no murmur/rub/gallop; No lower extremity edema; Peripheral pulses are 2+ bilaterally  ABDOMEN: Nontender to palpation, normoactive bowel sounds, no rebound/guarding; No hepatosplenomegaly  MUSCLOSKELETAL: no clubbing or cyanosis of digits; no joint swelling or tenderness to palpation  PSYCH: A+O to person, place, and time; affect appropriate    LABS:                        10.1   9.44  )-----------( 331      ( 16 May 2021 05:23 )             31.2     05-16    137  |  102  |  24<H>  ----------------------------<  141<H>  4.0   |  20<L>  |  1.28    Ca    8.4      16 May 2021 05:23  Phos  3.5     05-16  Mg     1.7     05-16        CARDIAC MARKERS ( 16 May 2021 05:23 )  x     / x     / 13 U/L / x     / x                RADIOLOGY & ADDITIONAL TESTS:  Results Reviewed:   Imaging Personally Reviewed:  Electrocardiogram Personally Reviewed:    COORDINATION OF CARE:  Care Discussed with Consultants/Other Providers [Y/N]: Y  Prior or Outpatient Records Reviewed [Y/N]: Y

## 2021-05-17 NOTE — PROGRESS NOTE ADULT - TIME BILLING
Pt with uncontrolled T2DM with acute infection and DKA due to poor med adherence and on Farxiga on and off.  Adjusting insulin  Discharge plan> refusing insulin which place her at high risk for DKA recurrence and high risk for CV events based on elevated A1C.  Team to speak to pt and sister about plan of care and consider evaluation for pt's capacity to care for self
Pt with uncontrolled T2DM with acute infection and DKA due to poor med adherence and on Farxiga on and off.  Adjusting insulin  Discharge plan> refusing insulin which place her at high risk for DKA recurrence and high risk for CV events based on elevated A1C.  Team to speak to opt and sister about plan of care.

## 2021-05-17 NOTE — PROGRESS NOTE ADULT - PROBLEM SELECTOR PLAN 2
HLD   LDL 89  Atorvastatin 10mg daily. Might need to increase dose to LDL goal <70 but pt with poor adherence to meds so can c/w present dose and recheck levels in 8-12weeks to assess for further adjustments.    HTN   goal bp <130/80  Mostly at goal, continue current management HLD   LDL 89  Atorvastatin 10mg daily. Might need to increase dose to LDL goal <70 but pt with poor adherence to meds so can c/w present dose and recheck levels in 8-12weeks to assess for further adjustments.    HTN   goal bp <130/80  Mostly at goal, continue current management    Low Vit D  Consider Vit D 2000iu daily

## 2021-05-17 NOTE — CHART NOTE - NSCHARTNOTEFT_GEN_A_CORE
Patient follow up, seen for Diabetic education  See complete Dietitian Assessment in Documents section dated 5/12/2019    Chart reviewed, events noted.  "Dx: DKA / Septic Shock 2/2 likely Urosepsis"    "76 F PMH T2DM, HTN, CABG years ago on aspirin 325, systolic HF and recurrent UTIs presented after being found down in kitchen by sister, admitted to MICU for management of DKA in the setting of Ecoli Uti        Subjective: Patient visited at bedside for purpose of providing diet education. patient was not interested in receiving education but granted the dietitian 2 minutes.   Discussed patient current A1C level ,goal,  and negative health consequences of long term effects. Patient stated her A1C is improved from when her glucose was 1100mg. patient also stated it wasn't her fault, "its the food", and also states she refuses insulin. A 1700 meal pattern for consistent carbohydrate intake was provided including portions sizes and meal spacing. Patient allowed it to be placed on bed table    Pertinent Labs:  HgbA1C 15.5%    MEDICATIONS  (STANDING):  acetaminophen   Tablet .. 1000 milliGRAM(s) Oral every 8 hours  aspirin  chewable 81 milliGRAM(s) Oral daily  atorvastatin 40 milliGRAM(s) Oral at bedtime  bisacodyl 5 milliGRAM(s) Oral every 12 hours    glucagon  Injectable 1 milliGRAM(s) IntraMuscular once  insulin glargine Injectable (LANTUS) 34 Unit(s) SubCutaneous at bedtime  insulin lispro (ADMELOG) corrective regimen sliding scale   SubCutaneous at bedtime  insulin lispro (ADMELOG) corrective regimen sliding scale   SubCutaneous three times a day before meals  insulin lispro Injectable (ADMELOG) 11 Unit(s) SubCutaneous three times a day before meals  lidocaine   Patch 1 Patch Transdermal daily  metoprolol succinate ER 25 milliGRAM(s) Oral daily  nystatin Powder 1 Application(s) Topical two times a day  polyethylene glycol 3350 17 Gram(s) Oral two times a day  senna 2 Tablet(s) Oral at bedtime      Previous Nutrition diagnosis: altered Laboratory values related to HgbA1C 15.5%js   Diagnosis continued ongoing  SHENG Santos, RD, CDN #[phone]

## 2021-05-17 NOTE — PROGRESS NOTE ADULT - PROBLEM SELECTOR PLAN 1
-Test BG ac and hs  -Decrease Lantus dose to 30 units qhs   -Decrease Admelog dose to 9 units TIDac  -C/w mod correctional Admelog scales premeal and qhs  -Consider mental health eval to determine pt's capacity to care for self  -Check C-peptide with fasting glucose tomorrow am    Discharge on basal/bolus insulin since pt had DKA at admission. If C-peptide recovering can consider basal/oral therapy  -Discontinue Farxiga given DKA on admission  Please write Rxs for: [employer] Insulin pen/Maria R insulin pen needles/glucose meter/strips/lancets   Patient will need to follow up with Endocrine on discharge: Can follow up with Dr Ramírez Urbano   -Plan discussed with pt/team. Spoke to team 8 at [phone] about the need to speak to pt and reinforce the present need for insulin.   Contact info: [phone] (24/7). pager [phone]

## 2021-05-17 NOTE — PROGRESS NOTE ADULT - ASSESSMENT
76 F PMH T2DM, HTN, CABG years ago on aspirin 325, systolic HF and recurrent UTIs presented after being found down in kitchen by sister, admitted to MICU for management of DKA in the setting of Ecoli Uti and medication nonadherence, and septic shock 2/2 E.coli uti, transferred to floors 5/13. Pending dispo    States she will not take insulin on outside and she will not go to [facility]. Discussed with patient she needs to be on insulin based on presentation in DKA, but patient continues to refuse. Risks and benefits were discussed with patient. States she understands she could "die" if she does not use insulin, but also states "but I might be fine too".

## 2021-05-17 NOTE — PROGRESS NOTE ADULT - SUBJECTIVE AND OBJECTIVE BOX
DIABETES FOLLOW UP NOTE: Saw pt earlier today  INTERVAL HX: Pt stable, reports tolerating POs. Pt states that if this provider is seeing her about going to rehab or taking insulin at home I should leave because she is going home and not doing any insulin no matter what.  Noted BG  improving> values mostly in 100s with a value >200 and <100 in the last 24 hours. No hypoglycemia.     Review of Systems:  General: As above. Unable to evaluate pt's ROS    Allergies    clams - itching (Other)  No Known Drug Allergies  Peroxide (Blisters)    Intolerances      MEDICATIONS:  atorvastatin 40 milliGRAM(s) Oral at bedtime  insulin glargine Injectable (LANTUS) 34 Unit(s) SubCutaneous at bedtime  insulin lispro (ADMELOG) corrective regimen sliding scale   SubCutaneous at bedtime  insulin lispro (ADMELOG) corrective regimen sliding scale   SubCutaneous three times a day before meals  insulin lispro Injectable (ADMELOG) 11 Unit(s) SubCutaneous three times a day before meals      PHYSICAL EXAM:  VITALS: T(C): 36.4 (05-17-21 @ 12:06)  T(F): 97.6 (05-17-21 @ 12:06), Max: 99.1 (05-17-21 @ 00:47)  HR: 78 (05-17-21 @ 12:06) (60 - 91)  BP: 148/61 (05-17-21 @ 12:06) (105/66 - 148/61)  RR:  (17 - 18)  SpO2:  (94% - 99%)  Wt(kg): --  GENERAL: Female laying in bed in NAD  Abdomen: Unable to evalute since pt refused PE  Extremities: Noted sling in R arm  NEURO: Alert and refusing to speak about DM care with this provider    LABS:  POCT Blood Glucose.: 92 mg/dL (05-17-21 @ 12:08)  POCT Blood Glucose.: 157 mg/dL (05-17-21 @ 08:06)  POCT Blood Glucose.: 206 mg/dL (05-16-21 @ 22:20)  POCT Blood Glucose.: 124 mg/dL (05-16-21 @ 17:11)  POCT Blood Glucose.: 168 mg/dL (05-16-21 @ 12:45)  POCT Blood Glucose.: 148 mg/dL (05-16-21 @ 08:51)  POCT Blood Glucose.: 163 mg/dL (05-15-21 @ 21:26)  POCT Blood Glucose.: 110 mg/dL (05-15-21 @ 17:21)  POCT Blood Glucose.: 151 mg/dL (05-15-21 @ 12:55)  POCT Blood Glucose.: 175 mg/dL (05-15-21 @ 08:14)  POCT Blood Glucose.: 141 mg/dL (05-14-21 @ 22:15)  POCT Blood Glucose.: 106 mg/dL (05-14-21 @ 17:42)                            10.1   9.44  )-----------( 331      ( 16 May 2021 05:23 )             31.2       05-16    137  |  102  |  24<H>  ----------------------------<  141<H>  4.0   |  20<L>  |  1.28    EGFR if non : 41<L>    Ca    8.4      05-16  Mg     1.7     05-16  Phos  3.5     05-16    A1C with Estimated Average Glucose Result: >15.5 % (05-12-21 @ 08:41)      Estimated Average Glucose: >398 mg/dL (05-12-21 @ 08:41)        05-13 Chol 161 Direct LDL -- LDL calculated 89 HDL 22<L> Trig 251<H>               DIABETES FOLLOW UP NOTE: Saw pt earlier today  INTERVAL HX: Pt stable, reports tolerating POs. Pt states that if this provider is seeing her about going to rehab or taking insulin at home I should leave because she is going home and not doing any insulin no matter what.  Noted BG  improving> values mostly in 100s with a value >200 and <100 in the last 24 hours. No hypoglycemia.     Review of Systems:  General: As above. Unable to evaluate pt's ROS    Allergies    clams - itching (Other)  No Known Drug Allergies  Peroxide (Blisters)    Intolerances      MEDICATIONS:  atorvastatin 40 milliGRAM(s) Oral at bedtime  insulin glargine Injectable (LANTUS) 34 Unit(s) SubCutaneous at bedtime  insulin lispro (ADMELOG) corrective regimen sliding scale   SubCutaneous at bedtime  insulin lispro (ADMELOG) corrective regimen sliding scale   SubCutaneous three times a day before meals  insulin lispro Injectable (ADMELOG) 11 Unit(s) SubCutaneous three times a day before meals      PHYSICAL EXAM:  VITALS: T(C): 36.4 (05-17-21 @ 12:06)  T(F): 97.6 (05-17-21 @ 12:06), Max: 99.1 (05-17-21 @ 00:47)  HR: 78 (05-17-21 @ 12:06) (60 - 91)  BP: 148/61 (05-17-21 @ 12:06) (105/66 - 148/61)  RR:  (17 - 18)  SpO2:  (94% - 99%)  Wt(kg): --  GENERAL: Female laying in bed in NAD  Abdomen: Unable to evalute since pt refused PE  Extremities: Noted sling in R arm  NEURO: Alert and refusing to speak about DM care with this provider    LABS:  POCT Blood Glucose.: 92 mg/dL (05-17-21 @ 12:08)  POCT Blood Glucose.: 157 mg/dL (05-17-21 @ 08:06)  POCT Blood Glucose.: 206 mg/dL (05-16-21 @ 22:20)  POCT Blood Glucose.: 124 mg/dL (05-16-21 @ 17:11)  POCT Blood Glucose.: 168 mg/dL (05-16-21 @ 12:45)  POCT Blood Glucose.: 148 mg/dL (05-16-21 @ 08:51)  POCT Blood Glucose.: 163 mg/dL (05-15-21 @ 21:26)  POCT Blood Glucose.: 110 mg/dL (05-15-21 @ 17:21)  POCT Blood Glucose.: 151 mg/dL (05-15-21 @ 12:55)  POCT Blood Glucose.: 175 mg/dL (05-15-21 @ 08:14)  POCT Blood Glucose.: 141 mg/dL (05-14-21 @ 22:15)  POCT Blood Glucose.: 106 mg/dL (05-14-21 @ 17:42)                            10.1   9.44  )-----------( 331      ( 16 May 2021 05:23 )             31.2       05-16    137  |  102  |  24<H>  ----------------------------<  141<H>  4.0   |  20<L>  |  1.28    EGFR if non : 41<L>    Ca    8.4      05-16  Mg     1.7     05-16  Phos  3.5     05-16    A1C with Estimated Average Glucose Result: >15.5 % (05-12-21 @ 08:41)      Estimated Average Glucose: >398 mg/dL (05-12-21 @ 08:41)        05-13 Chol 161 Direct LDL -- LDL calculated 89 HDL 22<L> Trig 251<H>      Vitamin D, 25-Hydroxy: 23.5 ng/mL (05-17-21 @ 08:41)

## 2021-05-17 NOTE — PROGRESS NOTE ADULT - ASSESSMENT
76 F w/h/o un controlled T2DM (A1C 15.5%) with poor adherence to tx PTA (States taking meds on and off). 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 Also hx of HTN, systolic HF and recurrent UTIs.  BIBEMS for being found on the kitchen floor in her home with elevated BG. In ED patient was found to be hypotensive, with DKA and sepsis secondary to UTI requiring ICU admission for levophed and insulin gtt. Endocrine consult requested for management of DKA and uncontrolled DM2. Pt  tolerating POs. BG levels improved while on present insulin doses. No hypoglycemia. Will preventively decrease insulin doses to keep BG at goal of 100 to 180s. Pt's initial glucotoxicity has resolved. Will check c-peptide to determine if pt is producing any insulin. Based on A1C will determine if pt needs basal/bolus insulin therapy versus basal/orals at discharge. Still pt will need at least basal insulin upon discharge to prevent recurrent DKA.     Pt refusing insulin at time of discharge. Also  noted ortho consult for R shoulder pain s/p mechanical fall with  right proximal humerus fracture> on sling> pt refusing rehab as well. Noted RD also tried to speak to pt about diet but pt refused to receive education.     Pt refusing health care recommendations to keep her safe at home. Would consider mental health evaluation to determine if pt has capacity to go back home. Pt lives alone and was admitted s/p fall secondary to non adherence to meds and lack of self care.            76 F w/h/o un controlled T2DM (A1C 15.5%) with poor adherence to tx PTA (States taking meds on and off). 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 Also hx of HTN, systolic HF and recurrent UTIs.  BIBEMS for being found on the kitchen floor in her home with elevated BG. In ED patient was found to be hypotensive, with DKA and sepsis secondary to UTI requiring ICU admission for levophed and insulin gtt. Endocrine consult requested for management of DKA and uncontrolled DM2. Pt  tolerating POs. BG levels improved while on present insulin doses. No hypoglycemia. Will preventively decrease insulin doses to keep BG at goal of 100 to 180s. Pt's initial glucotoxicity has resolved. Will check c-peptide to determine if pt is producing any insulin. Based on C-peptide result will determine if pt needs basal/bolus insulin therapy versus basal/orals at discharge. Still pt will need at least basal insulin upon discharge to prevent recurrent DKA.     Pt refusing insulin at time of discharge. Also  noted ortho consult for R shoulder pain s/p mechanical fall with  right proximal humerus fracture> on sling> pt refusing rehab as well. Noted RD also tried to speak to pt about diet but pt refused to receive education.     Pt refusing health care recommendations to keep her safe at home. Would consider mental health evaluation to determine if pt has capacity to go back home. Pt lives alone and was admitted s/p fall secondary to non adherence to meds and lack of self care.

## 2021-05-17 NOTE — PROGRESS NOTE ADULT - NUTRITIONAL ASSESSMENT
Appreciated RD consult > noted pt refused education about diet as well.
Appreciated RD consult > noted pt refused education about diet as well.

## 2021-05-18 DIAGNOSIS — Z91.11 PATIENT'S NONCOMPLIANCE WITH DIETARY REGIMEN: ICD-10-CM

## 2021-05-18 LAB
C PEPTIDE SERPL-MCNC: 1.2 NG/ML — SIGNIFICANT CHANGE UP (ref 1.1–4.4)
CULTURE RESULTS: SIGNIFICANT CHANGE UP
GLUCOSE BLDC GLUCOMTR-MCNC: 130 MG/DL — HIGH (ref 70–99)
GLUCOSE BLDC GLUCOMTR-MCNC: 145 MG/DL — HIGH (ref 70–99)
GLUCOSE BLDC GLUCOMTR-MCNC: 160 MG/DL — HIGH (ref 70–99)
GLUCOSE BLDC GLUCOMTR-MCNC: 85 MG/DL — SIGNIFICANT CHANGE UP (ref 70–99)
GLUCOSE SERPL-MCNC: 133 MG/DL — HIGH (ref 70–99)
SPECIMEN SOURCE: SIGNIFICANT CHANGE UP

## 2021-05-18 PROCEDURE — 99233 SBSQ HOSP IP/OBS HIGH 50: CPT | Mod: GC

## 2021-05-18 RX ORDER — INSULIN GLARGINE 100 [IU]/ML
30 INJECTION, SOLUTION SUBCUTANEOUS
Qty: 0 | Refills: 0 | DISCHARGE
Start: 2021-05-18

## 2021-05-18 RX ORDER — POLYETHYLENE GLYCOL 3350 17 G/17G
17 POWDER, FOR SOLUTION ORAL
Qty: 0 | Refills: 0 | DISCHARGE
Start: 2021-05-18

## 2021-05-18 RX ORDER — NYSTATIN CREAM 100000 [USP'U]/G
1 CREAM TOPICAL
Qty: 0 | Refills: 0 | DISCHARGE
Start: 2021-05-18

## 2021-05-18 RX ORDER — LIDOCAINE 4 G/100G
1 CREAM TOPICAL
Qty: 0 | Refills: 0 | DISCHARGE
Start: 2021-05-18

## 2021-05-18 RX ORDER — REPAGLINIDE 1 MG/1
1 TABLET ORAL
Qty: 90 | Refills: 0
Start: 2021-05-18 | End: 2021-06-16

## 2021-05-18 RX ORDER — ATORVASTATIN CALCIUM 80 MG/1
1 TABLET, FILM COATED ORAL
Qty: 0 | Refills: 0 | DISCHARGE
Start: 2021-05-18

## 2021-05-18 RX ORDER — METOPROLOL TARTRATE 50 MG
1 TABLET ORAL
Qty: 0 | Refills: 0 | DISCHARGE
Start: 2021-05-18

## 2021-05-18 RX ORDER — ASPIRIN/CALCIUM CARB/MAGNESIUM 324 MG
1 TABLET ORAL
Qty: 0 | Refills: 0 | DISCHARGE
Start: 2021-05-18

## 2021-05-18 RX ADMIN — Medication 2000 UNIT(S): at 12:44

## 2021-05-18 RX ADMIN — Medication 9 UNIT(S): at 08:30

## 2021-05-18 RX ADMIN — Medication 1000 MILLIGRAM(S): at 05:01

## 2021-05-18 RX ADMIN — INSULIN GLARGINE 30 UNIT(S): 100 INJECTION, SOLUTION SUBCUTANEOUS at 22:08

## 2021-05-18 RX ADMIN — POLYETHYLENE GLYCOL 3350 17 GRAM(S): 17 POWDER, FOR SOLUTION ORAL at 05:02

## 2021-05-18 RX ADMIN — SENNA PLUS 2 TABLET(S): 8.6 TABLET ORAL at 21:41

## 2021-05-18 RX ADMIN — Medication 5 MILLIGRAM(S): at 05:02

## 2021-05-18 RX ADMIN — Medication 1000 MILLIGRAM(S): at 05:31

## 2021-05-18 RX ADMIN — ENOXAPARIN SODIUM 40 MILLIGRAM(S): 100 INJECTION SUBCUTANEOUS at 18:24

## 2021-05-18 RX ADMIN — LIDOCAINE 1 PATCH: 4 CREAM TOPICAL at 04:58

## 2021-05-18 RX ADMIN — LIDOCAINE 1 PATCH: 4 CREAM TOPICAL at 18:25

## 2021-05-18 RX ADMIN — Medication 25 MILLIGRAM(S): at 05:01

## 2021-05-18 RX ADMIN — NYSTATIN CREAM 1 APPLICATION(S): 100000 CREAM TOPICAL at 05:02

## 2021-05-18 RX ADMIN — Medication 81 MILLIGRAM(S): at 12:44

## 2021-05-18 RX ADMIN — ATORVASTATIN CALCIUM 40 MILLIGRAM(S): 80 TABLET, FILM COATED ORAL at 21:41

## 2021-05-18 RX ADMIN — Medication 1000 MILLIGRAM(S): at 21:41

## 2021-05-18 RX ADMIN — Medication 1000 MILLIGRAM(S): at 22:26

## 2021-05-18 RX ADMIN — Medication 1000 MILLIGRAM(S): at 14:15

## 2021-05-18 RX ADMIN — LIDOCAINE 1 PATCH: 4 CREAM TOPICAL at 18:40

## 2021-05-18 RX ADMIN — Medication 9 UNIT(S): at 12:31

## 2021-05-18 RX ADMIN — NYSTATIN CREAM 1 APPLICATION(S): 100000 CREAM TOPICAL at 18:25

## 2021-05-18 RX ADMIN — Medication 1000 MILLIGRAM(S): at 14:30

## 2021-05-18 NOTE — PROGRESS NOTE ADULT - SUBJECTIVE AND OBJECTIVE BOX
PROGRESS NOTE:     CONTACT INFO:  Iraida Olivier MD | PGY-1  Pager: [phone] [employer], [id number] [facility]  After 7pm page night float  On weekends after 1pm check resident assignment tab to see who is covering      Patient is a 76y old  Female who presents with a chief complaint of Urosepsis and Diabetic Ketoacidosis (17 May 2021 17:04)      SUBJECTIVE / OVERNIGHT EVENTS:    - No acute events overnight.   - No fevers/chills.  - Patient seen and evaluated at bedside.  - Denies SOB at rest, chest pain, palpitations, abdominal pain, nausea/vomiting  - Feeling well. States does not want to take any insulin, does not want to go to [facility]. Becomes upset when discuss this.    ADDITIONAL REVIEW OF SYSTEMS:    MEDICATIONS  (STANDING):  acetaminophen   Tablet .. 1000 milliGRAM(s) Oral every 8 hours  aspirin  chewable 81 milliGRAM(s) Oral daily  atorvastatin 40 milliGRAM(s) Oral at bedtime  bisacodyl 5 milliGRAM(s) Oral every 12 hours  cholecalciferol 2000 Unit(s) Oral daily  dextrose 40% Gel 15 Gram(s) Oral once  dextrose 5%. 1000 milliLiter(s) (50 mL/Hr) IV Continuous <Continuous>  dextrose 50% Injectable 25 Gram(s) IV Push once  dextrose 50% Injectable 12.5 Gram(s) IV Push once  enoxaparin Injectable 40 milliGRAM(s) SubCutaneous daily  glucagon  Injectable 1 milliGRAM(s) IntraMuscular once  insulin glargine Injectable (LANTUS) 30 Unit(s) SubCutaneous at bedtime  insulin lispro (ADMELOG) corrective regimen sliding scale   SubCutaneous at bedtime  insulin lispro (ADMELOG) corrective regimen sliding scale   SubCutaneous three times a day before meals  insulin lispro Injectable (ADMELOG) 9 Unit(s) SubCutaneous three times a day before meals  lidocaine   Patch 1 Patch Transdermal daily  metoprolol succinate ER 25 milliGRAM(s) Oral daily  nystatin Powder 1 Application(s) Topical two times a day  polyethylene glycol 3350 17 Gram(s) Oral two times a day  senna 2 Tablet(s) Oral at bedtime    MEDICATIONS  (PRN):  fluticasone propionate 50 MICROgram(s)/spray Nasal Spray 1 Spray(s) Both Nostrils two times a day PRN congestion  sodium chloride 0.9% lock flush 10 milliLiter(s) IV Push every 1 hour PRN Pre/post blood products, medications, blood draw, and to maintain line patency      CAPILLARY BLOOD GLUCOSE      POCT Blood Glucose.: 130 mg/dL (18 May 2021 07:57)  POCT Blood Glucose.: 124 mg/dL (17 May 2021 21:48)  POCT Blood Glucose.: 125 mg/dL (17 May 2021 17:16)  POCT Blood Glucose.: 92 mg/dL (17 May 2021 12:08)    I&O's Summary    17 May 2021 07:01  -  18 May 2021 07:00  --------------------------------------------------------  IN: 960 mL / OUT: 0 mL / NET: 960 mL        PHYSICAL EXAM:  Vital Signs Last 24 Hrs  T(C): 36.9 (18 May 2021 04:54), Max: 36.9 (17 May 2021 19:58)  T(F): 98.5 (18 May 2021 04:54), Max: 98.5 (17 May 2021 19:58)  HR: 81 (18 May 2021 04:54) (78 - 92)  BP: 145/75 (18 May 2021 04:54) (138/58 - 148/61)  BP(mean): --  RR: 18 (18 May 2021 04:54) (18 - 18)  SpO2: 95% (18 May 2021 04:54) (94% - 99%)    CONSTITUTIONAL: NAD, well-developed  RESPIRATORY: Normal respiratory effort; lungs are clear to auscultation bilaterally  CARDIOVASCULAR: Regular rate and rhythm, normal S1 and S2, no murmur/rub/gallop; No lower extremity edema; Peripheral pulses are 2+ bilaterally  ABDOMEN: Nontender to palpation, normoactive bowel sounds, no rebound/guarding; No hepatosplenomegaly  MUSCLOSKELETAL: no clubbing or cyanosis of digits; no joint swelling or tenderness to palpation  PSYCH: A+O to person, place, and time; affect appropriate    LABS:    05-18    x   |  x   |  x   ----------------------------<  133<H>  x    |  x   |  x                     RADIOLOGY & ADDITIONAL TESTS:  Results Reviewed:   Imaging Personally Reviewed:  Electrocardiogram Personally Reviewed:    COORDINATION OF CARE:  Care Discussed with Consultants/Other Providers [Y/N]: Y  Prior or Outpatient Records Reviewed [Y/N]: Y

## 2021-05-18 NOTE — PROGRESS NOTE ADULT - PROBLEM SELECTOR PLAN 1
States she will not take insulin on outside and she will not go to [facility]. Discussed with patient she needs to be on insulin based on presentation in DKA, but patient continues to refuse. Risks and benefits were discussed with patient. States she understands she could "die" if she does not use insulin, but also states "but I might be fine too". States "I don't want to use insulin"    She has confirmed this daily. Pt now more recently refusing to engage in conversation regarding this.

## 2021-05-18 NOTE — PROGRESS NOTE ADULT - ASSESSMENT
76 F PMH T2DM, HTN, CABG years ago on aspirin 325, systolic HF and recurrent UTIs presented after being found down in kitchen by sister, admitted to MICU for management of DKA in the setting of Ecoli Uti and medication nonadherence, and septic shock 2/2 E.coli uti, transferred to floors 5/13. Pending dispo

## 2021-05-18 NOTE — PROGRESS NOTE ADULT - PROBLEM SELECTOR PLAN 2
Presented with DKA i/s/o medication noncompliance and UTI, required MICU for insulin gtt  Uncontrolled DM a1c > 15.5%  Patient states will NOT take insulin on outside. Refusing insulin on outside.  Was not on insulin prior outpatient. Was taking metformin irregularly on outside, script last filled in September per pharmacy  - now on Lantus 30U, admelog 9U, ISS; FSG in arget  . D/w endo re oral agents  -pt states she takes medicine "when she feels like", admits to noncompliance

## 2021-05-18 NOTE — CHART NOTE - NSCHARTNOTESELECT_GEN_ALL_CORE
MAR Accept Note/Transfer Note
Event Note
Event Note
MICU Transfer/Transfer Note
NP Note/Event Note
Nutrition Services

## 2021-05-18 NOTE — PROGRESS NOTE ADULT - PROBLEM SELECTOR PLAN 8
Prophylaxis:  lovenox  CC diet  Dispo: stable for medical discharge, however discharge regimen issue as pt will not take insulin outside  PT states SUSHIL, however pt wants to go home with sister, but sister unable to care for her as sister is elderly herself

## 2021-05-18 NOTE — PROGRESS NOTE ADULT - PROBLEM SELECTOR PLAN 7
presented in septic shock, BPs now stable 130s  med recc completed, was not taking any HTN meds other than metop which was more likley for CAD Prophylaxis:  lovenox  CC diet  Dispo: stable for medical discharge, however discharge regimen issue as pt will not take insulin outside  PT states SUSHIL, however pt wants to go home with sister, but sister unable to care for her as sister is elderly herself

## 2021-05-18 NOTE — PROGRESS NOTE ADULT - PROBLEM SELECTOR PLAN 5
4. h/o fall - s/p fall on 5/10 2/2 AMS from DKA  - Currently A&Ox3  - CT head/spine showed no fractures or bleeding  - Shoulder X-ray showed no fractures  - patient c/o b/l shoulder pain since fall  - lidocaine patch for shoulder pain - reports helpipng -restart home metop 25 qd

## 2021-05-18 NOTE — PROGRESS NOTE ADULT - PROBLEM SELECTOR PLAN 6
-restart home metop 25 qd presented in septic shock, BPs now stable 130s  med recc completed, was not taking any HTN meds other than metop which was more likley for CAD

## 2021-05-18 NOTE — CHART NOTE - NSCHARTNOTEFT_GEN_A_CORE
Pt refusing to speak with this provider.  Per team, pt will be going to rehab tomorrow  BG at goal while on present insulin doses.  Noted C-peptide recovering at 1.2 with  BG level 133.  Pt to c/w present insulin doses while in hospital     Discharge recs given to team as follows:  - C/w Lantus 30 units q hs  - Start Prandin 1mg ac meals. Hold if not eating or for BG <100s  -Can c/w Metformin 500mg bid.  -Will likely need DM meds adjustments at rehab since pt will be more active there.  -Will sing off for now. Recall as needed  Contact info: [phone] (24/7). pager [phone]

## 2021-05-19 LAB
GLUCOSE BLDC GLUCOMTR-MCNC: 140 MG/DL — HIGH (ref 70–99)
GLUCOSE BLDC GLUCOMTR-MCNC: 146 MG/DL — HIGH (ref 70–99)
GLUCOSE BLDC GLUCOMTR-MCNC: 153 MG/DL — HIGH (ref 70–99)
GLUCOSE BLDC GLUCOMTR-MCNC: 160 MG/DL — HIGH (ref 70–99)
SARS-COV-2 RNA SPEC QL NAA+PROBE: SIGNIFICANT CHANGE UP

## 2021-05-19 PROCEDURE — 99232 SBSQ HOSP IP/OBS MODERATE 35: CPT | Mod: GC

## 2021-05-19 RX ORDER — CHOLECALCIFEROL (VITAMIN D3) 125 MCG
2000 CAPSULE ORAL
Qty: 0 | Refills: 0 | DISCHARGE
Start: 2021-05-19

## 2021-05-19 RX ADMIN — LIDOCAINE 1 PATCH: 4 CREAM TOPICAL at 19:00

## 2021-05-19 RX ADMIN — Medication 9 UNIT(S): at 12:29

## 2021-05-19 RX ADMIN — POLYETHYLENE GLYCOL 3350 17 GRAM(S): 17 POWDER, FOR SOLUTION ORAL at 17:22

## 2021-05-19 RX ADMIN — NYSTATIN CREAM 1 APPLICATION(S): 100000 CREAM TOPICAL at 17:23

## 2021-05-19 RX ADMIN — Medication 25 MILLIGRAM(S): at 06:02

## 2021-05-19 RX ADMIN — LIDOCAINE 1 PATCH: 4 CREAM TOPICAL at 17:23

## 2021-05-19 RX ADMIN — Medication 81 MILLIGRAM(S): at 12:42

## 2021-05-19 RX ADMIN — Medication 1000 MILLIGRAM(S): at 06:02

## 2021-05-19 RX ADMIN — POLYETHYLENE GLYCOL 3350 17 GRAM(S): 17 POWDER, FOR SOLUTION ORAL at 06:02

## 2021-05-19 RX ADMIN — Medication 2000 UNIT(S): at 12:42

## 2021-05-19 RX ADMIN — ATORVASTATIN CALCIUM 40 MILLIGRAM(S): 80 TABLET, FILM COATED ORAL at 21:40

## 2021-05-19 RX ADMIN — Medication 1000 MILLIGRAM(S): at 07:41

## 2021-05-19 RX ADMIN — Medication 5 MILLIGRAM(S): at 06:02

## 2021-05-19 RX ADMIN — ENOXAPARIN SODIUM 40 MILLIGRAM(S): 100 INJECTION SUBCUTANEOUS at 17:22

## 2021-05-19 RX ADMIN — Medication 5 MILLIGRAM(S): at 17:21

## 2021-05-19 RX ADMIN — INSULIN GLARGINE 30 UNIT(S): 100 INJECTION, SOLUTION SUBCUTANEOUS at 22:44

## 2021-05-19 RX ADMIN — Medication 1000 MILLIGRAM(S): at 16:10

## 2021-05-19 RX ADMIN — Medication 1000 MILLIGRAM(S): at 21:40

## 2021-05-19 RX ADMIN — Medication 1000 MILLIGRAM(S): at 13:47

## 2021-05-19 RX ADMIN — Medication 1000 MILLIGRAM(S): at 22:10

## 2021-05-19 RX ADMIN — Medication 10 MILLIGRAM(S): at 23:18

## 2021-05-19 RX ADMIN — Medication 9 UNIT(S): at 17:22

## 2021-05-19 RX ADMIN — LIDOCAINE 1 PATCH: 4 CREAM TOPICAL at 06:04

## 2021-05-19 RX ADMIN — Medication 0: at 22:44

## 2021-05-19 RX ADMIN — SENNA PLUS 2 TABLET(S): 8.6 TABLET ORAL at 21:40

## 2021-05-19 RX ADMIN — Medication 2: at 12:26

## 2021-05-19 RX ADMIN — Medication 9 UNIT(S): at 08:36

## 2021-05-19 RX ADMIN — NYSTATIN CREAM 1 APPLICATION(S): 100000 CREAM TOPICAL at 06:03

## 2021-05-19 NOTE — PROGRESS NOTE ADULT - SUBJECTIVE AND OBJECTIVE BOX
PROGRESS NOTE:     CONTACT INFO:  Iraida Olivier MD | PGY-1  Pager: [phone] [place], [phone] [facility]  After 7pm page night float  On weekends after 1pm check resident assignment tab to see who is covering      Patient is a 76y old  Female who presents with a chief complaint of Urosepsis and Diabetic Ketoacidosis (18 May 2021 08:50)      SUBJECTIVE / OVERNIGHT EVENTS:    - No acute events overnight.   - No fevers/chills.  - Patient seen and evaluated at bedside.  - Denies SOB at rest, chest pain, palpitations, abdominal pain, nausea/vomiting  - Feels okay, in agreement with going to rehab, saying "I guess I'm going to rehab."    ADDITIONAL REVIEW OF SYSTEMS:    MEDICATIONS  (STANDING):  acetaminophen   Tablet .. 1000 milliGRAM(s) Oral every 8 hours  aspirin  chewable 81 milliGRAM(s) Oral daily  atorvastatin 40 milliGRAM(s) Oral at bedtime  bisacodyl 5 milliGRAM(s) Oral every 12 hours  cholecalciferol 2000 Unit(s) Oral daily  dextrose 40% Gel 15 Gram(s) Oral once  dextrose 5%. 1000 milliLiter(s) (50 mL/Hr) IV Continuous <Continuous>  dextrose 50% Injectable 25 Gram(s) IV Push once  dextrose 50% Injectable 12.5 Gram(s) IV Push once  enoxaparin Injectable 40 milliGRAM(s) SubCutaneous daily  glucagon  Injectable 1 milliGRAM(s) IntraMuscular once  insulin glargine Injectable (LANTUS) 30 Unit(s) SubCutaneous at bedtime  insulin lispro (ADMELOG) corrective regimen sliding scale   SubCutaneous at bedtime  insulin lispro (ADMELOG) corrective regimen sliding scale   SubCutaneous three times a day before meals  insulin lispro Injectable (ADMELOG) 9 Unit(s) SubCutaneous three times a day before meals  lidocaine   Patch 1 Patch Transdermal daily  metoprolol succinate ER 25 milliGRAM(s) Oral daily  nystatin Powder 1 Application(s) Topical two times a day  polyethylene glycol 3350 17 Gram(s) Oral two times a day  senna 2 Tablet(s) Oral at bedtime    MEDICATIONS  (PRN):  fluticasone propionate 50 MICROgram(s)/spray Nasal Spray 1 Spray(s) Both Nostrils two times a day PRN congestion  sodium chloride 0.9% lock flush 10 milliLiter(s) IV Push every 1 hour PRN Pre/post blood products, medications, blood draw, and to maintain line patency      CAPILLARY BLOOD GLUCOSE      POCT Blood Glucose.: 146 mg/dL (19 May 2021 08:22)  POCT Blood Glucose.: 160 mg/dL (18 May 2021 21:46)  POCT Blood Glucose.: 85 mg/dL (18 May 2021 17:22)  POCT Blood Glucose.: 145 mg/dL (18 May 2021 12:01)    I&O's Summary    18 May 2021 07:01  -  19 May 2021 07:00  --------------------------------------------------------  IN: 1200 mL / OUT: 0 mL / NET: 1200 mL        PHYSICAL EXAM:  Vital Signs Last 24 Hrs  T(C): 36.8 (19 May 2021 06:00), Max: 37.2 (18 May 2021 11:58)  T(F): 98.3 (19 May 2021 06:00), Max: 98.9 (18 May 2021 11:58)  HR: 84 (19 May 2021 06:00) (79 - 84)  BP: 145/76 (19 May 2021 06:00) (116/69 - 145/80)  BP(mean): --  RR: 18 (19 May 2021 06:00) (18 - 18)  SpO2: 94% (19 May 2021 06:00) (92% - 95%)    CONSTITUTIONAL: NAD, well-developed, + obese  RESPIRATORY: Normal respiratory effort; lungs are clear to auscultation bilaterally  CARDIOVASCULAR: Regular rate and rhythm, normal S1 and S2, no murmur/rub/gallop; No lower extremity edema; Peripheral pulses are 2+ bilaterally  ABDOMEN: Nontender to palpation, normoactive bowel sounds, no rebound/guarding; No hepatosplenomegaly  MUSCLOSKELETAL: +tenderness to palpation of R shoulder, bruise on R and L shoulder  PSYCH: A+O to person, place, and time; affect appropriate    LABS:    05-18    x   |  x   |  x   ----------------------------<  133<H>  x    |  x   |  x                     RADIOLOGY & ADDITIONAL TESTS:  Results Reviewed:   Imaging Personally Reviewed:  Electrocardiogram Personally Reviewed:    COORDINATION OF CARE:  Care Discussed with Consultants/Other Providers [Y/N]: Y  Prior or Outpatient Records Reviewed [Y/N]: Y

## 2021-05-19 NOTE — PROGRESS NOTE ADULT - PROBLEM SELECTOR PLAN 1
States she will not take insulin on outside and she will not go to [facility]. Discussed with patient she needs to be on insulin based on presentation in DKA, but patient continues to refuse. Risks and benefits were discussed with patient. States she understands she could "die" if she does not use insulin, but also states "but I might be fine too". States "I don't want to use insulin"    Team spoke with patient's sister and daughter who are in agreement and understand importance of insulin.

## 2021-05-19 NOTE — PROGRESS NOTE ADULT - PROBLEM SELECTOR PLAN 2
Presented with DKA i/s/o medication noncompliance and UTI, required MICU for insulin gtt  Uncontrolled DM a1c > 15.5%  Patient states will NOT take insulin on outside. Refusing insulin on outside.  Was not on insulin prior outpatient. Was taking metformin irregularly on outside, script last filled in September per pharmacy  - now on Lantus 30U, admelog 9U, ISS; FSG in arget  -pt states she takes medicine "when she feels like", admits to noncompliance  -dc on basal/oral regimen as per endo's chart note

## 2021-05-20 ENCOUNTER — TRANSCRIPTION ENCOUNTER (OUTPATIENT)
Age: 76
End: 2021-05-20

## 2021-05-20 VITALS
TEMPERATURE: 97 F | RESPIRATION RATE: 18 BRPM | SYSTOLIC BLOOD PRESSURE: 135 MMHG | OXYGEN SATURATION: 96 % | HEART RATE: 97 BPM | DIASTOLIC BLOOD PRESSURE: 68 MMHG

## 2021-05-20 LAB
GLUCOSE BLDC GLUCOMTR-MCNC: 103 MG/DL — HIGH (ref 70–99)
GLUCOSE BLDC GLUCOMTR-MCNC: 141 MG/DL — HIGH (ref 70–99)

## 2021-05-20 PROCEDURE — 36556 INSERT NON-TUNNEL CV CATH: CPT | Mod: RT

## 2021-05-20 PROCEDURE — 76775 US EXAM ABDO BACK WALL LIM: CPT

## 2021-05-20 PROCEDURE — 85610 PROTHROMBIN TIME: CPT

## 2021-05-20 PROCEDURE — 97110 THERAPEUTIC EXERCISES: CPT

## 2021-05-20 PROCEDURE — 85730 THROMBOPLASTIN TIME PARTIAL: CPT

## 2021-05-20 PROCEDURE — 87040 BLOOD CULTURE FOR BACTERIA: CPT

## 2021-05-20 PROCEDURE — 82550 ASSAY OF CK (CPK): CPT

## 2021-05-20 PROCEDURE — U0003: CPT

## 2021-05-20 PROCEDURE — 84132 ASSAY OF SERUM POTASSIUM: CPT

## 2021-05-20 PROCEDURE — 84484 ASSAY OF TROPONIN QUANT: CPT

## 2021-05-20 PROCEDURE — 87150 DNA/RNA AMPLIFIED PROBE: CPT

## 2021-05-20 PROCEDURE — 80202 ASSAY OF VANCOMYCIN: CPT

## 2021-05-20 PROCEDURE — 85018 HEMOGLOBIN: CPT

## 2021-05-20 PROCEDURE — 73030 X-RAY EXAM OF SHOULDER: CPT

## 2021-05-20 PROCEDURE — 83605 ASSAY OF LACTIC ACID: CPT

## 2021-05-20 PROCEDURE — 99291 CRITICAL CARE FIRST HOUR: CPT

## 2021-05-20 PROCEDURE — 84681 ASSAY OF C-PEPTIDE: CPT

## 2021-05-20 PROCEDURE — 85025 COMPLETE CBC W/AUTO DIFF WBC: CPT

## 2021-05-20 PROCEDURE — 70450 CT HEAD/BRAIN W/O DYE: CPT

## 2021-05-20 PROCEDURE — C1751: CPT

## 2021-05-20 PROCEDURE — 97530 THERAPEUTIC ACTIVITIES: CPT

## 2021-05-20 PROCEDURE — 84295 ASSAY OF SERUM SODIUM: CPT

## 2021-05-20 PROCEDURE — 82962 GLUCOSE BLOOD TEST: CPT

## 2021-05-20 PROCEDURE — 73020 X-RAY EXAM OF SHOULDER: CPT

## 2021-05-20 PROCEDURE — 82306 VITAMIN D 25 HYDROXY: CPT

## 2021-05-20 PROCEDURE — 80053 COMPREHEN METABOLIC PANEL: CPT

## 2021-05-20 PROCEDURE — 82947 ASSAY GLUCOSE BLOOD QUANT: CPT

## 2021-05-20 PROCEDURE — 97162 PT EVAL MOD COMPLEX 30 MIN: CPT

## 2021-05-20 PROCEDURE — 87186 SC STD MICRODIL/AGAR DIL: CPT

## 2021-05-20 PROCEDURE — 80061 LIPID PANEL: CPT

## 2021-05-20 PROCEDURE — 81001 URINALYSIS AUTO W/SCOPE: CPT

## 2021-05-20 PROCEDURE — 83735 ASSAY OF MAGNESIUM: CPT

## 2021-05-20 PROCEDURE — 82330 ASSAY OF CALCIUM: CPT

## 2021-05-20 PROCEDURE — 85027 COMPLETE CBC AUTOMATED: CPT

## 2021-05-20 PROCEDURE — 83036 HEMOGLOBIN GLYCOSYLATED A1C: CPT

## 2021-05-20 PROCEDURE — 86769 SARS-COV-2 COVID-19 ANTIBODY: CPT

## 2021-05-20 PROCEDURE — 96374 THER/PROPH/DIAG INJ IV PUSH: CPT

## 2021-05-20 PROCEDURE — 74176 CT ABD & PELVIS W/O CONTRAST: CPT

## 2021-05-20 PROCEDURE — 96375 TX/PRO/DX INJ NEW DRUG ADDON: CPT

## 2021-05-20 PROCEDURE — 84100 ASSAY OF PHOSPHORUS: CPT

## 2021-05-20 PROCEDURE — 87086 URINE CULTURE/COLONY COUNT: CPT

## 2021-05-20 PROCEDURE — 85014 HEMATOCRIT: CPT

## 2021-05-20 PROCEDURE — 71045 X-RAY EXAM CHEST 1 VIEW: CPT

## 2021-05-20 PROCEDURE — 82803 BLOOD GASES ANY COMBINATION: CPT

## 2021-05-20 PROCEDURE — 97166 OT EVAL MOD COMPLEX 45 MIN: CPT

## 2021-05-20 PROCEDURE — 72125 CT NECK SPINE W/O DYE: CPT

## 2021-05-20 PROCEDURE — 80048 BASIC METABOLIC PNL TOTAL CA: CPT

## 2021-05-20 PROCEDURE — 82435 ASSAY OF BLOOD CHLORIDE: CPT

## 2021-05-20 PROCEDURE — U0005: CPT

## 2021-05-20 PROCEDURE — 87077 CULTURE AEROBIC IDENTIFY: CPT

## 2021-05-20 PROCEDURE — 82565 ASSAY OF CREATININE: CPT

## 2021-05-20 PROCEDURE — 82010 KETONE BODYS QUAN: CPT

## 2021-05-20 PROCEDURE — 97116 GAIT TRAINING THERAPY: CPT

## 2021-05-20 PROCEDURE — 99239 HOSP IP/OBS DSCHRG MGMT >30: CPT | Mod: GC

## 2021-05-20 RX ADMIN — Medication 1000 MILLIGRAM(S): at 05:34

## 2021-05-20 RX ADMIN — Medication 2000 UNIT(S): at 11:25

## 2021-05-20 RX ADMIN — NYSTATIN CREAM 1 APPLICATION(S): 100000 CREAM TOPICAL at 05:04

## 2021-05-20 RX ADMIN — Medication 1000 MILLIGRAM(S): at 05:04

## 2021-05-20 RX ADMIN — LIDOCAINE 1 PATCH: 4 CREAM TOPICAL at 04:28

## 2021-05-20 RX ADMIN — POLYETHYLENE GLYCOL 3350 17 GRAM(S): 17 POWDER, FOR SOLUTION ORAL at 05:04

## 2021-05-20 RX ADMIN — Medication 9 UNIT(S): at 09:22

## 2021-05-20 RX ADMIN — Medication 5 MILLIGRAM(S): at 05:05

## 2021-05-20 RX ADMIN — Medication 25 MILLIGRAM(S): at 05:04

## 2021-05-20 RX ADMIN — Medication 9 UNIT(S): at 12:41

## 2021-05-20 RX ADMIN — Medication 81 MILLIGRAM(S): at 11:26

## 2021-05-20 NOTE — PROGRESS NOTE ADULT - REASON FOR ADMISSION
Urosepsis and Diabetic Ketoacidosis

## 2021-05-20 NOTE — PROVIDER CONTACT NOTE (OTHER) - ASSESSMENT
pt a&ox4, VSS
pt a and ox4, denies chest pain, denies SOB, denies abdominal pain, denies vomiting m, BS + x4 quads , reports passing flatus

## 2021-05-20 NOTE — PROGRESS NOTE ADULT - PROBLEM SELECTOR PROBLEM 3
Fall at home
UTI (urinary tract infection)
Hypertension, unspecified type
Humerus fracture
Fall at home
Humerus fracture
Fall at home
Humerus fracture
Fall at home

## 2021-05-20 NOTE — PROGRESS NOTE ADULT - PROBLEM SELECTOR PLAN 4
UC grew >100k E. coli concerning for urosepsis  - UC 5/10 grew E. coli  - BC 5/10 grew Staph. epi in one bottle only  - BC 5/12 NGTD  - s/p vancomycin IV 1250mg (5/10-5/13) x2 doses  - s/p ceftriaxone IV 1000mg (5/10-5/13) x3 doses  - s/p PO cipro 500 BID 5/13 - 5/15
-restart home metop 25 qd
-restart home metop 25 qd
4. h/o fall - s/p fall on 5/10 2/2 AMS from DKA  - Currently A&Ox3  - CT head/spine showed no fractures or bleeding  - Shoulder X-ray showed no fractures  - patient c/o b/l shoulder pain since fall  - lidocaine patch for shoulder pain - reports helpipng
UC grew >100k E. coli concerning for urosepsis  - UC 5/10 grew E. coli  - BC 5/10 grew Staph. epi in one bottle only  - BC 5/12 NGTD  - s/p vancomycin IV 1250mg (5/10-5/13) x2 doses  - s/p ceftriaxone IV 1000mg (5/10-5/13) x3 doses  - s/p PO cipro 500 BID 5/13 - 5/15
UC grew >100k E. coli concerning for urosepsis  - UC 5/10 grew E. coli  - BC 5/10 grew Staph. epi in one bottle only  - BC 5/12 NGTD  - s/p vancomycin IV 1250mg (5/10-5/13) x2 doses  - s/p ceftriaxone IV 1000mg (5/10-5/13) x3 doses  - s/p PO cipro 500 BID 5/13 - 5/15
-restart home metop 25 qd
-restart home metop 25 qd

## 2021-05-20 NOTE — PROGRESS NOTE ADULT - NSICDXPILOT_GEN_ALL_CORE
[place]

## 2021-05-20 NOTE — PROGRESS NOTE ADULT - SUBJECTIVE AND OBJECTIVE BOX
Delvin Daily MD  Internal Medicine, PGY-2  Pager: [phone] ([facility]) / [phone] ([facility])    SUBJECTIVE / OVERNIGHT EVENTS:  - Pt seen and examined at bedside  - NAEON  - Pending dispo to [facility]    MEDICATIONS  (STANDING):  acetaminophen   Tablet .. 1000 milliGRAM(s) Oral every 8 hours  aspirin  chewable 81 milliGRAM(s) Oral daily  atorvastatin 40 milliGRAM(s) Oral at bedtime  bisacodyl 5 milliGRAM(s) Oral every 12 hours  cholecalciferol 2000 Unit(s) Oral daily  dextrose 40% Gel 15 Gram(s) Oral once  dextrose 5%. 1000 milliLiter(s) (50 mL/Hr) IV Continuous <Continuous>  dextrose 50% Injectable 25 Gram(s) IV Push once  dextrose 50% Injectable 12.5 Gram(s) IV Push once  enoxaparin Injectable 40 milliGRAM(s) SubCutaneous daily  glucagon  Injectable 1 milliGRAM(s) IntraMuscular once  insulin glargine Injectable (LANTUS) 30 Unit(s) SubCutaneous at bedtime  insulin lispro (ADMELOG) corrective regimen sliding scale   SubCutaneous three times a day before meals  insulin lispro (ADMELOG) corrective regimen sliding scale   SubCutaneous at bedtime  insulin lispro Injectable (ADMELOG) 9 Unit(s) SubCutaneous three times a day before meals  lidocaine   Patch 1 Patch Transdermal daily  metoprolol succinate ER 25 milliGRAM(s) Oral daily  nystatin Powder 1 Application(s) Topical two times a day  polyethylene glycol 3350 17 Gram(s) Oral two times a day  senna 2 Tablet(s) Oral at bedtime    MEDICATIONS  (PRN):  fluticasone propionate 50 MICROgram(s)/spray Nasal Spray 1 Spray(s) Both Nostrils two times a day PRN congestion  sodium chloride 0.9% lock flush 10 milliLiter(s) IV Push every 1 hour PRN Pre/post blood products, medications, blood draw, and to maintain line patency    PHYSICAL EXAM:  Vital Signs Last 24 Hrs  T(C): 36.6 (20 May 2021 04:45), Max: 36.8 (19 May 2021 20:18)  T(F): 97.8 (20 May 2021 04:45), Max: 98.2 (19 May 2021 20:18)  HR: 98 (20 May 2021 04:45) (78 - 98)  BP: 160/75 (20 May 2021 04:45) (125/75 - 160/75)  RR: 18 (20 May 2021 04:45) (18 - 18)  SpO2: 93% (20 May 2021 04:45) (93% - 94%)    CAPILLARY BLOOD GLUCOSE  POCT Blood Glucose.: 160 mg/dL (19 May 2021 21:59)  POCT Blood Glucose.: 140 mg/dL (19 May 2021 16:56)  POCT Blood Glucose.: 153 mg/dL (19 May 2021 12:17)  POCT Blood Glucose.: 146 mg/dL (19 May 2021 08:22)    I&O's Summary  19 May 2021 07:01  -  20 May 2021 07:00  --------------------------------------------------------  IN: 980 mL / OUT: 0 mL / NET: 980 mL    CONSTITUTIONAL: NAD, well-developed, + obese  RESPIRATORY: Normal respiratory effort; lungs are clear to auscultation bilaterally  CARDIOVASCULAR: Regular rate and rhythm, normal S1 and S2, no murmur/rub/gallop; No lower extremity edema; Peripheral pulses are 2+ bilaterally  ABDOMEN: Nontender to palpation, normoactive bowel sounds, no rebound/guarding; No hepatosplenomegaly  MUSCLOSKELETAL: +tenderness to palpation of R shoulder, bruise on R and L shoulder  PSYCH: A+O to person, place, and time; affect appropriate    LABS:  - No new labs    IMAGING:  CT Abdomen and Pelvis No Cont (05.10.21 @ 23:16)  IMPRESSION:  - Limited study without intravenous contrast.  - Bilateral renal cysts.   Evaluation for pyelonephritis is limited without intravenous contrast.  - Stable dilatation of the infrarenal abdominal aorta to 2.4 cm.   Delvin Daily MD  Internal Medicine, PGY-2  Pager: [phone] ([facility]) / [phone] ([facility])    SUBJECTIVE / OVERNIGHT EVENTS:  - Pt seen and examined at bedside  - NAEON  - Required supp for BM which she's now had 2 of since this AM  - Pending dispo to [facility]    MEDICATIONS  (STANDING):  acetaminophen   Tablet .. 1000 milliGRAM(s) Oral every 8 hours  aspirin  chewable 81 milliGRAM(s) Oral daily  atorvastatin 40 milliGRAM(s) Oral at bedtime  bisacodyl 5 milliGRAM(s) Oral every 12 hours  cholecalciferol 2000 Unit(s) Oral daily  dextrose 40% Gel 15 Gram(s) Oral once  dextrose 5%. 1000 milliLiter(s) (50 mL/Hr) IV Continuous <Continuous>  dextrose 50% Injectable 25 Gram(s) IV Push once  dextrose 50% Injectable 12.5 Gram(s) IV Push once  enoxaparin Injectable 40 milliGRAM(s) SubCutaneous daily  glucagon  Injectable 1 milliGRAM(s) IntraMuscular once  insulin glargine Injectable (LANTUS) 30 Unit(s) SubCutaneous at bedtime  insulin lispro (ADMELOG) corrective regimen sliding scale   SubCutaneous three times a day before meals  insulin lispro (ADMELOG) corrective regimen sliding scale   SubCutaneous at bedtime  insulin lispro Injectable (ADMELOG) 9 Unit(s) SubCutaneous three times a day before meals  lidocaine   Patch 1 Patch Transdermal daily  metoprolol succinate ER 25 milliGRAM(s) Oral daily  nystatin Powder 1 Application(s) Topical two times a day  polyethylene glycol 3350 17 Gram(s) Oral two times a day  senna 2 Tablet(s) Oral at bedtime    MEDICATIONS  (PRN):  fluticasone propionate 50 MICROgram(s)/spray Nasal Spray 1 Spray(s) Both Nostrils two times a day PRN congestion  sodium chloride 0.9% lock flush 10 milliLiter(s) IV Push every 1 hour PRN Pre/post blood products, medications, blood draw, and to maintain line patency    PHYSICAL EXAM:  Vital Signs Last 24 Hrs  T(C): 36.6 (20 May 2021 04:45), Max: 36.8 (19 May 2021 20:18)  T(F): 97.8 (20 May 2021 04:45), Max: 98.2 (19 May 2021 20:18)  HR: 98 (20 May 2021 04:45) (78 - 98)  BP: 160/75 (20 May 2021 04:45) (125/75 - 160/75)  RR: 18 (20 May 2021 04:45) (18 - 18)  SpO2: 93% (20 May 2021 04:45) (93% - 94%)    CAPILLARY BLOOD GLUCOSE  POCT Blood Glucose.: 160 mg/dL (19 May 2021 21:59)  POCT Blood Glucose.: 140 mg/dL (19 May 2021 16:56)  POCT Blood Glucose.: 153 mg/dL (19 May 2021 12:17)  POCT Blood Glucose.: 146 mg/dL (19 May 2021 08:22)    I&O's Summary  19 May 2021 07:01  -  20 May 2021 07:00  --------------------------------------------------------  IN: 980 mL / OUT: 0 mL / NET: 980 mL    CONSTITUTIONAL: NAD, well-developed, + obese  RESPIRATORY: Normal respiratory effort; lungs are clear to auscultation bilaterally  CARDIOVASCULAR: Regular rate and rhythm, normal S1 and S2, no murmur/rub/gallop; No lower extremity edema; Peripheral pulses are 2+ bilaterally  ABDOMEN: Nontender to palpation, normoactive bowel sounds, no rebound/guarding; No hepatosplenomegaly  MUSCLOSKELETAL: +tenderness to palpation of R shoulder, bruise on R and L shoulder  PSYCH: A+O to person, place, and time; affect appropriate    LABS:  - No new labs    IMAGING:  CT Abdomen and Pelvis No Cont (05.10.21 @ 23:16)  IMPRESSION:  - Limited study without intravenous contrast.  - Bilateral renal cysts.   Evaluation for pyelonephritis is limited without intravenous contrast.  - Stable dilatation of the infrarenal abdominal aorta to 2.4 cm.

## 2021-05-20 NOTE — PROGRESS NOTE ADULT - ATTENDING COMMENTS
Patient seen and examined, case d/w house staff.  DKA, UTI, requiring MICU admission, pressor support, now transferred to floors.  Ideally given her presentation and HgbA1C of over 15.5 patient should be discharged on insulin, however she refuses at present time.  She wishes to continue on an oral regiment.  Will attempt to have further discussions with patient regarding home regiment.
Agree with above.  Found down with DKA and hypotension, likely septic shock ( and BP 80/40)  +U/A, AG 25 and beta-OH positive  Now off pressors, FS downtrending  Empiric antibiotics with CTX  Creatinine improving    I have personally provided 45 minutes of critical care time.
Agree with assessment and plan as above by Dr. Youngblood. Reviewed all pertinent labs, glucose values, and imaging studies. Modifications made as indicated above. Based on current insulin requirements recommend increase basal bolus regimen as above. Pt. will need to go home on insulin. Discussion had with patient regarding insulin which she is not keen on but stressed the importance of insulin and adherence especially in the setting of recent DKA and likely pancreatic insufficiency temporarily.     Yves Turner D.O  [phone]
Patient seen and examined, case d/w house staff.  DKA, UTI, requiring MICU admission, pressor support, now transferred to floors.  Ideally given her presentation and HgbA1C of over 15.5 patient should be discharged on insulin, however she refuses home insulin, even once daily dosing.  She wishes to continue on an oral regiment.  Will attempt to have further discussions with patient regarding home regiment.  PT  d/c planning
-Shoulder pain and limited ROM persist. -Shoulder fracture seen on repeat X-ray. -F/u ortho recs. -Pain control. -PT/OT. -Will check vitamin D level in am and replete if low.
-F/u endo recs. Patient still refuses insulin.   -Right shoulder pain with movement and palpation persists. -F/u repeat right shoulder X-ray. -Will add Tylenol 1gm TID standing for. -C/w topical lidocaine patch. -Ice. -PT/OT. -May need ortho eval if significant right shoulder pain/reduced mobility persists.   -Patient also says she doesn't want to go to rehab.
Patient seen and examined.  Case d/w house staff.  Patient continues to refuse insulin at home as well as [facility] placement.  Currently no safe dispo plan exists for patient.  Will d/w family and continue to discuss options with patient.
Patient seen and examined.  Case d/w house staff.  Plan for d/c to [facility] when bed available.  Bolus insulin and Prandin for pre-meal coverage, patient has been compliant with insulin regiment while in hospital.  Total d/c time 45 minutes.
Patient seen and examined.  Case d/w house staff.  Plan for d/c to [facility] when bed available.  Bolus insulin and Prandin for pre-meal coverage, patient has been compliant with insulin regiment while in hospital.  Total d/c time 45 minutes.
Patient seen and examined.  Case d/w house staff.  Patient continues to refuse to discuss insulin at home (although is compliant with it here in the hospital), reportedly OK with d/c plan to [facility], although she would not discuss it with me today.  Sister is in the process of choosing facilities  Will d/w family and continue to discuss options with patient.

## 2021-05-20 NOTE — PROGRESS NOTE ADULT - PROBLEM SELECTOR PROBLEM 1
Diabetic ketoacidosis without coma associated with type 2 diabetes mellitus
Type 2 diabetes mellitus with hyperglycemia, without long-term current use of insulin
Type 2 diabetes mellitus with hyperglycemia, without long-term current use of insulin
Diabetic ketoacidosis without coma associated with type 2 diabetes mellitus
Type 2 diabetes mellitus with hyperglycemia, without long-term current use of insulin
Diabetic ketoacidosis without coma associated with type 2 diabetes mellitus
Type 2 diabetes mellitus with hyperglycemia, without long-term current use of insulin
Noncompliance with therapeutic plan
Type 2 diabetes mellitus with hyperglycemia, without long-term current use of insulin
Noncompliance with therapeutic plan
Noncompliance with therapeutic plan

## 2021-05-20 NOTE — DISCHARGE NOTE NURSING/CASE MANAGEMENT/SOCIAL WORK - PATIENT PORTAL LINK FT
You can access the FollowMyHealth Patient Portal offered by [facility] by registering at the following website: [email or web address]. By joining [facility]’s FollowMyHealth portal, you will also be able to view your health information using other applications (apps) compatible with our system.

## 2021-05-20 NOTE — PROGRESS NOTE ADULT - PROVIDER SPECIALTY LIST ADULT
Internal Medicine
MICU
Endocrinology
Internal Medicine
MICU
Internal Medicine

## 2021-05-20 NOTE — PROVIDER CONTACT NOTE (OTHER) - SITUATION
pt requesting rectal suppository , pt reports small Bm in the morning and feeling " back up" in the rectum , reports constipated
Vanco pre 3rd  trough 8.9 pt on vancomycin 1g for MRSA infection

## 2021-05-20 NOTE — PROVIDER CONTACT NOTE (OTHER) - REASON
Vanco trough 8.9
pt requesting rectal suppository , pt reports small Bm in the morning and feeling " back up" in the rectum , reports constipated

## 2021-05-20 NOTE — PROGRESS NOTE ADULT - PROBLEM SELECTOR PLAN 1
States she will not take insulin on outside and she will not go to [facility]. Discussed with patient she needs to be on insulin based on presentation in DKA, but patient continues to refuse. Risks and benefits were discussed with patient. States she understands she could "die" if she does not use insulin, but also states "but I might be fine too". States "I don't want to use insulin"    Team spoke with patient's sister and daughter who are in agreement and understand importance of insulin.

## 2021-05-20 NOTE — PROGRESS NOTE ADULT - PROBLEM SELECTOR PROBLEM 4
UTI (urinary tract infection)
Hyperlipidemia, unspecified hyperlipidemia type
Fall at home
Chronic systolic heart failure
UTI (urinary tract infection)
UTI (urinary tract infection)

## 2021-05-24 ENCOUNTER — RX RENEWAL (OUTPATIENT)
Age: 76
End: 2021-05-24

## 2021-06-09 DIAGNOSIS — K31.84 TYPE 2 DIABETES MELLITUS WITH DIABETIC AUTONOMIC (POLY)NEUROPATHY: ICD-10-CM

## 2021-06-09 DIAGNOSIS — E11.43 TYPE 2 DIABETES MELLITUS WITH DIABETIC AUTONOMIC (POLY)NEUROPATHY: ICD-10-CM

## 2021-06-09 PROBLEM — I10 ESSENTIAL (PRIMARY) HYPERTENSION: Chronic | Status: ACTIVE | Noted: 2021-05-10

## 2021-06-09 RX ORDER — DAPAGLIFLOZIN 10 MG/1
10 TABLET, FILM COATED ORAL DAILY
Qty: 90 | Refills: 1 | Status: DISCONTINUED | COMMUNITY
Start: 1900-01-01 | End: 2021-06-09

## 2021-06-09 RX ORDER — METOPROLOL TARTRATE 25 MG/1
25 TABLET, FILM COATED ORAL
Qty: 90 | Refills: 0 | Status: DISCONTINUED | COMMUNITY
Start: 2017-12-26 | End: 2021-06-09

## 2021-06-09 RX ORDER — METFORMIN HYDROCHLORIDE 500 MG/1
500 TABLET, COATED ORAL
Qty: 360 | Refills: 0 | Status: DISCONTINUED | COMMUNITY
Start: 2017-11-27 | End: 2021-06-09

## 2021-06-09 RX ORDER — URSODIOL 500 MG/1
500 TABLET ORAL TWICE DAILY
Qty: 180 | Refills: 3 | Status: DISCONTINUED | COMMUNITY
Start: 2020-04-07 | End: 2021-06-09

## 2021-06-09 RX ORDER — ALPRAZOLAM 0.5 MG/1
0.5 TABLET ORAL
Qty: 20 | Refills: 0 | Status: DISCONTINUED | COMMUNITY
Start: 2019-05-14 | End: 2021-06-09

## 2021-06-09 RX ORDER — PANTOPRAZOLE 40 MG/1
40 TABLET, DELAYED RELEASE ORAL
Qty: 90 | Refills: 0 | Status: DISCONTINUED | COMMUNITY
Start: 2017-11-27 | End: 2021-06-09

## 2021-06-10 NOTE — PROGRESS NOTE ADULT - TIME-BASED
Goal Outcome Evaluation:  Plan of Care Reviewed With: patient        Progress: improving  Outcome Summary: Pt increased ambulation distance to 220ft with Michaela x2 and B UE supoport on tele monitor. Pt demonstrated improved balance and stability with increased step length. Pt required one brief standing rest break. Mobility limited by fatigue. Continue to progress as appropriate.  
25
35

## 2021-06-25 ENCOUNTER — LABORATORY RESULT (OUTPATIENT)
Age: 76
End: 2021-06-25

## 2021-06-25 ENCOUNTER — APPOINTMENT (OUTPATIENT)
Dept: INTERNAL MEDICINE | Facility: CLINIC | Age: 76
End: 2021-06-25
Payer: MEDICARE

## 2021-06-25 ENCOUNTER — RESULT CHARGE (OUTPATIENT)
Age: 76
End: 2021-06-25

## 2021-06-25 VITALS
DIASTOLIC BLOOD PRESSURE: 68 MMHG | HEIGHT: 67 IN | BODY MASS INDEX: 25.9 KG/M2 | HEART RATE: 86 BPM | RESPIRATION RATE: 16 BRPM | TEMPERATURE: 97.3 F | WEIGHT: 165 LBS | SYSTOLIC BLOOD PRESSURE: 110 MMHG | OXYGEN SATURATION: 98 %

## 2021-06-25 DIAGNOSIS — R10.2 PELVIC AND PERINEAL PAIN: ICD-10-CM

## 2021-06-25 LAB
BILIRUB UR QL STRIP: NORMAL
CLARITY UR: NORMAL
COLLECTION METHOD: NORMAL
GLUCOSE UR-MCNC: NORMAL
HCG UR QL: 0.2 EU/DL
HGB UR QL STRIP.AUTO: ABNORMAL
KETONES UR-MCNC: NORMAL
LEUKOCYTE ESTERASE UR QL STRIP: NORMAL
NITRITE UR QL STRIP: POSITIVE
PH UR STRIP: 5.5
PROT UR STRIP-MCNC: 100
SP GR UR STRIP: 1.02

## 2021-06-25 PROCEDURE — 81003 URINALYSIS AUTO W/O SCOPE: CPT | Mod: QW

## 2021-06-25 PROCEDURE — 99214 OFFICE O/P EST MOD 30 MIN: CPT | Mod: 25

## 2021-06-25 NOTE — PHYSICAL EXAM
[No Acute Distress] : no acute distress [Well Nourished] : well nourished [Well Developed] : well developed [Well-Appearing] : well-appearing [Normal Sclera/Conjunctiva] : normal sclera/conjunctiva [PERRL] : pupils equal round and reactive to light [EOMI] : extraocular movements intact [Normal Outer Ear/Nose] : the outer ears and nose were normal in appearance [Normal Oropharynx] : the oropharynx was normal [No JVD] : no jugular venous distention [No Lymphadenopathy] : no lymphadenopathy [Supple] : supple [Thyroid Normal, No Nodules] : the thyroid was normal and there were no nodules present [No Respiratory Distress] : no respiratory distress  [No Accessory Muscle Use] : no accessory muscle use [Clear to Auscultation] : lungs were clear to auscultation bilaterally [Normal Rate] : normal rate  [Regular Rhythm] : with a regular rhythm [No Murmur] : no murmur heard [Normal S1, S2] : normal S1 and S2 [No Carotid Bruits] : no carotid bruits [No Abdominal Bruit] : a ~M bruit was not heard ~T in the abdomen [No Varicosities] : no varicosities [Pedal Pulses Present] : the pedal pulses are present [No Palpable Aorta] : no palpable aorta [No Extremity Clubbing/Cyanosis] : no extremity clubbing/cyanosis [Soft] : abdomen soft [Non Tender] : non-tender [No Masses] : no abdominal mass palpated [Non-distended] : non-distended [No HSM] : no HSM [Normal Bowel Sounds] : normal bowel sounds [Normal Posterior Cervical Nodes] : no posterior cervical lymphadenopathy [Normal Anterior Cervical Nodes] : no anterior cervical lymphadenopathy [No CVA Tenderness] : no CVA  tenderness [No Spinal Tenderness] : no spinal tenderness [No Joint Swelling] : no joint swelling [Grossly Normal Strength/Tone] : grossly normal strength/tone [No Rash] : no rash [Coordination Grossly Intact] : coordination grossly intact [No Focal Deficits] : no focal deficits [Normal Gait] : normal gait [Deep Tendon Reflexes (DTR)] : deep tendon reflexes were 2+ and symmetric [Normal Affect] : the affect was normal [Normal Insight/Judgement] : insight and judgment were intact [de-identified] : trace edema in lower extremities bilaterally

## 2021-06-25 NOTE — ASSESSMENT
[FreeTextEntry1] : Patient is a 76 year old female with a past medical history as above who presents for general follow-up.

## 2021-06-25 NOTE — REVIEW OF SYSTEMS
[Joint Pain] : joint pain [Negative] : Heme/Lymph [Back Pain] : back pain [Lower Ext Edema] : lower extremity edema [Recent Change In Weight] : ~T recent weight change [Frequency] : no frequency [FreeTextEntry2] : weight loss [FreeTextEntry5] : lower extremity heaviness  [FreeTextEntry8] : sensation of pressure in pelvic area after urinating  [FreeTextEntry9] : right shoulder pain

## 2021-06-25 NOTE — ADDENDUM
[FreeTextEntry1] : I, Erasto Ruiz, acted solely as scribe for Dr. Felipe Manuel DO on this date 06/25/2021  1:40PM .\par \par All medical record entries made by the Scribe were at my, Dr. Felipe Manuel DO direction and personally dictated by me on 06/25/2021  1:40PM. I have reviewed the chart and agree that the record accurately reflects my personal performance of the history, physical exam, assessment and plan. I have also personally directed, reviewed and agreed with the chart.\par

## 2021-06-25 NOTE — PLAN
[FreeTextEntry1] : Cardiovascular\par lower extremity edema - recommended keeping legs elevated when at home; advised low sodium diet\par benign essential hypertension - continue Metoprolol Succinate ER 25mg p.o.q.d. as directed - advised low sodium diet; will continue to monitor BP\par CAD - s/p CABG - continue Atorvastatin Calcium 40mg p.o.q.h.s. as directed; advised to start Aspirin 81mg p.o.q.d. as directed \par Endocrinology\par diabetes - continue Metformin HCl ER 500mg BID p.o.q.d. as directed and Repaglinide 2mg TID p.o.q.d. as directed; continue Atorvastatin Calcium 40mg p.o.q.h.s. as directed - advised low carbohydrate/low sugar diet - follow up with endocrinologist, Dr. Urbano\par Gastroenterology\par diabetic gastroparesis - continue Metoclopramide HCl 5mg QID p.o. p.r.n. as directed\par GERD - continue antireflux measures\par Musculoskeletal\par right shoulder fracture - secondary to fall - patient refusing home PT; follow up with orthopedic surgeon for further evaluation \par Hematology\par anemia - will continue to monitor; CBC and anemia work up to be rechecked in 2 months\par Urology\par pelvic pressure after urinating - likely secondary to UTI - check POCT UA: revealed large blood, protein (100mg/dL), nitrites, and leukocytes; check UA w/ Reflex Urine Culture - start Nitrofurantoin Monohyd Macro (Macrobid) 100mg p.o.q.d. as directed, Rx filled - if symptoms persist/worsen, recommended following up with urologist, Dr. Mcneil for further evaluation/testing \par \par RTO in 2 months for fasting blood work.

## 2021-06-25 NOTE — HISTORY OF PRESENT ILLNESS
[FreeTextEntry1] : general follow-up [de-identified] : Patient is a 76 year old female with a past medical history as below who presents for general follow-up. Patient states she is taking all medications as prescribed and denies any adverse reactions or side effects. She denies lightheadedness or dizziness on Metoprolol Succinate. She denies any recent episodes of hypoglycemia. Patient was previously taking Vitamin D-3 2000 IU. Patient was recently admitted to [facility] for a right shoulder fracture secondary to a fall. She was discharged on 6/8/21. She refused home PT. Consultation with orthopedic surgery was advised. X-Ray of right humerus (6/3/21) was normal. Patient was previously referred to endocrinologist, Dr. Urbano given history of DM2. Patient has been maintaining a low carbohydrate/low sugar diet. She notes weight loss. For the past week, patient has noted sensation of pressure in the pelvic area after urinating. She denies urinary frequency, abdominal pain, fever, or chills. She notes chronic back pain. Patient notes lower extremity edema. She notes lower extremity heaviness. Blood work done on 6/7/21 revealed low HGB (10.7), low HCT (31.3), normal blood-glucose (97), normal Creatinine (0.79), and normal electrolyte levels.

## 2021-06-26 ENCOUNTER — LABORATORY RESULT (OUTPATIENT)
Age: 76
End: 2021-06-26

## 2021-06-27 LAB
APPEARANCE: ABNORMAL
BILIRUBIN URINE: NEGATIVE
BLOOD URINE: ABNORMAL
COLOR: YELLOW
GLUCOSE QUALITATIVE U: NEGATIVE
KETONES URINE: NEGATIVE
LEUKOCYTE ESTERASE URINE: ABNORMAL
NITRITE URINE: POSITIVE
PH URINE: 6
PROTEIN URINE: ABNORMAL
SPECIFIC GRAVITY URINE: 1.02
UROBILINOGEN URINE: NORMAL

## 2021-06-29 ENCOUNTER — NON-APPOINTMENT (OUTPATIENT)
Age: 76
End: 2021-06-29

## 2021-07-05 NOTE — ED ADULT NURSE NOTE - TEMPLATE LIST FOR HEAD TO TOE ASSESSMENT
Developed a rash after using a new body wash on Saturday night. Rash worse around the groin area.   Neuro

## 2021-07-29 ENCOUNTER — LABORATORY RESULT (OUTPATIENT)
Age: 76
End: 2021-07-29

## 2021-07-30 ENCOUNTER — LABORATORY RESULT (OUTPATIENT)
Age: 76
End: 2021-07-30

## 2021-08-08 ENCOUNTER — NON-APPOINTMENT (OUTPATIENT)
Age: 76
End: 2021-08-08

## 2021-08-08 LAB
APPEARANCE: ABNORMAL
BILIRUBIN URINE: NEGATIVE
BLOOD URINE: NEGATIVE
COLOR: YELLOW
GLUCOSE QUALITATIVE U: NEGATIVE
KETONES URINE: NEGATIVE
LEUKOCYTE ESTERASE URINE: ABNORMAL
NITRITE URINE: POSITIVE
PH URINE: 6.5
PROTEIN URINE: ABNORMAL
SPECIFIC GRAVITY URINE: 1.02
UROBILINOGEN URINE: NORMAL

## 2021-08-08 NOTE — PHYSICAL THERAPY INITIAL EVALUATION ADULT - FOLLOWS COMMANDS/ANSWERS QUESTIONS, REHAB EVAL
selective command follow/able to follow multistep instructions/able to follow single-step instructions
[[id number]]

## 2021-08-26 ENCOUNTER — APPOINTMENT (OUTPATIENT)
Dept: INTERNAL MEDICINE | Facility: CLINIC | Age: 76
End: 2021-08-26
Payer: MEDICARE

## 2021-08-26 VITALS
WEIGHT: 163.9 LBS | DIASTOLIC BLOOD PRESSURE: 68 MMHG | HEIGHT: 67 IN | BODY MASS INDEX: 25.72 KG/M2 | HEART RATE: 81 BPM | SYSTOLIC BLOOD PRESSURE: 100 MMHG | RESPIRATION RATE: 16 BRPM | OXYGEN SATURATION: 98 % | TEMPERATURE: 97.2 F

## 2021-08-26 PROCEDURE — 36415 COLL VENOUS BLD VENIPUNCTURE: CPT

## 2021-08-26 PROCEDURE — 99214 OFFICE O/P EST MOD 30 MIN: CPT | Mod: 25

## 2021-08-26 NOTE — HISTORY OF PRESENT ILLNESS
[Family Member] : family member [FreeTextEntry1] : fasting blood work and general follow-up\par  [de-identified] : Patient is a 76 year old female with a past medical history as below who presents for fasting blood work and general follow-up. Patient states she is taking all medications as prescribed. She denies lightheadedness or dizziness on Metoprolol Succinate. She denies any new arthralgias or myalgias on Atorvastatin Calcium. Patient noted nausea/vomiting today secondary to taking antidiabetic medications while fasting. Patient has been maintaining a low carbohydrate/low sugar diet. She notes minimal CV activity.

## 2021-08-26 NOTE — ADDENDUM
[FreeTextEntry1] : I, Erasto Ruiz, acted solely as scribe for Dr. Felipe Manuel DO on this date 08/26/2021  1:40PM .\par \par All medical record entries made by the Scribe were at my, Dr. Felipe Manuel DO direction and personally dictated by me on 08/26/2021  1:40PM. I have reviewed the chart and agree that the record accurately reflects my personal performance of the history, physical exam, assessment and plan. I have also personally directed, reviewed and agreed with the chart.\par

## 2021-08-26 NOTE — PLAN
[FreeTextEntry1] : Cardiovascular\par lower extremity edema - continue keeping legs elevated when at home; continue low sodium diet\par benign essential hypertension - continue Metoprolol Succinate ER 25mg p.o.q.d. as directed - continue low sodium diet; will continue to monitor BP\par CAD - s/p CABG - continue Atorvastatin Calcium 40mg p.o.q.h.s. as directed; previously advised to start Aspirin 81mg daily\par Endocrinology\par nausea/vomiting - likely secondary to hypoglycemia - patient noted improvement after being given Jose-Jorge Chocolate Drink \par diabetes - continue Metformin HCl ER 500mg BID p.o.q.d. as directed and Repaglinide 2mg TID p.o.q.d. as directed; continue Atorvastatin Calcium 40mg p.o.q.h.s. as directed - continue low carbohydrate/low sugar diet - check hemoglobin A1C and Urine Microalbumin/Creatinine Ratio - continue to follow up with endocrinologist, Dr. Urbano\par Gastroenterology\par diabetic gastroparesis - continue Metoclopramide HCl 5mg QID p.o. p.r.n. as directed\par GERD - continue antireflux measures\par Musculoskeletal\par recent history of right shoulder fracture - secondary to fall - patient refusing home PT; previously advised to follow up with orthopedic surgeon for further evaluation \par Hematology\par anemia - check CBC\par \par check female panel, hemoglobin A1C, and Urine Microalbumin/Creatinine Ratio

## 2021-09-08 ENCOUNTER — NON-APPOINTMENT (OUTPATIENT)
Age: 76
End: 2021-09-08

## 2021-09-08 LAB
25(OH)D3 SERPL-MCNC: 44.2 NG/ML
ALBUMIN SERPL ELPH-MCNC: 4 G/DL
ALP BLD-CCNC: 601 U/L
ALT SERPL-CCNC: 159 U/L
ANION GAP SERPL CALC-SCNC: 14 MMOL/L
AST SERPL-CCNC: 131 U/L
BASOPHILS # BLD AUTO: 0.04 K/UL
BASOPHILS NFR BLD AUTO: 0.4 %
BILIRUB SERPL-MCNC: 0.6 MG/DL
BUN SERPL-MCNC: 39 MG/DL
CALCIUM SERPL-MCNC: 9.6 MG/DL
CHLORIDE SERPL-SCNC: 99 MMOL/L
CHOLEST SERPL-MCNC: 147 MG/DL
CO2 SERPL-SCNC: 20 MMOL/L
CREAT SERPL-MCNC: 1.24 MG/DL
EOSINOPHIL # BLD AUTO: 0.15 K/UL
EOSINOPHIL NFR BLD AUTO: 1.4 %
ESTIMATED AVERAGE GLUCOSE: 137 MG/DL
GLUCOSE SERPL-MCNC: 171 MG/DL
HBA1C MFR BLD HPLC: 6.4 %
HCT VFR BLD CALC: 34.3 %
HDLC SERPL-MCNC: 55 MG/DL
HGB BLD-MCNC: 10.5 G/DL
IMM GRANULOCYTES NFR BLD AUTO: 0.4 %
LDLC SERPL CALC-MCNC: 68 MG/DL
LYMPHOCYTES # BLD AUTO: 1.81 K/UL
LYMPHOCYTES NFR BLD AUTO: 17.4 %
MAN DIFF?: NORMAL
MCHC RBC-ENTMCNC: 27.1 PG
MCHC RBC-ENTMCNC: 30.6 GM/DL
MCV RBC AUTO: 88.6 FL
MONOCYTES # BLD AUTO: 0.67 K/UL
MONOCYTES NFR BLD AUTO: 6.4 %
NEUTROPHILS # BLD AUTO: 7.71 K/UL
NEUTROPHILS NFR BLD AUTO: 74 %
NONHDLC SERPL-MCNC: 92 MG/DL
PLATELET # BLD AUTO: 365 K/UL
POTASSIUM SERPL-SCNC: 5.8 MMOL/L
PROT SERPL-MCNC: 9.6 G/DL
RBC # BLD: 3.87 M/UL
RBC # FLD: 15 %
SODIUM SERPL-SCNC: 133 MMOL/L
TRIGL SERPL-MCNC: 121 MG/DL
TSH SERPL-ACNC: 2.96 UIU/ML
WBC # FLD AUTO: 10.42 K/UL

## 2021-09-25 ENCOUNTER — RX RENEWAL (OUTPATIENT)
Age: 76
End: 2021-09-25

## 2021-11-17 DIAGNOSIS — Z79.899 OTHER LONG TERM (CURRENT) DRUG THERAPY: ICD-10-CM

## 2021-11-17 DIAGNOSIS — N39.0 URINARY TRACT INFECTION, SITE NOT SPECIFIED: ICD-10-CM

## 2021-11-17 DIAGNOSIS — I50.9 HEART FAILURE, UNSPECIFIED: ICD-10-CM

## 2021-11-18 ENCOUNTER — APPOINTMENT (OUTPATIENT)
Dept: INTERNAL MEDICINE | Facility: CLINIC | Age: 76
End: 2021-11-18
Payer: MEDICARE

## 2021-11-18 VITALS
DIASTOLIC BLOOD PRESSURE: 60 MMHG | WEIGHT: 168 LBS | TEMPERATURE: 98.2 F | RESPIRATION RATE: 16 BRPM | BODY MASS INDEX: 26.31 KG/M2 | SYSTOLIC BLOOD PRESSURE: 106 MMHG | OXYGEN SATURATION: 98 % | HEART RATE: 85 BPM

## 2021-11-18 DIAGNOSIS — E66.9 OVERWEIGHT: ICD-10-CM

## 2021-11-18 DIAGNOSIS — E66.3 OVERWEIGHT: ICD-10-CM

## 2021-11-18 DIAGNOSIS — E11.9 TYPE 2 DIABETES MELLITUS W/OUT COMPLICATIONS: ICD-10-CM

## 2021-11-18 DIAGNOSIS — I10 ESSENTIAL (PRIMARY) HYPERTENSION: ICD-10-CM

## 2021-11-18 DIAGNOSIS — F41.9 ANXIETY DISORDER, UNSPECIFIED: ICD-10-CM

## 2021-11-18 PROCEDURE — 99214 OFFICE O/P EST MOD 30 MIN: CPT

## 2021-11-18 NOTE — ASSESSMENT
[FreeTextEntry1] : Patient is a 76 year old female with a past medical history as above who presents for Rx for lab work.

## 2021-11-18 NOTE — PLAN
[FreeTextEntry1] : Cardiovascular\par lower extremity edema - continue keeping legs elevated when at home; continue low sodium diet\par benign essential hypertension - continue Metoprolol Succinate ER 25mg p.o.q.d. as directed - continue low sodium diet; will continue to monitor BP\par hyperlipidemia - continue Atorvastatin Calcium 40mg p.o.q.h.s. as directed - continue low cholesterol/low fat diet - check FLP and LFTs\par CAD - s/p CABG - continue Atorvastatin Calcium 40mg p.o.q.h.s. as directed; previously advised to start Aspirin 81mg daily\par Endocrinology\par dizziness - likely secondary to hypoglycemia - patient was given Granola Bar \par diabetes - continue Metformin HCl ER 500mg BID p.o.q.d. as directed and Repaglinide 2mg TID p.o.q.d. as directed; continue Atorvastatin Calcium 40mg p.o.q.h.s. as directed - continue low carbohydrate/low sugar diet - check hemoglobin A1C and Urine Microalbumin/Creatinine Ratio - continue to follow up with endocrinologist, Dr. Urbano\par Gastroenterology\par diabetic gastroparesis - continue Metoclopramide HCl 5mg QID p.o. p.r.n. as directed\par GERD - continue antireflux measures\par Musculoskeletal\par recent history of right shoulder fracture - secondary to fall - patient refusing home PT; previously advised to follow up with orthopedic surgeon for further evaluation \par Hematology\par anemia - check CBC\par \par Rx given for lab work (CBC, CMP, FLP, hemoglobin A1C, TSH, Urine Microalbumin/Creatine Ratio, and UA w/ Reflex Urine Culture); recommended following up at a [facility].

## 2021-11-18 NOTE — PHYSICAL EXAM
[No Acute Distress] : no acute distress [Well Nourished] : well nourished [Well Developed] : well developed [Well-Appearing] : well-appearing [Normal Sclera/Conjunctiva] : normal sclera/conjunctiva [PERRL] : pupils equal round and reactive to light [EOMI] : extraocular movements intact [Normal Outer Ear/Nose] : the outer ears and nose were normal in appearance [Normal Oropharynx] : the oropharynx was normal [No JVD] : no jugular venous distention [No Lymphadenopathy] : no lymphadenopathy [Supple] : supple [Thyroid Normal, No Nodules] : the thyroid was normal and there were no nodules present [No Respiratory Distress] : no respiratory distress  [No Accessory Muscle Use] : no accessory muscle use [Clear to Auscultation] : lungs were clear to auscultation bilaterally [Normal Rate] : normal rate  [Regular Rhythm] : with a regular rhythm [Normal S1, S2] : normal S1 and S2 [No Murmur] : no murmur heard [No Carotid Bruits] : no carotid bruits [No Abdominal Bruit] : a ~M bruit was not heard ~T in the abdomen [No Varicosities] : no varicosities [Pedal Pulses Present] : the pedal pulses are present [No Edema] : there was no peripheral edema [No Palpable Aorta] : no palpable aorta [No Extremity Clubbing/Cyanosis] : no extremity clubbing/cyanosis [Soft] : abdomen soft [Non Tender] : non-tender [Non-distended] : non-distended [No Masses] : no abdominal mass palpated [No HSM] : no HSM [Normal Bowel Sounds] : normal bowel sounds [Normal Posterior Cervical Nodes] : no posterior cervical lymphadenopathy [Normal Anterior Cervical Nodes] : no anterior cervical lymphadenopathy [No CVA Tenderness] : no CVA  tenderness [No Spinal Tenderness] : no spinal tenderness [No Joint Swelling] : no joint swelling [Grossly Normal Strength/Tone] : grossly normal strength/tone [No Rash] : no rash [Coordination Grossly Intact] : coordination grossly intact [No Focal Deficits] : no focal deficits [Normal Gait] : normal gait [Deep Tendon Reflexes (DTR)] : deep tendon reflexes were 2+ and symmetric [Normal Affect] : the affect was normal [Normal Insight/Judgement] : insight and judgment were intact [de-identified] : overweight

## 2021-11-18 NOTE — HISTORY OF PRESENT ILLNESS
[FreeTextEntry1] : Rx for lab work\par  [de-identified] : Patient is a 76 year old female with a past medical history as below who presents for Rx for lab work. Patient states she is taking all medications as prescribed. She denies any new arthralgias or myalgias on Atorvastatin Calcium. She denies any gastrointestinal issues on Metformin. She notes dizziness (likely secondary to hypoglycemia) as she was fasting today. Patient has been trying to maintain a low carbohydrate/low sugar diet. She notes minimal CV activity.

## 2021-11-18 NOTE — ADDENDUM
[FreeTextEntry1] : I, Erasto Ruiz, acted solely as scribe for Dr. Felipe Manuel DO on this date 11/18/2021 10:00AM .\par \par All medical record entries made by the Scribe were at my, Dr. Felipe Manuel DO direction and personally dictated by me on 11/18/2021 10:00AM. I have reviewed the chart and agree that the record accurately reflects my personal performance of the history, physical exam, assessment and plan. I have also personally directed, reviewed and agreed with the chart.

## 2021-12-22 ENCOUNTER — RX RENEWAL (OUTPATIENT)
Age: 76
End: 2021-12-22

## 2022-03-04 ENCOUNTER — NON-APPOINTMENT (OUTPATIENT)
Age: 77
End: 2022-03-04

## 2022-03-04 ENCOUNTER — APPOINTMENT (OUTPATIENT)
Dept: INTERNAL MEDICINE | Facility: CLINIC | Age: 77
End: 2022-03-04
Payer: MEDICARE

## 2022-03-04 VITALS
HEART RATE: 72 BPM | RESPIRATION RATE: 16 BRPM | BODY MASS INDEX: 27.31 KG/M2 | HEIGHT: 67 IN | DIASTOLIC BLOOD PRESSURE: 76 MMHG | WEIGHT: 174 LBS | SYSTOLIC BLOOD PRESSURE: 134 MMHG | OXYGEN SATURATION: 96 % | TEMPERATURE: 97.6 F

## 2022-03-04 DIAGNOSIS — Z00.00 ENCOUNTER FOR GENERAL ADULT MEDICAL EXAMINATION W/OUT ABNORMAL FINDINGS: ICD-10-CM

## 2022-03-04 PROCEDURE — G0439: CPT

## 2022-03-04 PROCEDURE — 93000 ELECTROCARDIOGRAM COMPLETE: CPT

## 2022-03-04 RX ORDER — NITROFURANTOIN MACROCRYSTALS 100 MG/1
100 CAPSULE ORAL
Qty: 14 | Refills: 0 | Status: DISCONTINUED | COMMUNITY
Start: 2021-06-25 | End: 2022-03-04

## 2022-03-04 NOTE — HEALTH RISK ASSESSMENT
[Good] : ~his/her~  mood as  good [Former] : Former [No] : In the past 12 months have you used drugs other than those required for medical reasons? No [No falls in past year] : Patient reported no falls in the past year [0] : 2) Feeling down, depressed, or hopeless: Not at all (0) [PHQ-2 Negative - No further assessment needed] : PHQ-2 Negative - No further assessment needed [None] : None [Alone] : lives alone [Retired] : retired [] :  [Feels Safe at Home] : Feels safe at home [Fully functional (bathing, dressing, toileting, transferring, walking, feeding)] : Fully functional (bathing, dressing, toileting, transferring, walking, feeding) [Fully functional (using the telephone, shopping, preparing meals, housekeeping, doing laundry, using] : Fully functional and needs no help or supervision to perform IADLs (using the telephone, shopping, preparing meals, housekeeping, doing laundry, using transportation, managing medications and managing finances) [Reports normal functional visual acuity (ie: able to read med bottle)] : Reports normal functional visual acuity [Smoke Detector] : smoke detector [Carbon Monoxide Detector] : carbon monoxide detector [Safety elements used in home] : safety elements used in home [Seat Belt] :  uses seat belt [Sunscreen] : uses sunscreen [EMY5Hapox] : 0 [Change in mental status noted] : No change in mental status noted [Language] : denies difficulty with language [Reports changes in hearing] : Reports no changes in hearing [Reports changes in vision] : Reports no changes in vision [Reports changes in dental health] : Reports no changes in dental health [Guns at Home] : no guns at home [Travel to Developing Areas] : does not  travel to developing areas [TB Exposure] : is not being exposed to tuberculosis

## 2022-03-04 NOTE — END OF VISIT
[FreeTextEntry3] : "I, Allyson Rider, personally scribed the services dictated to me by Dr. Bryant Le MD in this documentation on 03/04/2022 " \par \par "I Dr. Bryant Le MD, personally performed the services described in this documentation on 03/04/2022 for the patient as scribed by Allyson Rider in my presence. I have reviewed and verified that all the information is accurate and true."\par

## 2022-03-04 NOTE — HISTORY OF PRESENT ILLNESS
[FreeTextEntry1] : new patient establishing care and annual physical \par  [de-identified] : LUIGI MELARA is a 76 year old F who presents today for new patient establishing care and annual physical. Patient has no new complaints\par

## 2022-03-04 NOTE — PLAN
[FreeTextEntry1] : Continue medications \par Further instructions pending lab results\par Follow up with cardiologist\par declines labs today \par

## 2022-03-04 NOTE — PHYSICAL EXAM
[No Acute Distress] : no acute distress [Well Nourished] : well nourished [Well Developed] : well developed [Well-Appearing] : well-appearing [Normal Voice/Communication] : normal voice/communication [Normal Sclera/Conjunctiva] : normal sclera/conjunctiva [PERRL] : pupils equal round and reactive to light [EOMI] : extraocular movements intact [Normal Outer Ear/Nose] : the outer ears and nose were normal in appearance [Normal TMs] : both tympanic membranes were normal [No JVD] : no jugular venous distention [No Lymphadenopathy] : no lymphadenopathy [Supple] : supple [Thyroid Normal, No Nodules] : the thyroid was normal and there were no nodules present [No Respiratory Distress] : no respiratory distress  [No Accessory Muscle Use] : no accessory muscle use [Clear to Auscultation] : lungs were clear to auscultation bilaterally [Normal Rate] : normal rate  [Regular Rhythm] : with a regular rhythm [Normal S1, S2] : normal S1 and S2 [No Murmur] : no murmur heard [No Carotid Bruits] : no carotid bruits [No Abdominal Bruit] : a ~M bruit was not heard ~T in the abdomen [No Varicosities] : no varicosities [Pedal Pulses Present] : the pedal pulses are present [No Edema] : there was no peripheral edema [No Palpable Aorta] : no palpable aorta [No Extremity Clubbing/Cyanosis] : no extremity clubbing/cyanosis [Soft] : abdomen soft [Non Tender] : non-tender [Non-distended] : non-distended [No Masses] : no abdominal mass palpated [No HSM] : no HSM [Normal Bowel Sounds] : normal bowel sounds [Normal Supraclavicular Nodes] : no supraclavicular lymphadenopathy [Normal Posterior Cervical Nodes] : no posterior cervical lymphadenopathy [Normal Anterior Cervical Nodes] : no anterior cervical lymphadenopathy [No CVA Tenderness] : no CVA  tenderness [No Spinal Tenderness] : no spinal tenderness [No Joint Swelling] : no joint swelling [Grossly Normal Strength/Tone] : grossly normal strength/tone [No Rash] : no rash [Coordination Grossly Intact] : coordination grossly intact [No Focal Deficits] : no focal deficits [Normal Gait] : normal gait [Deep Tendon Reflexes (DTR)] : deep tendon reflexes were 2+ and symmetric [Speech Grossly Normal] : speech grossly normal [Memory Grossly Normal] : memory grossly normal [Normal Affect] : the affect was normal [Alert and Oriented x3] : oriented to person, place, and time [Normal Mood] : the mood was normal [Normal Insight/Judgement] : insight and judgment were intact [de-identified] : scar on chest wall

## 2022-05-10 ENCOUNTER — RX RENEWAL (OUTPATIENT)
Age: 77
End: 2022-05-10

## 2022-05-15 ENCOUNTER — RX RENEWAL (OUTPATIENT)
Age: 77
End: 2022-05-15

## 2022-06-11 ENCOUNTER — RX RENEWAL (OUTPATIENT)
Age: 77
End: 2022-06-11

## 2022-06-13 NOTE — ED ADULT TRIAGE NOTE - AS O2 DELIVERY
room air Quality 226: Preventive Care And Screening: Tobacco Use: Screening And Cessation Intervention: Patient screened for tobacco use and is an ex/non-smoker

## 2022-09-04 NOTE — PHYSICAL THERAPY INITIAL EVALUATION ADULT - BRACE/ORTHOTICS
pt with posterior lean during walk requiring modA to correct due to pt refusal to amb further (pt stating that she will 'faint' in order to return back to bed)
no discharge, no irritation, no pain, no redness, and no visual changes.

## 2022-09-06 NOTE — ED PROVIDER NOTE - NS ED MD DISPO SPECIAL CONSIDERATION1
What Type Of Note Output Would You Prefer (Optional)?: Bullet Format Hpi Title: Evaluation of Skin Lesions How Severe Are Your Spot(S)?: mild Have Your Spot(S) Been Treated In The Past?: has not been treated Additional History: Declines chaperone.\\n\\nFBE.\\n\\nPt Concerns: Low Suspicion of COVID-19

## 2022-09-08 ENCOUNTER — RX RENEWAL (OUTPATIENT)
Age: 77
End: 2022-09-08

## 2022-09-09 NOTE — CHART NOTE - NSCHARTNOTEFT_GEN_A_CORE
Rt IJ TLC d/c'ed via aseptic technique. Pt placed supine/flat. Line d/c'ed in entirety. site without redness, infiltration, drainage, tenderness or pain. Pt tolerated procedure well. direct pressure maintained x 7 minutes, dry sterile dressing applied dry, intact, no bleeding and no hematoma.

## 2022-09-19 NOTE — PATIENT PROFILE ADULT - PATIENT REPRESENTATIVE: ( YOU CAN CHOOSE ANY PERSON THAT CAN ASSIST YOU WITH YOUR HEALTH CARE PREFERENCES, DOES NOT HAVE TO BE A SPOUSE, IMMEDIATE FAMILY OR SIGNIFICANT OTHER/PARTNER)
Quality 226: Preventive Care And Screening: Tobacco Use: Screening And Cessation Intervention: Patient screened for tobacco use and is an ex/non-smoker yes

## 2022-10-08 ENCOUNTER — RX RENEWAL (OUTPATIENT)
Age: 77
End: 2022-10-08

## 2022-10-13 PROBLEM — I51.9 SEVERE LEFT VENTRICULAR SYSTOLIC DYSFUNCTION: Status: ACTIVE | Noted: 2018-01-12

## 2022-10-16 NOTE — PROGRESS NOTE ADULT - PROBLEM SELECTOR PLAN 3
4. h/o fall - s/p fall on 5/10 2/2 AMS from DKA  - Currently A&Ox3  - CT head/spine showed no fractures or bleeding  - Shoulder X-ray showed no fractures  - patient c/o b/l shoulder pain since fall  - lidocaine patch for shoulder pain - reports helpipng
4. h/o fall - s/p fall on 5/10 2/2 AMS from DKA  - Currently A&Ox3  - CT head/spine showed no fractures or bleeding  - Shoulder X-ray showed no fractures  - patient c/o b/l shoulder pain since fall  - lidocaine patch for shoulder pain
R humerus fx  -ortho consulted, reccs appreciated  -sling, f/u outpt
R humerus fx  -ortho consulted, reccs appreciated  -sling, f/u outpt
R humerus fx  - sling, f/u outpt  - ortho consulted, reccs appreciated
4. h/o fall - s/p fall on 5/10 2/2 AMS from DKA  - Currently A&Ox3  - CT head/spine showed no fractures or bleeding  - Shoulder X-ray showed no fractures  - patient c/o b/l shoulder pain since fall  - lidocaine patch for shoulder pain
4. h/o fall - s/p fall on 5/10 2/2 AMS from DKA  - Currently A&Ox3  - CT head/spine showed no fractures or bleeding  - Shoulder X-ray showed no fractures  - patient c/o b/l shoulder pain since fall  - Start lidocaine patch for shoulder pain
UC grew >100k E. coli concerning for urosepsis  - UC 5/10 grew E. coli  - BC 5/10 grew Staph. epi in one bottle only  - BC 5/12 NGTD  - s/p vancomycin IV 1250mg (5/10-5/13) x2 doses  - s/p ceftriaxone IV 1000mg (5/10-5/13) x3 doses  - s/p PO cipro 500 BID 5/13 - 5/15
Yes

## 2022-10-24 ENCOUNTER — APPOINTMENT (OUTPATIENT)
Dept: INTERNAL MEDICINE | Facility: CLINIC | Age: 77
End: 2022-10-24

## 2022-10-24 VITALS
WEIGHT: 172 LBS | HEART RATE: 70 BPM | SYSTOLIC BLOOD PRESSURE: 130 MMHG | HEIGHT: 67 IN | TEMPERATURE: 96.4 F | OXYGEN SATURATION: 97 % | RESPIRATION RATE: 14 BRPM | BODY MASS INDEX: 27 KG/M2 | DIASTOLIC BLOOD PRESSURE: 70 MMHG

## 2022-10-24 DIAGNOSIS — R73.9 HYPERGLYCEMIA, UNSPECIFIED: ICD-10-CM

## 2022-10-24 DIAGNOSIS — Z23 ENCOUNTER FOR IMMUNIZATION: ICD-10-CM

## 2022-10-24 PROCEDURE — 99214 OFFICE O/P EST MOD 30 MIN: CPT | Mod: 25

## 2022-10-24 PROCEDURE — G0008: CPT

## 2022-10-24 PROCEDURE — 90662 IIV NO PRSV INCREASED AG IM: CPT

## 2022-10-24 RX ORDER — ALPRAZOLAM 0.5 MG/1
0.5 TABLET ORAL
Qty: 30 | Refills: 0 | Status: ACTIVE | COMMUNITY
Start: 1900-01-01 | End: 1900-01-01

## 2022-10-24 NOTE — HEALTH RISK ASSESSMENT
[Former] : Former [20 or more] : 20 or more [No] : In the past 12 months have you used drugs other than those required for medical reasons? No [No falls in past year] : Patient reported no falls in the past year [1] : 1) Little interest or pleasure doing things for several days (1) [0] : 2) Feeling down, depressed, or hopeless: Not at all (0) [PHQ-2 Negative - No further assessment needed] : PHQ-2 Negative - No further assessment needed [YearQuit] : 2017 [[id number]] : 1

## 2022-10-24 NOTE — PLAN
[FreeTextEntry1] : Continue medications\par Advised to lose weight\par \par Further instructions pending lab results

## 2022-10-24 NOTE — HISTORY OF PRESENT ILLNESS
[FreeTextEntry1] : follow up [de-identified] : LUIGI SARITHA is a 77 year old F who presents today for DM and cholesterol HTN

## 2022-10-26 LAB
25(OH)D3 SERPL-MCNC: 23.9 NG/ML
ALBUMIN SERPL ELPH-MCNC: 3.8 G/DL
ALP BLD-CCNC: 304 U/L
ALT SERPL-CCNC: 76 U/L
ANION GAP SERPL CALC-SCNC: 12 MMOL/L
AST SERPL-CCNC: 70 U/L
BASOPHILS # BLD AUTO: 0.04 K/UL
BASOPHILS NFR BLD AUTO: 0.6 %
BILIRUB SERPL-MCNC: 0.3 MG/DL
BUN SERPL-MCNC: 35 MG/DL
CALCIUM SERPL-MCNC: 9.1 MG/DL
CHLORIDE SERPL-SCNC: 105 MMOL/L
CHOLEST SERPL-MCNC: 167 MG/DL
CK SERPL-CCNC: 27 U/L
CO2 SERPL-SCNC: 22 MMOL/L
CREAT SERPL-MCNC: 1.11 MG/DL
EGFR: 51 ML/MIN/1.73M2
EOSINOPHIL # BLD AUTO: 0.33 K/UL
EOSINOPHIL NFR BLD AUTO: 4.6 %
ESTIMATED AVERAGE GLUCOSE: 120 MG/DL
GLUCOSE SERPL-MCNC: 84 MG/DL
HBA1C MFR BLD HPLC: 5.8 %
HCT VFR BLD CALC: 34.9 %
HDLC SERPL-MCNC: 58 MG/DL
HGB BLD-MCNC: 11 G/DL
IMM GRANULOCYTES NFR BLD AUTO: 0.1 %
LDLC SERPL CALC-MCNC: 74 MG/DL
LYMPHOCYTES # BLD AUTO: 2.02 K/UL
LYMPHOCYTES NFR BLD AUTO: 28.2 %
MAN DIFF?: NORMAL
MCHC RBC-ENTMCNC: 29.2 PG
MCHC RBC-ENTMCNC: 31.5 GM/DL
MCV RBC AUTO: 92.6 FL
MONOCYTES # BLD AUTO: 0.61 K/UL
MONOCYTES NFR BLD AUTO: 8.5 %
NEUTROPHILS # BLD AUTO: 4.16 K/UL
NEUTROPHILS NFR BLD AUTO: 58 %
NONHDLC SERPL-MCNC: 109 MG/DL
PLATELET # BLD AUTO: 332 K/UL
POTASSIUM SERPL-SCNC: 5.7 MMOL/L
PROT SERPL-MCNC: 8.7 G/DL
RBC # BLD: 3.77 M/UL
RBC # FLD: 14.3 %
SODIUM SERPL-SCNC: 139 MMOL/L
TRIGL SERPL-MCNC: 176 MG/DL
TSH SERPL-ACNC: 2.05 UIU/ML
WBC # FLD AUTO: 7.17 K/UL

## 2022-10-26 RX ORDER — MULTIVIT-MIN/FOLIC/VIT K/LYCOP 400-300MCG
25 MCG TABLET ORAL
Qty: 100 | Refills: 1 | Status: ACTIVE | COMMUNITY

## 2023-01-22 ENCOUNTER — RX RENEWAL (OUTPATIENT)
Age: 78
End: 2023-01-22

## 2023-03-13 NOTE — ED ADULT TRIAGE NOTE - CHIEF COMPLAINT QUOTE
Called and spoke with Yanelis.  She reports she was having pain in the wrist and she had a spot that was reddened and crusty and a few days later a stitch popped up.  She also reports that the 2 trigger fingers she had done did not work.  I explained that she definitely should come in to have Dr Colin evaluate the trigger fingers and then we can look at the stitch.  She agreed and appointment scheduled.   pt c/o headpain for 1 day pt with recent stroke

## 2023-03-30 ENCOUNTER — RX RENEWAL (OUTPATIENT)
Age: 78
End: 2023-03-30

## 2023-04-06 ENCOUNTER — RX RENEWAL (OUTPATIENT)
Age: 78
End: 2023-04-06

## 2023-04-06 RX ORDER — METOCLOPRAMIDE 5 MG/1
5 TABLET ORAL
Qty: 360 | Refills: 0 | Status: ACTIVE | COMMUNITY
Start: 2021-06-09 | End: 1900-01-01

## 2023-05-03 NOTE — ED PROVIDER NOTE - PROGRESS NOTE
Call 911 for stroke/Need for follow up after discharge/Prescribed medications/Risk factors for stroke/Stroke education booklet/Stroke support groups for patients, families, and friends/Stroke warning signs and symptoms/Signs and symptoms of stroke
Stable.

## 2023-06-13 ENCOUNTER — APPOINTMENT (OUTPATIENT)
Dept: INTERNAL MEDICINE | Facility: CLINIC | Age: 78
End: 2023-06-13
Payer: MEDICARE

## 2023-06-13 DIAGNOSIS — R35.0 FREQUENCY OF MICTURITION: ICD-10-CM

## 2023-06-13 DIAGNOSIS — R42 DIZZINESS AND GIDDINESS: ICD-10-CM

## 2023-06-13 PROCEDURE — 99214 OFFICE O/P EST MOD 30 MIN: CPT

## 2023-06-13 RX ORDER — MECLIZINE HYDROCHLORIDE 12.5 MG/1
12.5 TABLET ORAL
Qty: 20 | Refills: 0 | Status: ACTIVE | COMMUNITY
Start: 2023-06-13 | End: 1900-01-01

## 2023-06-13 NOTE — REVIEW OF SYSTEMS
[Vision Problems] : vision problems [Headache] : headache [Dizziness] : dizziness [Negative] : Heme/Lymph [Frequency] : frequency

## 2023-06-13 NOTE — PLAN
[FreeTextEntry1] : Sent for MRI of head \par continue medications \par Further instructions pending lab results \par start Meclizine as needed BID

## 2023-06-13 NOTE — HISTORY OF PRESENT ILLNESS
[FreeTextEntry8] : LUIGI MELARA is a 78 year old F who presents today for an acute visit for headache, nausea, dizziness and feeling light headed for 1 week. Pt has hx of DM, HLD and CAD. Pt had blurry vision starting 2 weeks ago

## 2023-06-13 NOTE — END OF VISIT
[FreeTextEntry3] : "I, Onelia Peralta, personally scribed the services dictated to me by Dr. Bryant Le MD in this documentation on 06/13/2023 " \par \par "I Dr. Bryant Le MD, personally performed the services described in this documentation on 06/13/2023 for the patient as scribed by Onelia Peralta in my presence. I have reviewed and verified that all the information is accurate and true."

## 2023-06-13 NOTE — HEALTH RISK ASSESSMENT
[Never (0 pts)] : Never (0 points) [No] : In the past 12 months have you used drugs other than those required for medical reasons? No [No falls in past year] : Patient reported no falls in the past year [Assistive Device] : Patient uses an assistive device [0] : 2) Feeling down, depressed, or hopeless: Not at all (0) [PHQ-2 Negative - No further assessment needed] : PHQ-2 Negative - No further assessment needed [Never] : Never [de-identified] : walking cane [[id number]] : 0

## 2023-06-13 NOTE — PHYSICAL EXAM
[No Acute Distress] : no acute distress [Well Nourished] : well nourished [Well Developed] : well developed [Well-Appearing] : well-appearing [Normal Voice/Communication] : normal voice/communication [Normal Sclera/Conjunctiva] : normal sclera/conjunctiva [PERRL] : pupils equal round and reactive to light [EOMI] : extraocular movements intact [Normal Outer Ear/Nose] : the outer ears and nose were normal in appearance [Normal Oropharynx] : the oropharynx was normal [Normal TMs] : both tympanic membranes were normal [No JVD] : no jugular venous distention [No Lymphadenopathy] : no lymphadenopathy [Supple] : supple [Thyroid Normal, No Nodules] : the thyroid was normal and there were no nodules present [No Respiratory Distress] : no respiratory distress  [No Accessory Muscle Use] : no accessory muscle use [Clear to Auscultation] : lungs were clear to auscultation bilaterally [Normal Rate] : normal rate  [Regular Rhythm] : with a regular rhythm [Normal S1, S2] : normal S1 and S2 [No Murmur] : no murmur heard [No Carotid Bruits] : no carotid bruits [No Abdominal Bruit] : a ~M bruit was not heard ~T in the abdomen [No Varicosities] : no varicosities [Pedal Pulses Present] : the pedal pulses are present [No Edema] : there was no peripheral edema [No Palpable Aorta] : no palpable aorta [No Extremity Clubbing/Cyanosis] : no extremity clubbing/cyanosis [Soft] : abdomen soft [Non Tender] : non-tender [Non-distended] : non-distended [No Masses] : no abdominal mass palpated [No HSM] : no HSM [Normal Bowel Sounds] : normal bowel sounds [Normal Supraclavicular Nodes] : no supraclavicular lymphadenopathy [Normal Posterior Cervical Nodes] : no posterior cervical lymphadenopathy [Normal Anterior Cervical Nodes] : no anterior cervical lymphadenopathy [No CVA Tenderness] : no CVA  tenderness [No Spinal Tenderness] : no spinal tenderness [No Joint Swelling] : no joint swelling [Grossly Normal Strength/Tone] : grossly normal strength/tone [No Rash] : no rash [Coordination Grossly Intact] : coordination grossly intact [No Focal Deficits] : no focal deficits [Normal Gait] : normal gait [Deep Tendon Reflexes (DTR)] : deep tendon reflexes were 2+ and symmetric [Speech Grossly Normal] : speech grossly normal [Memory Grossly Normal] : memory grossly normal [Normal Affect] : the affect was normal [Alert and Oriented x3] : oriented to person, place, and time [Normal Mood] : the mood was normal [Normal Insight/Judgement] : insight and judgment were intact [Normal Axillary Nodes] : no axillary lymphadenopathy [de-identified] : no nystagmus, no ptosis

## 2023-06-15 LAB
ALBUMIN SERPL ELPH-MCNC: 4.4 G/DL
ALP BLD-CCNC: 236 U/L
ALT SERPL-CCNC: 43 U/L
ANION GAP SERPL CALC-SCNC: 13 MMOL/L
APPEARANCE: CLEAR
AST SERPL-CCNC: 49 U/L
BACTERIA: ABNORMAL /HPF
BILIRUB SERPL-MCNC: 0.3 MG/DL
BILIRUBIN URINE: NEGATIVE
BLOOD URINE: ABNORMAL
BUN SERPL-MCNC: 32 MG/DL
CALCIUM SERPL-MCNC: 9.4 MG/DL
CHLORIDE SERPL-SCNC: 102 MMOL/L
CHOLEST SERPL-MCNC: 151 MG/DL
CK SERPL-CCNC: 22 U/L
CO2 SERPL-SCNC: 22 MMOL/L
COLOR: YELLOW
CREAT SERPL-MCNC: 1.36 MG/DL
EGFR: 40 ML/MIN/1.73M2
ESTIMATED AVERAGE GLUCOSE: 140 MG/DL
GLUCOSE QUALITATIVE U: NEGATIVE
GLUCOSE SERPL-MCNC: 157 MG/DL
HBA1C MFR BLD HPLC: 6.5 %
HDLC SERPL-MCNC: 59 MG/DL
HYALINE CASTS: NORMAL
KETONES URINE: NEGATIVE
LDLC SERPL CALC-MCNC: 65 MG/DL
LEUKOCYTE ESTERASE URINE: ABNORMAL
MICROSCOPIC-UA: NORMAL
NITRITE URINE: POSITIVE
NONHDLC SERPL-MCNC: 92 MG/DL
PH URINE: 6
POTASSIUM SERPL-SCNC: 5.5 MMOL/L
PROT SERPL-MCNC: 9.1 G/DL
PROTEIN URINE: ABNORMAL
RED BLOOD CELLS URINE: 12 /HPF
SODIUM SERPL-SCNC: 137 MMOL/L
SPECIFIC GRAVITY URINE: >=1.03
SQUAMOUS EPITHELIAL CELLS: 2
TRIGL SERPL-MCNC: 137 MG/DL
TSH SERPL-ACNC: 2.77 UIU/ML
UROBILINOGEN URINE: NORMAL
WHITE BLOOD CELLS URINE: 22 /HPF

## 2023-06-17 LAB — BACTERIA UR CULT: ABNORMAL

## 2023-06-17 RX ORDER — CEPHALEXIN 250 MG/1
250 CAPSULE ORAL 3 TIMES DAILY
Qty: 21 | Refills: 0 | Status: ACTIVE | COMMUNITY
Start: 2023-06-17 | End: 1900-01-01

## 2023-06-27 NOTE — BEHAVIORAL HEALTH ASSESSMENT NOTE - ACTIVATING EVENTS/STRESSORS
[FreeTextEntry1] : After failed PT he needs MRI to assess degree of tear which will affect PT treatment type and duration of time out of play ( to avoid a full tear). Moreover, the experienced PT was also concerned about a stress fracture potentially. The latter less likely. \par \par I Suspect the low back pain is muscular and X rays support my suspicion that there is no spondylolysis or spondylolisthesis. Chart held for X rays.\par \par PT note reviewed.I concur he needs MRI. \par \par I don't think we need further imaging of lower spine at this time. I think the low back pain will respond to stretching and core strengthening.  Acute medical problem/Non-compliant or not receiving treatment

## 2023-07-10 ENCOUNTER — RX RENEWAL (OUTPATIENT)
Age: 78
End: 2023-07-10

## 2023-07-18 NOTE — PATIENT PROFILE ADULT - PRO INTERPRETER NEED 2
English
No. RUT screening performed.  STOP BANG Legend: 0-2 = LOW Risk; 3-4 = INTERMEDIATE Risk; 5-8 = HIGH Risk

## 2023-08-16 ENCOUNTER — RX RENEWAL (OUTPATIENT)
Age: 78
End: 2023-08-16

## 2023-09-19 NOTE — H&P ADULT - NSHPLABSRESULTS_GEN_ALL_CORE
Advised patient of above. Patient verbalized understanding. .  Labs reviewed personally.                          12.1   12.27 )-----------( 279      ( 10 May 2021 17:55 )             35.0     Hgb Trend: 12.1<--  05-10    127<L>  |  92<L>  |  55<H>  ----------------------------<  499<HH>  5.2   |  11<L>  |  1.98<H>    Ca    8.4      10 May 2021 19:44  Phos  5.4     05-10  Mg     2.0     05-10    TPro  7.7  /  Alb  3.3  /  TBili  0.4  /  DBili  x   /  AST  40  /  ALT  28  /  AlkPhos  133<H>  0510    Creatinine Trend: 1.98<--, 2.04<--, 2.23<--    Urinalysis Basic - ( 10 May 2021 17:58 )    Color: Yellow / Appearance: Turbid / S.014 / pH: x  Gluc: x / Ketone: Small  / Bili: Negative / Urobili: Negative   Blood: x / Protein: 300 mg/dL / Nitrite: Negative   Leuk Esterase: Large / RBC: 8 /hpf / WBC >50 /HPF   Sq Epi: x / Non Sq Epi: 0 /hpf / Bacteria: Few        < from: CT Head No Cont (05.10.21 @ 18:58) >    IMPRESSION:    There is no skull fracture, intracranial hemorrhage or mass effect.    There is no cervical spine fracture.                CHRISTOPHER JACKSON M.D., ATTENDING RADIOLOGIST  This document has been electronically signed. May 10 2021  7:19PM    < end of copied text >    < from: Xray Chest 1 View- PORTABLE-Urgent (Xray Chest 1 View- PORTABLE-Urgent .) (05.10.21 @ 17:32) >    INTERPRETATION:  No focal consolidations.    < end of copied text >    < from: Xray Shoulder 2 Views, Bilateral (05.10.21 @ 19:26) >      INTERPRETATION:  No acute fracture or dislocation.    < end of copied text >

## 2023-11-06 NOTE — PROGRESS NOTE ADULT - PROBLEM SELECTOR PLAN 3
- likely due to volume depletion from furosemide and dapagliflozin, corrected Na for glucose 136  - will monitor BMP at 3pm and reassess needs for IVF  -Strict I/Os Isotretinoin Counseling: Patient should get monthly blood tests, not donate blood, not drive at night if vision affected, not share medication, and not undergo elective surgery for 6 months after tx completed. Side effects reviewed, pt to contact office should one occur.

## 2023-11-12 ENCOUNTER — RX RENEWAL (OUTPATIENT)
Age: 78
End: 2023-11-12

## 2023-12-01 ENCOUNTER — RX RENEWAL (OUTPATIENT)
Age: 78
End: 2023-12-01

## 2023-12-16 ENCOUNTER — RX RENEWAL (OUTPATIENT)
Age: 78
End: 2023-12-16

## 2024-03-13 NOTE — PROGRESS NOTE ADULT - PROBLEM SELECTOR PLAN 7
Refill Request     CONFIRM preferred pharmacy with the patient.    If Mail Order Rx - Pend for 90 day refill.      Last Seen: Last Seen Department: 10/19/2023  Last Seen by PCP: 10/19/2023    Last Written: 9/15/2023, #90, 1 refill    If no future appointment scheduled:  Review the last OV with PCP and review information for follow-up visit,  Route STAFF MESSAGE with patient name to the  Pool for scheduling with the following information:            -  Timing of next visit           -  Visit type ie Physical, OV, etc           -  Diagnoses/Reason ie. COPD, HTN - Do not use MEDICATION, Follow-up or CHECK UP - Give reason for visit      Next Appointment:   No future appointments.    Message sent to  to schedule appt with patient?  NO      Requested Prescriptions     Pending Prescriptions Disp Refills    citalopram (CELEXA) 20 MG tablet [Pharmacy Med Name: CITALOPRAM HBR 20 MG TABLET] 90 tablet 1     Sig: TAKE 1 TABLET BY MOUTH EVERY DAY        c/w asa and atorvastatin

## 2024-03-17 ENCOUNTER — RX RENEWAL (OUTPATIENT)
Age: 79
End: 2024-03-17

## 2024-04-24 ENCOUNTER — APPOINTMENT (OUTPATIENT)
Dept: INTERNAL MEDICINE | Facility: CLINIC | Age: 79
End: 2024-04-24
Payer: MEDICARE

## 2024-04-24 ENCOUNTER — NON-APPOINTMENT (OUTPATIENT)
Age: 79
End: 2024-04-24

## 2024-04-24 VITALS
TEMPERATURE: 97.9 F | HEART RATE: 72 BPM | RESPIRATION RATE: 16 BRPM | OXYGEN SATURATION: 98 % | DIASTOLIC BLOOD PRESSURE: 76 MMHG | SYSTOLIC BLOOD PRESSURE: 122 MMHG | HEIGHT: 67 IN | WEIGHT: 185 LBS | BODY MASS INDEX: 29.03 KG/M2

## 2024-04-24 DIAGNOSIS — I10 ESSENTIAL (PRIMARY) HYPERTENSION: ICD-10-CM

## 2024-04-24 PROCEDURE — 93000 ELECTROCARDIOGRAM COMPLETE: CPT

## 2024-04-24 PROCEDURE — 99214 OFFICE O/P EST MOD 30 MIN: CPT | Mod: 25

## 2024-04-24 RX ORDER — REPAGLINIDE 2 MG/1
2 TABLET ORAL 3 TIMES DAILY
Qty: 270 | Refills: 0 | Status: DISCONTINUED | COMMUNITY
Start: 2021-06-09 | End: 2024-04-24

## 2024-04-24 NOTE — PHYSICAL EXAM
[No Acute Distress] : no acute distress [Well Nourished] : well nourished [Well Developed] : well developed [Well-Appearing] : well-appearing [Normal Voice/Communication] : normal voice/communication [Normal Sclera/Conjunctiva] : normal sclera/conjunctiva [PERRL] : pupils equal round and reactive to light [EOMI] : extraocular movements intact [Normal Outer Ear/Nose] : the outer ears and nose were normal in appearance [Normal Oropharynx] : the oropharynx was normal [No JVD] : no jugular venous distention [No Lymphadenopathy] : no lymphadenopathy [Supple] : supple [Thyroid Normal, No Nodules] : the thyroid was normal and there were no nodules present [No Respiratory Distress] : no respiratory distress  [No Accessory Muscle Use] : no accessory muscle use [Clear to Auscultation] : lungs were clear to auscultation bilaterally [Normal Rate] : normal rate  [Regular Rhythm] : with a regular rhythm [Normal S1, S2] : normal S1 and S2 [No Murmur] : no murmur heard [No Carotid Bruits] : no carotid bruits [No Abdominal Bruit] : a ~M bruit was not heard ~T in the abdomen [No Varicosities] : no varicosities [Pedal Pulses Present] : the pedal pulses are present [No Edema] : there was no peripheral edema [No Palpable Aorta] : no palpable aorta [No Extremity Clubbing/Cyanosis] : no extremity clubbing/cyanosis [Soft] : abdomen soft [Non Tender] : non-tender [Non-distended] : non-distended [No Masses] : no abdominal mass palpated [No HSM] : no HSM [Normal Bowel Sounds] : normal bowel sounds [Normal Posterior Cervical Nodes] : no posterior cervical lymphadenopathy [Normal Anterior Cervical Nodes] : no anterior cervical lymphadenopathy [No CVA Tenderness] : no CVA  tenderness [No Spinal Tenderness] : no spinal tenderness [No Joint Swelling] : no joint swelling [Grossly Normal Strength/Tone] : grossly normal strength/tone [No Rash] : no rash [Coordination Grossly Intact] : coordination grossly intact [No Focal Deficits] : no focal deficits [Normal Gait] : normal gait [Deep Tendon Reflexes (DTR)] : deep tendon reflexes were 2+ and symmetric [Speech Grossly Normal] : speech grossly normal details [Memory Grossly Normal] : memory grossly normal [Normal Affect] : the affect was normal [Alert and Oriented x3] : oriented to person, place, and time [Normal Mood] : the mood was normal [Normal Insight/Judgement] : insight and judgment were intact

## 2024-04-24 NOTE — HISTORY OF PRESENT ILLNESS
[Coronary Artery Disease] : coronary artery disease [Asthma] : asthma [Diabetes] : diabetes [Aortic Stenosis] : no aortic stenosis [Atrial Fibrillation] : no atrial fibrillation [Recent Myocardial Infarction] : no recent myocardial infarction [Implantable Device/Pacemaker] : no implantable device/pacemaker [COPD] : no COPD [Sleep Apnea] : no sleep apnea [Smoker] : not a smoker [Chronic Kidney Disease] : no chronic kidney disease [FreeTextEntry1] : Oral Surgery  [FreeTextEntry3] : Prince Lopez [FreeTextEntry4] : LUIGI SARITHA is a 79 year old F who presents today for medical Clearance to have oral Surgery extraction of teeth

## 2024-04-24 NOTE — RESULTS/DATA
[ECG Reviewed] : reviewed [NSR] : normal sinus rhythm [NS ST-T Wave Changes] : there are nonspecific ST-T wave changes [No Interval Change] : no interval change [FreeTextEntry1] : PVCs [FreeTextEntry2] : old Anteroseptal infarct

## 2024-04-25 LAB
25(OH)D3 SERPL-MCNC: 18.9 NG/ML
ALBUMIN SERPL ELPH-MCNC: 4.2 G/DL
ALP BLD-CCNC: 134 U/L
ALT SERPL-CCNC: 26 U/L
ANION GAP SERPL CALC-SCNC: 15 MMOL/L
AST SERPL-CCNC: 24 U/L
BASOPHILS # BLD AUTO: 0.03 K/UL
BASOPHILS NFR BLD AUTO: 0.3 %
BILIRUB SERPL-MCNC: 0.5 MG/DL
BUN SERPL-MCNC: 44 MG/DL
CALCIUM SERPL-MCNC: 9.1 MG/DL
CHLORIDE SERPL-SCNC: 100 MMOL/L
CHOLEST SERPL-MCNC: 232 MG/DL
CK SERPL-CCNC: 36 U/L
CO2 SERPL-SCNC: 23 MMOL/L
CREAT SERPL-MCNC: 1.69 MG/DL
EGFR: 31 ML/MIN/1.73M2
EOSINOPHIL # BLD AUTO: 0.16 K/UL
EOSINOPHIL NFR BLD AUTO: 1.4 %
ESTIMATED AVERAGE GLUCOSE: 200 MG/DL
GLUCOSE SERPL-MCNC: 171 MG/DL
HBA1C MFR BLD HPLC: 8.6 %
HCT VFR BLD CALC: 35.5 %
HDLC SERPL-MCNC: 57 MG/DL
HGB BLD-MCNC: 11.7 G/DL
IMM GRANULOCYTES NFR BLD AUTO: 0.3 %
LDLC SERPL CALC-MCNC: 146 MG/DL
LYMPHOCYTES # BLD AUTO: 2.73 K/UL
LYMPHOCYTES NFR BLD AUTO: 24.4 %
MAN DIFF?: NORMAL
MCHC RBC-ENTMCNC: 30.5 PG
MCHC RBC-ENTMCNC: 33 GM/DL
MCV RBC AUTO: 92.7 FL
MONOCYTES # BLD AUTO: 0.91 K/UL
MONOCYTES NFR BLD AUTO: 8.1 %
NEUTROPHILS # BLD AUTO: 7.34 K/UL
NEUTROPHILS NFR BLD AUTO: 65.5 %
NONHDLC SERPL-MCNC: 175 MG/DL
PLATELET # BLD AUTO: 299 K/UL
POTASSIUM SERPL-SCNC: 4.7 MMOL/L
PROT SERPL-MCNC: 8.8 G/DL
RBC # BLD: 3.83 M/UL
RBC # FLD: 14.2 %
SODIUM SERPL-SCNC: 138 MMOL/L
TRIGL SERPL-MCNC: 167 MG/DL
TSH SERPL-ACNC: 1.56 UIU/ML
WBC # FLD AUTO: 11.2 K/UL

## 2024-04-25 RX ORDER — ATORVASTATIN CALCIUM 40 MG/1
40 TABLET, FILM COATED ORAL
Qty: 90 | Refills: 0 | Status: ACTIVE | COMMUNITY
Start: 2021-06-09 | End: 1900-01-01

## 2024-04-25 RX ORDER — METOPROLOL SUCCINATE 25 MG/1
25 TABLET, EXTENDED RELEASE ORAL
Qty: 90 | Refills: 0 | Status: ACTIVE | COMMUNITY
Start: 2021-06-09 | End: 1900-01-01

## 2024-04-25 RX ORDER — METFORMIN ER 500 MG 500 MG/1
500 TABLET ORAL
Qty: 270 | Refills: 1 | Status: ACTIVE | COMMUNITY
Start: 2021-06-09 | End: 1900-01-01

## 2024-05-01 ENCOUNTER — APPOINTMENT (OUTPATIENT)
Dept: INTERNAL MEDICINE | Facility: CLINIC | Age: 79
End: 2024-05-01
Payer: MEDICARE

## 2024-05-01 VITALS — SYSTOLIC BLOOD PRESSURE: 120 MMHG | DIASTOLIC BLOOD PRESSURE: 60 MMHG

## 2024-05-01 VITALS
DIASTOLIC BLOOD PRESSURE: 54 MMHG | TEMPERATURE: 98.3 F | HEART RATE: 67 BPM | SYSTOLIC BLOOD PRESSURE: 126 MMHG | RESPIRATION RATE: 16 BRPM | HEIGHT: 67 IN | OXYGEN SATURATION: 96 %

## 2024-05-01 VITALS — HEART RATE: 70 BPM

## 2024-05-01 DIAGNOSIS — I10 ESSENTIAL (PRIMARY) HYPERTENSION: ICD-10-CM

## 2024-05-01 DIAGNOSIS — D72.829 ELEVATED WHITE BLOOD CELL COUNT, UNSPECIFIED: ICD-10-CM

## 2024-05-01 DIAGNOSIS — E11.49 TYPE 2 DIABETES MELLITUS WITH OTHER DIABETIC NEUROLOGICAL COMPLICATION: ICD-10-CM

## 2024-05-01 DIAGNOSIS — I25.10 ATHEROSCLEROTIC HEART DISEASE OF NATIVE CORONARY ARTERY W/OUT ANGINA PECTORIS: ICD-10-CM

## 2024-05-01 DIAGNOSIS — E78.5 HYPERLIPIDEMIA, UNSPECIFIED: ICD-10-CM

## 2024-05-01 PROCEDURE — 36415 COLL VENOUS BLD VENIPUNCTURE: CPT

## 2024-05-01 PROCEDURE — G2211 COMPLEX E/M VISIT ADD ON: CPT

## 2024-05-01 PROCEDURE — 99214 OFFICE O/P EST MOD 30 MIN: CPT

## 2024-05-01 NOTE — HEALTH RISK ASSESSMENT
[Never (0 pts)] : Never (0 points) [No] : In the past 12 months have you used drugs other than those required for medical reasons? No [No falls in past year] : Patient reported no falls in the past year [Assistive Device] : Patient uses an assistive device [Little interest or pleasure doing things] : 1) Little interest or pleasure doing things [Feeling down, depressed, or hopeless] : 2) Feeling down, depressed, or hopeless [0] : 2) Feeling down, depressed, or hopeless: Not at all (0) [PHQ-2 Negative - No further assessment needed] : PHQ-2 Negative - No further assessment needed [Former] : Former [< 15 Years] : < 15 Years [de-identified] : cane  [[id number]] : 0

## 2024-05-01 NOTE — HISTORY OF PRESENT ILLNESS
[FreeTextEntry1] : follow up  [de-identified] : LUIGI MELARA is a 79-year-old F who presents today for follow up for HTN, DM and HLD. Pt has a hx of CAD. Pt's recent bloodwork showed that her WBC count and cholesterol were elevated and her kidney function was abnormal. Pt was started on Vitamin D due to having low levels. Pt complains of lightheadedness and nausea.

## 2024-05-01 NOTE — END OF VISIT
[FreeTextEntry3] : "I, Elio Kaiser, personally scribed the services dictated to me by Dr. Bryant Le MD in this documentation on 05/01/2024"   "I Dr. Bryant Le MD, personally performed the services described in this documentation on 05/01/2024 for the patient as scribed by Elio Kaiser in my presence. I have reviewed and verified that all the information is accurate and true."

## 2024-05-01 NOTE — PLAN
[FreeTextEntry1] : continue medications Atorvastatin Metformin Metoprolol  Further instructions pending lab results  Labs discussed WBC cholesterol elevated A1c 8.6 chronic medical conditions HTN HLD DM

## 2024-05-15 LAB
ALBUMIN SERPL ELPH-MCNC: 4.1 G/DL
ALP BLD-CCNC: 153 U/L
ALT SERPL-CCNC: 23 U/L
ANION GAP SERPL CALC-SCNC: 13 MMOL/L
AST SERPL-CCNC: 28 U/L
BASOPHILS # BLD AUTO: 0.04 K/UL
BASOPHILS NFR BLD AUTO: 0.5 %
BILIRUB SERPL-MCNC: 0.4 MG/DL
BUN SERPL-MCNC: 37 MG/DL
CALCIUM SERPL-MCNC: 9.1 MG/DL
CHLORIDE SERPL-SCNC: 104 MMOL/L
CO2 SERPL-SCNC: 21 MMOL/L
CREAT SERPL-MCNC: 1.39 MG/DL
EGFR: 39 ML/MIN/1.73M2
EOSINOPHIL # BLD AUTO: 0.15 K/UL
EOSINOPHIL NFR BLD AUTO: 1.8 %
GLUCOSE SERPL-MCNC: 113 MG/DL
HCT VFR BLD CALC: 33.4 %
HGB BLD-MCNC: 11.2 G/DL
IMM GRANULOCYTES NFR BLD AUTO: 0.6 %
LYMPHOCYTES # BLD AUTO: 2.25 K/UL
LYMPHOCYTES NFR BLD AUTO: 27.2 %
MAN DIFF?: NORMAL
MCHC RBC-ENTMCNC: 30.6 PG
MCHC RBC-ENTMCNC: 33.5 GM/DL
MCV RBC AUTO: 91.3 FL
MONOCYTES # BLD AUTO: 0.71 K/UL
MONOCYTES NFR BLD AUTO: 8.6 %
NEUTROPHILS # BLD AUTO: 5.08 K/UL
NEUTROPHILS NFR BLD AUTO: 61.3 %
PLATELET # BLD AUTO: 333 K/UL
POTASSIUM SERPL-SCNC: 5.2 MMOL/L
PROT SERPL-MCNC: 8.4 G/DL
RBC # BLD: 3.66 M/UL
RBC # FLD: 13.9 %
SODIUM SERPL-SCNC: 139 MMOL/L
WBC # FLD AUTO: 8.28 K/UL

## 2024-06-23 ENCOUNTER — RX RENEWAL (OUTPATIENT)
Age: 79
End: 2024-06-23

## 2024-06-23 RX ORDER — FUROSEMIDE 20 MG/1
20 TABLET ORAL
Qty: 30 | Refills: 1 | Status: ACTIVE | COMMUNITY
Start: 2017-11-27 | End: 1900-01-01

## 2024-06-24 ENCOUNTER — RX RENEWAL (OUTPATIENT)
Age: 79
End: 2024-06-24

## 2024-07-23 ENCOUNTER — RX RENEWAL (OUTPATIENT)
Age: 79
End: 2024-07-23

## 2024-08-02 ENCOUNTER — RX RENEWAL (OUTPATIENT)
Age: 79
End: 2024-08-02

## 2024-08-07 ENCOUNTER — RX RENEWAL (OUTPATIENT)
Age: 79
End: 2024-08-07

## 2024-09-23 NOTE — OCCUPATIONAL THERAPY INITIAL EVALUATION ADULT - PHYSICAL ASSIST/NONPHYSICAL ASSIST:DRESS LOWER BODY, OT EVAL
Patient here today for nurse blood pressure check.  Last dose of BP medication taken today at 0945.    BP Readings from Last 1 Encounters:   09/23/24 1126 (!) 144/88   09/23/24 1119 (!) 148/90     Last visit for this condition: 8/7/2024  Last visit BP Reading:  Elevated at OB/GYN visit on 9/5/2024- 152/80  Per that visit plan of care: Return for nurse BP check  Next visit with PCP scheduled for: 8/12/2025  Current blood pressure medications are: Lisinopril 20 mg daily    Please review and advise on plan of care.    Pulse Readings from Last 1 Encounters:   09/23/24 84      Today she did have 2 cups of regular coffee when normally she only has 1 cup.    She does not follow a low salt diet however does not usually add salt to her foods.  She is active at her job but does not follow a regular exercise routine.    Denies any headaches, dizziness, lightheadedness, chest pains or vision concerns.    She does bring in her home BP cuff.  Reading is 137/92 and heart rate is 84.    She feels like her BP may be high when in clinic.  Encouraged her to check her BP at home and report them back to clinic in the next few days.      She does have upcoming pre op visits as well.      She is agreeable to adjustments in medication if needed/ warranted.    Please advise.   verbal cues/nonverbal cues (demo/gestures)/1 person assist

## 2024-09-24 NOTE — PROGRESS NOTE ADULT - ASSESSMENT
Depression (without Suicidality or Psychosis)/Anxiety (includes Panic, OCD) 76 F with history of T2DM, HTN, CABG years ago on aspirin 325, systolic HF and recurrent UTIs BIBEMS for being found on the kitchen floor in her home with elevated BG. In ED patient was found to be hypotensive requiring low dose levophed during volume resuscitation, AG 25, HCO3 13, BHB 5.4, ph 7.26, serum glucose 531. Patient admitted to ICU for sepsis secondary to UTI requiring levophed and DKA requiring insulin gtt. Endocrine consult requested for management of DKA and uncontrolled DM2. Questionable adherence to medication as A1c elevated to >15.5 (high risk patient with DKA and severely uncontrolled diabetes with A1c >15% at high risk of CAD and CVA with high level decision-making).      DKA in setting on SGLT2i  Uncontrolled DM2  A1c >15.5%   Recommend increase Lantus to 34units qhs   Recommend increase Admelog to 10 units TIDac  Recommend mod correctional scale premeal and qhs     Patient will need to be discharged on basal/bolus insulin, dose to be determined. will consider metformin on discharge if GFR improves with treatment of UTI. Will discontinue Farxiga given DKA on admission   Patient will need to follow up with Endocrine on discharge: Can follow up with Dr Ramírez Urbano     HTN   goal bp <130/80  At goal, continue current management     HLD   Consider fasting lipid panel   Given patient age and uncontrolled DM2, patient would benefit from statin  (home meds state patient was on Atorvastatin 10mg daily)        Ken Youngblood MD  Endocrine Fellow   Pager  on weekdays 9am- 5pm   Please call  after hours and on weekends 76 F with history of T2DM, HTN, CABG years ago on aspirin 325, systolic HF and recurrent UTIs BIBEMS for being found on the kitchen floor in her home with elevated BG. In ED patient was found to be hypotensive requiring low dose levophed during volume resuscitation, AG 25, HCO3 13, BHB 5.4, ph 7.26, serum glucose 531. Patient admitted to ICU for sepsis secondary to UTI requiring levophed and DKA requiring insulin gtt. Endocrine consult requested for management of DKA and uncontrolled DM2. Questionable adherence to medication as A1c elevated to >15.5 (high risk patient with DKA and severely uncontrolled diabetes with A1c >15% at high risk of CAD and CVA with high level decision-making).      DKA in setting on SGLT2i  Uncontrolled DM2  A1c >15.5%   Recommend increase Lantus to 34units qhs   Recommend increase Admelog to 10 units TIDac  Recommend mod correctional scale premeal and qhs     Recommend discharge on basal/bolus insulin, however patient currently only agreeable to once daily basal insulin + PO medication. Will discuss further with patient tmw.  Will consider metformin on discharge if GFR improves with treatment of UTI. Will discontinue Farxiga given DKA on admission   Patient will need to follow up with Endocrine on discharge: Can follow up with Dr Ramírez Urbano     HTN   goal bp <130/80  At goal, continue current management     HLD   Consider fasting lipid panel   Given patient age and uncontrolled DM2, patient would benefit from statin  (home meds state patient was on Atorvastatin 10mg daily)    Discussed with Dr Yue Youngblood MD  Endocrine Fellow   Pager  on weekdays 9am- 5pm   Please call  after hours and on weekends 76 F with history of T2DM, HTN, CABG years ago on aspirin 325, systolic HF and recurrent UTIs BIBEMS for being found on the kitchen floor in her home with elevated BG. In ED patient was found to be hypotensive requiring low dose levophed during volume resuscitation, AG 25, HCO3 13, BHB 5.4, ph 7.26, serum glucose 531. Patient admitted to ICU for sepsis secondary to UTI requiring levophed and DKA requiring insulin gtt. Endocrine consult requested for management of DKA and uncontrolled DM2. Questionable adherence to medication as A1c elevated to >15.5 (high risk patient with DKA and severely uncontrolled diabetes with A1c >15% at high risk of CAD and CVA with high level decision-making).      DKA in setting on SGLT2i  Uncontrolled DM2  A1c >15.5%   Recommend increase Lantus to 34units qhs   Recommend increase Admelog to 11 units TIDac  Recommend mod correctional scale premeal and qhs     Recommend discharge on basal/bolus insulin, however patient currently only agreeable to once daily basal insulin + PO medication. Will discuss further with patient tmw.  Will consider metformin on discharge if GFR improves with treatment of UTI. Will discontinue Farxiga given DKA on admission   Patient will need to follow up with Endocrine on discharge: Can follow up with Dr Ramírez Urbano     HTN   goal bp <130/80  At goal, continue current management     HLD   Consider fasting lipid panel   Given patient age and uncontrolled DM2, patient would benefit from statin  (home meds state patient was on Atorvastatin 10mg daily)    Discussed with Dr Yue Youngblood MD  Endocrine Fellow   Pager  on weekdays 9am- 5pm   Please call  after hours and on weekends

## 2024-10-07 ENCOUNTER — APPOINTMENT (OUTPATIENT)
Dept: CARDIOLOGY | Facility: CLINIC | Age: 79
End: 2024-10-07
Payer: MEDICARE

## 2024-10-07 VITALS
BODY MASS INDEX: 28.72 KG/M2 | HEIGHT: 67 IN | HEART RATE: 82 BPM | SYSTOLIC BLOOD PRESSURE: 144 MMHG | DIASTOLIC BLOOD PRESSURE: 83 MMHG | OXYGEN SATURATION: 94 % | WEIGHT: 183 LBS

## 2024-10-07 DIAGNOSIS — I25.10 ATHEROSCLEROTIC HEART DISEASE OF NATIVE CORONARY ARTERY W/OUT ANGINA PECTORIS: ICD-10-CM

## 2024-10-07 DIAGNOSIS — I50.9 HEART FAILURE, UNSPECIFIED: ICD-10-CM

## 2024-10-07 DIAGNOSIS — I10 ESSENTIAL (PRIMARY) HYPERTENSION: ICD-10-CM

## 2024-10-07 PROCEDURE — G2211 COMPLEX E/M VISIT ADD ON: CPT

## 2024-10-07 PROCEDURE — 93000 ELECTROCARDIOGRAM COMPLETE: CPT

## 2024-10-07 PROCEDURE — 99215 OFFICE O/P EST HI 40 MIN: CPT

## 2024-10-07 PROCEDURE — 99205 OFFICE O/P NEW HI 60 MIN: CPT

## 2024-10-15 NOTE — BEHAVIORAL HEALTH ASSESSMENT NOTE - EMPLOYMENT
[Follow Up] : a follow up visit [Patient] : patient [Mother] : mother [FreeTextEntry1] : L distal radius fracture, sustained on 8/17/24 Retired

## 2024-10-17 ENCOUNTER — RX RENEWAL (OUTPATIENT)
Age: 79
End: 2024-10-17

## 2024-10-21 ENCOUNTER — APPOINTMENT (OUTPATIENT)
Dept: CARDIOLOGY | Facility: CLINIC | Age: 79
End: 2024-10-21
Payer: MEDICARE

## 2024-10-21 PROCEDURE — 78452 HT MUSCLE IMAGE SPECT MULT: CPT

## 2024-10-21 PROCEDURE — 93306 TTE W/DOPPLER COMPLETE: CPT

## 2024-10-21 PROCEDURE — 93015 CV STRESS TEST SUPVJ I&R: CPT

## 2024-10-21 PROCEDURE — A9500: CPT

## 2024-10-22 ENCOUNTER — NON-APPOINTMENT (OUTPATIENT)
Age: 79
End: 2024-10-22

## 2025-01-21 NOTE — PROGRESS NOTE ADULT - ASSESSMENT
On 2025, Gay KENNEY NP scribed the services personally performed by Opal Zee MD    Patient:  Eugenia Gaming  :  1952  PCP:  Tushar Villarreal MD  Date of visit: 2025    CHIEF COMPLAINT:   Chief Complaint   Patient presents with    Office Visit     New patient        HISTORY OF PRESENT ILLNESS:  Eugenia Gaming is a 72 year old female who is a new patient who presents for consultation regarding osteoporosis. She was referred by Dr. Errol Cabello.    Was previously seen by Dr Jose Faria.  She reports a decrease in height from 5 feet 4 inches to approximately 5 feet 2.5 inches, although she is uncertain of the exact timeline. She experienced a fracture in her left humerus-upper arm on 2023, resulting from an accidental collision with an employee at [employer] restaurant. The following day, she underwent surgery at [place], where a andreas and screws were inserted into her humerus. Despite extensive physical therapy, she continues to experience pain in her arm. An initial consultation with Dr. Michael, who performed the surgery, led to further therapy recommendations.   However, the pain persisted, prompting her to seek a second opinion from Dr. Connor, an orthopedic specialist. He advised against additional surgery due to the proximity of the fracture to a major nerve. Instead, he recommended an ultrasound and MRI, which revealed that the bone was not healing properly. She was then referred to Dr. Cabello, a trauma specialist, who suggested a noninvasive treatment involving an Exogen machine. She has completed 10 treatments with this machine, which uses ultrasound to stimulate bone growth. She has a follow-up appointment scheduled with Dr. Cabello in 2025.     She was also advised to consult an endocrinologist to investigate potential issues with bone growth. She has a history of short-term steroid use.  She has not taken pioglitazone or any antiseizure  medications. She does not smoke or consume excessive alcohol. She is on Cymbalta, which is an antidepressant and omeprazole for reflux. She is also on famotidine.  Prior to menopause, she had regular menstrual cycles and went through menopause around the age of 45. Since her arm fracture, she has been performing chair exercises at home but has stopped using her arm due to pain. She does not remain on her feet for more than 30 minutes at a time and considers herself sedentary. Her last bone density test was conducted on 06/14/2023. She received Reclast on 04/04/2024 and had been taking alendronate from December 2021 through February 2024. She is currently taking calcium supplements 315 mg twice daily and vitamin D2 1000 international units daily.    Most recent bone mineral density was done on 6/14/2023.  Findings:  Lumbar spine: (L1-L4): 1.012 g/cm2, T-score:  -1.4 Lumbar % Change: 17.8, g/cm2 Change: 0.153  Lowest femoral: Total Right-0.726 g/cm2, T-score: -2.2   Left Radius 33%; 0.487, T-score: -3.1        REVIEW OF SYSTEMS:  Constitutional: Negative for fever, chills, appetite change, fatigue.  Skin: Negative for rash or wounds.   HEENT: Negative for eye drainage, rhinorrhea, ear pain, sore throat or neck pain.  Respiratory: Negative cough, wheezing or shortness of breath.    Cardiovascular: Negative for chest pain, chest pressure, palpitations, diaphoresis or edema.   Gastrointestinal: Negative for nausea, vomiting, diarrhea, abdominal pain, black or tarry stools.  Genitourinary: Negative for dysuria, urgency, frequency, hematuria or flank pain.  Extremities:  Negative for joint swelling or joint pain.  Neuro:  Negative for change in sensory or motor function.  Negative for headache, migraine aura.  No change in gait.  No history of vertigo.  No change in vision or speech.  Endocrine: Negative for heat or cold intolerance, polydipsia, weight loss or gain.  Hematological: Negative for bleeding, brusing or  72 year old woman with a history of tobacco use, DM, HTN, poor historian presents for dyspnea and NSTEMI. Initially she was found to be in heart failure, and is s/p IV Lasix.     - she appears euvolemic on exam; would keep net even today  - She is s/p Cath with significant two vessel disease.   - For CABG this afternoon  - Continue with ASA  - Continue with lopressor 25 mg po bid, monitor for NSVT  - Continue with Statin  - did not tolerate ACE-inhibitor, as she developed hypotension  - will follow post operatively in the CTU lymphadenopathy.  Psych: Negative for change in affect, anxiety, depression, mentation or sleep disturbance.    All other systems reviewed and otherwise negative.    HISTORIES:  Past Medical History:   Diagnosis Date    Allergic rhinitis     Blocked tear duct     Breast lump in female     Bronchial asthma (CMD)     Chronic kidney disease     CKD2    Chronic pain     Colon polyps     Diastolic dysfunction     Difficulty in walking(719.7)     Disorder of bone and cartilage, unspecified 10/06/2009    Dry eye syndrome     Dyslipidemia     Foot pain     GERD (gastroesophageal reflux disease)     H/O  section     x 2    HTN (hypertension)     Lower extremity edema     Multiple thyroid nodules     nodules    Obesity     Ovarian cystadenoma     Plantar fasciitis     PMB (postmenopausal bleeding)     Rash     Sinusitis, chronic     Type 2 diabetes mellitus with hyperglycemia  (CMD)     Urinary tract infection     Vaginal itching     Vitamin B12 deficiency     Vitamin D deficiency      Past Surgical History:   Procedure Laterality Date    Biopsy of thyroid,percut      Thyroid nodule biopsy     section, classic      x 2    Cholecystectomy      Colonoscopy      With polypectomy    Cyst removal Right     Right knee    Dexa bone density axial skeleton  10/06/2009    Eye surgery Bilateral     to straighten vision, lazy eyes    Hysteroscopy,bio endo/polyptmy Left 2017    Ovarian cyst removal      Removal gallbladder  1992    Sinus surgery proc unlisted  2012    enlarge breathing area    Skin biopsy      Tear duct surgery      stents placed for several months then removed    Tonsillectomy and adenoidectomy  196    Upper gi endoscopy,exam       Family History   Problem Relation Age of Onset    Heart disease Mother     Osteoporosis Mother     Hypertension Father     Osteoporosis Sister     Heart disease Sister     Substance Abuse Brother         alcoholism and heavy smoker    Cancer, Ovarian  Maternal Grandmother     Heart disease Maternal Grandmother     Heart disease Maternal Grandfather     Patient is unaware of any medical problems Paternal Grandmother     Stroke Paternal Grandfather     Patient is unaware of any medical problems Daughter     Neurologic Disorder Son         charcot-Melisa Tooth    Musculoskeletal Son     Cancer, Breast Neg Hx     Cancer, Colon Neg Hx     Cervical cancer Neg Hx     Cancer, Prostate Neg Hx      Social History     Socioeconomic History    Marital status: /Civil Union     Spouse name: Not on file    Number of children: 2    Years of education: Not on file    Highest education level: Not on file   Occupational History    Occupation:      Comment: Hospice Care   Tobacco Use    Smoking status: Never     Passive exposure: Past    Smokeless tobacco: Never   Vaping Use    Vaping status: never used   Substance and Sexual Activity    Alcohol use: Not Currently     Alcohol/week: 0.0 - 2.0 standard drinks of alcohol     Comment: ON OCCASION    Drug use: No    Sexual activity: Yes     Partners: Male     Birth control/protection: None     Comment:  40 years   Other Topics Concern    Not on file   Social History Narrative    Not on file     Social Determinants of Health     Financial Resource Strain: Low Risk  (6/24/2023)    Financial Resource Strain     Unable to Get: None   Food Insecurity: Not At Risk (6/24/2023)    Food Insecurity     Food Insecurity: Worried or Stressed about Money for Food: Never   Transportation Needs: No Transportation Needs (7/24/2023)    OASIS : Transportation     Lack of Transportation (Medical): No     Lack of Transportation (Non-Medical): No     Patient Unable or Declines to Respond: No   Physical Activity: Not on file   Stress: Not on file   Social Connections: Feeling Somewhat Isolated (7/24/2023)    OASIS : Social Isolation     Frequency of experiencing loneliness or isolation: Sometimes   Interpersonal  Safety: Not on file (6/24/2023)       MEDICATIONS / ALLERGIES:  Current Outpatient Medications   Medication Sig    omeprazole (PriLOSEC) 40 MG capsule Take 1 capsule by mouth in the morning and 1 capsule in the evening.    Calcium Citrate 333 MG Tab Take 333 mg by mouth in the morning and 333 mg in the evening.    atorvastatin (LIPITOR) 10 MG tablet TAKE 1 TABLET BY MOUTH DAILY    furosemide (LASIX) 20 MG tablet TAKE 1 TABLET BY MOUTH DAILY    DULoxetine (CYMBALTA) 60 MG capsule TAKE 1 CAPSULE BY MOUTH DAILY    famotidine (PEPCID) 40 MG tablet TAKE 1 TABLET BY MOUTH DAILY    gabapentin (NEURONTIN) 300 MG capsule TAKE 1 CAPSULE BY MOUTH AT 8 AM, 1 PM, 5 TO 6 PM AND 2 CAPSULES  AT 10 TO 11 PM    losartan (COZAAR) 50 MG tablet TAKE 1 TABLET BY MOUTH DAILY    Qvar RediHaler 40 MCG/ACT inhaler Inhale 40 mcg into the lungs.    albuterol 108 (90 Base) MCG/ACT inhaler Inhale 2 puffs into the lungs every 6 hours as needed.    zoledronic acid (RECLAST) 5 MG/100ML injectable solution Inject 5 mg into the vein 1 time. Pt reports she receives this injection annually.    blood glucose (OneTouch Verio) test strip daily. Test blood sugar 1 time daily. Diagnosis: E11.9. Meter: one touch verio    mometasone (ELOCON) 0.1 % ointment Use 2 times daily for up to 2 weeks per month on both hands and right knee. Can restart as needed for itching.    mupirocin (Bactroban) 2 % ointment Apply topically 3 times daily.    Ascorbic Acid (vitamin C) 500 MG tablet Take 500 mg by mouth daily.    guaifenesin (HUMIBID E) 400 MG Tab Take 600 mg by mouth daily.    ibuprofen (MOTRIN) 200 MG tablet Take 200-400 mg by mouth every 6 hours as needed for Pain. Take 2 tablets twice a day    blood glucose lancets Test blood sugar once daily. Diagnosis: E11.9. Meter: Pharmacist Choice    albuterol (Proventil HFA) 108 (90 Base) MCG/ACT inhaler Inhale 2 puffs into the lungs every 4 hours as needed for Shortness of Breath or Wheezing.    Misc. Devices (Walker) Misc  4 wheel heavy duty walker with a seat for fall prevention.  Diagnosis:  Peripheral neuropathy (G6 2.9)  Reason for walker:  Fall prevention    The patient is overweight 136.3 kg and height is 5'4\"    cetirizine (ZyrTEC) 10 MG tablet Take 10 mg by mouth daily.    Probiotic Product (FLORAJEN3 PO) Take 1 capsule by mouth daily.    vitamin B-12 (CYANOCOBALAMIN) 1000 MCG tablet Take 1 tablet by mouth daily. OTC    cholecalciferol (VITAMIN D3) 1000 UNIT tablet Take 2,000 Units by mouth daily.    Daily Multiple Vitamins TABS Take 1 tablet by mouth daily.     No current facility-administered medications for this visit.     Allergies as of 01/24/2025 - Reviewed 01/24/2025   Allergen Reaction Noted    Juglans Other (See Comments) 11/14/2023    Cat dander PRURITUS     Dog dander PRURITUS     Dust      Dust mite extract Other (See Comments) 11/11/2019    Guinea pig   (animal) Other (See Comments) 11/11/2019    Mold   (environmental) PRURITUS     Molds & smuts Other (See Comments) 11/11/2019    Pollen      Sulfa antibiotics RASH, SWELLING, and Other (See Comments) 11/11/2019    Sulfa drugs cross reactors RASH and SWELLING     Sulfamethoxazole-trimethoprim SWELLING 11/14/2023    Tetracyclines & related RASH and SWELLING     Thimerosal ARTHRALGIA 12/10/2019    Trees PRURITUS and Other (See Comments) 11/11/2019    Walnuts [walnut   (food)] PRURITUS        LAB REVIEW:  Calcium (mg/dL)   Date Value   01/30/2025 10.0   12/13/2024 9.3     Albumin (g/dL)   Date Value   01/30/2025 4.0   07/08/2024 3.9     No results found for: \"PTH\"  Vitamin D, 25-Hydroxy (ng/mL)   Date Value   01/30/2025 49.0     Lab Results   Component Value Date    TSH 1.933 06/22/2023    TSH 1.481 06/23/2022       Results for orders placed in visit on 06/14/23    BD DEXA SCAN AXIAL SKELETON    Narrative  DUAL-ENERGY X-RAY ABSORPTIOMETRY (DXA)    History: 70-year-old white female    Technical:  Precision Data/Least Significan Change:  [facility] [address]  ([facility]ly [facility]  [place])    PRODIGY ADVANCE FULL SIZE    L-Spine: 0.047 g/cm2  Total Hip: 0.036 g/cm2  Femoral Neck: 0.049 g/cm2    Exam Limitations: None.    Comparison: July 5, 2021  * Prior exams performed other facilities have not been cross calibrated  with this DXA unit and are not valid for comparison.    Findings:    Lumbar spine:  (L1-L4):  1.012 g/cm2  T-score:  -1.4  Z-score:  0.3  Lumbar % Change: 17.8, g/cm2 Change: 0.153    Lowest femoral:  Total Right-  0.726 g/cm2  T-score: -2.2  Z-score: -0.8    Femoral % and g/cm2 Change:  Total femoral Mean: 20.5, g/cm2 Change: 0.144    All other bone mineral density measurements and statistics are listed in  the computer generated report available in [employer].    The estimated 10-year risk percentage for a major osteoporotic fracture is  N/A% and for hip fracture is N/A%.    Left Radius 33%  BMD (g/cm square): 0.487  T-score:                      -3.1  Z-score:                      -1.2  % change: 6.5; 0.030 g/cm squared    Impression  1. Osteoporosis. Bone mineral density has significantly increased in both  lumbar spine and hips.  2. Optimize dietary intake of vitamin D and calcium.  3. Consider repeat bone mineral density testing in 2 years, as clinically  appropriate.    People with diagnosed cases of osteoporosis or at high risk for fracture  should have regular bone mineral density tests. For patients eligible for  Medicare, routine testing is allowed once every 2 years. The testing  frequency may be increased for patients who have rapidly progressing  disease, those who are receiving or discontinuing medical therapy to  restore bone mass, or have additional risk factors. Check with regional  carriers for more information.    WHO Criteria Statement:  NORMAL:  At or above -1.0 S.D. below peak bone density of the referenced  young adult population (T-score).  LOW BONE MASS/OSTEOPENIA:  Between -1.0 and -2.5 S.D. below peak bone  density of the referenced young adult  population.  OSTEOPOROSIS:  At or less than -2.5 S.D. below peak bone density of the  referenced young adult population.    All treatment decisions require clinical judgment and consideration of  individual patient factors, including patient preferences, comorbidities,  previous drug use, risk factors not captured in the FRAX model (e.g.,  frailty, falls, vitamin D deficiency, increased bone turnover, interval  significant decline in bone density) and possible under or overestimation  of fracture risk by FRAX. Secondary causes of bone loss should be evaluated  since the etiology of low bone mass cannot be determined by BMD  measurement.    National Osteoporosis Foundation Criteria for Consideration of  Pharmacologic Therapy: NOF recommends FDA approved medical therapies be  considered in postmenopausal women and men age  50 years with:  1.  Hip or vertebral (clinical or morphometric) fracture.  2.  T-score of -2.5 at the spine or hip.  3.  Ten year fracture probability by FRAX of 3% for hip fracture or  20% for major osteoporotic fracture.    Electronically Signed by: Mellissa Estrella MD  Signed on: 6/21/2023 9:05 AM  Workstation ID: [id number]      PHYSICAL EXAM  Visit Vitals  /78 (BP Location: RUE - Right upper extremity, Patient Position: Sitting, Cuff Size: Large Adult)   Pulse 90   Ht 5' 2.61\" (1.59 m)   Wt 135.7 kg (299 lb 3.2 oz)   BMI 53.66 kg/m²    , Body mass index is 53.66 kg/m².  HEENT:  Pupils are equal and reactive to light and accommodation.  Extraocular muscles are intact.  There is no lid lag.  There is no stare or proptosis.  Atraumatic.  NECK:  Supple.  Thyroid is not palpable.  There are no neck masses palpable.  There is no cervical or supraclavicular lymphadenopathy.  Trachea is midline.  There is no JVD (jugular venous distention).  LUNGS:  Bilaterally clear to auscultation.  Unlabored breathing.  HEART:  Regular rate and rhythm.  No S3 or murmur.  Bilateral peripheral pulses are  palpable.  ABDOMEN:  Soft.  No tenderness.  EXTREMITIES:  No tremors.  Bilateral peripheral pulses palpable.  No pedal edema.  NEUROLOGIC:  No focal motor or sensory deficit.  SKIN:  No rash.  Normal.  Moist.  Good turgor.  MUSCULOSKELETAL:  Normal range of motion of all joints.    ASSESSMENT:  Postmenopausal osteoporosis  Notes decrease in height. Had fracture in her left upper arm  06/27/2023, underwent surgery at [facility], where a andreas and screws were inserted into her humerus. Despite physical therapy, continues to experience pain in her arm. Initially saw Dr. Michael, who performed the surgery, saught a second opinion from Dr. Connor, who advised against additional surgery due to the proximity of the fracture to a major nerve. Instead, he recommended an ultrasound and MRI, which revealed that the bone was not healing properly. Then referred to Dr. Cabello, a trauma specialist, who suggested a noninvasive treatment involving an Exogen machine, which uses ultrasound to stimulate bone growth. She has a follow-up appointment scheduled with Dr. Cabello in February 2025. History of short-term steroid use. She is on Cymbalta. On omeprazole and famotidine for reflux .  History regular menstrual cycles and went through menopause around the age of 45. Had done chair exercises at home but has stopped due to pain. Not on her feet for more than 30 minutes at a time and considers herself sedentary. Her last bone density test was conducted on 06/14/2023, lumbar spine: (L1-L4): 1.012 g/cm2, T-score -1.4. Lowest femoral: Total Right-0.726 g/cm2, T-score: -2.2. Left Radius 33%; 0.487, T-score: -3.1.  Alendronate from December 2021 - February 2024.  First Reclast on 04/04/2024. Taking calcium supplements 315 mg twice daily and vitamin D2 1000 international units daily.  Gastroesophageal reflux disease without esophagitis On omeprazole and famotidine for reflux.         PLAN:  Rule out secondary causes of osteoporosis.   I  explained that in osteoporosis, the rate of bone breakdown, or resorption, exceeds the rate of bone formation.   I discussed the risk factors for osteoporosis including: sedentary lifestyle, diet low in calcium and vitamin D, white race, kidney disease, hyperthyroidism, age, and in women female gender, estrogen deficiency, early menopause.  I discussed the complications of untreated osteoporosis including: fractures, kyphosis, poor bone healing, and loss of motion.   I discussed the diagnostic workup for secondary causes of osteoporosis includin AM cortisol, TSH, PTH, vitamin D to rule out hyperparathyroidism, hyperthyroidism, and corticosteroid excess.   I recommend a well-balanced diet and stress the importance of increasing calcium (1,200 mg daily in the diet) and vitamin D (2,000 iu daily) intake.   I encouraged the patient to participate in weight-bearing exercise such as walking or yoga at least 30 minutes 3 days a week.   I discussed the treatment options for osteoporosis including: Reclast which side effects include osteonecrosis after tooth extraction, joint pain, or muscle aches.   I will schedule Reclast 5 mg infusion once a year PENDING RESULTS OF BONE DENSITY in spring 2025.  I will check labs for for calcium, albumin, vitamin D, creatinine, and PTH.  I explained side effects of Reclast include flu-like syndrome for 2 days.   I advised the patient to take tylenol 500 mg 1 hour prior to infusion and every 6-8 hours for 2 days.  I advised the patient to stay hydrated drinking 8-10 glasses of water daily  I discussed fall prevention in great detail with the patient.  I advised the patient to call the endocrine clinic if the patient has a fracture or if needs a tooth extraction or dental implant.  I advised the patient to tell their dentist they are on Reclast.  I encouraged the patient to participate in weight-bearing exercise such as walking or yoga or [place] at least 30 minutes 3-5 days a week as  tolerated.   To schedule Reclast, call [facility] Therapy department at [phone].    Dietary Sources of Vitamin D   Food Vitamin D Content   Cod Liver oil, 1 tablespoon 1360 units (34 mcg)   Odell (sockeye), cooked, 3oz 794 units (~20 mcg)   Mushrooms that have been exposed to ultraviolet light to increase vitamin D, 3 oz (not yet commonly available) 400 units (10 mcg)   Mackerel, cooked, 3oz 388 units (~10 mcg)   Tuna Fish, canned in water, drained, 3 oz 154 units (~4 mcg)   Milk, nonfat ,reduced fat, and whole vitamin D-fortified, 1 cup ~120 units (3 mcg)   Orange juice fortified with vitamin D, 1 cup 100 units (2.5 mcg)   Margarine, fortified, 1 tablespoon 60 units (1.5 mcg)   Egg, 1 whole (vitamin D is found in yolk) 25 units (~0.6 mcg)     Calcium content of common foods   Food Calcium Content   Milk or yogurt 8oz 300mg   Swiss cheese 1oz 270mg   Cottage cheeese 8oz 125mg   Calcium-fortified cereal 1c 300mg   Calcium-fortified orange juice 1c 270mg   Sardines (6) 290mg   Tofu (firm) 1c 500mg   Broccoli cooked 1c 75mg   Almonds 4oz 300mg         Dietary supplements             Orders Placed This Encounter    BD DEXA AXIAL SKELETON WITH TBS    Albumin    Calcium    Vitamin D -25 Hydroxy    Parathyroid Hormone Intact Without Calcium    Cortisol, AM    Calcium Citrate 333 MG Tab       An extended discussion of the above diagnoses, workup as well as the risk/benefits of the treatment plan was conducted with the patient, who verbalized understanding. All questions were answered. Patient is to call if there are any problems.     I have spent 60 minutes of face to face time with this patient in which greater than 50% of the time was spent in counseling and coordination of care.     The documentation recorded by the scribe accurately and completely reflects the service(s) I personally performed and the decisions made by me.     Opal Zee MD  2/2/2025

## 2025-02-11 ENCOUNTER — RX RENEWAL (OUTPATIENT)
Age: 80
End: 2025-02-11

## 2025-02-17 NOTE — ED ADULT NURSE NOTE - NURSING NEURO LEVEL OF CONSCIOUSNESS
Summary  ID: [id number]  Patient: Sarah Hidalgo [[id number]]     Procedure: OPEN ACCESS COLONOSCOPY Status: Needs Scheduling   Requested appt date:  Authorizing: Babita Burger MD in [facility]           Priority: Routine               Diagnosis: Colon cancer screening [Z12.11]       Request History    Action Date and Time User Details   Request Created 02/17/2025 14:23 Babita Burger MD Appointment Encounter: Details          follows commands/alert and awake

## 2025-02-19 ENCOUNTER — RX RENEWAL (OUTPATIENT)
Age: 80
End: 2025-02-19

## 2025-03-17 ENCOUNTER — NON-APPOINTMENT (OUTPATIENT)
Age: 80
End: 2025-03-17

## 2025-03-31 ENCOUNTER — APPOINTMENT (OUTPATIENT)
Dept: INTERNAL MEDICINE | Facility: CLINIC | Age: 80
End: 2025-03-31
Payer: MEDICARE

## 2025-03-31 VITALS
DIASTOLIC BLOOD PRESSURE: 74 MMHG | SYSTOLIC BLOOD PRESSURE: 148 MMHG | RESPIRATION RATE: 16 BRPM | HEART RATE: 76 BPM | OXYGEN SATURATION: 96 % | HEIGHT: 67 IN

## 2025-03-31 VITALS — SYSTOLIC BLOOD PRESSURE: 126 MMHG | DIASTOLIC BLOOD PRESSURE: 76 MMHG

## 2025-03-31 DIAGNOSIS — I10 ESSENTIAL (PRIMARY) HYPERTENSION: ICD-10-CM

## 2025-03-31 DIAGNOSIS — E66.9 OVERWEIGHT: ICD-10-CM

## 2025-03-31 DIAGNOSIS — E11.49 TYPE 2 DIABETES MELLITUS WITH OTHER DIABETIC NEUROLOGICAL COMPLICATION: ICD-10-CM

## 2025-03-31 DIAGNOSIS — E78.5 HYPERLIPIDEMIA, UNSPECIFIED: ICD-10-CM

## 2025-03-31 DIAGNOSIS — E66.3 OVERWEIGHT: ICD-10-CM

## 2025-03-31 PROCEDURE — G2211 COMPLEX E/M VISIT ADD ON: CPT

## 2025-03-31 PROCEDURE — 99214 OFFICE O/P EST MOD 30 MIN: CPT

## 2025-04-03 ENCOUNTER — NON-APPOINTMENT (OUTPATIENT)
Age: 80
End: 2025-04-03

## 2025-04-06 LAB
25(OH)D3 SERPL-MCNC: 25.4 NG/ML
ALBUMIN SERPL ELPH-MCNC: 3.8 G/DL
ALP BLD-CCNC: 176 U/L
ALT SERPL-CCNC: 26 U/L
ANION GAP SERPL CALC-SCNC: 11 MMOL/L
AST SERPL-CCNC: 25 U/L
BASOPHILS # BLD AUTO: 0.03 K/UL
BASOPHILS NFR BLD AUTO: 0.4 %
BILIRUB SERPL-MCNC: 0.3 MG/DL
BUN SERPL-MCNC: 43 MG/DL
CALCIUM SERPL-MCNC: 9.1 MG/DL
CHLORIDE SERPL-SCNC: 102 MMOL/L
CHOLEST SERPL-MCNC: 155 MG/DL
CK SERPL-CCNC: 22 U/L
CO2 SERPL-SCNC: 24 MMOL/L
CREAT SERPL-MCNC: 1.43 MG/DL
EGFRCR SERPLBLD CKD-EPI 2021: 37 ML/MIN/1.73M2
EOSINOPHIL # BLD AUTO: 0.64 K/UL
EOSINOPHIL NFR BLD AUTO: 7.6 %
ESTIMATED AVERAGE GLUCOSE: 177 MG/DL
GLUCOSE SERPL-MCNC: 160 MG/DL
HBA1C MFR BLD HPLC: 7.8 %
HCT VFR BLD CALC: 32.4 %
HDLC SERPL-MCNC: 56 MG/DL
HGB BLD-MCNC: 10 G/DL
IMM GRANULOCYTES NFR BLD AUTO: 0.6 %
LDLC SERPL-MCNC: 76 MG/DL
LYMPHOCYTES # BLD AUTO: 2.35 K/UL
LYMPHOCYTES NFR BLD AUTO: 27.9 %
MAN DIFF?: NORMAL
MCHC RBC-ENTMCNC: 27.2 PG
MCHC RBC-ENTMCNC: 30.9 G/DL
MCV RBC AUTO: 88 FL
MONOCYTES # BLD AUTO: 0.59 K/UL
MONOCYTES NFR BLD AUTO: 7 %
NEUTROPHILS # BLD AUTO: 4.75 K/UL
NEUTROPHILS NFR BLD AUTO: 56.5 %
NONHDLC SERPL-MCNC: 99 MG/DL
PLATELET # BLD AUTO: 309 K/UL
POTASSIUM SERPL-SCNC: 5.5 MMOL/L
PROT SERPL-MCNC: 8.8 G/DL
RBC # BLD: 3.68 M/UL
RBC # FLD: 16 %
SODIUM SERPL-SCNC: 137 MMOL/L
TRIGL SERPL-MCNC: 133 MG/DL
TSH SERPL-ACNC: 2.64 UIU/ML
WBC # FLD AUTO: 8.41 K/UL

## 2025-04-15 ENCOUNTER — RX RENEWAL (OUTPATIENT)
Age: 80
End: 2025-04-15

## 2025-05-28 NOTE — PATIENT PROFILE ADULT - HAS THE PATIENT BEEN ADMITTED FROM SHORT TERM REHAB?
In an effort to ensure that our patients [employer], a Team Member has reviewed your chart and identified an opportunity to provide the best care possible. An attempt was made to discuss or schedule due or overdue Preventive or Chronic Condition care.Care Gaps identified: Wellness Visits.    The Outcome was Contact was not made, left message. We are attempting to schedule a yearly wellness visit. If you have any questions or need help with scheduling, contact your primary care provider..   Type of Appointment needed: Medicare Wellness Visit    no

## 2025-06-17 ENCOUNTER — RX RENEWAL (OUTPATIENT)
Age: 80
End: 2025-06-17

## 2025-09-03 ENCOUNTER — TRANSCRIPTION ENCOUNTER (OUTPATIENT)
Age: 80
End: 2025-09-03

## 2025-09-04 ENCOUNTER — RESULT REVIEW (OUTPATIENT)
Age: 80
End: 2025-09-04

## 2025-09-10 ENCOUNTER — TRANSCRIPTION ENCOUNTER (OUTPATIENT)
Age: 80
End: 2025-09-10

## 2025-09-17 ENCOUNTER — APPOINTMENT (OUTPATIENT)
Dept: INTERNAL MEDICINE | Facility: CLINIC | Age: 80
End: 2025-09-17
Payer: MEDICARE

## 2025-09-17 VITALS
OXYGEN SATURATION: 90 % | HEIGHT: 67 IN | RESPIRATION RATE: 16 BRPM | DIASTOLIC BLOOD PRESSURE: 68 MMHG | SYSTOLIC BLOOD PRESSURE: 110 MMHG | HEART RATE: 95 BPM

## 2025-09-17 DIAGNOSIS — I10 ESSENTIAL (PRIMARY) HYPERTENSION: ICD-10-CM

## 2025-09-17 DIAGNOSIS — E11.49 TYPE 2 DIABETES MELLITUS WITH OTHER DIABETIC NEUROLOGICAL COMPLICATION: ICD-10-CM

## 2025-09-17 PROCEDURE — 99214 OFFICE O/P EST MOD 30 MIN: CPT

## 2025-09-17 PROCEDURE — 36415 COLL VENOUS BLD VENIPUNCTURE: CPT

## 2025-09-17 PROCEDURE — G0008: CPT

## 2025-09-17 PROCEDURE — 90662 IIV NO PRSV INCREASED AG IM: CPT

## 2025-09-19 ENCOUNTER — NON-APPOINTMENT (OUTPATIENT)
Age: 80
End: 2025-09-19

## 2025-09-19 ENCOUNTER — APPOINTMENT (OUTPATIENT)
Dept: PULMONOLOGY | Facility: CLINIC | Age: 80
End: 2025-09-19
Payer: MEDICARE

## 2025-09-19 VITALS
SYSTOLIC BLOOD PRESSURE: 138 MMHG | WEIGHT: 183 LBS | HEIGHT: 67 IN | RESPIRATION RATE: 16 BRPM | OXYGEN SATURATION: 91 % | TEMPERATURE: 94.7 F | DIASTOLIC BLOOD PRESSURE: 63 MMHG | BODY MASS INDEX: 28.72 KG/M2 | HEART RATE: 62 BPM

## 2025-09-19 DIAGNOSIS — Z82.62 FAMILY HISTORY OF OSTEOPOROSIS: ICD-10-CM

## 2025-09-19 DIAGNOSIS — J44.9 CHRONIC OBSTRUCTIVE PULMONARY DISEASE, UNSPECIFIED: ICD-10-CM

## 2025-09-19 DIAGNOSIS — F41.9 ANXIETY DISORDER, UNSPECIFIED: ICD-10-CM

## 2025-09-19 DIAGNOSIS — E66.3 OVERWEIGHT: ICD-10-CM

## 2025-09-19 DIAGNOSIS — E55.9 VITAMIN D DEFICIENCY, UNSPECIFIED: ICD-10-CM

## 2025-09-19 DIAGNOSIS — Z87.09 PERSONAL HISTORY OF OTHER DISEASES OF THE RESPIRATORY SYSTEM: ICD-10-CM

## 2025-09-19 DIAGNOSIS — G90.09 OTHER IDIOPATHIC PERIPHERAL AUTONOMIC NEUROPATHY: ICD-10-CM

## 2025-09-19 DIAGNOSIS — J96.11 CHRONIC RESPIRATORY FAILURE WITH HYPOXIA: ICD-10-CM

## 2025-09-19 DIAGNOSIS — Z86.018 PERSONAL HISTORY OF OTHER BENIGN NEOPLASM: ICD-10-CM

## 2025-09-19 DIAGNOSIS — I27.21 SECONDARY PULMONARY ARTERIAL HYPERTENSION: ICD-10-CM

## 2025-09-19 DIAGNOSIS — R06.02 SHORTNESS OF BREATH: ICD-10-CM

## 2025-09-19 DIAGNOSIS — Z87.891 PERSONAL HISTORY OF NICOTINE DEPENDENCE: ICD-10-CM

## 2025-09-19 DIAGNOSIS — Z82.69 FAMILY HISTORY OF OTHER DISEASES OF THE MUSCULOSKELETAL SYSTEM AND CONNECTIVE TISSUE: ICD-10-CM

## 2025-09-19 DIAGNOSIS — K21.9 GASTRO-ESOPHAGEAL REFLUX DISEASE W/OUT ESOPHAGITIS: ICD-10-CM

## 2025-09-19 DIAGNOSIS — K74.3 PRIMARY BILIARY CIRRHOSIS: ICD-10-CM

## 2025-09-19 DIAGNOSIS — J98.6 DISORDERS OF DIAPHRAGM: ICD-10-CM

## 2025-09-19 DIAGNOSIS — E78.5 HYPERLIPIDEMIA, UNSPECIFIED: ICD-10-CM

## 2025-09-19 DIAGNOSIS — E11.9 TYPE 2 DIABETES MELLITUS W/OUT COMPLICATIONS: ICD-10-CM

## 2025-09-19 DIAGNOSIS — J98.4 CHRONIC OBSTRUCTIVE PULMONARY DISEASE, UNSPECIFIED: ICD-10-CM

## 2025-09-19 DIAGNOSIS — Z83.49 FAMILY HISTORY OF OTHER ENDOCRINE, NUTRITIONAL AND METABOLIC DISEASES: ICD-10-CM

## 2025-09-19 DIAGNOSIS — E66.9 OVERWEIGHT: ICD-10-CM

## 2025-09-19 DIAGNOSIS — Z72.820 SLEEP DEPRIVATION: ICD-10-CM

## 2025-09-19 PROCEDURE — 95012 NITRIC OXIDE EXP GAS DETER: CPT

## 2025-09-23 LAB
ALBUMIN SERPL ELPH-MCNC: 3.2 G/DL
ALP BLD-CCNC: 164 U/L
ALT SERPL-CCNC: 21 U/L
ANION GAP SERPL CALC-SCNC: 16 MMOL/L
AST SERPL-CCNC: 27 U/L
BASOPHILS # BLD AUTO: 0.02 K/UL
BASOPHILS NFR BLD AUTO: 0.3 %
BILIRUB SERPL-MCNC: 0.5 MG/DL
BUN SERPL-MCNC: 53 MG/DL
CALCIUM SERPL-MCNC: 8.9 MG/DL
CHLORIDE SERPL-SCNC: 106 MMOL/L
CO2 SERPL-SCNC: 20 MMOL/L
CREAT SERPL-MCNC: 1.85 MG/DL
EGFRCR SERPLBLD CKD-EPI 2021: 27 ML/MIN/1.73M2
EOSINOPHIL # BLD AUTO: 0.1 K/UL
EOSINOPHIL NFR BLD AUTO: 1.3 %
GLUCOSE SERPL-MCNC: 105 MG/DL
HCT VFR BLD CALC: 28.3 %
HGB BLD-MCNC: 8.5 G/DL
IMM GRANULOCYTES NFR BLD AUTO: 0.4 %
LYMPHOCYTES # BLD AUTO: 1.33 K/UL
LYMPHOCYTES NFR BLD AUTO: 17.6 %
MAN DIFF?: NORMAL
MCHC RBC-ENTMCNC: 27.3 PG
MCHC RBC-ENTMCNC: 30 G/DL
MCV RBC AUTO: 91 FL
MONOCYTES # BLD AUTO: 0.61 K/UL
MONOCYTES NFR BLD AUTO: 8.1 %
NEUTROPHILS # BLD AUTO: 5.48 K/UL
NEUTROPHILS NFR BLD AUTO: 72.3 %
PLATELET # BLD AUTO: 326 K/UL
POTASSIUM SERPL-SCNC: 4.2 MMOL/L
PROT SERPL-MCNC: 7.9 G/DL
RBC # BLD: 3.11 M/UL
RBC # FLD: 15.6 %
SODIUM SERPL-SCNC: 142 MMOL/L
WBC # FLD AUTO: 7.57 K/UL

## 2025-09-23 RX ORDER — UMECLIDINIUM BROMIDE AND VILANTEROL TRIFENATATE 62.5; 25 UG/1; UG/1
62.5-25 POWDER RESPIRATORY (INHALATION)
Qty: 1 | Refills: 3 | Status: ACTIVE | COMMUNITY
Start: 2025-09-19 | End: 1900-01-01